# Patient Record
Sex: FEMALE | Race: OTHER | Employment: UNEMPLOYED | URBAN - METROPOLITAN AREA
[De-identification: names, ages, dates, MRNs, and addresses within clinical notes are randomized per-mention and may not be internally consistent; named-entity substitution may affect disease eponyms.]

---

## 2017-01-04 ENCOUNTER — TELEPHONE (OUTPATIENT)
Dept: OBGYN CLINIC | Facility: CLINIC | Age: 37
End: 2017-01-04

## 2017-01-04 NOTE — TELEPHONE ENCOUNTER
Pt requesting to know when her sterilization was. Informed pt that she had her sterilization on June 8, 2016. Per pt will come in today to sign the consent form for her TANIA.

## 2017-01-09 ENCOUNTER — OFFICE VISIT (OUTPATIENT)
Dept: FAMILY MEDICINE CLINIC | Facility: CLINIC | Age: 37
End: 2017-01-09

## 2017-01-09 VITALS
WEIGHT: 155 LBS | SYSTOLIC BLOOD PRESSURE: 118 MMHG | TEMPERATURE: 98 F | DIASTOLIC BLOOD PRESSURE: 72 MMHG | BODY MASS INDEX: 26 KG/M2

## 2017-01-09 DIAGNOSIS — G44.209 ACUTE NON INTRACTABLE TENSION-TYPE HEADACHE: Primary | ICD-10-CM

## 2017-01-09 DIAGNOSIS — K21.9 GASTROESOPHAGEAL REFLUX DISEASE WITHOUT ESOPHAGITIS: ICD-10-CM

## 2017-01-09 PROCEDURE — 99214 OFFICE O/P EST MOD 30 MIN: CPT | Performed by: FAMILY MEDICINE

## 2017-01-09 PROCEDURE — 96372 THER/PROPH/DIAG INJ SC/IM: CPT | Performed by: FAMILY MEDICINE

## 2017-01-09 PROCEDURE — 99212 OFFICE O/P EST SF 10 MIN: CPT | Performed by: FAMILY MEDICINE

## 2017-01-09 RX ORDER — KETOROLAC TROMETHAMINE 10 MG/1
10 TABLET, FILM COATED ORAL EVERY 12 HOURS PRN
Qty: 10 TABLET | Refills: 0 | Status: SHIPPED | OUTPATIENT
Start: 2017-01-09 | End: 2017-03-21

## 2017-01-09 RX ORDER — KETOROLAC TROMETHAMINE 30 MG/ML
30 INJECTION, SOLUTION INTRAMUSCULAR; INTRAVENOUS ONCE
Status: COMPLETED | OUTPATIENT
Start: 2017-01-09 | End: 2017-01-09

## 2017-01-09 RX ORDER — FAMOTIDINE 40 MG/1
40 TABLET, FILM COATED ORAL DAILY
Qty: 60 TABLET | Refills: 0 | Status: SHIPPED | OUTPATIENT
Start: 2017-01-09 | End: 2017-03-21

## 2017-01-09 RX ADMIN — KETOROLAC TROMETHAMINE 30 MG: 30 INJECTION, SOLUTION INTRAMUSCULAR; INTRAVENOUS at 13:51:00

## 2017-01-09 NOTE — PROGRESS NOTES
1/9/2017  1:29 PM    Lorne Mcdermott is a 31year old female. Chief complaint(s): Patient presents with:   Follow - Up  Headache: pt states headaches have gotten worse since last visit  Fatigue    HPI:     Lorne Mcdermott primary complaint is regarding h OTHER SURGICAL HISTORY  2007    Comment Cysts removed    OTHER SURGICAL HISTORY      Comment arthrocentesis of the right shoulder subacromial space    OTHER SURGICAL HISTORY  06/08/16    Comment laparoscopy, filshie clip sterilization of left fallopian Allergies:    Sulfa Antibiotics       Hives, Rash      ROS:   Review of Systems   Constitutional: Positive for fatigue. Negative for fever and chills. HENT: Negative for ear pain and sore throat. Respiratory: Negative for cough and wheezing. Result Value Ref Range   Case Report Surgical Pathology                                Case: [id number]                                  Authorizing Provider:  Eleazar Gosselin, MD    Collected:           10/28/2016                   Ordering Location Note: Due to nature of specimen, it may not survive processing.    Laura Sylvester)  Sharlene Diego M.D./[facility] REMOVED]    Interpretation Benign      EKG / Spirometry : -     Radiology: No results found. -    ASSESSMENT/PLAN:   Assessment  Acute non intracta doctor with any new symptoms or medications' side effects.       FOLLOW-UP: Schedule a follow-up visit in prn.        2. Gastroesophageal reflux disease without esophagitis       MEDICATIONS:   Signed Prescriptions Disp Refills    famotidine 40 MG Oral Tab

## 2017-01-23 ENCOUNTER — OFFICE VISIT (OUTPATIENT)
Dept: OBGYN CLINIC | Facility: CLINIC | Age: 37
End: 2017-01-23

## 2017-01-23 VITALS
TEMPERATURE: 98 F | HEART RATE: 90 BPM | BODY MASS INDEX: 26 KG/M2 | WEIGHT: 156 LBS | DIASTOLIC BLOOD PRESSURE: 80 MMHG | SYSTOLIC BLOOD PRESSURE: 127 MMHG

## 2017-01-23 DIAGNOSIS — R10.2 CHRONIC PELVIC PAIN IN FEMALE: Primary | ICD-10-CM

## 2017-01-23 DIAGNOSIS — N80.9 ENDOMETRIOSIS: ICD-10-CM

## 2017-01-23 DIAGNOSIS — G89.29 CHRONIC PELVIC PAIN IN FEMALE: Primary | ICD-10-CM

## 2017-01-23 DIAGNOSIS — D25.9 UTERINE LEIOMYOMA, UNSPECIFIED LOCATION: ICD-10-CM

## 2017-01-23 PROCEDURE — 99244 OFF/OP CNSLTJ NEW/EST MOD 40: CPT | Performed by: OBSTETRICS & GYNECOLOGY

## 2017-01-23 NOTE — PROGRESS NOTES
HPI:   Letty Sharma is a 31year old female who presents for a hx of chronic pelvic pain/endometriosis/fibroid uterus,  Pt to sign consent form for medicaid today a hysterectomy, pt counseled extesnively on options of management, including medical manag (VITAMIN B 12) 250 MCG Oral Lozenge Take 1 tablet by mouth daily. Disp: 100 lozenge Rfl: 1   acetaminophen (TYLENOL) 325 MG Oral Tab Take 325 mg by mouth every 6 (six) hours as needed for Pain.  Disp:  Rfl:    Ketorolac Tromethamine 10 MG Oral Tab Take 1 ta or ST  LUNGS: denies shortness of breath with exertion  CARDIOVASCULAR: denies chest pain on exertion  GI: denies abdominal pain,denies heartburn  : denies dysuria, vaginal discharge or itching,periods regular   MUSCULOSKELETAL: denies back pain  NEURO:

## 2017-01-25 ENCOUNTER — TELEPHONE (OUTPATIENT)
Dept: OBGYN CLINIC | Facility: CLINIC | Age: 37
End: 2017-01-25

## 2017-01-25 NOTE — TELEPHONE ENCOUNTER
Pt is requesting a dr's note stating she will be off from work due to a gyne surgical procedure, ( not sure how much time off  she needs  ) and would like to know the difference between the 2 surgeries?    also has been feeling lots  of pain and pressure on her vaginal are and back when she is sitting down

## 2017-01-28 NOTE — TELEPHONE ENCOUNTER
Called pt , left message. Pt is considering between a Total Abdominal Hysterectomy vs a Davinci Robotic Total hysterectomy.

## 2017-01-31 NOTE — TELEPHONE ENCOUNTER
Pt counseled, pt wants a Total Abdominal Hysterectomy, possible bilateral salpingoophorectomy, possible bilateral salpingectomy. Pt requesting beginning of Mar 2017.

## 2017-02-07 NOTE — TELEPHONE ENCOUNTER
Left message on home number to call office. Unable to leave message on cell number. Need to inquire if patient signed consent form for Hysterectomy due to having IPA.

## 2017-02-10 NOTE — TELEPHONE ENCOUNTER
Patient is scheduled for surgery 3/8/17 at 7:30am for TANIA poss BSO, poss bilateral salpingectomy with Dr. Magnus Jimenes, assist pending. Form faxed to surg sched and placed in book. Consent for TANIA IPA was signed 1/17/2016.

## 2017-02-10 NOTE — TELEPHONE ENCOUNTER
Patient informed consent was signed 1/7/16 will call patient with dates, per pt requesting march surgery.

## 2017-02-10 NOTE — TELEPHONE ENCOUNTER
Called patient to inform surgery date and time, patient stated she had time to think about  and does not have any at this time. Patient stated she will call to reschedule for another date possible April or until she is able to find care for her children. Surgery form faxed to surg sched dept stating cancelled per pt request today verbal over phone. Routed to Dr. Jack Barker for Camelia Kat.

## 2017-02-13 ENCOUNTER — TELEPHONE (OUTPATIENT)
Dept: OBGYN CLINIC | Facility: CLINIC | Age: 37
End: 2017-02-13

## 2017-02-13 DIAGNOSIS — G89.29 CHRONIC FEMALE PELVIC PAIN: ICD-10-CM

## 2017-02-13 DIAGNOSIS — D25.9 LEIOMYOMA OF UTERUS, UNSPECIFIED: Primary | ICD-10-CM

## 2017-02-13 DIAGNOSIS — R10.2 CHRONIC FEMALE PELVIC PAIN: ICD-10-CM

## 2017-02-13 DIAGNOSIS — N80.9 ENDOMETRIOSIS: ICD-10-CM

## 2017-02-13 NOTE — TELEPHONE ENCOUNTER
PT IS CALLING TO CONFIRM  HER APPT WAS FOR 03/08/2017 OR ARE ANY OTHER  DATES AVAILABLE ? PT IS AWARE SHE WILL BE STAYING AT [facility] 3 DAYS CAN HER 15YEAR O[address] ?

## 2017-02-13 NOTE — TELEPHONE ENCOUNTER
Patient is interested in surgery 4/05/2017 due to having enough planning with childcare. Informed patient will call tomorrow with time available. Patient agreed and verbalized understanding.

## 2017-02-15 NOTE — TELEPHONE ENCOUNTER
Dr. Patel Barcenas, patient is scheduled for Total abdominal hysterectomy, poss bilateral salpingo oophorectomy, poss bilateral salpingectomy for 4/05/2017 at 7:30am [facility], assist is pending. Form faxed to surg sched and put in surg book.      Patient informed of d

## 2017-02-16 NOTE — TELEPHONE ENCOUNTER
Yes, thank you Dr. Bisi Gomez. Placed in surg book and updated form and faxed. Routed to Dr. Rosemarie Moody for [address].

## 2017-02-21 NOTE — TELEPHONE ENCOUNTER
Patient calling to confirm date of surgery to inform her work for any paper work to be filled. Per pt will call back if note needed for work and or papers filled out. Patient agreed and verbalized understanding.

## 2017-03-02 ENCOUNTER — OFFICE VISIT (OUTPATIENT)
Dept: FAMILY MEDICINE CLINIC | Facility: CLINIC | Age: 37
End: 2017-03-02

## 2017-03-02 VITALS
TEMPERATURE: 98 F | WEIGHT: 161 LBS | SYSTOLIC BLOOD PRESSURE: 118 MMHG | HEIGHT: 65 IN | DIASTOLIC BLOOD PRESSURE: 79 MMHG | BODY MASS INDEX: 26.82 KG/M2 | HEART RATE: 65 BPM

## 2017-03-02 DIAGNOSIS — J06.9 UPPER RESPIRATORY TRACT INFECTION, UNSPECIFIED TYPE: ICD-10-CM

## 2017-03-02 DIAGNOSIS — M26.609 TMJ DYSFUNCTION: Primary | ICD-10-CM

## 2017-03-02 DIAGNOSIS — G44.209 TENSION HEADACHE: ICD-10-CM

## 2017-03-02 PROCEDURE — 99214 OFFICE O/P EST MOD 30 MIN: CPT | Performed by: FAMILY MEDICINE

## 2017-03-02 PROCEDURE — 99212 OFFICE O/P EST SF 10 MIN: CPT | Performed by: FAMILY MEDICINE

## 2017-03-02 RX ORDER — MONTELUKAST SODIUM 10 MG/1
10 TABLET ORAL DAILY
Qty: 30 TABLET | Refills: 0 | Status: SHIPPED | OUTPATIENT
Start: 2017-03-02 | End: 2017-03-29

## 2017-03-02 RX ORDER — ACETAMINOPHEN AND CODEINE PHOSPHATE 120; 12 MG/5ML; MG/5ML
SOLUTION ORAL
COMMUNITY
Start: 2017-02-06 | End: 2017-03-29

## 2017-03-02 RX ORDER — CYCLOBENZAPRINE HCL 10 MG
10 TABLET ORAL NIGHTLY
Qty: 20 TABLET | Refills: 0 | Status: SHIPPED | OUTPATIENT
Start: 2017-03-02 | End: 2017-03-22

## 2017-03-02 RX ORDER — NAPROXEN 500 MG/1
500 TABLET ORAL 2 TIMES DAILY WITH MEALS
Qty: 42 TABLET | Refills: 0 | Status: SHIPPED | OUTPATIENT
Start: 2017-03-02 | End: 2017-03-21

## 2017-03-02 NOTE — PROGRESS NOTES
Patient ID: Bri Vega is a 30year old female. HPI  Patient presents with:  Headache  Dizziness    She states this started 4 days ago with headache on both ears that goes up to the parietal region of her scalp.   She states she does work the third Disp:  Rfl:      Allergies:  Sulfa Antibiotics       Hives, Rash   PHYSICAL EXAM:   Physical Exam   Constitutional: She is oriented to person, place, and time. She appears well-developed and well-nourished. No distress. HENT:   Head: Normocephalic.    Rig NTI.        Try and reduce your stress as well.  agrees. Can put ice pack on the back of the neck which can also help. Tension headache  -     naproxen 500 MG Oral Tab; Take 1 tablet (500 mg total) by mouth 2 (two) times daily with meals.   -

## 2017-03-02 NOTE — PATIENT INSTRUCTIONS
You have bilateral TMJ dysfunction (temporomandibular joint). Do warm compresses 3 times daily for 5 minutes each time and take medicine as directed. Avoid chewing tough meats, bagels, and gum excessively.   May need to see dentist for night mouthguard if

## 2017-03-20 ENCOUNTER — TELEPHONE (OUTPATIENT)
Dept: OBGYN CLINIC | Facility: CLINIC | Age: 37
End: 2017-03-20

## 2017-03-20 NOTE — TELEPHONE ENCOUNTER
PT HAS SURGERY SCHEDULED FOR 4-5-17  NEEDS A NOTE FOR WORK STATING DATE OF SURGERY; AND HOW MANY DAYS PT WILL BE OFF WORK  PLEASE FAX

## 2017-03-21 ENCOUNTER — OFFICE VISIT (OUTPATIENT)
Dept: FAMILY MEDICINE CLINIC | Facility: CLINIC | Age: 37
End: 2017-03-21

## 2017-03-21 VITALS
TEMPERATURE: 98 F | SYSTOLIC BLOOD PRESSURE: 138 MMHG | DIASTOLIC BLOOD PRESSURE: 80 MMHG | BODY MASS INDEX: 27 KG/M2 | HEART RATE: 74 BPM | WEIGHT: 161 LBS

## 2017-03-21 DIAGNOSIS — G44.209 TENSION HEADACHE: Primary | ICD-10-CM

## 2017-03-21 PROCEDURE — 99214 OFFICE O/P EST MOD 30 MIN: CPT | Performed by: FAMILY MEDICINE

## 2017-03-21 PROCEDURE — 99212 OFFICE O/P EST SF 10 MIN: CPT | Performed by: FAMILY MEDICINE

## 2017-03-21 NOTE — PROGRESS NOTES
3/21/2017  11:15 AM    Carlee Avalos is a 30year old female. Chief complaint(s): Patient presents with: Follow - Up  Headache    HPI:     Carlee Avalos primary complaint is regarding headache.      Patient is a 30-year-old female who presents compl Types: Cigarettes    Smokeless Status: Former User                       Comment: Smokes 1-5 cigarettes daily    Alcohol Use: No                 Immunizations: There is no immunization history on file for this patient.     Medications (Active lb (73.029 kg)  LMP 03/02/2017 (Exact Date)  Breastfeeding? No   HENT:   Head: Normocephalic. Right Ear: Tympanic membrane, external ear and ear canal normal.   Left Ear: Tympanic membrane, external ear and ear canal normal.   Nose: No rhinorrhea.    Mout

## 2017-03-22 NOTE — TELEPHONE ENCOUNTER
Pt called and made aware zaria,t per Dr Triana Standard, pt would be off work for 6-8 weeks. Pt also made aware note for time off work faxed to [phone] attention to [facility]. Pt verbalizes understanding.  [phone] used to interpret for cl

## 2017-03-22 NOTE — TELEPHONE ENCOUNTER
PER PT SHE WOULD LIKE TO KNOW IF [facility] NOTE WAS FAX TO HER JOB, PER PT DR AHUJA SAID SHE WILL BE OUT OF WORK FOR 8 WEEKS

## 2017-03-27 NOTE — TELEPHONE ENCOUNTER
Per pt her  has not received the dr's note, or received it by mail, pt would like to  a copy at the [facility]

## 2017-03-28 NOTE — TELEPHONE ENCOUNTER
Pt is returning nurse call, pt would like to know if the note is ready for pick at the [facility] location ?

## 2017-03-29 ENCOUNTER — TELEPHONE (OUTPATIENT)
Dept: OBGYN CLINIC | Facility: CLINIC | Age: 37
End: 2017-03-29

## 2017-03-29 RX ORDER — FAMOTIDINE 20 MG/1
40 TABLET ORAL DAILY
COMMUNITY
End: 2017-06-02

## 2017-03-29 NOTE — TELEPHONE ENCOUNTER
Referral was authorized in [address] [place] [address] under referral tab dated 2/15/17. Called Ming and left him a VM.

## 2017-03-29 NOTE — TELEPHONE ENCOUNTER
EMILIE CALLING FROM [facility] REGARDING PRIOR AUTHORIZATION / PT SCHEDULE FOR SURGERY ON 04/05/17 / PT HAS CARMINA RIVERO / [place] / [place] ADV

## 2017-03-30 ENCOUNTER — LAB ENCOUNTER (OUTPATIENT)
Dept: LAB | Age: 37
End: 2017-03-30
Attending: OBSTETRICS & GYNECOLOGY
Payer: MEDICAID

## 2017-03-30 DIAGNOSIS — D25.9 UTERINE LEIOMYOMA, UNSPECIFIED LOCATION: ICD-10-CM

## 2017-03-30 DIAGNOSIS — R10.2 PELVIC PAIN IN FEMALE: ICD-10-CM

## 2017-03-30 DIAGNOSIS — N80.9 ENDOMETRIOSIS: ICD-10-CM

## 2017-03-30 PROCEDURE — 36415 COLL VENOUS BLD VENIPUNCTURE: CPT

## 2017-03-30 PROCEDURE — 85025 COMPLETE CBC W/AUTO DIFF WBC: CPT

## 2017-03-30 PROCEDURE — 86901 BLOOD TYPING SEROLOGIC RH(D): CPT

## 2017-03-30 PROCEDURE — 86850 RBC ANTIBODY SCREEN: CPT

## 2017-03-30 PROCEDURE — 86900 BLOOD TYPING SEROLOGIC ABO: CPT

## 2017-03-30 NOTE — TELEPHONE ENCOUNTER
Left message on Rich v/m to call office. Called [facility] patient is approved Cori Eisenberg number is #[id number] Spoke to [place] and [place].

## 2017-03-31 ENCOUNTER — TELEPHONE (OUTPATIENT)
Dept: OBGYN CLINIC | Facility: CLINIC | Age: 37
End: 2017-03-31

## 2017-04-01 NOTE — TELEPHONE ENCOUNTER
Pt will re fax the fmla forms today, pls call pt when is ready for , pt's job would like the pt to drop them off personally

## 2017-04-03 NOTE — TELEPHONE ENCOUNTER
Per pt the NICANOR at the [facility] location faxed the forms to the [facility]

## 2017-04-03 NOTE — TELEPHONE ENCOUNTER
Per pt she has been faxing to [phone] and was not able to go thought, she will be drooping them at at the [facility] location , pls note once the forms are completed to have the pt pick then up at the [facility] office she needs to turn them in in person at her job

## 2017-04-03 NOTE — TELEPHONE ENCOUNTER
Pt will be dropping papers off at the [facility] location. Pt to  forms once forms filled out per staff.

## 2017-04-05 ENCOUNTER — ANESTHESIA EVENT (OUTPATIENT)
Dept: SURGERY | Facility: HOSPITAL | Age: 37
DRG: 743 | End: 2017-04-05
Payer: MEDICAID

## 2017-04-05 ENCOUNTER — HOSPITAL ENCOUNTER (INPATIENT)
Facility: HOSPITAL | Age: 37
LOS: 2 days | Discharge: HOME | DRG: 743 | End: 2017-04-07
Attending: OBSTETRICS & GYNECOLOGY | Admitting: OBSTETRICS & GYNECOLOGY
Payer: MEDICAID

## 2017-04-05 ENCOUNTER — SURGERY (OUTPATIENT)
Age: 37
End: 2017-04-05

## 2017-04-05 ENCOUNTER — ANESTHESIA (OUTPATIENT)
Dept: SURGERY | Facility: HOSPITAL | Age: 37
DRG: 743 | End: 2017-04-05
Payer: MEDICAID

## 2017-04-05 DIAGNOSIS — N80.9 ENDOMETRIOSIS: ICD-10-CM

## 2017-04-05 DIAGNOSIS — D25.9 UTERINE LEIOMYOMA, UNSPECIFIED LOCATION: Primary | ICD-10-CM

## 2017-04-05 DIAGNOSIS — R10.2 PELVIC PAIN IN FEMALE: ICD-10-CM

## 2017-04-05 PROCEDURE — 0UT70ZZ RESECTION OF BILATERAL FALLOPIAN TUBES, OPEN APPROACH: ICD-10-PCS | Performed by: OBSTETRICS & GYNECOLOGY

## 2017-04-05 PROCEDURE — 49203 EXC ABD TUM 5 CM OR LESS: CPT | Performed by: OBSTETRICS & GYNECOLOGY

## 2017-04-05 PROCEDURE — 58150 TOTAL HYSTERECTOMY: CPT | Performed by: OBSTETRICS & GYNECOLOGY

## 2017-04-05 PROCEDURE — 0UT90ZZ RESECTION OF UTERUS, OPEN APPROACH: ICD-10-PCS | Performed by: OBSTETRICS & GYNECOLOGY

## 2017-04-05 PROCEDURE — 0UTC0ZZ RESECTION OF CERVIX, OPEN APPROACH: ICD-10-PCS | Performed by: OBSTETRICS & GYNECOLOGY

## 2017-04-05 RX ORDER — SODIUM CHLORIDE 0.9 % (FLUSH) 0.9 %
10 SYRINGE (ML) INJECTION AS NEEDED
Status: DISCONTINUED | OUTPATIENT
Start: 2017-04-05 | End: 2017-04-07

## 2017-04-05 RX ORDER — SODIUM PHOSPHATE, DIBASIC AND SODIUM PHOSPHATE, MONOBASIC 7; 19 G/133ML; G/133ML
1 ENEMA RECTAL ONCE AS NEEDED
Status: ACTIVE | OUTPATIENT
Start: 2017-04-05 | End: 2017-04-05

## 2017-04-05 RX ORDER — MORPHINE SULFATE 10 MG/ML
6 INJECTION, SOLUTION INTRAMUSCULAR; INTRAVENOUS EVERY 10 MIN PRN
Status: DISCONTINUED | OUTPATIENT
Start: 2017-04-05 | End: 2017-04-05 | Stop reason: HOSPADM

## 2017-04-05 RX ORDER — ONDANSETRON 2 MG/ML
INJECTION INTRAMUSCULAR; INTRAVENOUS AS NEEDED
Status: DISCONTINUED | OUTPATIENT
Start: 2017-04-05 | End: 2017-04-05 | Stop reason: SURG

## 2017-04-05 RX ORDER — ONDANSETRON 2 MG/ML
4 INJECTION INTRAMUSCULAR; INTRAVENOUS ONCE AS NEEDED
Status: DISCONTINUED | OUTPATIENT
Start: 2017-04-05 | End: 2017-04-05 | Stop reason: HOSPADM

## 2017-04-05 RX ORDER — ACETAMINOPHEN 325 MG/1
650 TABLET ORAL ONCE
Status: COMPLETED | OUTPATIENT
Start: 2017-04-05 | End: 2017-04-05

## 2017-04-05 RX ORDER — POLYETHYLENE GLYCOL 3350 17 G/17G
17 POWDER, FOR SOLUTION ORAL DAILY PRN
Status: DISCONTINUED | OUTPATIENT
Start: 2017-04-05 | End: 2017-04-07

## 2017-04-05 RX ORDER — SODIUM CHLORIDE, SODIUM LACTATE, POTASSIUM CHLORIDE, CALCIUM CHLORIDE 600; 310; 30; 20 MG/100ML; MG/100ML; MG/100ML; MG/100ML
INJECTION, SOLUTION INTRAVENOUS CONTINUOUS
Status: DISCONTINUED | OUTPATIENT
Start: 2017-04-05 | End: 2017-04-07

## 2017-04-05 RX ORDER — ZOLPIDEM TARTRATE 5 MG/1
5 TABLET ORAL NIGHTLY PRN
Status: DISCONTINUED | OUTPATIENT
Start: 2017-04-05 | End: 2017-04-07

## 2017-04-05 RX ORDER — METOCLOPRAMIDE 10 MG/1
10 TABLET ORAL ONCE
Status: DISCONTINUED | OUTPATIENT
Start: 2017-04-05 | End: 2017-04-05 | Stop reason: HOSPADM

## 2017-04-05 RX ORDER — NALOXONE HYDROCHLORIDE 0.4 MG/ML
80 INJECTION, SOLUTION INTRAMUSCULAR; INTRAVENOUS; SUBCUTANEOUS AS NEEDED
Status: DISCONTINUED | OUTPATIENT
Start: 2017-04-05 | End: 2017-04-05 | Stop reason: HOSPADM

## 2017-04-05 RX ORDER — DIPHENHYDRAMINE HYDROCHLORIDE 50 MG/ML
12.5 INJECTION INTRAMUSCULAR; INTRAVENOUS EVERY 4 HOURS PRN
Status: DISCONTINUED | OUTPATIENT
Start: 2017-04-05 | End: 2017-04-07

## 2017-04-05 RX ORDER — MORPHINE SULFATE 2 MG/ML
2 INJECTION, SOLUTION INTRAMUSCULAR; INTRAVENOUS EVERY 10 MIN PRN
Status: DISCONTINUED | OUTPATIENT
Start: 2017-04-05 | End: 2017-04-05 | Stop reason: HOSPADM

## 2017-04-05 RX ORDER — BISACODYL 10 MG
10 SUPPOSITORY, RECTAL RECTAL
Status: DISCONTINUED | OUTPATIENT
Start: 2017-04-05 | End: 2017-04-07

## 2017-04-05 RX ORDER — DEXTROSE, SODIUM CHLORIDE, SODIUM LACTATE, POTASSIUM CHLORIDE, AND CALCIUM CHLORIDE 5; .6; .31; .03; .02 G/100ML; G/100ML; G/100ML; G/100ML; G/100ML
INJECTION, SOLUTION INTRAVENOUS CONTINUOUS
Status: DISCONTINUED | OUTPATIENT
Start: 2017-04-05 | End: 2017-04-07

## 2017-04-05 RX ORDER — DEXAMETHASONE SODIUM PHOSPHATE 4 MG/ML
VIAL (ML) INJECTION AS NEEDED
Status: DISCONTINUED | OUTPATIENT
Start: 2017-04-05 | End: 2017-04-05 | Stop reason: SURG

## 2017-04-05 RX ORDER — SODIUM CHLORIDE, SODIUM LACTATE, POTASSIUM CHLORIDE, CALCIUM CHLORIDE 600; 310; 30; 20 MG/100ML; MG/100ML; MG/100ML; MG/100ML
INJECTION, SOLUTION INTRAVENOUS CONTINUOUS PRN
Status: DISCONTINUED | OUTPATIENT
Start: 2017-04-05 | End: 2017-04-05 | Stop reason: SURG

## 2017-04-05 RX ORDER — SODIUM CHLORIDE, SODIUM LACTATE, POTASSIUM CHLORIDE, CALCIUM CHLORIDE 600; 310; 30; 20 MG/100ML; MG/100ML; MG/100ML; MG/100ML
INJECTION, SOLUTION INTRAVENOUS CONTINUOUS
Status: DISCONTINUED | OUTPATIENT
Start: 2017-04-05 | End: 2017-04-05 | Stop reason: HOSPADM

## 2017-04-05 RX ORDER — HYDROMORPHONE HYDROCHLORIDE 1 MG/ML
0.2 INJECTION, SOLUTION INTRAMUSCULAR; INTRAVENOUS; SUBCUTANEOUS EVERY 5 MIN PRN
Status: DISCONTINUED | OUTPATIENT
Start: 2017-04-05 | End: 2017-04-05 | Stop reason: HOSPADM

## 2017-04-05 RX ORDER — DOCUSATE SODIUM 100 MG/1
100 CAPSULE, LIQUID FILLED ORAL 2 TIMES DAILY
Status: DISCONTINUED | OUTPATIENT
Start: 2017-04-05 | End: 2017-04-07

## 2017-04-05 RX ORDER — HYDROMORPHONE HYDROCHLORIDE 1 MG/ML
INJECTION, SOLUTION INTRAMUSCULAR; INTRAVENOUS; SUBCUTANEOUS AS NEEDED
Status: DISCONTINUED | OUTPATIENT
Start: 2017-04-05 | End: 2017-04-05 | Stop reason: SURG

## 2017-04-05 RX ORDER — ROCURONIUM BROMIDE 10 MG/ML
INJECTION, SOLUTION INTRAVENOUS AS NEEDED
Status: DISCONTINUED | OUTPATIENT
Start: 2017-04-05 | End: 2017-04-05 | Stop reason: SURG

## 2017-04-05 RX ORDER — GLYCOPYRROLATE 0.2 MG/ML
INJECTION INTRAMUSCULAR; INTRAVENOUS AS NEEDED
Status: DISCONTINUED | OUTPATIENT
Start: 2017-04-05 | End: 2017-04-05 | Stop reason: SURG

## 2017-04-05 RX ORDER — NEOSTIGMINE METHYLSULFATE 0.5 MG/ML
INJECTION INTRAVENOUS AS NEEDED
Status: DISCONTINUED | OUTPATIENT
Start: 2017-04-05 | End: 2017-04-05 | Stop reason: SURG

## 2017-04-05 RX ORDER — MIDAZOLAM HYDROCHLORIDE 1 MG/ML
INJECTION INTRAMUSCULAR; INTRAVENOUS AS NEEDED
Status: DISCONTINUED | OUTPATIENT
Start: 2017-04-05 | End: 2017-04-05 | Stop reason: SURG

## 2017-04-05 RX ORDER — HYDROCODONE BITARTRATE AND ACETAMINOPHEN 5; 325 MG/1; MG/1
1 TABLET ORAL AS NEEDED
Status: DISCONTINUED | OUTPATIENT
Start: 2017-04-05 | End: 2017-04-05 | Stop reason: HOSPADM

## 2017-04-05 RX ORDER — MORPHINE SULFATE 4 MG/ML
4 INJECTION, SOLUTION INTRAMUSCULAR; INTRAVENOUS EVERY 10 MIN PRN
Status: DISCONTINUED | OUTPATIENT
Start: 2017-04-05 | End: 2017-04-05 | Stop reason: HOSPADM

## 2017-04-05 RX ORDER — IBUPROFEN 600 MG/1
600 TABLET ORAL EVERY 6 HOURS
Status: DISCONTINUED | OUTPATIENT
Start: 2017-04-05 | End: 2017-04-07

## 2017-04-05 RX ORDER — HYDROCODONE BITARTRATE AND ACETAMINOPHEN 5; 325 MG/1; MG/1
2 TABLET ORAL AS NEEDED
Status: DISCONTINUED | OUTPATIENT
Start: 2017-04-05 | End: 2017-04-05 | Stop reason: HOSPADM

## 2017-04-05 RX ORDER — HALOPERIDOL 5 MG/ML
0.25 INJECTION INTRAMUSCULAR ONCE AS NEEDED
Status: DISCONTINUED | OUTPATIENT
Start: 2017-04-05 | End: 2017-04-05 | Stop reason: HOSPADM

## 2017-04-05 RX ORDER — HYDROMORPHONE HYDROCHLORIDE 1 MG/ML
0.4 INJECTION, SOLUTION INTRAMUSCULAR; INTRAVENOUS; SUBCUTANEOUS EVERY 5 MIN PRN
Status: DISCONTINUED | OUTPATIENT
Start: 2017-04-05 | End: 2017-04-05 | Stop reason: HOSPADM

## 2017-04-05 RX ORDER — LIDOCAINE HYDROCHLORIDE 10 MG/ML
INJECTION, SOLUTION EPIDURAL; INFILTRATION; INTRACAUDAL; PERINEURAL AS NEEDED
Status: DISCONTINUED | OUTPATIENT
Start: 2017-04-05 | End: 2017-04-05 | Stop reason: SURG

## 2017-04-05 RX ORDER — FAMOTIDINE 20 MG/1
20 TABLET ORAL ONCE
Status: DISCONTINUED | OUTPATIENT
Start: 2017-04-05 | End: 2017-04-05 | Stop reason: HOSPADM

## 2017-04-05 RX ORDER — HYDROMORPHONE HYDROCHLORIDE 1 MG/ML
0.6 INJECTION, SOLUTION INTRAMUSCULAR; INTRAVENOUS; SUBCUTANEOUS EVERY 5 MIN PRN
Status: DISCONTINUED | OUTPATIENT
Start: 2017-04-05 | End: 2017-04-05 | Stop reason: HOSPADM

## 2017-04-05 RX ADMIN — GLYCOPYRROLATE 0.8 MG: 0.2 INJECTION INTRAMUSCULAR; INTRAVENOUS at 09:20:00

## 2017-04-05 RX ADMIN — NEOSTIGMINE METHYLSULFATE 5 MG: 0.5 INJECTION INTRAVENOUS at 09:20:00

## 2017-04-05 RX ADMIN — HYDROMORPHONE HYDROCHLORIDE 0.3 MG: 1 INJECTION, SOLUTION INTRAMUSCULAR; INTRAVENOUS; SUBCUTANEOUS at 09:28:00

## 2017-04-05 RX ADMIN — MIDAZOLAM HYDROCHLORIDE 2 MG: 1 INJECTION INTRAMUSCULAR; INTRAVENOUS at 07:50:00

## 2017-04-05 RX ADMIN — ONDANSETRON 4 MG: 2 INJECTION INTRAMUSCULAR; INTRAVENOUS at 09:20:00

## 2017-04-05 RX ADMIN — DEXAMETHASONE SODIUM PHOSPHATE 4 MG: 4 MG/ML VIAL (ML) INJECTION at 08:06:00

## 2017-04-05 RX ADMIN — ROCURONIUM BROMIDE 50 MG: 10 INJECTION, SOLUTION INTRAVENOUS at 07:52:00

## 2017-04-05 RX ADMIN — SODIUM CHLORIDE, SODIUM LACTATE, POTASSIUM CHLORIDE, CALCIUM CHLORIDE: 600; 310; 30; 20 INJECTION, SOLUTION INTRAVENOUS at 09:39:00

## 2017-04-05 RX ADMIN — SODIUM CHLORIDE, SODIUM LACTATE, POTASSIUM CHLORIDE, CALCIUM CHLORIDE: 600; 310; 30; 20 INJECTION, SOLUTION INTRAVENOUS at 07:47:00

## 2017-04-05 RX ADMIN — ROCURONIUM BROMIDE 10 MG: 10 INJECTION, SOLUTION INTRAVENOUS at 08:49:00

## 2017-04-05 RX ADMIN — LIDOCAINE HYDROCHLORIDE 25 MG: 10 INJECTION, SOLUTION EPIDURAL; INFILTRATION; INTRACAUDAL; PERINEURAL at 07:52:00

## 2017-04-05 RX ADMIN — SODIUM CHLORIDE, SODIUM LACTATE, POTASSIUM CHLORIDE, CALCIUM CHLORIDE: 600; 310; 30; 20 INJECTION, SOLUTION INTRAVENOUS at 09:25:00

## 2017-04-05 NOTE — ANESTHESIA POSTPROCEDURE EVALUATION
Patient: Bri Vega    Procedure Summary     Date Anesthesia Start Anesthesia Stop Room / Location    04/05/17 0747  [facility] OR 05 / [facility] MAIN OR       Procedure Diagnosis Surgeon Responsible Provider    ABDOMINAL HYSTERECTOMY (N/A Abdomen) (Uterine l

## 2017-04-05 NOTE — OPERATIVE REPORT
[facility]    PATIENT'S NAME: Immanuel Velasquez   ATTENDING PHYSICIAN: Juvencio Morris MD   OPERATING PHYSICIAN: Yasir Morris MD   PATIENT ACCOUNT#:   [de-identified]    LOCATION:  [facility] [address] PACU [address] [place] [address]  MEDICAL RECORD #:   [id number] was administered, the patient was prepped and draped in the usual sterile fashion. Note that sequential compression devices were begun prior to procedure and were operative throughout the procedure and will be operative postop as well for DVT prophylaxis. field bilaterally. Progressive clamping was then performed toward the uterosacral ligaments, which were clamped and suture ligated bilaterally. The uterus and cervix were then amputated off the vaginal cuff.   The vaginal cuff was closed with 0 Vicryl sut

## 2017-04-05 NOTE — ANESTHESIA PREPROCEDURE EVALUATION
Anesthesia PreOp Note    HPI:     Bri Vega is a 30year old female who presents for preoperative consultation requested by:  Charlotte Booker MD    Date of Surgery: 4/5/2017    Procedure(s):  ABDOMINAL HYSTERECTOMY  Indication: Uterine leiomyoma, CHOLECYSTECTOMY           Prescriptions prior to admission:  famoTIDine 20 MG Oral Tab Take 40 mg by mouth daily. Disp:  Rfl:  4/4/2017 at 1800   Melatonin 1 MG Oral Cap Take 2 mg by mouth daily.  Disp:  Rfl:  Taking   alprazolam (XANAX) 0.25 MG Oral Tab Ta 6.9 03/30/2017   RBC 4.10 03/30/2017   HGB 12.5 03/30/2017   HCT 37.3 03/30/2017   MCV 91.0 03/30/2017   MCH 30.6 03/30/2017   MCHC 33.7 03/30/2017   RDW 13.5 03/30/2017    03/30/2017   MPV 9.4 03/30/2017   URINEPREG Negative 04/05/2017 AM

## 2017-04-05 NOTE — H&P
HPI:    Madan Melgar is a 31year old female who presents for a hx of chronic pelvic pain/endometriosis/fibroid uterus,  Pt to sign consent form for medicaid today a hysterectomy, pt counseled extesnively on options of management, including medical melissa Disp: 12 capsule  Rfl: 4    Cyanocobalamin (VITAMIN B 12) 250 MCG Oral Lozenge  Take 1 tablet by mouth daily.  Disp: 100 lozenge  Rfl: 1    acetaminophen (TYLENOL) 325 MG Oral Tab  Take 325 mg by mouth every 6 (six) hours as needed for Pain.  Disp:   Rfl:     REVIEW OF SYSTEMS:    GENERAL: feels well otherwise  SKIN: denies any unusual skin lesions  EYES:denies blurred vision or double vision  HEENT: denies nasal congestion, sinus pain or ST  LUNGS: denies shortness of breath with exertion  CARDIOVASCULAR

## 2017-04-05 NOTE — OPERATIVE REPORT
[facility] - [facility]    Post-Op Progress Note    Mark Mathis Patient Status:  Surgery Admit    1980 MRN [id number]   Location One [facility] [place] [facility]"" UNIT Attending Tatiana Carlson MD   Hosp Day # 0 PCP Rafael Kraft

## 2017-04-06 RX ORDER — HYDROCODONE BITARTRATE AND ACETAMINOPHEN 7.5; 325 MG/1; MG/1
1 TABLET ORAL EVERY 6 HOURS PRN
Status: DISCONTINUED | OUTPATIENT
Start: 2017-04-06 | End: 2017-04-07

## 2017-04-06 RX ORDER — HYDROCODONE BITARTRATE AND ACETAMINOPHEN 5; 325 MG/1; MG/1
1 TABLET ORAL EVERY 4 HOURS PRN
Status: DISCONTINUED | OUTPATIENT
Start: 2017-04-06 | End: 2017-04-07

## 2017-04-06 NOTE — PLAN OF CARE
Altered Communication/Language Barrier    • Patient/Family is able to understand and participate in their care Progressing        DISCHARGE PLANNING    • Discharge to home or other facility with appropriate resources Progressing        PAIN - ADULT    • Ve

## 2017-04-06 NOTE — PAYOR COMM NOTE
Review Type: ADMISSION   Reviewer: Dylan Carty       Date: April 6, 2017 - 3:53 PM  Payor: Josef Wyman Number: [id number]  Admit date: 4/5/2017  6:14 AM   Admitted from Emergency Dept.:       HPI:    Kimberly Arauz is a 31 year 37.5-325 MG Oral Tab  Take 1 tablet by mouth every 6 (six) hours as needed for Pain.  Take it with food.  Disp: 60 tablet  Rfl: 1    Cholecalciferol (VITAMIN D3) 47104 UNITS Oral Cap  Take 1 tablet by mouth once a week.  Disp: 12 capsule  Rfl: 4    Cyanocob Status: Former Smoker                   Packs/Day: 0.00  Years:            Types: Cigarettes    Smokeless Status: Former User                        Comment: Smokes 1-5 cigarettes daily    Alcohol Use: ZAK                         REVIEW OF SYSTEMS:    GENER patient indicates understanding of these issues and agrees to the plan.    Pt counseled again on liberty/poss bso. All questions answered.              ADMISSION DATE:       04/05/2017      OPERATION DATE:  04/05/2017    OPERATIVE REPORT      PREOPERATIVE DIAG

## 2017-04-06 NOTE — PROGRESS NOTES
POD 1  Pt doing well. Pt doing well. No c/o.     abd soft, nt., incision c/d/i    Ext nt    A/p POD 1  Pt doing well. No c/o. Ambulate.

## 2017-04-07 VITALS
TEMPERATURE: 98 F | HEIGHT: 64 IN | HEART RATE: 85 BPM | WEIGHT: 162 LBS | SYSTOLIC BLOOD PRESSURE: 118 MMHG | DIASTOLIC BLOOD PRESSURE: 57 MMHG | BODY MASS INDEX: 27.66 KG/M2 | RESPIRATION RATE: 20 BRPM | OXYGEN SATURATION: 100 %

## 2017-04-07 RX ORDER — HYDROCODONE BITARTRATE AND ACETAMINOPHEN 7.5; 325 MG/1; MG/1
1 TABLET ORAL EVERY 6 HOURS PRN
Qty: 30 TABLET | Refills: 0 | Status: SHIPPED | OUTPATIENT
Start: 2017-04-07 | End: 2017-06-02

## 2017-04-07 RX ORDER — ALPRAZOLAM 0.25 MG/1
0.25 TABLET ORAL DAILY PRN
Status: DISCONTINUED | OUTPATIENT
Start: 2017-04-07 | End: 2017-04-07

## 2017-04-07 NOTE — PROGRESS NOTES
POD 2  Pt doing well, no c/o. Passing gas, s/p bm    Alert and oriented, nad  abd soft, nt, incision c/d/i  Ext nt    A/p pod 2  Pt doing well. Eating well. Ambulating well. D/c instructions given/  F/u 2 weeks.

## 2017-04-07 NOTE — DISCHARGE SUMMARY
[facility] [place] [facility]    Discharge Summary    Obi Estrada Patient Status:  Inpatient    1980 MRN [id number]   Location [facility] 4W/SW/SE Attending Gianfranco Waters MD   Hosp Day # 2 PCP Alisa Washington MD     Admit Date:

## 2017-04-10 ENCOUNTER — TELEPHONE (OUTPATIENT)
Dept: OBGYN CLINIC | Facility: CLINIC | Age: 37
End: 2017-04-10

## 2017-04-10 NOTE — TELEPHONE ENCOUNTER
Used , Ashley, [id number]. Informed pt using the  that Dr. Fahad Kim wanted pt to be informed that pathology report was benign, fibroid uterus noted. Pt stated understanding.

## 2017-04-10 NOTE — TELEPHONE ENCOUNTER
[address] MD Robina  P [facility] Clinical Staff                   Please inform, pathology report was benign, fibroid uterus noted.

## 2017-04-10 NOTE — TELEPHONE ENCOUNTER
Has a rash in her back, very very itchy, taking narco for pain, had surgery on 04/05/2017. Bertram Abbott

## 2017-04-10 NOTE — TELEPHONE ENCOUNTER
PT IS CALLING TO CHECK THE STATUS ON HER FMLA FORMS, DID TRIED TO SPEAK TO ADRIANA BUT SHE DID NOT ANSWER

## 2017-04-10 NOTE — TELEPHONE ENCOUNTER
Patient developed a rash on her back 3days very itchy, per ASJ patient to try take benadryl and use hydrocortizone cream, if rash wont improve needs to see her PC, no further questions at this time,  used to communicate

## 2017-04-20 ENCOUNTER — OFFICE VISIT (OUTPATIENT)
Dept: OBGYN CLINIC | Facility: CLINIC | Age: 37
End: 2017-04-20

## 2017-04-20 VITALS
WEIGHT: 166 LBS | BODY MASS INDEX: 28 KG/M2 | DIASTOLIC BLOOD PRESSURE: 69 MMHG | HEART RATE: 76 BPM | SYSTOLIC BLOOD PRESSURE: 117 MMHG

## 2017-04-20 DIAGNOSIS — R10.2 PELVIC PAIN: ICD-10-CM

## 2017-04-20 DIAGNOSIS — R30.0 DYSURIA: ICD-10-CM

## 2017-04-20 DIAGNOSIS — N30.90 CYSTITIS: Primary | ICD-10-CM

## 2017-04-20 PROCEDURE — 99214 OFFICE O/P EST MOD 30 MIN: CPT | Performed by: OBSTETRICS & GYNECOLOGY

## 2017-04-20 RX ORDER — AMOXICILLIN 500 MG/1
500 CAPSULE ORAL 3 TIMES DAILY
Qty: 21 CAPSULE | Refills: 0 | Status: SHIPPED | OUTPATIENT
Start: 2017-04-20 | End: 2017-04-21

## 2017-04-20 RX ORDER — PHENAZOPYRIDINE HYDROCHLORIDE 200 MG/1
200 TABLET, FILM COATED ORAL 3 TIMES DAILY PRN
Qty: 9 TABLET | Refills: 0 | Status: SHIPPED | OUTPATIENT
Start: 2017-04-20 | End: 2017-04-23

## 2017-04-20 NOTE — PROGRESS NOTES
HPI:   Merlinda Compton is a 30year old female who presents for a f/u/postop/pt c/o suprapubic pain and dysuria for several days.        Wt Readings from Last 6 Encounters:  04/20/17 : 166 lb (75.297 kg)  04/05/17 : 162 lb (73.483 kg)  03/21/17 : 161 lb (73 subacromial space    OTHER SURGICAL HISTORY  06/08/16    Comment laparoscopy, filshie clip sterilization of left fallopian tube, ablation of endometriosis, retrieval and removal of displaced filshie clip    CHOLECYSTECTOMY      TOTAL ABDOMINAL HYSTERECTOMY tv palpation.    MUSCULOSKELETAL: back is not tender,FROM of the back  EXTREMITIES: no cyanosis, clubbing or edema  NEURO: Oriented times three,    ASSESSMENT AND PLAN:   Ramonita Hernandez is a 30year old female who presents for a f/u postop/suprapubic pelvi

## 2017-04-21 ENCOUNTER — TELEPHONE (OUTPATIENT)
Dept: OBGYN CLINIC | Facility: CLINIC | Age: 37
End: 2017-04-21

## 2017-04-21 RX ORDER — AMOXICILLIN 500 MG/1
500 CAPSULE ORAL 3 TIMES DAILY
Qty: 21 CAPSULE | Refills: 0 | Status: SHIPPED | OUTPATIENT
Start: 2017-04-21 | End: 2017-04-28

## 2017-04-21 NOTE — TELEPHONE ENCOUNTER
Rx sent to [facility] pharmacy, pt had hysterectomy on 4/5/17, when can pt resume driving?

## 2017-04-25 ENCOUNTER — TELEPHONE (OUTPATIENT)
Dept: OBGYN CLINIC | Facility: CLINIC | Age: 37
End: 2017-04-25

## 2017-04-25 NOTE — TELEPHONE ENCOUNTER
Per pt she had a bad cramp on her R side that goes down to leg, had surgery on 04/05/2017, pt would like to know what to take ?

## 2017-04-25 NOTE — TELEPHONE ENCOUNTER
Patient had surgery on 4/5 and started having pain 1 week ago on right side of abdomen and radiates down to the leg, rates pain 9 out of 10- comes and goes.   feel like leg becomes \"loose\" (no strength on whole right leg when she feels the pain) and burni

## 2017-04-26 ENCOUNTER — TELEPHONE (OUTPATIENT)
Dept: OBGYN CLINIC | Facility: CLINIC | Age: 37
End: 2017-04-26

## 2017-04-26 NOTE — TELEPHONE ENCOUNTER
TANIA done 4/5/17 by lobito, pt states she has had vaginal bleeding bright red color since today 12 noon, pain is 6/10, pt is taking norco every 8-10hrs PRN, amoxicillin every 6hrs, lower pelvic pressure when walking only 8-9/10 pain.  Also pt states that this w

## 2017-04-27 ENCOUNTER — HOSPITAL ENCOUNTER (EMERGENCY)
Facility: HOSPITAL | Age: 37
Discharge: HOME | End: 2017-04-27
Payer: MEDICAID

## 2017-04-27 ENCOUNTER — APPOINTMENT (OUTPATIENT)
Dept: CT IMAGING | Facility: HOSPITAL | Age: 37
End: 2017-04-27
Attending: NURSE PRACTITIONER
Payer: MEDICAID

## 2017-04-27 VITALS
WEIGHT: 165 LBS | TEMPERATURE: 98 F | HEART RATE: 73 BPM | BODY MASS INDEX: 28 KG/M2 | DIASTOLIC BLOOD PRESSURE: 59 MMHG | SYSTOLIC BLOOD PRESSURE: 108 MMHG | RESPIRATION RATE: 18 BRPM | OXYGEN SATURATION: 99 %

## 2017-04-27 DIAGNOSIS — Z87.42 H/O VAGINAL BLEEDING: Primary | ICD-10-CM

## 2017-04-27 DIAGNOSIS — Z90.710 S/P HYSTERECTOMY: ICD-10-CM

## 2017-04-27 PROCEDURE — 85025 COMPLETE CBC W/AUTO DIFF WBC: CPT | Performed by: NURSE PRACTITIONER

## 2017-04-27 PROCEDURE — 74177 CT ABD & PELVIS W/CONTRAST: CPT

## 2017-04-27 PROCEDURE — 80048 BASIC METABOLIC PNL TOTAL CA: CPT | Performed by: NURSE PRACTITIONER

## 2017-04-27 PROCEDURE — 81003 URINALYSIS AUTO W/O SCOPE: CPT

## 2017-04-27 PROCEDURE — 99285 EMERGENCY DEPT VISIT HI MDM: CPT

## 2017-04-27 PROCEDURE — 36415 COLL VENOUS BLD VENIPUNCTURE: CPT

## 2017-04-27 NOTE — TELEPHONE ENCOUNTER
04/27 pt did not go to the ER, but she was advise to go to the ER  Due to her symptoms for the past two days and how important is for her to be seen at the ER. Pt said that she will try to go today.

## 2017-04-27 NOTE — ED INITIAL ASSESSMENT (HPI)
Per sister, pt with vag bleeding when wiping after urination. Sister states pt had hysterectomy 4/5/17. C/o lower abd pain, slight lower back pain.  Per sister, pt on amoxicillin for UTI, started saturday

## 2017-04-27 NOTE — TELEPHONE ENCOUNTER
Per Dr. Aodnay Hu, patient to wait in ER to have complete testing done with ultrasound. Per pt is with sister and will have her get her food because she is hungry. Per patient will wait.

## 2017-04-27 NOTE — ED PROVIDER NOTES
Patient Seen in: [facility] AND [facility] Emergency Department    History   Patient presents with:  Vaginal Bleeding    Stated Complaint: Vaginal bleeding     HPI    Patient presents into the emergency room for evaluation of vaginal bleeding.   Patient is primar displaced filshie clip    CHOLECYSTECTOMY      TOTAL ABDOMINAL HYSTERECTOMY N/A 4/5/2017    Comment Procedure: ABDOMINAL HYSTERECTOMY;  Surgeon: Bridgette Pierre MD;  Location: [facility] MAIN OR       Medications :   amoxicillin 500 MG Oral Cap,  Take 1 caps well-developed and well-nourished. No distress. HENT:   Head: Normocephalic and atraumatic. Neck: Normal range of motion. Neck supple. Cardiovascular: Normal rate, regular rhythm, normal heart sounds and intact distal pulses.   Exam reveals no gallop DIFFERENTIAL[[id number]]                              Final result                 Please view results for these tests on the individual orders.    CBC W/ DIFFERENTIAL       MDM   During the course of stay in the emergency room: Patient was notified based on  140-400 K/UL   MPV 8.3 7.4-10.3 fL   Neutrophil % 73 %   Lymphocyte % 21 %   Monocyte % 4 %   Eosinophil % 1 %   Basophil % 1 %   Neutrophil Absolute 4.0 1.8-7.7 K/UL   Lymphocyte Absolute 1.1 1.0-4.0 K/UL   Monocyte Absolute 0.2 0.0-1.0 K/UL   E

## 2017-04-27 NOTE — TELEPHONE ENCOUNTER
Per pt she is at the er now, has been waiting for the past 2.30. Hrs, per pt this is way too long, the wait time is longer.  Pt would like to know if she can be seen at the clinic be

## 2017-04-27 NOTE — TELEPHONE ENCOUNTER
PT IS RETURNING NURSE CALL, STILL HAVING LITTLE BIT ON DISCHARGE, AND MILD PELVIC PAIN WHEN SHE WALKS

## 2017-04-28 NOTE — TELEPHONE ENCOUNTER
PER PT SHE WAS SEEN AT THE ER YESTERDAY. RETURNING NURSE CALL. ALSO DOES SHE NEEDS TO SCHEDULED A ER F/U APPT ?

## 2017-04-28 NOTE — TELEPHONE ENCOUNTER
Pt called and appointment with Dr. Trevon Negron made for 05/01/17 for 11:40 pm. Pt seen yesterday in the ER for vaginal bleeding and abdominal pain. Pt voices still has abdominal pain today (rating 1-2 on 0-10 scale), but no vaginal bleeding.

## 2017-05-03 ENCOUNTER — APPOINTMENT (OUTPATIENT)
Dept: LAB | Facility: HOSPITAL | Age: 37
End: 2017-05-03
Attending: OBSTETRICS & GYNECOLOGY
Payer: MEDICAID

## 2017-05-03 ENCOUNTER — OFFICE VISIT (OUTPATIENT)
Dept: OBGYN CLINIC | Facility: CLINIC | Age: 37
End: 2017-05-03

## 2017-05-03 VITALS
SYSTOLIC BLOOD PRESSURE: 126 MMHG | WEIGHT: 170 LBS | HEART RATE: 84 BPM | BODY MASS INDEX: 29 KG/M2 | DIASTOLIC BLOOD PRESSURE: 72 MMHG

## 2017-05-03 DIAGNOSIS — R35.0 URINARY FREQUENCY: ICD-10-CM

## 2017-05-03 DIAGNOSIS — N30.90 CYSTITIS: ICD-10-CM

## 2017-05-03 DIAGNOSIS — R35.0 URINARY FREQUENCY: Primary | ICD-10-CM

## 2017-05-03 DIAGNOSIS — R10.2 PELVIC PAIN: ICD-10-CM

## 2017-05-03 DIAGNOSIS — R30.0 DYSURIA: ICD-10-CM

## 2017-05-03 PROCEDURE — 87086 URINE CULTURE/COLONY COUNT: CPT

## 2017-05-03 PROCEDURE — 99214 OFFICE O/P EST MOD 30 MIN: CPT | Performed by: OBSTETRICS & GYNECOLOGY

## 2017-05-03 PROCEDURE — 81003 URINALYSIS AUTO W/O SCOPE: CPT

## 2017-05-03 RX ORDER — PHENAZOPYRIDINE HYDROCHLORIDE 200 MG/1
200 TABLET, FILM COATED ORAL 3 TIMES DAILY PRN
Qty: 9 TABLET | Refills: 0 | Status: SHIPPED | OUTPATIENT
Start: 2017-05-03 | End: 2017-05-12

## 2017-05-03 RX ORDER — NITROFURANTOIN 25; 75 MG/1; MG/1
100 CAPSULE ORAL 2 TIMES DAILY
Qty: 14 CAPSULE | Refills: 0 | Status: SHIPPED | OUTPATIENT
Start: 2017-05-03 | End: 2017-05-10

## 2017-05-04 NOTE — PROGRESS NOTES
HPI:   Edilberto Elias is a 30year old female who presents for a hx of discomfort with urinating/dysuria/urinary frequency for several days. Stated has lower pelvic discomfort on/off,  Sometimes with walking. Feels fine when resting/lying down always. Diagnosis Date   • Hyperlipidemia    • Migraines      Per [facility] - Migraine headaches   • History of Papanicolaou smear of cervix 2015   • Ovarian cyst    • Postpartum depression    • Pregnancy , ,         • Vitamin D deficiency itching,periods regular   MUSCULOSKELETAL: denies back pain  NEURO: denies headaches  PSYCHE: denies depression or anxiety  HEMATOLOGIC: denies hx of anemia  ENDOCRINE: denies thyroid history  ALL/ASTHMA: denies hx of allergy or asthma    EXAM:   /72

## 2017-05-12 ENCOUNTER — OFFICE VISIT (OUTPATIENT)
Dept: OBGYN CLINIC | Facility: CLINIC | Age: 37
End: 2017-05-12

## 2017-05-12 VITALS
HEART RATE: 72 BPM | SYSTOLIC BLOOD PRESSURE: 106 MMHG | DIASTOLIC BLOOD PRESSURE: 73 MMHG | WEIGHT: 171.19 LBS | BODY MASS INDEX: 29 KG/M2

## 2017-05-12 DIAGNOSIS — L76.82 INCISIONAL PAIN: ICD-10-CM

## 2017-05-12 DIAGNOSIS — R30.0 DYSURIA: Primary | ICD-10-CM

## 2017-05-12 PROCEDURE — 99213 OFFICE O/P EST LOW 20 MIN: CPT | Performed by: OBSTETRICS & GYNECOLOGY

## 2017-05-12 NOTE — PROGRESS NOTES
HPI:   Asmita Abreu is a 30year old female who presents for a f/u s/p liberty. Pt had abd discomfort before,  On /off ,not persistent. Pt feels mild discomfort with urinating, had amox and macrobid,  Urine cx negative.   rec pt make appt with urology,  Pt 2012    COLONOSCOPY  2004    OTHER SURGICAL HISTORY  4/2014    Comment Per [facility] - Laparoscopic ovarian cyst    OTHER SURGICAL HISTORY  2007    Comment Cysts removed    OTHER SURGICAL HISTORY      Comment arthrocentesis of the right shoulder subacromial spa normocephalic  NECK: supple,no adenopathy  CHEST: no chest tenderness  LUNGS: clear to auscultation  CARDIO: RRR without murmur  GI: good BS's,no masses, HSM or tenderness, incision healed, very mild tenderness on moving incision, no s/sx infection.     :

## 2017-05-17 ENCOUNTER — TELEPHONE (OUTPATIENT)
Dept: OBGYN CLINIC | Facility: CLINIC | Age: 37
End: 2017-05-17

## 2017-05-17 NOTE — TELEPHONE ENCOUNTER
pt. wants to know if she can start taking Phentermine medication or any other medication to loose weight?

## 2017-05-17 NOTE — TELEPHONE ENCOUNTER
Please ask pt to call the doctor who prescribed the phentermine if that doctor wants her to take the phentermine.

## 2017-05-18 ENCOUNTER — OFFICE VISIT (OUTPATIENT)
Dept: SURGERY | Facility: CLINIC | Age: 37
End: 2017-05-18

## 2017-05-18 VITALS
BODY MASS INDEX: 27.49 KG/M2 | TEMPERATURE: 98 F | DIASTOLIC BLOOD PRESSURE: 70 MMHG | RESPIRATION RATE: 16 BRPM | SYSTOLIC BLOOD PRESSURE: 115 MMHG | HEART RATE: 73 BPM | WEIGHT: 165 LBS | HEIGHT: 65 IN

## 2017-05-18 DIAGNOSIS — R82.90 URINE FINDING: Primary | ICD-10-CM

## 2017-05-18 PROCEDURE — 99244 OFF/OP CNSLTJ NEW/EST MOD 40: CPT | Performed by: UROLOGY

## 2017-05-18 PROCEDURE — 99212 OFFICE O/P EST SF 10 MIN: CPT | Performed by: UROLOGY

## 2017-05-18 PROCEDURE — 81002 URINALYSIS NONAUTO W/O SCOPE: CPT | Performed by: UROLOGY

## 2017-05-18 RX ORDER — FAMOTIDINE 40 MG/1
40 TABLET, FILM COATED ORAL DAILY
COMMUNITY
End: 2017-06-02

## 2017-05-18 RX ORDER — OXYBUTYNIN CHLORIDE 10 MG/1
10 TABLET, EXTENDED RELEASE ORAL DAILY
Qty: 30 TABLET | Refills: 11 | Status: SHIPPED | OUTPATIENT
Start: 2017-05-18 | End: 2017-08-07

## 2017-05-18 NOTE — PROGRESS NOTES
SUBJECTIVE:  Ellis Anderson is a 30year old female who presents for a consultation at the request of, and a copy of this note will be sent to, Dr. Brad Ruiz, for evaluation of  Bladder pain, nocturia x 2-3, daytime frequency, pain exacerbated by holding h laparoscopy, filshie clip sterilization of left fallopian tube, ablation of endometriosis, retrieval and removal of displaced filshie clip    CHOLECYSTECTOMY      TOTAL ABDOMINAL HYSTERECTOMY N/A 4/5/2017    Comment Procedure: ABDOMINAL HYSTERECTOMY;  Surg ER 10 mg PO QD. Side effects reviewed. Asked that she increase her fiber intake.  -Believe pain likely post operative. Given all negative urine testing and CT scan, no evidence of  path. -Followup 4 weeks to assess response to medication.     Orders P

## 2017-06-02 ENCOUNTER — OFFICE VISIT (OUTPATIENT)
Dept: OBGYN CLINIC | Facility: CLINIC | Age: 37
End: 2017-06-02

## 2017-06-02 VITALS
BODY MASS INDEX: 28 KG/M2 | DIASTOLIC BLOOD PRESSURE: 72 MMHG | WEIGHT: 171 LBS | SYSTOLIC BLOOD PRESSURE: 111 MMHG | HEART RATE: 69 BPM

## 2017-06-02 DIAGNOSIS — M54.30 SCIATIC LEG PAIN: Primary | ICD-10-CM

## 2017-06-02 DIAGNOSIS — G89.29 CHRONIC LOW BACK PAIN WITH SCIATICA, SCIATICA LATERALITY UNSPECIFIED, UNSPECIFIED BACK PAIN LATERALITY: ICD-10-CM

## 2017-06-02 DIAGNOSIS — M54.40 CHRONIC LOW BACK PAIN WITH SCIATICA, SCIATICA LATERALITY UNSPECIFIED, UNSPECIFIED BACK PAIN LATERALITY: ICD-10-CM

## 2017-06-02 PROCEDURE — 99214 OFFICE O/P EST MOD 30 MIN: CPT | Performed by: OBSTETRICS & GYNECOLOGY

## 2017-06-02 RX ORDER — ACETAMINOPHEN 500 MG
500 TABLET ORAL EVERY 6 HOURS PRN
COMMUNITY
End: 2017-06-05

## 2017-06-02 NOTE — PROGRESS NOTES
HPI:   Ella Lesch is a 30year old female who presents for a f/u s/p liberty. Pt noted leg pains, no abd or pelvic pain. Pt stated she has a hx of sciatic leg pain, last evaluated 1 year ago per pt.   She noted left and right sciatica pain, as well as SI ,         • Vitamin D deficiency    • Vitamin B12 deficiency    • Esophageal reflux    • Endometriosis    • IBS (irritable bowel syndrome)    • Depression    • PONV (postoperative nausea and vomiting)           Past Surgical History    [facility] denies hx of allergy or asthma    EXAM:   /72 mmHg  Pulse 69  Wt 171 lb (77.565 kg)  LMP 03/02/2017 (Exact Date)  Body mass index is 28.46 kg/(m^2).    GENERAL: well developed, well nourished,in no apparent distress  SKIN: no rashes,no suspicious lesi

## 2017-06-05 ENCOUNTER — OFFICE VISIT (OUTPATIENT)
Dept: FAMILY MEDICINE CLINIC | Facility: CLINIC | Age: 37
End: 2017-06-05

## 2017-06-05 VITALS
HEART RATE: 66 BPM | BODY MASS INDEX: 28 KG/M2 | WEIGHT: 170 LBS | TEMPERATURE: 99 F | DIASTOLIC BLOOD PRESSURE: 76 MMHG | SYSTOLIC BLOOD PRESSURE: 111 MMHG

## 2017-06-05 DIAGNOSIS — M54.41 ACUTE MIDLINE LOW BACK PAIN WITH RIGHT-SIDED SCIATICA: Primary | ICD-10-CM

## 2017-06-05 PROCEDURE — 99214 OFFICE O/P EST MOD 30 MIN: CPT | Performed by: FAMILY MEDICINE

## 2017-06-05 PROCEDURE — 99212 OFFICE O/P EST SF 10 MIN: CPT | Performed by: FAMILY MEDICINE

## 2017-06-05 RX ORDER — IBUPROFEN 600 MG/1
600 TABLET ORAL EVERY 6 HOURS PRN
Qty: 60 TABLET | Refills: 0 | Status: SHIPPED | OUTPATIENT
Start: 2017-06-05 | End: 2017-06-19

## 2017-06-05 RX ORDER — METAXALONE 800 MG/1
800 TABLET ORAL 3 TIMES DAILY
Qty: 30 TABLET | Refills: 1 | Status: SHIPPED | OUTPATIENT
Start: 2017-06-05 | End: 2017-06-19

## 2017-06-05 NOTE — PROGRESS NOTES
6/5/2017  4:45 PM    Arpita Gutierrez is a 30year old female. Chief complaint(s): Patient presents with: Follow - Up  Sciatica    HPI:     Arpita Gutierrez primary complaint is regarding back pains.      Arpita Gutierrez is a 30year old female present co Meliton Green MD;  Location: [facility] MAIN OR    HYSTERECTOMY        Family History   Problem Relation Age of Onset   • Diabetes Father      Type 2   • Hypertension Mother    • Lipids Mother      Hyperlipidemia   • Diabetes Mother      Type 2   • Diabetes Sister /76 mmHg  Pulse 66  Temp(Src) 98.5 °F (36.9 °C) (Oral)  Wt 170 lb (77.111 kg)  LMP 03/02/2017 (Exact Date)  Breastfeeding? No   HENT:   Head: Normocephalic. Right Ear: External ear normal.   Left Ear: External ear normal.   Nose: No rhinorrhea. tablet (800 mg total) by mouth 3 (three) times daily. REFERRALS: 1PHYSICAL THERAPY - INTERNAL,   PHYSICAL THERAPY - at [facility].    RECOMMENDATIONS given include: Please, call our office with any questions or concerns.  Notify Dr Bonifacio Matute or

## 2017-06-19 ENCOUNTER — APPOINTMENT (OUTPATIENT)
Dept: LAB | Age: 37
End: 2017-06-19
Attending: FAMILY MEDICINE
Payer: MEDICAID

## 2017-06-19 ENCOUNTER — LAB ENCOUNTER (OUTPATIENT)
Dept: LAB | Age: 37
End: 2017-06-19
Attending: FAMILY MEDICINE
Payer: MEDICAID

## 2017-06-19 ENCOUNTER — OFFICE VISIT (OUTPATIENT)
Dept: FAMILY MEDICINE CLINIC | Facility: CLINIC | Age: 37
End: 2017-06-19

## 2017-06-19 VITALS
HEIGHT: 62.5 IN | HEART RATE: 88 BPM | SYSTOLIC BLOOD PRESSURE: 117 MMHG | TEMPERATURE: 99 F | BODY MASS INDEX: 29.07 KG/M2 | DIASTOLIC BLOOD PRESSURE: 83 MMHG | WEIGHT: 162 LBS

## 2017-06-19 DIAGNOSIS — Z00.00 PHYSICAL EXAM: ICD-10-CM

## 2017-06-19 DIAGNOSIS — Z00.00 PHYSICAL EXAM: Primary | ICD-10-CM

## 2017-06-19 PROCEDURE — 85025 COMPLETE CBC W/AUTO DIFF WBC: CPT

## 2017-06-19 PROCEDURE — 93010 ELECTROCARDIOGRAM REPORT: CPT | Performed by: FAMILY MEDICINE

## 2017-06-19 PROCEDURE — 99395 PREV VISIT EST AGE 18-39: CPT | Performed by: FAMILY MEDICINE

## 2017-06-19 PROCEDURE — 93005 ELECTROCARDIOGRAM TRACING: CPT

## 2017-06-19 PROCEDURE — 80061 LIPID PANEL: CPT

## 2017-06-19 PROCEDURE — 84443 ASSAY THYROID STIM HORMONE: CPT

## 2017-06-19 PROCEDURE — 81001 URINALYSIS AUTO W/SCOPE: CPT

## 2017-06-19 PROCEDURE — 83036 HEMOGLOBIN GLYCOSYLATED A1C: CPT

## 2017-06-19 PROCEDURE — 80053 COMPREHEN METABOLIC PANEL: CPT

## 2017-06-19 PROCEDURE — 36415 COLL VENOUS BLD VENIPUNCTURE: CPT

## 2017-06-19 RX ORDER — ACETAMINOPHEN 160 MG
2000 TABLET,DISINTEGRATING ORAL DAILY
COMMUNITY
End: 2017-08-24

## 2017-06-19 NOTE — PROGRESS NOTES
6/19/2017  10:21 AM    Alessio Sanders is a 30year old female. Chief complaint(s): Patient presents with:  Routine Physical    HPI:     Alessio Sanders primary complaint is regarding CPE.      Isamar Bar is a 30year old female present today fo 4/5/2017    Comment Procedure: ABDOMINAL HYSTERECTOMY;  Surgeon: Daryle Spar, MD;  Location: [facility] MAIN OR    HYSTERECTOMY        Family History   Problem Relation Age of Onset   • Diabetes Father      Type 2   • Hypertension Mother    • Lipids Veroniquee appears well-developed and well-nourished. /83 mmHg  Pulse 88  Temp(Src) 98.7 °F (37.1 °C) (Oral)  Ht 5' 2.5\" (1.588 m)  Wt 162 lb (73.483 kg)  BMI 29.14 kg/m2  LMP 03/02/2017 (Exact Date)  Breastfeeding?  No   HENT:   Normocephalic, Normal red lig Negative mg/dL   SPECIFIC GRAVITY 1.010 1.005 - 1.030   OCCULT BLOOD neg Negative   PH, URINE 6.0 4.5 - 8.0   PROTEIN (URINE DIPSTICK) neg Negative/Trace mg/dL   UROBILINOGEN,SEMI-QN 0.2 0.0 - 1.9 mg/dL   NITRITE, URINE neg Negative   LEUKOCYTES neg Negati chosen by patient was s/p hysterectomy. REFUSALS:  Although recommended, the patient refuses the following: none . FOLLOW-UP: Schedule a follow-up visit in 12 months.              Orders This Visit:    Orders Placed This Encounter  CBC W Differential

## 2017-06-21 ENCOUNTER — TELEPHONE (OUTPATIENT)
Dept: INTERNAL MEDICINE CLINIC | Facility: CLINIC | Age: 37
End: 2017-06-21

## 2017-06-21 NOTE — TELEPHONE ENCOUNTER
----- Message from Raj Lei MD sent at 6/19/2017  7:37 PM CDT -----  Please notify patient that his/her cholesterol levels were slight elevated.  However, it only requires to follow a low cholesterol / low fat diet, exercise and recheck in 12 month

## 2017-06-21 NOTE — TELEPHONE ENCOUNTER
Via  line [phone] Informed pt test results and Dr. Simpson Held message to follow a low cholesterol/low fat diet and exercise, recheck in 12 wks. Pt verbalized understanding  UA was abn.  Pt c/o urinary frequency, urgency, abdominal pain during urin

## 2017-07-06 ENCOUNTER — OFFICE VISIT (OUTPATIENT)
Dept: SURGERY | Facility: CLINIC | Age: 37
End: 2017-07-06

## 2017-07-06 VITALS
HEIGHT: 62 IN | SYSTOLIC BLOOD PRESSURE: 104 MMHG | BODY MASS INDEX: 29.81 KG/M2 | DIASTOLIC BLOOD PRESSURE: 70 MMHG | HEART RATE: 77 BPM | WEIGHT: 162 LBS | TEMPERATURE: 98 F

## 2017-07-06 DIAGNOSIS — R39.89 BLADDER PAIN: Primary | ICD-10-CM

## 2017-07-06 PROCEDURE — 99213 OFFICE O/P EST LOW 20 MIN: CPT | Performed by: UROLOGY

## 2017-07-06 PROCEDURE — 99212 OFFICE O/P EST SF 10 MIN: CPT | Performed by: UROLOGY

## 2017-07-06 RX ORDER — AMITRIPTYLINE HYDROCHLORIDE 25 MG/1
25 TABLET, FILM COATED ORAL NIGHTLY
Qty: 30 TABLET | Refills: 11 | Status: SHIPPED | OUTPATIENT
Start: 2017-07-06 | End: 2017-09-07

## 2017-07-06 NOTE — PROGRESS NOTES
Gregory Ball is a 30year old female. HPI:   Patient presents with:  Urinary Symptoms (urologic): Patient stilll c/o bladder pain. Pain starts in lumbar and travels to bladder area. Pain level last night at work was a 10/10.  Pain worse with walking SURGICAL HISTORY      Comment: laparoscopy, filshie clip sterilization of                left fallopian tube, ablation of endometriosis,               retrieval and removal of displaced filshie clip  4/5/2017: TOTAL ABDOMINAL HYSTERECTOMY N/A      Comment: Orders This Visit:  No orders of the defined types were placed in this encounter. Meds This Visit:    Signed Prescriptions Disp Refills    Amitriptyline HCl 25 MG Oral Tab 30 tablet 11      Sig: Take 1 tablet (25 mg total) by mouth nightly.

## 2017-07-17 ENCOUNTER — OFFICE VISIT (OUTPATIENT)
Dept: FAMILY MEDICINE CLINIC | Facility: CLINIC | Age: 37
End: 2017-07-17

## 2017-07-17 ENCOUNTER — HOSPITAL ENCOUNTER (OUTPATIENT)
Dept: GENERAL RADIOLOGY | Age: 37
Discharge: HOME | End: 2017-07-17
Attending: FAMILY MEDICINE
Payer: COMMERCIAL

## 2017-07-17 VITALS
DIASTOLIC BLOOD PRESSURE: 61 MMHG | WEIGHT: 162 LBS | HEART RATE: 78 BPM | BODY MASS INDEX: 29.81 KG/M2 | SYSTOLIC BLOOD PRESSURE: 115 MMHG | TEMPERATURE: 99 F | HEIGHT: 62 IN

## 2017-07-17 DIAGNOSIS — M43.17 SPONDYLOLISTHESIS OF LUMBOSACRAL REGION: ICD-10-CM

## 2017-07-17 DIAGNOSIS — G89.29 CHRONIC RIGHT-SIDED LOW BACK PAIN WITH RIGHT-SIDED SCIATICA: ICD-10-CM

## 2017-07-17 DIAGNOSIS — M43.07 SPONDYLOLYSIS, LUMBOSACRAL: Primary | ICD-10-CM

## 2017-07-17 DIAGNOSIS — M43.07 SPONDYLOLYSIS, LUMBOSACRAL: ICD-10-CM

## 2017-07-17 DIAGNOSIS — M54.41 CHRONIC RIGHT-SIDED LOW BACK PAIN WITH RIGHT-SIDED SCIATICA: ICD-10-CM

## 2017-07-17 PROCEDURE — 72110 X-RAY EXAM L-2 SPINE 4/>VWS: CPT | Performed by: FAMILY MEDICINE

## 2017-07-17 PROCEDURE — 99212 OFFICE O/P EST SF 10 MIN: CPT | Performed by: FAMILY MEDICINE

## 2017-07-17 PROCEDURE — 99214 OFFICE O/P EST MOD 30 MIN: CPT | Performed by: FAMILY MEDICINE

## 2017-07-17 RX ORDER — NABUMETONE 750 MG/1
750 TABLET, FILM COATED ORAL 2 TIMES DAILY
Qty: 60 TABLET | Refills: 0 | Status: SHIPPED | OUTPATIENT
Start: 2017-07-17 | End: 2017-08-07

## 2017-07-21 ENCOUNTER — TELEPHONE (OUTPATIENT)
Dept: FAMILY MEDICINE CLINIC | Facility: CLINIC | Age: 37
End: 2017-07-21

## 2017-07-21 NOTE — TELEPHONE ENCOUNTER
Pt would like a call back with her x-ray results. Per pt it was done this week. Pt is requesting return call in [address] [facility]

## 2017-07-31 ENCOUNTER — TELEPHONE (OUTPATIENT)
Dept: FAMILY MEDICINE CLINIC | Facility: CLINIC | Age: 37
End: 2017-07-31

## 2017-07-31 DIAGNOSIS — M43.07 SPONDYLOLYSIS, LUMBOSACRAL: Primary | ICD-10-CM

## 2017-07-31 DIAGNOSIS — M54.41 CHRONIC RIGHT-SIDED LOW BACK PAIN WITH RIGHT-SIDED SCIATICA: ICD-10-CM

## 2017-07-31 DIAGNOSIS — G89.29 CHRONIC RIGHT-SIDED LOW BACK PAIN WITH RIGHT-SIDED SCIATICA: ICD-10-CM

## 2017-07-31 DIAGNOSIS — M43.17 SPONDYLOLISTHESIS OF LUMBOSACRAL REGION: ICD-10-CM

## 2017-07-31 NOTE — TELEPHONE ENCOUNTER
With  # [id number]    Notes Recorded by Ang Beckham DO on 7/17/2017 at 11:10 PM CDT  X-ray of your back shows that your L5 vertebrae has slipped on top of the S1 vertebrae.  You do have disc space narrowing also between L5 and S1.  I suspect may

## 2017-07-31 NOTE — TELEPHONE ENCOUNTER
Samoan SPEAKER -  Pt calling requesting a change in orders for MRI to an open MRI. Pt states she can not do a closed one. Pt has appt on 08/12, please call when approved.

## 2017-08-07 ENCOUNTER — HOSPITAL ENCOUNTER (OUTPATIENT)
Dept: GENERAL RADIOLOGY | Age: 37
Discharge: HOME | End: 2017-08-07
Attending: FAMILY MEDICINE
Payer: COMMERCIAL

## 2017-08-07 ENCOUNTER — OFFICE VISIT (OUTPATIENT)
Dept: FAMILY MEDICINE CLINIC | Facility: CLINIC | Age: 37
End: 2017-08-07

## 2017-08-07 VITALS
WEIGHT: 159.19 LBS | BODY MASS INDEX: 29.3 KG/M2 | RESPIRATION RATE: 18 BRPM | TEMPERATURE: 99 F | HEART RATE: 105 BPM | HEIGHT: 62 IN | SYSTOLIC BLOOD PRESSURE: 108 MMHG | DIASTOLIC BLOOD PRESSURE: 67 MMHG

## 2017-08-07 DIAGNOSIS — M54.41 CHRONIC RIGHT-SIDED LOW BACK PAIN WITH RIGHT-SIDED SCIATICA: Primary | ICD-10-CM

## 2017-08-07 DIAGNOSIS — M25.572 ACUTE LEFT ANKLE PAIN: ICD-10-CM

## 2017-08-07 DIAGNOSIS — M43.16 SPONDYLOLISTHESIS OF LUMBAR REGION: ICD-10-CM

## 2017-08-07 DIAGNOSIS — S32.000G CLOSED WEDGE FRACTURE OF LUMBAR VERTEBRA WITH DELAYED HEALING, UNSPECIFIED LUMBAR VERTEBRAL LEVEL, SUBSEQUENT ENCOUNTER: ICD-10-CM

## 2017-08-07 DIAGNOSIS — G89.29 CHRONIC RIGHT-SIDED LOW BACK PAIN WITH RIGHT-SIDED SCIATICA: Primary | ICD-10-CM

## 2017-08-07 PROCEDURE — 99214 OFFICE O/P EST MOD 30 MIN: CPT | Performed by: FAMILY MEDICINE

## 2017-08-07 PROCEDURE — 99212 OFFICE O/P EST SF 10 MIN: CPT | Performed by: FAMILY MEDICINE

## 2017-08-07 PROCEDURE — 73610 X-RAY EXAM OF ANKLE: CPT | Performed by: FAMILY MEDICINE

## 2017-08-07 RX ORDER — DICLOFENAC SODIUM 75 MG/1
75 TABLET, DELAYED RELEASE ORAL 2 TIMES DAILY
Qty: 42 TABLET | Refills: 1 | Status: SHIPPED | OUTPATIENT
Start: 2017-08-07 | End: 2017-08-24 | Stop reason: ALTCHOICE

## 2017-08-07 NOTE — PROGRESS NOTES
Patient ID: Alessio Sanders is a 30year old female.     HPI  Patient presents with:  Pain: left hip   Back Pain: lower back     Xr Lumbar Spine (min 4 Views) (cpt=72110)    Result Date: 7/17/2017  CONCLUSION:  1. Grade II spondylolisthesis of L5 with re ovarian cyst  2007: OTHER SURGICAL HISTORY      Comment: Cysts removed  No date: OTHER SURGICAL HISTORY      Comment: arthrocentesis of the right shoulder                subacromial space  06/08/16: OTHER SURGICAL HISTORY      Comment: patty chavez intact, good strength with plantar flexion and dorsiflexion, gait was antalgic. Able to flex forward at the waist and touch the ankles. No bony tenderness. Straight leg raise is negative bilaterally.      Neurological: Patient is alert and oriented to

## 2017-08-08 ENCOUNTER — TELEPHONE (OUTPATIENT)
Dept: INTERNAL MEDICINE CLINIC | Facility: CLINIC | Age: 37
End: 2017-08-08

## 2017-08-08 NOTE — TELEPHONE ENCOUNTER
Pt called, message per Dr given. Verbalized understanding and compliance   pt is already aware and has MRI scheduled 8/12/17    Notes Recorded by Joseph Trivedi DO on 8/7/2017 at 8:00 PM CDT  X-ray of the ankle shows no fracture. Pt notified.   All que

## 2017-08-08 NOTE — TELEPHONE ENCOUNTER
Notes Recorded by Ritu Silva DO on 7/17/2017 at 11:10 PM CDT  X-ray of your back shows that your L5 vertebrae has slipped on top of the S1 vertebrae.  You do have disc space narrowing also between L5 and S1.  I suspect may pinching a nerve and that is

## 2017-08-12 ENCOUNTER — HOSPITAL ENCOUNTER (OUTPATIENT)
Dept: MRI IMAGING | Age: 37
Discharge: HOME | End: 2017-08-12
Attending: FAMILY MEDICINE
Payer: COMMERCIAL

## 2017-08-12 DIAGNOSIS — M43.17 SPONDYLOLISTHESIS OF LUMBOSACRAL REGION: ICD-10-CM

## 2017-08-12 DIAGNOSIS — M54.41 CHRONIC RIGHT-SIDED LOW BACK PAIN WITH RIGHT-SIDED SCIATICA: ICD-10-CM

## 2017-08-12 DIAGNOSIS — G89.29 CHRONIC RIGHT-SIDED LOW BACK PAIN WITH RIGHT-SIDED SCIATICA: ICD-10-CM

## 2017-08-12 DIAGNOSIS — M43.07 SPONDYLOLYSIS, LUMBOSACRAL: ICD-10-CM

## 2017-08-12 PROCEDURE — 72148 MRI LUMBAR SPINE W/O DYE: CPT | Performed by: FAMILY MEDICINE

## 2017-08-16 ENCOUNTER — TELEPHONE (OUTPATIENT)
Dept: FAMILY MEDICINE CLINIC | Facility: CLINIC | Age: 37
End: 2017-08-16

## 2017-08-16 NOTE — TELEPHONE ENCOUNTER
#[id number]    Pt advised of results, verbalized understanding and agrees to plan. Pt made appt to review results on Monday.

## 2017-08-16 NOTE — TELEPHONE ENCOUNTER
Patient is calling regarding results. Please advise.      Notes Recorded by Alesha Sutton DO on 8/13/2017 at 1:46 PM CDT  At L5-S1 you have slippage of the vertebrae.  This is causing a disc bulge.  You also have tightening of the holes with the nerves co

## 2017-08-21 ENCOUNTER — OFFICE VISIT (OUTPATIENT)
Dept: FAMILY MEDICINE CLINIC | Facility: CLINIC | Age: 37
End: 2017-08-21

## 2017-08-21 VITALS
DIASTOLIC BLOOD PRESSURE: 62 MMHG | SYSTOLIC BLOOD PRESSURE: 117 MMHG | TEMPERATURE: 99 F | WEIGHT: 158.63 LBS | RESPIRATION RATE: 12 BRPM | HEIGHT: 62 IN | HEART RATE: 67 BPM | BODY MASS INDEX: 29.19 KG/M2

## 2017-08-21 DIAGNOSIS — M43.16 SPONDYLOLISTHESIS OF LUMBAR REGION: ICD-10-CM

## 2017-08-21 DIAGNOSIS — T67.1XXA HEAT SYNCOPE, INITIAL ENCOUNTER: ICD-10-CM

## 2017-08-21 DIAGNOSIS — M54.41 CHRONIC RIGHT-SIDED LOW BACK PAIN WITH RIGHT-SIDED SCIATICA: Primary | ICD-10-CM

## 2017-08-21 DIAGNOSIS — G89.29 CHRONIC RIGHT-SIDED LOW BACK PAIN WITH RIGHT-SIDED SCIATICA: Primary | ICD-10-CM

## 2017-08-21 DIAGNOSIS — M51.26 HERNIATION OF NUCLEUS PULPOSUS, LUMBAR: ICD-10-CM

## 2017-08-21 PROCEDURE — 99212 OFFICE O/P EST SF 10 MIN: CPT | Performed by: FAMILY MEDICINE

## 2017-08-21 PROCEDURE — 99215 OFFICE O/P EST HI 40 MIN: CPT | Performed by: FAMILY MEDICINE
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
## 2017-08-22 ENCOUNTER — LAB ENCOUNTER (OUTPATIENT)
Dept: LAB | Age: 37
End: 2017-08-22
Attending: FAMILY MEDICINE
Payer: COMMERCIAL

## 2017-08-22 DIAGNOSIS — Z00.00 ROUTINE GENERAL MEDICAL EXAMINATION AT A HEALTH CARE FACILITY: Primary | ICD-10-CM

## 2017-08-22 DIAGNOSIS — T67.1XXA HEAT SYNCOPE, INITIAL ENCOUNTER: ICD-10-CM

## 2017-08-22 LAB
ANION GAP SERPL CALC-SCNC: 4 MMOL/L (ref 0–18)
BASOPHILS # BLD: 0 K/UL (ref 0–0.2)
BASOPHILS NFR BLD: 1 %
BUN SERPL-MCNC: 6 MG/DL (ref 8–20)
BUN/CREAT SERPL: 6.9 (ref 10–20)
CALCIUM SERPL-MCNC: 8.6 MG/DL (ref 8.5–10.5)
CHLORIDE SERPL-SCNC: 108 MMOL/L (ref 95–110)
CO2 SERPL-SCNC: 27 MMOL/L (ref 22–32)
CREAT SERPL-MCNC: 0.87 MG/DL (ref 0.5–1.5)
EOSINOPHIL # BLD: 0.1 K/UL (ref 0–0.7)
EOSINOPHIL NFR BLD: 1 %
ERYTHROCYTE [DISTWIDTH] IN BLOOD BY AUTOMATED COUNT: 14 % (ref 11–15)
GLUCOSE SERPL-MCNC: 70 MG/DL (ref 70–99)
HCT VFR BLD AUTO: 37.8 % (ref 35–48)
HGB BLD-MCNC: 12.9 G/DL (ref 12–16)
LYMPHOCYTES # BLD: 1.5 K/UL (ref 1–4)
LYMPHOCYTES NFR BLD: 25 %
MCH RBC QN AUTO: 30.1 PG (ref 27–32)
MCHC RBC AUTO-ENTMCNC: 34.1 G/DL (ref 32–37)
MCV RBC AUTO: 88.4 FL (ref 80–100)
MONOCYTES # BLD: 0.5 K/UL (ref 0–1)
MONOCYTES NFR BLD: 9 %
NEUTROPHILS # BLD AUTO: 4.1 K/UL (ref 1.8–7.7)
NEUTROPHILS NFR BLD: 65 %
OSMOLALITY UR CALC.SUM OF ELEC: 284 MOSM/KG (ref 275–295)
PLATELET # BLD AUTO: 231 K/UL (ref 140–400)
PMV BLD AUTO: 8.9 FL (ref 7.4–10.3)
POTASSIUM SERPL-SCNC: 3.5 MMOL/L (ref 3.3–5.1)
RBC # BLD AUTO: 4.28 M/UL (ref 3.7–5.4)
SODIUM SERPL-SCNC: 139 MMOL/L (ref 136–144)
WBC # BLD AUTO: 6.2 K/UL (ref 4–11)

## 2017-08-22 PROCEDURE — 85025 COMPLETE CBC W/AUTO DIFF WBC: CPT

## 2017-08-22 PROCEDURE — 36415 COLL VENOUS BLD VENIPUNCTURE: CPT

## 2017-08-22 PROCEDURE — 93005 ELECTROCARDIOGRAM TRACING: CPT

## 2017-08-22 PROCEDURE — 80048 BASIC METABOLIC PNL TOTAL CA: CPT

## 2017-08-22 PROCEDURE — 93010 ELECTROCARDIOGRAM REPORT: CPT | Performed by: FAMILY MEDICINE

## 2017-08-23 ENCOUNTER — TELEPHONE (OUTPATIENT)
Dept: NEUROLOGY | Facility: CLINIC | Age: 37
End: 2017-08-23

## 2017-08-23 ENCOUNTER — TELEPHONE (OUTPATIENT)
Dept: FAMILY MEDICINE CLINIC | Facility: CLINIC | Age: 37
End: 2017-08-23

## 2017-08-23 NOTE — TELEPHONE ENCOUNTER
----- Message from Car Baron DO sent at 8/7/2017  8:00 PM CDT -----  X-ray of the ankle shows no fracture.

## 2017-08-23 NOTE — TELEPHONE ENCOUNTER
----- Message from Dwayne Dupree DO sent at 8/22/2017 11:48 PM CDT -----  CBC shows no anemia, the kidney function is normal, and the sugar is normal.

## 2017-08-24 ENCOUNTER — HOSPITAL ENCOUNTER (OUTPATIENT)
Dept: GENERAL RADIOLOGY | Facility: HOSPITAL | Age: 37
Discharge: HOME | End: 2017-08-24
Attending: PHYSICAL MEDICINE & REHABILITATION
Payer: COMMERCIAL

## 2017-08-24 ENCOUNTER — OFFICE VISIT (OUTPATIENT)
Dept: NEUROLOGY | Facility: CLINIC | Age: 37
End: 2017-08-24

## 2017-08-24 ENCOUNTER — TELEPHONE (OUTPATIENT)
Dept: NEUROLOGY | Facility: CLINIC | Age: 37
End: 2017-08-24

## 2017-08-24 VITALS
DIASTOLIC BLOOD PRESSURE: 76 MMHG | RESPIRATION RATE: 16 BRPM | BODY MASS INDEX: 29.08 KG/M2 | HEIGHT: 62 IN | SYSTOLIC BLOOD PRESSURE: 120 MMHG | WEIGHT: 158 LBS | HEART RATE: 60 BPM

## 2017-08-24 DIAGNOSIS — M43.17 SPONDYLOLISTHESIS AT L5-S1 LEVEL: ICD-10-CM

## 2017-08-24 DIAGNOSIS — M43.07 SPONDYLOLYSIS OF LUMBOSACRAL REGION: ICD-10-CM

## 2017-08-24 DIAGNOSIS — G89.29 CHRONIC BILATERAL LOW BACK PAIN WITH BILATERAL SCIATICA: ICD-10-CM

## 2017-08-24 DIAGNOSIS — M54.42 CHRONIC BILATERAL LOW BACK PAIN WITH BILATERAL SCIATICA: ICD-10-CM

## 2017-08-24 DIAGNOSIS — M54.41 CHRONIC BILATERAL LOW BACK PAIN WITH BILATERAL SCIATICA: ICD-10-CM

## 2017-08-24 DIAGNOSIS — M43.07 SPONDYLOLYSIS OF LUMBOSACRAL REGION: Primary | ICD-10-CM

## 2017-08-24 PROCEDURE — 72120 X-RAY BEND ONLY L-S SPINE: CPT | Performed by: PHYSICAL MEDICINE & REHABILITATION

## 2017-08-24 PROCEDURE — 99214 OFFICE O/P EST MOD 30 MIN: CPT | Performed by: PHYSICAL MEDICINE & REHABILITATION

## 2017-08-24 NOTE — PATIENT INSTRUCTIONS
As of October 6th 2014, the Drug Enforcement Agency [facility]) is reclassifying all hydrocodone combination medications from Schedule III to Schedule II. This includes medications such as Norco, Vicodin, Lortab, Zohydro, and Vicoprofen.     What this means for y

## 2017-08-24 NOTE — H&P
No referring provider defined for this encounter.   Rhoda Hernandez MD    PHYSICAL MEDICINE and REHABILITATION NEW PATIENT    Chief Complaint: low back pain     HPI: Gregory Ball is a 30year old female who presents with a chief complaint of Low Back Shortness of breath: no  Chest pain:  no  Stomach ulcer:  no  Arthritis:  no  Depression:  yes  Diabetes Mellitus:  no  Cancer: no  Bowel/Bladder incontinence: no  Numbness/Tingling: yes as above  Seizure: no    Past Medical History:   Diagnosis Date   • D Comment: OCCASIONAL ALCOHOL    Drug use: No    Sexual activity: Yes    Birth control/ protection: Hysterectomy     Other Topics Concern    Caffeine Concern Yes    Comment: 1 cup coffee daily     Social History Narrative   None on file     Past Surgical Hi Babinski sign: negative b/l    Manual muscle testing:                                         Muscle Stretch Reflex:   Right Left                     Right Left   Hip Flexor * 5  Knee 2 2   Hip Adductor 5 5  Hamstring Not elicited Not elicited   Hip Abduct

## 2017-08-24 NOTE — TELEPHONE ENCOUNTER
Faxed clinical notes to [address] [place] [address] for authorization of L5-S1 ILESI cpt code 56895 pending approval.

## 2017-08-28 NOTE — TELEPHONE ENCOUNTER
Received fax from [address] [place] [address]  advising of approval for L5-S1 ILESI. Authorization # [id number] for 2 units/DOS effective 08/25/17 to 10/01/17 . Will inform Nursing.

## 2017-08-29 NOTE — TELEPHONE ENCOUNTER
Patient has been scheduled for a Lumbar interlaminar epidural steroid injection L5-S1 towards the right on 9/8/17 at the [address] [place] [address]. Medications and allergies reviewed.  Patient informed to hold aspirins, nsaids, blood thinners, vitamins and fish oils 7 days prior

## 2017-08-30 NOTE — TELEPHONE ENCOUNTER
Initially scheduled injection for 9/8/17  5 pm start time. [facility] scheduling called and time changed to 9/8/17 3 pm start time. Surgery scheduling request form faxed to [phone] with corrected time.

## 2017-08-30 NOTE — TELEPHONE ENCOUNTER
Faxed request form w/ corrected time to [phone].  Left detailed message to notify pt her injection start time was changed to 3 pm.

## 2017-09-08 ENCOUNTER — SURGERY (OUTPATIENT)
Age: 37
End: 2017-09-08

## 2017-09-08 ENCOUNTER — HOSPITAL ENCOUNTER (OUTPATIENT)
Facility: HOSPITAL | Age: 37
Setting detail: HOSPITAL OUTPATIENT SURGERY
Discharge: HOME | End: 2017-09-08
Attending: PHYSICAL MEDICINE & REHABILITATION | Admitting: PHYSICAL MEDICINE & REHABILITATION
Payer: COMMERCIAL

## 2017-09-08 ENCOUNTER — APPOINTMENT (OUTPATIENT)
Dept: GENERAL RADIOLOGY | Facility: HOSPITAL | Age: 37
End: 2017-09-08
Attending: PHYSICAL MEDICINE & REHABILITATION
Payer: COMMERCIAL

## 2017-09-08 VITALS
WEIGHT: 158 LBS | DIASTOLIC BLOOD PRESSURE: 73 MMHG | RESPIRATION RATE: 16 BRPM | SYSTOLIC BLOOD PRESSURE: 114 MMHG | HEART RATE: 86 BPM | BODY MASS INDEX: 29.08 KG/M2 | HEIGHT: 62 IN | OXYGEN SATURATION: 100 % | TEMPERATURE: 98 F

## 2017-09-08 PROBLEM — M43.17 SPONDYLOLISTHESIS OF LUMBOSACRAL REGION: Status: ACTIVE | Noted: 2017-09-08

## 2017-09-08 PROCEDURE — 3E0R3CZ INTRODUCTION OF REGIONAL ANESTHETIC INTO SPINAL CANAL, PERCUTANEOUS APPROACH: ICD-10-PCS | Performed by: PHYSICAL MEDICINE & REHABILITATION

## 2017-09-08 PROCEDURE — 62323 NJX INTERLAMINAR LMBR/SAC: CPT | Performed by: PHYSICAL MEDICINE & REHABILITATION

## 2017-09-08 PROCEDURE — 72100 X-RAY EXAM L-S SPINE 2/3 VWS: CPT | Performed by: PHYSICAL MEDICINE & REHABILITATION

## 2017-09-08 PROCEDURE — 3E0R3KZ INTRODUCTION OF OTHER DIAGNOSTIC SUBSTANCE INTO SPINAL CANAL, PERCUTANEOUS APPROACH: ICD-10-PCS | Performed by: PHYSICAL MEDICINE & REHABILITATION

## 2017-09-08 PROCEDURE — 3E0R33Z INTRODUCTION OF ANTI-INFLAMMATORY INTO SPINAL CANAL, PERCUTANEOUS APPROACH: ICD-10-PCS | Performed by: PHYSICAL MEDICINE & REHABILITATION

## 2017-09-08 PROCEDURE — 76001 XR C-ARM FLUORO >1 HOUR  (CPT=76001): CPT | Performed by: PHYSICAL MEDICINE & REHABILITATION

## 2017-09-08 RX ORDER — LIDOCAINE HYDROCHLORIDE 10 MG/ML
INJECTION, SOLUTION EPIDURAL; INFILTRATION; INTRACAUDAL; PERINEURAL AS NEEDED
Status: DISCONTINUED | OUTPATIENT
Start: 2017-09-08 | End: 2017-09-08 | Stop reason: HOSPADM

## 2017-09-08 RX ORDER — TRIAMCINOLONE ACETONIDE 40 MG/ML
INJECTION, SUSPENSION INTRA-ARTICULAR; INTRAMUSCULAR AS NEEDED
Status: DISCONTINUED | OUTPATIENT
Start: 2017-09-08 | End: 2017-09-08 | Stop reason: HOSPADM

## 2017-09-08 NOTE — DISCHARGE SUMMARY
[facility]  Discharge Summary    Joe Garcia Patient Status:  Hospital Outpatient Surgery    1980 MRN [id number]   Location [facility] PRE OP RECOVERY Attending Parker Chester DO   Hosp Day # 0 PCP Diya Spivey MD     Date of mg total) by mouth nightly as needed for Sleep. Qty: 30 tablet Refills: 1      Follow up Visits: Follow-up with Dr. Darcy Santos for post-procedure check in 2-3 weeks.     Follow up Labs: None    Other Discharge Instructions: Given a signed letter to be e

## 2017-09-08 NOTE — H&P
[address] Patient Status:  Hospital Outpatient Surgery    1980 MRN [id number]   Location [facility] PRE OP RECOVERY Attending May Sutton, [address] Day # 0 PCP Roxie Mcdonald • Hypertension Mother    • Lipids Mother      Hyperlipidemia   • Diabetes Mother      Type 2   • Diabetes Sister    • Hypertension Sister    • Lipids Brother       reports that she has never smoked.  She has quit using smokeless tobacco. She reports that right.    Sonido Duong  9/8/2017  2:58 PM

## 2017-09-08 NOTE — PROCEDURES
Procedure Note                   Pre and Post-operative Vital Signs:    09/08/17  1419 09/08/17  1500 09/08/17  1533 09/08/17  1535   BP: 122/50 (!) 89/70 126/67 114/73   BP Location: Right arm      Pulse: 86 86 92 86   Resp: 16      Temp: 98.2 °F (36.8 °C extremities and able to ambulate, and patient was given discharge instructions and discharged in care of family/friend in stable condition    Discharge Instructions:      1) Patient was given post-procedure discharge instructions.   2) Patient was ambulatin

## 2017-09-12 ENCOUNTER — OFFICE VISIT (OUTPATIENT)
Dept: FAMILY MEDICINE CLINIC | Facility: CLINIC | Age: 37
End: 2017-09-12

## 2017-09-12 VITALS
DIASTOLIC BLOOD PRESSURE: 68 MMHG | SYSTOLIC BLOOD PRESSURE: 119 MMHG | HEART RATE: 68 BPM | BODY MASS INDEX: 29.26 KG/M2 | HEIGHT: 62 IN | TEMPERATURE: 99 F | WEIGHT: 159 LBS

## 2017-09-12 DIAGNOSIS — G43.009 MIGRAINE WITHOUT AURA AND WITHOUT STATUS MIGRAINOSUS, NOT INTRACTABLE: Primary | ICD-10-CM

## 2017-09-12 DIAGNOSIS — M43.17 SPONDYLOLISTHESIS OF LUMBOSACRAL REGION: ICD-10-CM

## 2017-09-12 PROCEDURE — 99214 OFFICE O/P EST MOD 30 MIN: CPT | Performed by: FAMILY MEDICINE

## 2017-09-12 PROCEDURE — 99212 OFFICE O/P EST SF 10 MIN: CPT | Performed by: FAMILY MEDICINE

## 2017-09-12 RX ORDER — PROPRANOLOL HYDROCHLORIDE 10 MG/1
10 TABLET ORAL 2 TIMES DAILY
Qty: 60 TABLET | Refills: 1 | Status: SHIPPED | OUTPATIENT
Start: 2017-09-12 | End: 2018-01-22

## 2017-09-12 NOTE — PROGRESS NOTES
Patient ID: Eryn Esparza is a 30year old female. HPI  Patient presents with:  Forms Completion  Leg Pain  Back Pain  Migraine  I saw on 8/21/2017.   On September 8, 2017 she did get a epidural steroid injection into her back but states it did not JOINT UNLISTED  No date: CHOLECYSTECTOMY  2004: COLONOSCOPY  No date: HYSTERECTOMY  2012: LAPAROSCOPIC CHOLECYSTECTOMY  4/2014: OTHER SURGICAL HISTORY      Comment: Per [facility] - Laparoscopic ovarian cyst  2007: OTHER SURGICAL HISTORY      Comment: Cysts yoel normal. No respiratory distress. Abdominal: Normal appearance and bowel sounds are normal. There is    no tenderness. There is no rigidity, no rebound, no guarding   Neurological: Patient is alert and oriented to person, place, and time.    Skin: Skin is

## 2017-09-13 ENCOUNTER — TELEPHONE (OUTPATIENT)
Dept: FAMILY MEDICINE CLINIC | Facility: CLINIC | Age: 37
End: 2017-09-13

## 2017-09-13 NOTE — TELEPHONE ENCOUNTER
Patient dropped off FMLA forms to be completed at [facility]. Faxed to Medical Records at [phone]. Also forwarded originals via inner office.

## 2017-09-14 ENCOUNTER — TELEPHONE (OUTPATIENT)
Dept: ADMINISTRATIVE | Age: 37
End: 2017-09-14

## 2017-09-14 NOTE — TELEPHONE ENCOUNTER
Good afternoon Dr. Kurt Cuello pending in DONTA. Pt is requesting intermittent time off of 1-4 days per month for 12 months due to back pain and migraines starting from 9/8/17. Do you approve?     Thank you,  Gerhardt Challenger

## 2017-09-22 ENCOUNTER — TELEPHONE (OUTPATIENT)
Dept: NEUROLOGY | Facility: CLINIC | Age: 37
End: 2017-09-22

## 2017-09-22 ENCOUNTER — OFFICE VISIT (OUTPATIENT)
Dept: NEUROLOGY | Facility: CLINIC | Age: 37
End: 2017-09-22

## 2017-09-22 VITALS
DIASTOLIC BLOOD PRESSURE: 60 MMHG | HEIGHT: 62 IN | SYSTOLIC BLOOD PRESSURE: 120 MMHG | HEART RATE: 68 BPM | WEIGHT: 159 LBS | RESPIRATION RATE: 16 BRPM | BODY MASS INDEX: 29.26 KG/M2

## 2017-09-22 DIAGNOSIS — M43.17 SPONDYLOLISTHESIS OF LUMBOSACRAL REGION: Primary | ICD-10-CM

## 2017-09-22 PROCEDURE — 99214 OFFICE O/P EST MOD 30 MIN: CPT | Performed by: PHYSICAL MEDICINE & REHABILITATION

## 2017-09-22 RX ORDER — TRAMADOL HYDROCHLORIDE 50 MG/1
50 TABLET ORAL EVERY 8 HOURS PRN
Qty: 15 TABLET | Refills: 0 | Status: SHIPPED | OUTPATIENT
Start: 2017-09-22 | End: 2018-01-22

## 2017-09-22 NOTE — TELEPHONE ENCOUNTER
Called [facility] [phone] for Authorization of Approval for Bilateral L5 transforaminal epidural steroid injections with CPT code 90532-40 / 64927, t/t Bandar Riojas, stating the Approved with Authorization code Authorization # [id number] for 2 units/DOS e

## 2017-09-22 NOTE — PROGRESS NOTES
No referring provider defined for this encounter. Nataliia Frausto MD    Chief Complaint: Low back pain    HPI:  Maritza Grimes is a 30year old female who presents with complaints of Low Back Pain (9/8/17 L5-S1 ILESI with no relief offered.  Pain is 7 Musculoskeletal:        Back:        Neurologic Examination:  Manual muscle testing:   Gives way with right HF due to pain. 5/5 b/l ankle dorsiflexion and plantarflexion.     Provocative tests:   Right  Left   Facet Load (ext/SB/Rot) Positive Negative   Str

## 2017-09-22 NOTE — TELEPHONE ENCOUNTER
Patient has been scheduled for a Bilateral L5 transforaminal epidural steroid injections on 9/29/17 at the [address] [place] [address]. Medications and allergies reviewed. Patient informed to hold aspirins, nsaids, blood thinners, vitamins and fish oils 7 days prior to procedure.

## 2017-09-26 ENCOUNTER — OFFICE VISIT (OUTPATIENT)
Dept: FAMILY MEDICINE CLINIC | Facility: CLINIC | Age: 37
End: 2017-09-26

## 2017-09-26 VITALS
TEMPERATURE: 99 F | RESPIRATION RATE: 12 BRPM | HEIGHT: 62 IN | BODY MASS INDEX: 29.66 KG/M2 | SYSTOLIC BLOOD PRESSURE: 140 MMHG | WEIGHT: 161.19 LBS | DIASTOLIC BLOOD PRESSURE: 59 MMHG | HEART RATE: 77 BPM

## 2017-09-26 DIAGNOSIS — M43.17 SPONDYLOLISTHESIS OF LUMBOSACRAL REGION: Primary | ICD-10-CM

## 2017-09-26 DIAGNOSIS — G44.209 TENSION HEADACHE: ICD-10-CM

## 2017-09-26 DIAGNOSIS — M54.16 LUMBAR RADICULITIS: ICD-10-CM

## 2017-09-26 PROCEDURE — 99214 OFFICE O/P EST MOD 30 MIN: CPT | Performed by: FAMILY MEDICINE

## 2017-09-26 PROCEDURE — 99212 OFFICE O/P EST SF 10 MIN: CPT | Performed by: FAMILY MEDICINE

## 2017-09-26 RX ORDER — ORPHENADRINE CITRATE 100 MG/1
100 TABLET, EXTENDED RELEASE ORAL 2 TIMES DAILY PRN
Qty: 60 TABLET | Refills: 1 | Status: SHIPPED | OUTPATIENT
Start: 2017-09-26 | End: 2018-01-22

## 2017-09-26 NOTE — PROGRESS NOTES
Patient ID: Kristen Shore is a 30year old female. HPI  Patient presents with:  Back Pain: follow up     She saw Dr. Raj Cueto for her pain in the back. Pain is not really changed. She will receive other shots another set of shots in 3 days.   Pt re ovarian cyst  2007: OTHER SURGICAL HISTORY      Comment: Cysts removed  No date: OTHER SURGICAL HISTORY      Comment: arthrocentesis of the right shoulder                subacromial space  06/08/16: OTHER SURGICAL HISTORY      Comment: patty chavez bilateral lower extremity, sensory exam was intact, good strength with plantar flexion and dorsiflexion, gait was normal.  Able to flex forward at the waist and touch the upper legs. No bony tenderness.     Neck: Tender with increased tone of the trapezius

## 2017-09-29 ENCOUNTER — APPOINTMENT (OUTPATIENT)
Dept: GENERAL RADIOLOGY | Facility: HOSPITAL | Age: 37
End: 2017-09-29
Attending: PHYSICAL MEDICINE & REHABILITATION
Payer: COMMERCIAL

## 2017-09-29 ENCOUNTER — HOSPITAL ENCOUNTER (OUTPATIENT)
Facility: HOSPITAL | Age: 37
Setting detail: HOSPITAL OUTPATIENT SURGERY
Discharge: HOME | End: 2017-09-29
Attending: PHYSICAL MEDICINE & REHABILITATION | Admitting: PHYSICAL MEDICINE & REHABILITATION
Payer: COMMERCIAL

## 2017-09-29 ENCOUNTER — ANESTHESIA EVENT (OUTPATIENT)
Dept: SURGERY | Facility: HOSPITAL | Age: 37
End: 2017-09-29
Payer: COMMERCIAL

## 2017-09-29 ENCOUNTER — SURGERY (OUTPATIENT)
Age: 37
End: 2017-09-29

## 2017-09-29 ENCOUNTER — ANESTHESIA (OUTPATIENT)
Dept: SURGERY | Facility: HOSPITAL | Age: 37
End: 2017-09-29
Payer: COMMERCIAL

## 2017-09-29 VITALS
RESPIRATION RATE: 14 BRPM | DIASTOLIC BLOOD PRESSURE: 63 MMHG | BODY MASS INDEX: 28.89 KG/M2 | TEMPERATURE: 98 F | HEART RATE: 60 BPM | SYSTOLIC BLOOD PRESSURE: 132 MMHG | WEIGHT: 157 LBS | OXYGEN SATURATION: 99 % | HEIGHT: 62 IN

## 2017-09-29 PROCEDURE — 76001 XR C-ARM FLUORO >1 HOUR  (CPT=76001): CPT | Performed by: PHYSICAL MEDICINE & REHABILITATION

## 2017-09-29 PROCEDURE — 72100 X-RAY EXAM L-S SPINE 2/3 VWS: CPT | Performed by: PHYSICAL MEDICINE & REHABILITATION

## 2017-09-29 PROCEDURE — 3E0R33Z INTRODUCTION OF ANTI-INFLAMMATORY INTO SPINAL CANAL, PERCUTANEOUS APPROACH: ICD-10-PCS | Performed by: PHYSICAL MEDICINE & REHABILITATION

## 2017-09-29 PROCEDURE — 3E0R3BZ INTRODUCTION OF ANESTHETIC AGENT INTO SPINAL CANAL, PERCUTANEOUS APPROACH: ICD-10-PCS | Performed by: PHYSICAL MEDICINE & REHABILITATION

## 2017-09-29 RX ORDER — ACETAMINOPHEN 325 MG/1
650 TABLET ORAL ONCE
Status: COMPLETED | OUTPATIENT
Start: 2017-09-29 | End: 2017-09-29

## 2017-09-29 RX ORDER — MIDAZOLAM HYDROCHLORIDE 1 MG/ML
INJECTION INTRAMUSCULAR; INTRAVENOUS AS NEEDED
Status: DISCONTINUED | OUTPATIENT
Start: 2017-09-29 | End: 2017-09-29 | Stop reason: SURG

## 2017-09-29 RX ORDER — HYDROMORPHONE HYDROCHLORIDE 1 MG/ML
0.4 INJECTION, SOLUTION INTRAMUSCULAR; INTRAVENOUS; SUBCUTANEOUS EVERY 5 MIN PRN
Status: DISCONTINUED | OUTPATIENT
Start: 2017-09-29 | End: 2017-09-29

## 2017-09-29 RX ORDER — MORPHINE SULFATE 2 MG/ML
2 INJECTION, SOLUTION INTRAMUSCULAR; INTRAVENOUS EVERY 10 MIN PRN
Status: DISCONTINUED | OUTPATIENT
Start: 2017-09-29 | End: 2017-09-29

## 2017-09-29 RX ORDER — HYDROCODONE BITARTRATE AND ACETAMINOPHEN 5; 325 MG/1; MG/1
1 TABLET ORAL AS NEEDED
Status: DISCONTINUED | OUTPATIENT
Start: 2017-09-29 | End: 2017-09-29

## 2017-09-29 RX ORDER — FAMOTIDINE 20 MG/1
20 TABLET ORAL ONCE
Status: DISCONTINUED | OUTPATIENT
Start: 2017-09-29 | End: 2017-09-29 | Stop reason: HOSPADM

## 2017-09-29 RX ORDER — HALOPERIDOL 5 MG/ML
0.25 INJECTION INTRAMUSCULAR ONCE AS NEEDED
Status: DISCONTINUED | OUTPATIENT
Start: 2017-09-29 | End: 2017-09-29

## 2017-09-29 RX ORDER — NALOXONE HYDROCHLORIDE 0.4 MG/ML
80 INJECTION, SOLUTION INTRAMUSCULAR; INTRAVENOUS; SUBCUTANEOUS AS NEEDED
Status: DISCONTINUED | OUTPATIENT
Start: 2017-09-29 | End: 2017-09-29

## 2017-09-29 RX ORDER — SODIUM CHLORIDE, SODIUM LACTATE, POTASSIUM CHLORIDE, CALCIUM CHLORIDE 600; 310; 30; 20 MG/100ML; MG/100ML; MG/100ML; MG/100ML
INJECTION, SOLUTION INTRAVENOUS CONTINUOUS
Status: DISCONTINUED | OUTPATIENT
Start: 2017-09-29 | End: 2017-09-29

## 2017-09-29 RX ORDER — MORPHINE SULFATE 4 MG/ML
4 INJECTION, SOLUTION INTRAMUSCULAR; INTRAVENOUS EVERY 10 MIN PRN
Status: DISCONTINUED | OUTPATIENT
Start: 2017-09-29 | End: 2017-09-29

## 2017-09-29 RX ORDER — MORPHINE SULFATE 10 MG/ML
6 INJECTION, SOLUTION INTRAMUSCULAR; INTRAVENOUS EVERY 10 MIN PRN
Status: DISCONTINUED | OUTPATIENT
Start: 2017-09-29 | End: 2017-09-29

## 2017-09-29 RX ORDER — HYDROMORPHONE HYDROCHLORIDE 1 MG/ML
0.2 INJECTION, SOLUTION INTRAMUSCULAR; INTRAVENOUS; SUBCUTANEOUS EVERY 5 MIN PRN
Status: DISCONTINUED | OUTPATIENT
Start: 2017-09-29 | End: 2017-09-29

## 2017-09-29 RX ORDER — LIDOCAINE HYDROCHLORIDE 10 MG/ML
INJECTION, SOLUTION EPIDURAL; INFILTRATION; INTRACAUDAL; PERINEURAL AS NEEDED
Status: DISCONTINUED | OUTPATIENT
Start: 2017-09-29 | End: 2017-09-29 | Stop reason: HOSPADM

## 2017-09-29 RX ORDER — METOCLOPRAMIDE 10 MG/1
10 TABLET ORAL ONCE
Status: DISCONTINUED | OUTPATIENT
Start: 2017-09-29 | End: 2017-09-29 | Stop reason: HOSPADM

## 2017-09-29 RX ORDER — METOPROLOL TARTRATE 5 MG/5ML
2.5 INJECTION INTRAVENOUS ONCE
Status: DISCONTINUED | OUTPATIENT
Start: 2017-09-29 | End: 2017-09-29

## 2017-09-29 RX ORDER — DEXAMETHASONE SODIUM PHOSPHATE 10 MG/ML
INJECTION, SOLUTION INTRAMUSCULAR; INTRAVENOUS AS NEEDED
Status: DISCONTINUED | OUTPATIENT
Start: 2017-09-29 | End: 2017-09-29 | Stop reason: HOSPADM

## 2017-09-29 RX ORDER — HYDROMORPHONE HYDROCHLORIDE 1 MG/ML
0.6 INJECTION, SOLUTION INTRAMUSCULAR; INTRAVENOUS; SUBCUTANEOUS EVERY 5 MIN PRN
Status: DISCONTINUED | OUTPATIENT
Start: 2017-09-29 | End: 2017-09-29

## 2017-09-29 RX ORDER — ONDANSETRON 2 MG/ML
4 INJECTION INTRAMUSCULAR; INTRAVENOUS ONCE AS NEEDED
Status: DISCONTINUED | OUTPATIENT
Start: 2017-09-29 | End: 2017-09-29

## 2017-09-29 RX ORDER — HYDROCODONE BITARTRATE AND ACETAMINOPHEN 5; 325 MG/1; MG/1
2 TABLET ORAL AS NEEDED
Status: DISCONTINUED | OUTPATIENT
Start: 2017-09-29 | End: 2017-09-29

## 2017-09-29 RX ADMIN — SODIUM CHLORIDE, SODIUM LACTATE, POTASSIUM CHLORIDE, CALCIUM CHLORIDE: 600; 310; 30; 20 INJECTION, SOLUTION INTRAVENOUS at 16:45:00

## 2017-09-29 RX ADMIN — MIDAZOLAM HYDROCHLORIDE 2 MG: 1 INJECTION INTRAMUSCULAR; INTRAVENOUS at 16:15:00

## 2017-09-29 RX ADMIN — SODIUM CHLORIDE, SODIUM LACTATE, POTASSIUM CHLORIDE, CALCIUM CHLORIDE: 600; 310; 30; 20 INJECTION, SOLUTION INTRAVENOUS at 16:14:00

## 2017-09-29 RX ADMIN — SODIUM CHLORIDE, SODIUM LACTATE, POTASSIUM CHLORIDE, CALCIUM CHLORIDE: 600; 310; 30; 20 INJECTION, SOLUTION INTRAVENOUS at 16:25:00

## 2017-09-29 NOTE — DISCHARGE SUMMARY
Outpatient Surgery Brief Discharge Summary         Patient ID:  Mickey Coulter  [id number]  30year old  2/9/1980    Discharge Diagnoses: Spondylolisthesis of lumbosacral region     Procedures: bilateral L5-S1 transforaminal epidural steroid injections

## 2017-09-29 NOTE — OPERATIVE REPORT
Procedure note: Transforaminal Epidural Steroid Injection, bilateral L5-S1     Pre-operative vital signs:    09/29/17  1405   BP: 135/58   Pulse: 80   Resp: 18   Temp: 98.6 °F (37 °C)     Informed Consent Obtained by: Wilfredo Nolasco    Procedure: Tameka Rangel above. The needle was removed and the patient tolerated the procedure well. Anesthesia: MAC sedation  IVF: NS  Addendum: The patient was noted to have volitional movement in the bilateral ankles on command.     Post-operative Vital Signs: BP:  HR:  RR:

## 2017-09-29 NOTE — ANESTHESIA PREPROCEDURE EVALUATION
Anesthesia PreOp Note    HPI:     Rohan Norris is a 30year old female who presents for preoperative consultation requested by: Troy Rosario DO    Date of Surgery: 9/29/2017    Procedure(s):  LUMBAR INTERLAMINAR EPIDURAL INJECTION  Indication: Spon HISTORY      Comment: laparoscopy, filshie clip sterilization of                left fallopian tube, ablation of endometriosis,               retrieval and removal of displaced filshie clip  4/5/2017: TOTAL ABDOMINAL HYSTERECTOMY N/A      Comment: Procedur HYDROcodone-acetaminophen (NORCO) 5-325 MG per tab 1 tablet 1 tablet Oral PRN Jim Rodriguez MD    Or        HYDROcodone-acetaminophen [facility]) 5-325 MG per tab 2 tablet 2 tablet Oral PRN Jim Rodriguez MD    fentaNYL citrate (SUBLIMAZE) 0.05 MG/ML Father      Type 2   • Hypertension Mother    • Lipids Mother      Hyperlipidemia   • Diabetes Mother      Type 2   • Diabetes Sister    • Hypertension Sister    • Lipids Brother        Social History  Social History   Marital status:   Spouse name: I  TM distance: >3 FB  Neck ROM: full  Dental - normal exam     Pulmonary - normal exam   Cardiovascular - normal exam    Neuro/Psych      GI/Hepatic/Renal    (+) GERD well controlled,     Endo/Other    Abdominal  - normal exam             Anesthesia Plan:

## 2017-09-29 NOTE — ANESTHESIA POSTPROCEDURE EVALUATION
Patient: Ismael Hernández    Procedure Summary     Date:  09/29/17 Room / Location:  [facility] OR  / [facility] OR    Anesthesia Start:  1605 Anesthesia Stop:  5737    Procedure:  LUMBAR INTERLAMINAR EPIDURAL INJECTION (Bilateral ) Diagnosis:  (Spondylolis

## 2017-09-29 NOTE — H&P
[address] [facility] Patient Status:  Hospital Outpatient Surgery    1980 MRN [id number]   Location [facility] PRE OP RECOVERY Attending Arden Mortensen, [address] [facility] Day # 0 MAMI Barry • Hypertension Sister    • Lipids Brother       reports that she has never smoked. She has quit using smokeless tobacco. She reports that she drinks alcohol. She reports that she does not use drugs.     Allergies:    Sulfa Antibiotics       Hives, Rash pain     Right elbow pain     Cervical radiculitis     Vitamin D deficiency     Vitamin B12 deficiency     Postcoital bleeding     Pelvic pain in female     Uterine leiomyoma     Endometriosis     Spondylolisthesis of lumbosacral region    1) spondylosthes

## 2017-10-06 ENCOUNTER — TELEPHONE (OUTPATIENT)
Dept: FAMILY MEDICINE CLINIC | Facility: CLINIC | Age: 37
End: 2017-10-06

## 2017-10-09 NOTE — TELEPHONE ENCOUNTER
PA for Orphenadrine Citrate  mg tab completed with EPS via CMM response time 24-72 hours KEY [id number].

## 2017-10-12 NOTE — TELEPHONE ENCOUNTER
Fax received from [employer] stating orphenadrine tab 100mg er is denied. Pt must try and fail 2 PDL agents. Pt has tried and failed cyclobenzaprine so needs to try 1 more of the following: Parafon Forte, Baclofen, Robaxin, Zanaflex tabs only, generics when available.

## 2017-10-23 ENCOUNTER — TELEPHONE (OUTPATIENT)
Dept: NEUROLOGY | Facility: CLINIC | Age: 37
End: 2017-10-23

## 2018-01-22 ENCOUNTER — OFFICE VISIT (OUTPATIENT)
Dept: FAMILY MEDICINE CLINIC | Facility: CLINIC | Age: 38
End: 2018-01-22

## 2018-01-22 VITALS
DIASTOLIC BLOOD PRESSURE: 81 MMHG | HEART RATE: 90 BPM | BODY MASS INDEX: 30 KG/M2 | TEMPERATURE: 98 F | SYSTOLIC BLOOD PRESSURE: 134 MMHG | WEIGHT: 165 LBS

## 2018-01-22 DIAGNOSIS — M54.6 ACUTE RIGHT-SIDED THORACIC BACK PAIN: Primary | ICD-10-CM

## 2018-01-22 DIAGNOSIS — R51.9 ACUTE NONINTRACTABLE HEADACHE, UNSPECIFIED HEADACHE TYPE: ICD-10-CM

## 2018-01-22 LAB
APPEARANCE: CLEAR
BILIRUBIN: NEGATIVE
GLUCOSE (URINE DIPSTICK): NEGATIVE MG/DL
KETONES (URINE DIPSTICK): NEGATIVE MG/DL
LEUKOCYTES: NEGATIVE
MULTISTIX LOT#: NORMAL NUMERIC
NITRITE, URINE: NEGATIVE
OCCULT BLOOD: NEGATIVE
PH, URINE: 5 (ref 4.5–8)
PROTEIN (URINE DIPSTICK): NEGATIVE MG/DL
SPECIFIC GRAVITY: 1.01 (ref 1–1.03)
URINE-COLOR: YELLOW
UROBILINOGEN,SEMI-QN: 0.2 MG/DL (ref 0–1.9)

## 2018-01-22 PROCEDURE — 81002 URINALYSIS NONAUTO W/O SCOPE: CPT | Performed by: FAMILY MEDICINE

## 2018-01-22 PROCEDURE — 99214 OFFICE O/P EST MOD 30 MIN: CPT | Performed by: FAMILY MEDICINE

## 2018-01-22 PROCEDURE — 99212 OFFICE O/P EST SF 10 MIN: CPT | Performed by: FAMILY MEDICINE

## 2018-01-22 NOTE — PROGRESS NOTES
1/22/2018  9:29 AM    Aravind Churchill is a 30year old female. Chief complaint(s): Patient presents with:  Headache  Low Back Pain: X 2 months off an on   Urinary Frequency    HPI:     Aravind Churchill primary complaint is regarding as above.      Chani Gonzalez of lumbosacral region 9/8/2017   • Vitamin B12 deficiency    • Vitamin D deficiency       Past Surgical History:  No date: CHOLECYSTECTOMY  2004: COLONOSCOPY  No date: HYSTERECTOMY  2012: LAPAROSCOPIC CHOLECYSTECTOMY  4/2014: OTHER SURGICAL HISTORY      Co AN             I.V.      ROS:   Review of Systems   Constitutional: Negative for chills, fatigue and fever. HENT: Negative for ear pain and sore throat. Respiratory: Negative for cough and wheezing.     Cardiovascular: Negative for chest pain and palpi Negative   PH, URINE 5.0 4.5 - 8.0   PROTEIN (URINE DIPSTICK) NEGATIVE Negative/Trace mg/dL   UROBILINOGEN,SEMI-QN 0.2 0.0 - 1.9 mg/dL   NITRITE, URINE NEGATIVE Negative   LEUKOCYTES NEGATIVE Negative   APPEARANCE CLEAR Clear   URINE-COLOR YELLOW Yellow

## 2018-01-26 ENCOUNTER — OFFICE VISIT (OUTPATIENT)
Dept: OBGYN CLINIC | Facility: CLINIC | Age: 38
End: 2018-01-26

## 2018-01-26 ENCOUNTER — APPOINTMENT (OUTPATIENT)
Dept: LAB | Facility: HOSPITAL | Age: 38
End: 2018-01-26
Attending: OBSTETRICS & GYNECOLOGY
Payer: COMMERCIAL

## 2018-01-26 VITALS — DIASTOLIC BLOOD PRESSURE: 70 MMHG | WEIGHT: 163 LBS | SYSTOLIC BLOOD PRESSURE: 114 MMHG | BODY MASS INDEX: 30 KG/M2

## 2018-01-26 DIAGNOSIS — N83.201 RIGHT OVARIAN CYST: ICD-10-CM

## 2018-01-26 DIAGNOSIS — R10.2 PELVIC PAIN: Primary | ICD-10-CM

## 2018-01-26 DIAGNOSIS — R10.2 PELVIC PAIN: ICD-10-CM

## 2018-01-26 DIAGNOSIS — Z87.42 HISTORY OF ENDOMETRIOSIS: ICD-10-CM

## 2018-01-26 PROCEDURE — 87086 URINE CULTURE/COLONY COUNT: CPT

## 2018-01-26 PROCEDURE — 99214 OFFICE O/P EST MOD 30 MIN: CPT | Performed by: OBSTETRICS & GYNECOLOGY

## 2018-01-26 NOTE — PROGRESS NOTES
fHPI:   Joe Garcia is a 30year old female who presents for a hx of pelvic pain, was in [facility] and u/s showed right ovarian cyst 1.9 cm possible endometrioma vs hemorrhagic corpus luteum. Now here for a f/u consult from the er .   Pt stated 2005, 2008: Pregnancy      Comment:   2017: Spondylolisthesis of lumbosacral region  No date: Vitamin B12 deficiency  No date: Vitamin D deficiency   Past Surgical History:  No date: CHOLECYSTECTOMY  2004: COLONOSCOPY  No date: HYSTERECTOMY  2012: of anemia  ENDOCRINE: denies thyroid history  ALL/ASTHMA: denies hx of allergy or asthma    EXAM:   /70   Wt 163 lb (73.9 kg)   LMP 03/22/2017   BMI 29.81 kg/m²   Body mass index is 29.81 kg/m².    GENERAL: well developed, well nourished,in no apparen

## 2018-02-05 ENCOUNTER — HOSPITAL ENCOUNTER (OUTPATIENT)
Dept: ULTRASOUND IMAGING | Age: 38
Discharge: HOME | End: 2018-02-05
Attending: OBSTETRICS & GYNECOLOGY
Payer: COMMERCIAL

## 2018-02-05 DIAGNOSIS — R10.2 PELVIC PAIN: ICD-10-CM

## 2018-02-05 DIAGNOSIS — N83.201 RIGHT OVARIAN CYST: ICD-10-CM

## 2018-02-05 PROCEDURE — 76830 TRANSVAGINAL US NON-OB: CPT | Performed by: OBSTETRICS & GYNECOLOGY

## 2018-02-05 PROCEDURE — 76856 US EXAM PELVIC COMPLETE: CPT | Performed by: OBSTETRICS & GYNECOLOGY

## 2018-02-08 ENCOUNTER — TELEPHONE (OUTPATIENT)
Dept: OBGYN CLINIC | Facility: CLINIC | Age: 38
End: 2018-02-08

## 2018-02-09 NOTE — TELEPHONE ENCOUNTER
Lebanese phone line  #[phone] used to translate phone call. Informed pt urine culture from 01/26/18 was negative. Pt verbalizes understanding.

## 2018-02-12 ENCOUNTER — OFFICE VISIT (OUTPATIENT)
Dept: FAMILY MEDICINE CLINIC | Facility: CLINIC | Age: 38
End: 2018-02-12

## 2018-02-12 ENCOUNTER — LAB ENCOUNTER (OUTPATIENT)
Dept: LAB | Age: 38
End: 2018-02-12
Attending: FAMILY MEDICINE
Payer: COMMERCIAL

## 2018-02-12 VITALS
TEMPERATURE: 99 F | WEIGHT: 160 LBS | DIASTOLIC BLOOD PRESSURE: 63 MMHG | RESPIRATION RATE: 12 BRPM | BODY MASS INDEX: 29.44 KG/M2 | HEART RATE: 81 BPM | SYSTOLIC BLOOD PRESSURE: 102 MMHG | HEIGHT: 62 IN

## 2018-02-12 DIAGNOSIS — M79.604 PAIN IN RIGHT LEG: ICD-10-CM

## 2018-02-12 DIAGNOSIS — R53.83 LETHARGY: ICD-10-CM

## 2018-02-12 DIAGNOSIS — R10.11 RUQ ABDOMINAL PAIN: ICD-10-CM

## 2018-02-12 DIAGNOSIS — M54.6 CHRONIC BILATERAL THORACIC BACK PAIN: ICD-10-CM

## 2018-02-12 DIAGNOSIS — F41.9 ANXIETY: ICD-10-CM

## 2018-02-12 DIAGNOSIS — G89.29 CHRONIC BILATERAL THORACIC BACK PAIN: ICD-10-CM

## 2018-02-12 DIAGNOSIS — F51.04 PSYCHOPHYSIOLOGICAL INSOMNIA: ICD-10-CM

## 2018-02-12 DIAGNOSIS — R10.31 RIGHT LOWER QUADRANT ABDOMINAL PAIN: Primary | ICD-10-CM

## 2018-02-12 DIAGNOSIS — F32.A DEPRESSIVE DISORDER: ICD-10-CM

## 2018-02-12 DIAGNOSIS — R14.0 POSTPRANDIAL ABDOMINAL BLOATING: ICD-10-CM

## 2018-02-12 LAB
BASOPHILS # BLD: 0 K/UL (ref 0–0.2)
BASOPHILS NFR BLD: 0 %
CRP SERPL-MCNC: <0.5 MG/DL (ref 0–0.9)
EOSINOPHIL # BLD: 0.1 K/UL (ref 0–0.7)
EOSINOPHIL NFR BLD: 1 %
ERYTHROCYTE [DISTWIDTH] IN BLOOD BY AUTOMATED COUNT: 13 % (ref 11–15)
ERYTHROCYTE [SEDIMENTATION RATE] IN BLOOD: 7 MM/HR (ref 0–20)
HCT VFR BLD AUTO: 41.5 % (ref 35–48)
HGB BLD-MCNC: 13.8 G/DL (ref 12–16)
LYMPHOCYTES # BLD: 1.9 K/UL (ref 1–4)
LYMPHOCYTES NFR BLD: 24 %
MCH RBC QN AUTO: 30.4 PG (ref 27–32)
MCHC RBC AUTO-ENTMCNC: 33.3 G/DL (ref 32–37)
MCV RBC AUTO: 91.3 FL (ref 80–100)
MONOCYTES # BLD: 0.7 K/UL (ref 0–1)
MONOCYTES NFR BLD: 9 %
NEUTROPHILS # BLD AUTO: 5.1 K/UL (ref 1.8–7.7)
NEUTROPHILS NFR BLD: 65 %
PLATELET # BLD AUTO: 261 K/UL (ref 140–400)
PMV BLD AUTO: 9.3 FL (ref 7.4–10.3)
RBC # BLD AUTO: 4.54 M/UL (ref 3.7–5.4)
RHEUMATOID FACT SER QL: <5 IU/ML
TSH SERPL-ACNC: 1.18 UIU/ML (ref 0.45–5.33)
VIT B12 SERPL-MCNC: 596 PG/ML (ref 181–914)
WBC # BLD AUTO: 7.8 K/UL (ref 4–11)

## 2018-02-12 PROCEDURE — 86039 ANTINUCLEAR ANTIBODIES (ANA): CPT

## 2018-02-12 PROCEDURE — 82607 VITAMIN B-12: CPT

## 2018-02-12 PROCEDURE — 85025 COMPLETE CBC W/AUTO DIFF WBC: CPT

## 2018-02-12 PROCEDURE — 86812 HLA TYPING A B OR C: CPT

## 2018-02-12 PROCEDURE — 86038 ANTINUCLEAR ANTIBODIES: CPT

## 2018-02-12 PROCEDURE — 85652 RBC SED RATE AUTOMATED: CPT

## 2018-02-12 PROCEDURE — 86140 C-REACTIVE PROTEIN: CPT

## 2018-02-12 PROCEDURE — 84443 ASSAY THYROID STIM HORMONE: CPT

## 2018-02-12 PROCEDURE — 99215 OFFICE O/P EST HI 40 MIN: CPT | Performed by: FAMILY MEDICINE

## 2018-02-12 PROCEDURE — 36415 COLL VENOUS BLD VENIPUNCTURE: CPT

## 2018-02-12 PROCEDURE — 86431 RHEUMATOID FACTOR QUANT: CPT

## 2018-02-12 PROCEDURE — 99212 OFFICE O/P EST SF 10 MIN: CPT | Performed by: FAMILY MEDICINE

## 2018-02-12 RX ORDER — ALPRAZOLAM 0.25 MG/1
0.25 TABLET ORAL NIGHTLY PRN
Qty: 30 TABLET | Refills: 1 | Status: SHIPPED | OUTPATIENT
Start: 2018-02-12 | End: 2018-05-29

## 2018-02-12 NOTE — PROGRESS NOTES
Patient ID: Kristen Shore is a 41year old female. HPI  Patient presents with:  Abdominal Pain: Patient  states here for abd and back pain x's 2 months. She has numerous complaints.   She states about 2 months ago she has been starting to have s pain in the right upper quadrant is worse when she takes a deep breath or after she eats. She denies any acid taste in her throat. I asked if she is stressed and she states she is very stressed and sometimes a stress even causes nausea.   She has a poor femoroacetabular impingement. 8. Lesser incidental findings as above.             Wt Readings from Last 6 Encounters:  02/12/18 : 160 lb (72.6 kg)  01/26/18 : 163 lb (73.9 kg)  01/22/18 : 165 lb (74.8 kg)  09/29/17 : 157 lb (71.2 kg)  09/26/17 : 161 lb Hyun Evans MD;  Location: [address] [place] [address] MAIN OR  2012: TUBAL LIGATION       Current Outpatient Prescriptions:  Acetaminophen (TYLENOL) 325 MG Oral Cap Take 500 mg by mouth every 6 (six) hours as needed.  Disp:  Rfl:    alprazolam (XANAX) 0.25 MG Oral Tab Neurological: Patient is alert and oriented to person, place, and time   Patient has normal strength and normal reflexes. Patient displays no tremor. No cranial nerve deficit. Coordination and gait normal   Skin: Skin is warm.    Psychiatric: Patient ha INTERNAL    Depressive disorder  -     Sertraline HCl 50 MG Oral Tab; Take 1/2 tablet po daily for 1 week and then go to 1 full tablet each day from then on.  (for mood)    Anxiety  -     ALPRAZolam (XANAX) 0.25 MG Oral Tab;  Take 1 tablet (0.25 mg total) b

## 2018-02-13 LAB — HLA-B27: NEGATIVE

## 2018-02-14 ENCOUNTER — TELEPHONE (OUTPATIENT)
Dept: FAMILY MEDICINE CLINIC | Facility: CLINIC | Age: 38
End: 2018-02-14

## 2018-02-15 LAB — NUCLEAR IGG TITR SER IF: POSITIVE {TITER}

## 2018-02-16 ENCOUNTER — TELEPHONE (OUTPATIENT)
Dept: SURGERY | Facility: CLINIC | Age: 38
End: 2018-02-16

## 2018-02-16 NOTE — TELEPHONE ENCOUNTER
Patient calling - verified patient's name and  - informed patient of doctor's note with , patient verbalized understanding.  Pt states she tried to  the zoloft but did not have her correct insurance information with her and was not able

## 2018-02-17 ENCOUNTER — TELEPHONE (OUTPATIENT)
Dept: OTHER | Age: 38
End: 2018-02-17

## 2018-02-17 LAB — ANA NUCLEOLAR TITR SER IF: 160 {TITER}

## 2018-02-17 NOTE — TELEPHONE ENCOUNTER
Routed to [facility] to adjusts referrals in system for this month. Notes Recorded by Carmen Vasquez RN on 2/17/2018 at 4:29 PM CST   obtained ID #[id number] pt notified of results and verbalizes understanding with .  Phone number

## 2018-03-15 ENCOUNTER — OFFICE VISIT (OUTPATIENT)
Dept: RHEUMATOLOGY | Facility: CLINIC | Age: 38
End: 2018-03-15

## 2018-03-15 ENCOUNTER — APPOINTMENT (OUTPATIENT)
Dept: LAB | Age: 38
End: 2018-03-15
Attending: INTERNAL MEDICINE
Payer: COMMERCIAL

## 2018-03-15 VITALS
SYSTOLIC BLOOD PRESSURE: 122 MMHG | BODY MASS INDEX: 29.63 KG/M2 | HEIGHT: 62 IN | HEART RATE: 65 BPM | WEIGHT: 161 LBS | DIASTOLIC BLOOD PRESSURE: 73 MMHG

## 2018-03-15 DIAGNOSIS — M25.50 POLYARTHRALGIA: ICD-10-CM

## 2018-03-15 DIAGNOSIS — M79.10 MYALGIA: ICD-10-CM

## 2018-03-15 DIAGNOSIS — R76.8 POSITIVE ANA (ANTINUCLEAR ANTIBODY): ICD-10-CM

## 2018-03-15 DIAGNOSIS — R76.8 POSITIVE ANA (ANTINUCLEAR ANTIBODY): Primary | ICD-10-CM

## 2018-03-15 LAB — CK SERPL-CCNC: 94 U/L (ref 38–234)

## 2018-03-15 PROCEDURE — 99212 OFFICE O/P EST SF 10 MIN: CPT | Performed by: INTERNAL MEDICINE

## 2018-03-15 PROCEDURE — 82550 ASSAY OF CK (CPK): CPT

## 2018-03-15 PROCEDURE — 86235 NUCLEAR ANTIGEN ANTIBODY: CPT

## 2018-03-15 PROCEDURE — 86800 THYROGLOBULIN ANTIBODY: CPT

## 2018-03-15 PROCEDURE — 82085 ASSAY OF ALDOLASE: CPT

## 2018-03-15 PROCEDURE — 86376 MICROSOMAL ANTIBODY EACH: CPT

## 2018-03-15 PROCEDURE — 86200 CCP ANTIBODY: CPT

## 2018-03-15 PROCEDURE — 86225 DNA ANTIBODY NATIVE: CPT

## 2018-03-15 PROCEDURE — 36415 COLL VENOUS BLD VENIPUNCTURE: CPT

## 2018-03-15 PROCEDURE — 99244 OFF/OP CNSLTJ NEW/EST MOD 40: CPT | Performed by: INTERNAL MEDICINE

## 2018-03-15 RX ORDER — CYCLOBENZAPRINE HCL 5 MG
5 TABLET ORAL NIGHTLY
Qty: 30 TABLET | Refills: 0 | Status: SHIPPED | OUTPATIENT
Start: 2018-03-15 | End: 2018-05-29

## 2018-03-16 LAB
ALDOLASE, SERUM: 4.1 U/L
CCP IGG SERPL-ACNC: 0.5 U/ML (ref 0–6.9)
DSDNA AB TITR SER: <10 {TITER}
THYROGLOBULIN AB: 1.5 IU/ML
THYROPEROXIDASE AB SERPL-ACNC: 42.3 IU/ML (ref 0–9)

## 2018-03-17 LAB — SCLERODERMA (SCL-70) (ENA) AB, IGG: 0 AU/ML

## 2018-03-20 LAB
ENA SM IGG SER QL: NEGATIVE
ENA SM+RNP AB SER QL: NEGATIVE
ENA SS-A AB SER QL IA: NEGATIVE
ENA SS-B AB SER QL IA: NEGATIVE

## 2018-03-26 ENCOUNTER — OFFICE VISIT (OUTPATIENT)
Dept: FAMILY MEDICINE CLINIC | Facility: CLINIC | Age: 38
End: 2018-03-26

## 2018-03-26 VITALS — TEMPERATURE: 98 F | WEIGHT: 158 LBS | HEIGHT: 62 IN | BODY MASS INDEX: 29.08 KG/M2

## 2018-03-26 DIAGNOSIS — M25.50 POLYARTHRALGIA: Primary | ICD-10-CM

## 2018-03-26 DIAGNOSIS — R53.83 LETHARGY: ICD-10-CM

## 2018-03-26 DIAGNOSIS — R76.8 POSITIVE ANA (ANTINUCLEAR ANTIBODY): ICD-10-CM

## 2018-03-26 DIAGNOSIS — R30.0 DYSURIA: ICD-10-CM

## 2018-03-26 DIAGNOSIS — F32.A DEPRESSIVE DISORDER: ICD-10-CM

## 2018-03-26 DIAGNOSIS — F41.9 ANXIETY: ICD-10-CM

## 2018-03-26 LAB
APPEARANCE: CLEAR
MULTISTIX LOT#: NORMAL NUMERIC
PH, URINE: 6 (ref 4.5–8)
SPECIFIC GRAVITY: 1.01 (ref 1–1.03)
URINE-COLOR: YELLOW
UROBILINOGEN,SEMI-QN: 0.2 MG/DL (ref 0–1.9)

## 2018-03-26 PROCEDURE — 99214 OFFICE O/P EST MOD 30 MIN: CPT | Performed by: FAMILY MEDICINE

## 2018-03-26 PROCEDURE — 81002 URINALYSIS NONAUTO W/O SCOPE: CPT | Performed by: FAMILY MEDICINE

## 2018-03-26 PROCEDURE — 99212 OFFICE O/P EST SF 10 MIN: CPT | Performed by: FAMILY MEDICINE

## 2018-03-26 RX ORDER — DULOXETIN HYDROCHLORIDE 30 MG/1
CAPSULE, DELAYED RELEASE ORAL
Qty: 60 CAPSULE | Refills: 1 | Status: SHIPPED | OUTPATIENT
Start: 2018-03-26 | End: 2018-04-26

## 2018-03-26 NOTE — PROGRESS NOTES
Patient ID: Gualberto Mclaughlin is a 41year old female. HPI  Patient presents with:  Low Back Pain  Hair/Scalp Problem    She states today in the morning she started having some pressure when she urinated. Some mild pain. Is no blood in the urine.   No COLONOSCOPY  No date: HYSTERECTOMY  2012: LAPAROSCOPIC CHOLECYSTECTOMY  4/2014: OTHER SURGICAL HISTORY      Comment: Per [facility] - Laparoscopic ovarian cyst  2007: OTHER SURGICAL HISTORY      Comment: Cysts removed  No date: OTHER SURGICAL HISTORY      Commchirag bilateral trapezius muscle up into the cervical paraspinal muscles. Lymphadenopathy:     Has  no cervical adenopathy. Cardiovascular: Normal rate, regular rhythm and no murmur heard.   Pulmonary/Chest: Effort normal and breath sounds normal. No respirato

## 2018-04-04 ENCOUNTER — HOSPITAL ENCOUNTER (OUTPATIENT)
Age: 38
Discharge: HOME | End: 2018-04-04
Attending: FAMILY MEDICINE
Payer: COMMERCIAL

## 2018-04-04 VITALS
HEART RATE: 80 BPM | RESPIRATION RATE: 18 BRPM | TEMPERATURE: 98 F | DIASTOLIC BLOOD PRESSURE: 52 MMHG | SYSTOLIC BLOOD PRESSURE: 125 MMHG | OXYGEN SATURATION: 100 % | BODY MASS INDEX: 29 KG/M2 | WEIGHT: 158 LBS

## 2018-04-04 DIAGNOSIS — R21 RASH: Primary | ICD-10-CM

## 2018-04-04 PROCEDURE — 99213 OFFICE O/P EST LOW 20 MIN: CPT

## 2018-04-04 PROCEDURE — 99214 OFFICE O/P EST MOD 30 MIN: CPT

## 2018-04-04 RX ORDER — PERMETHRIN 50 MG/G
CREAM TOPICAL
Qty: 60 G | Refills: 0 | Status: SHIPPED | OUTPATIENT
Start: 2018-04-04 | End: 2018-04-26

## 2018-04-04 NOTE — ED INITIAL ASSESSMENT (HPI)
Broke out in [facility] 2 weeks ago no new products has used nothing for itching denies new products soaps creams lotion  Etc,easy non labored resp  States rash also on her legs and back no visible rash noted. Used new green tea extract.  Had extens

## 2018-04-04 NOTE — ED PROVIDER NOTES
Patient Seen in: [facility] Immediate Care In [address] [place] [address]    History   Patient presents with:  Rash Skin Problem (integumentary)    Stated Complaint: hives    HPI    Rash over the waist.    X 2 weeks  Now seems to be spreading of the back. Itchy. 0.0 oz/week     Comment: OCCASIONAL ALCOHOL      Review of Systems    Positive for stated complaint: hives  Other systems are as noted in HPI. Constitutional and vital signs reviewed.       All other systems reviewed and negative except as noted ab MD  [address] [id number]  [phone]    Schedule an appointment as soon as possible for a visit           Medications Prescribed:  Current Discharge Medication List    START taking these medications    triamcinolone acetonide 0.1 %

## 2018-04-12 ENCOUNTER — OFFICE VISIT (OUTPATIENT)
Dept: RHEUMATOLOGY | Facility: CLINIC | Age: 38
End: 2018-04-12

## 2018-04-12 VITALS
HEIGHT: 62 IN | TEMPERATURE: 98 F | WEIGHT: 161 LBS | BODY MASS INDEX: 29.63 KG/M2 | HEART RATE: 64 BPM | SYSTOLIC BLOOD PRESSURE: 112 MMHG | DIASTOLIC BLOOD PRESSURE: 69 MMHG

## 2018-04-12 DIAGNOSIS — R76.8 POSITIVE ANA (ANTINUCLEAR ANTIBODY): Primary | ICD-10-CM

## 2018-04-12 DIAGNOSIS — M79.10 MYALGIA: ICD-10-CM

## 2018-04-12 PROCEDURE — 99212 OFFICE O/P EST SF 10 MIN: CPT | Performed by: INTERNAL MEDICINE

## 2018-04-12 PROCEDURE — 99214 OFFICE O/P EST MOD 30 MIN: CPT | Performed by: INTERNAL MEDICINE

## 2018-04-12 RX ORDER — MELOXICAM 7.5 MG/1
7.5 TABLET ORAL DAILY
Qty: 30 TABLET | Refills: 1 | Status: SHIPPED | OUTPATIENT
Start: 2018-04-12 | End: 2018-05-29

## 2018-04-12 RX ORDER — TRAMADOL HYDROCHLORIDE 50 MG/1
50 TABLET ORAL DAILY
Qty: 30 TABLET | Refills: 3 | Status: SHIPPED | OUTPATIENT
Start: 2018-04-12 | End: 2018-05-29

## 2018-04-12 NOTE — PATIENT INSTRUCTIONS
1. Try meloxicam 7.5mg a day   2. Try tramadol 50mg a day #30   3. Stop flexeril   4. Cont. Duloxetine 30mg twice ad ay   5. Return to [facility] in 3-4 months.

## 2018-04-12 NOTE — PROGRESS NOTES
Patsy Nuñez is a 41year old female who presents for Patient presents with:  Results: lab results, also c/o lesions in mouth  Follow - Up: was seen by another physician for rash  .    HPI:     I had the pleasure of seeing Patsy Nuñez on 3/15/201 (CYMBALTA) 30 MG Oral Cap DR Particles 1 p.o. daily for 1 week and then start taking 2 p.o. daily. Disp: 60 capsule Rfl: 1   Cyclobenzaprine HCl 5 MG Oral Tab Take 1 tablet (5 mg total) by mouth nightly.  Disp: 30 tablet Rfl: 0   ALPRAZolam (XANAX) 0.25 MG Sallie Sol MD;  Location: [facility] OR  2012: TUBAL LIGATION   Family History   Problem Relation Age of Onset   • Diabetes Father      Type 2   • Hypertension Mother    • Lipids Mother      Hyperlipidemia   • Diabetes Mother      Type 2   • Diabetes S 112/69 (BP Location: Right arm, Patient Position: Sitting, Cuff Size: adult)   Pulse 64   Temp 98.1 °F (36.7 °C) (Oral)   Ht 5' 2\" (1.575 m)   Wt 161 lb (73 kg)   LMP 03/22/2017   BMI 29.45 kg/m²   HEENT: Clear oropharynx, no oral ulcers, EOM intact, pipe GLUCOSE (URINE DIPSTICK)      mg/dL neg    BILIRUBIN      Negative neg    KETONES (URINE DIPSTICK)      Negative mg/dL neg    SPECIFIC GRAVITY      1.005 - 1.030 1.010    OCCULT BLOOD      Negative neg    PH, URINE      4.5 - 8.0 6.0    PROTEIN (URINE DI presents with:  Results: lab results, also c/o lesions in mouth  Follow - Up: was seen by another physician for rash      1.  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

## 2018-04-26 ENCOUNTER — TELEPHONE (OUTPATIENT)
Dept: NEUROLOGY | Facility: CLINIC | Age: 38
End: 2018-04-26

## 2018-04-26 ENCOUNTER — OFFICE VISIT (OUTPATIENT)
Dept: NEUROLOGY | Facility: CLINIC | Age: 38
End: 2018-04-26

## 2018-04-26 VITALS
HEIGHT: 62 IN | WEIGHT: 158 LBS | BODY MASS INDEX: 29.08 KG/M2 | RESPIRATION RATE: 14 BRPM | HEART RATE: 62 BPM | SYSTOLIC BLOOD PRESSURE: 110 MMHG | DIASTOLIC BLOOD PRESSURE: 70 MMHG

## 2018-04-26 DIAGNOSIS — M43.17 SPONDYLOLISTHESIS AT L5-S1 LEVEL: Primary | ICD-10-CM

## 2018-04-26 PROCEDURE — 99213 OFFICE O/P EST LOW 20 MIN: CPT | Performed by: PHYSICAL MEDICINE & REHABILITATION

## 2018-04-26 RX ORDER — GABAPENTIN 300 MG/1
300 CAPSULE ORAL 3 TIMES DAILY
Qty: 60 CAPSULE | Refills: 0 | Status: SHIPPED | OUTPATIENT
Start: 2018-04-26 | End: 2018-06-14

## 2018-04-26 NOTE — PROGRESS NOTES
HPI:    Patient ID: Gregory Ball is a 41year old female. This 53-year-old female was last seen by me in September. She continues to experience chronic bilateral low back pain radiating down both legs, right worse than left.   The pain radiates dis Gregory Nino) 0.25 MG Oral Tab Take 1 tablet (0.25 mg total) by mouth nightly as needed for Sleep. Disp: 30 tablet Rfl: 1   Acetaminophen (TYLENOL) 325 MG Oral Cap Take 500 mg by mouth every 6 (six) hours as needed.  Disp:  Rfl:    TraMADol HCl 50 MG Oral Tab [facility] Due to the spondylolisthesis with bilateral L5 encroachment recommend bilateral S1 transforaminal epidural steroid injections under MAC sedation for situational anxiety.   Instructed her that she should try to do core exercises and walk to maintain her nonp

## 2018-04-26 NOTE — TELEPHONE ENCOUNTER
[employer] for authorization of approval for bilateral S1 TFESIs cpt codes 53411-21,16651. Approval was given with  Authorization # [id number] for one unit/DOS effective 04/26/18 07/25/18. Will inform Nursing.

## 2018-04-26 NOTE — PATIENT INSTRUCTIONS
1) Take 1 capsule in the AM after work for 7 days. If you are not too sedated when you wake up you can increase this to twice a day. 2) Stop the meloxicam 7 day before the procedure.      As of October 6th 2014, the Drug Enforcement Agency [facility]) is recl prescription, office must be given name of individual in advance and they must present an ID as well. · The name of the person picking up your prescription must be documented in your chart.

## 2018-05-10 ENCOUNTER — TELEPHONE (OUTPATIENT)
Dept: NEUROLOGY | Facility: CLINIC | Age: 38
End: 2018-05-10

## 2018-05-10 NOTE — TELEPHONE ENCOUNTER
Pt. is asking when is she going to be scheduled for procedure? See authorization. Authorization # [id number] for one unit/DOS effective 04/26/18 07/25/18.  Will inform Nursing

## 2018-05-10 NOTE — TELEPHONE ENCOUNTER
Patient has been scheduled for a Bilateral S1 TFESI under fluoroscopy, MAC sedation for situational anxiety On 5/18/18  at the [facility]. Medications and allergies reviewed.  Patient informed to hold aspirins, nsaids, blood thinners, vitamins and fish oils 3-7 da

## 2018-05-14 NOTE — TELEPHONE ENCOUNTER
Patient called and stated she would like to cancel her injection at [facility] on 05/18/18 w/ Dr. Braulio Barrett. Patient stated she can't make the appointment due to not having a  for her appointment and states she would like to reschedule.  Patient is aware she w

## 2018-05-15 NOTE — TELEPHONE ENCOUNTER
Received cancellation form from [address] [place] [address] . Patient cancelled and will call back when she is able to find a  since procedure is under MAC sedation.

## 2018-06-01 ENCOUNTER — HOSPITAL ENCOUNTER (OUTPATIENT)
Age: 38
Discharge: HOME | End: 2018-06-01
Attending: FAMILY MEDICINE
Payer: COMMERCIAL

## 2018-06-01 VITALS
DIASTOLIC BLOOD PRESSURE: 63 MMHG | SYSTOLIC BLOOD PRESSURE: 128 MMHG | HEART RATE: 126 BPM | OXYGEN SATURATION: 99 % | RESPIRATION RATE: 18 BRPM | WEIGHT: 158 LBS | BODY MASS INDEX: 29 KG/M2 | TEMPERATURE: 98 F

## 2018-06-01 DIAGNOSIS — G43.009 MIGRAINE WITHOUT AURA AND WITHOUT STATUS MIGRAINOSUS, NOT INTRACTABLE: Primary | ICD-10-CM

## 2018-06-01 PROCEDURE — 96372 THER/PROPH/DIAG INJ SC/IM: CPT

## 2018-06-01 PROCEDURE — 99214 OFFICE O/P EST MOD 30 MIN: CPT

## 2018-06-01 RX ORDER — ONDANSETRON 4 MG/1
4 TABLET, ORALLY DISINTEGRATING ORAL EVERY 8 HOURS PRN
Qty: 6 TABLET | Refills: 0 | Status: SHIPPED | OUTPATIENT
Start: 2018-06-01 | End: 2018-06-08

## 2018-06-01 RX ORDER — KETOROLAC TROMETHAMINE 10 MG/1
10 TABLET, FILM COATED ORAL EVERY 6 HOURS PRN
Qty: 25 TABLET | Refills: 0 | Status: SHIPPED | OUTPATIENT
Start: 2018-06-01 | End: 2018-06-08

## 2018-06-01 RX ORDER — HYDROCODONE BITARTRATE AND ACETAMINOPHEN 5; 325 MG/1; MG/1
1-2 TABLET ORAL EVERY 4 HOURS PRN
Qty: 15 TABLET | Refills: 0 | Status: SHIPPED | OUTPATIENT
Start: 2018-06-01 | End: 2018-06-08

## 2018-06-01 RX ORDER — KETOROLAC TROMETHAMINE 30 MG/ML
60 INJECTION, SOLUTION INTRAMUSCULAR; INTRAVENOUS ONCE
Status: COMPLETED | OUTPATIENT
Start: 2018-06-01 | End: 2018-06-01

## 2018-06-01 RX ORDER — ONDANSETRON 4 MG/1
4 TABLET, ORALLY DISINTEGRATING ORAL ONCE
Status: COMPLETED | OUTPATIENT
Start: 2018-06-01 | End: 2018-06-01

## 2018-06-01 NOTE — ED INITIAL ASSESSMENT (HPI)
Headache started Tuesday  +nausea  +blurry vision  +photosensitivity  +nasal congestion    HX: migraines

## 2018-06-01 NOTE — ED PROVIDER NOTES
Patient Seen in: [facility] Immediate Care In [place]    History   CC:  Patient presents with:  Headache (neurologic)    Stated Complaint: headache    ------------------------------  Per Rn: (paraphrase)    Ha x 3d, blurred vision, nausea  ------ Jihan Jimenez MD;  Location: [address] [place] [address] MAIN OR  2012: TUBAL LIGATION    Fam Hx: reviewed, is not pertinent    Smoking status: Never Smoker                                                              Smokeless tobacco: Former User                     Alcohol use:  Katie Alcala Differential diagnosis(es) d/w: migraine, tension ha  rx toradol, zofran provided  Therapeutic plan as noted  All questions answered, pt verbalizes understanding  F/u w/PCP advised            Disposition and Plan     Clinical Impression:  Migraine without

## 2018-06-05 ENCOUNTER — OFFICE VISIT (OUTPATIENT)
Dept: FAMILY MEDICINE CLINIC | Facility: CLINIC | Age: 38
End: 2018-06-05

## 2018-06-05 VITALS
TEMPERATURE: 97 F | HEIGHT: 62 IN | DIASTOLIC BLOOD PRESSURE: 75 MMHG | WEIGHT: 166 LBS | HEART RATE: 59 BPM | SYSTOLIC BLOOD PRESSURE: 114 MMHG | BODY MASS INDEX: 30.55 KG/M2

## 2018-06-05 DIAGNOSIS — G44.209 TENSION HEADACHE: Primary | ICD-10-CM

## 2018-06-05 DIAGNOSIS — F41.9 ANXIETY AND DEPRESSION: ICD-10-CM

## 2018-06-05 DIAGNOSIS — M26.609 TMJ DYSFUNCTION: ICD-10-CM

## 2018-06-05 DIAGNOSIS — F32.A ANXIETY AND DEPRESSION: ICD-10-CM

## 2018-06-05 DIAGNOSIS — S46.819D STRAIN OF TRAPEZIUS MUSCLE, UNSPECIFIED LATERALITY, SUBSEQUENT ENCOUNTER: ICD-10-CM

## 2018-06-05 DIAGNOSIS — G47.00 INSOMNIA, UNSPECIFIED TYPE: ICD-10-CM

## 2018-06-05 DIAGNOSIS — K59.00 CONSTIPATION, UNSPECIFIED CONSTIPATION TYPE: ICD-10-CM

## 2018-06-05 PROCEDURE — 99215 OFFICE O/P EST HI 40 MIN: CPT | Performed by: FAMILY MEDICINE

## 2018-06-05 PROCEDURE — 99212 OFFICE O/P EST SF 10 MIN: CPT | Performed by: FAMILY MEDICINE

## 2018-06-05 RX ORDER — VENLAFAXINE HYDROCHLORIDE 75 MG/1
75 CAPSULE, EXTENDED RELEASE ORAL DAILY
Qty: 30 CAPSULE | Refills: 2 | Status: SHIPPED | OUTPATIENT
Start: 2018-06-05 | End: 2018-12-17

## 2018-06-05 RX ORDER — NORTRIPTYLINE HYDROCHLORIDE 10 MG/1
10 CAPSULE ORAL NIGHTLY
Qty: 60 CAPSULE | Refills: 2 | Status: SHIPPED | OUTPATIENT
Start: 2018-06-05 | End: 2018-11-21

## 2018-06-05 RX ORDER — DOCUSATE SODIUM 100 MG/1
100 CAPSULE, LIQUID FILLED ORAL 2 TIMES DAILY PRN
Qty: 40 CAPSULE | Refills: 0 | Status: SHIPPED | OUTPATIENT
Start: 2018-06-05 | End: 2018-11-21

## 2018-06-05 NOTE — PROGRESS NOTES
Patient ID: Maritza Grimes is a 41year old female.     HPI  Patient presents with:  Urgent Care F/u: headaches and nausea still having headaches and blurry vision  Constipation: X 3 weeks    She did see Dr. Breana Kamara on 4/12/2018, her rheumatologist. of breath. Cardiovascular: Negative for chest pain. Gastrointestinal: Negative for abdominal pain and vomiting. Musculoskeletal: Positive for myalgias and neck pain. Skin: Negative for rash. Neurological: Positive for headaches.  Negative for diz Disp: 25 tablet Rfl: 0   ondansetron 4 MG Oral Tablet Dispersible Take 1 tablet (4 mg total) by mouth every 8 (eight) hours as needed for Nausea.  Disp: 6 tablet Rfl: 0   HYDROcodone-acetaminophen 5-325 MG Oral Tab Take 1-2 tablets by mouth every 4 (four) h rate, regular rhythm and  normal heart sounds. Pulmonary/Chest: Effort normal and breath sounds normal. No respiratory distress. Lymphadenopathy:     Patient has no cervical adenopathy.    Neurological: Patient is alert and oriented to person, place, a if it does not improve especially if you grind your teeth. The brand that I wear is NTI. Constipation, unspecified constipation type  -     docusate sodium (COLACE) 100 MG Oral Cap;  Take 1 capsule (100 mg total) by mouth 2 (two) times daily as ne

## 2018-06-05 NOTE — PATIENT INSTRUCTIONS
TEMPOROMANDIBULAR JOINT PAIN PLAN    You have bilateral TMJ dysfunction (temporomandibular joint). Do warm compresses 3 times daily for 5 minutes each time and take medicine as directed.   Avoid chewing tough me

## 2018-06-08 ENCOUNTER — HOSPITAL ENCOUNTER (OUTPATIENT)
Facility: HOSPITAL | Age: 38
Setting detail: HOSPITAL OUTPATIENT SURGERY
Discharge: HOME | End: 2018-06-08
Attending: PHYSICAL MEDICINE & REHABILITATION | Admitting: PHYSICAL MEDICINE & REHABILITATION
Payer: COMMERCIAL

## 2018-06-08 ENCOUNTER — OFFICE VISIT (OUTPATIENT)
Dept: PEDIATRICS CLINIC | Facility: CLINIC | Age: 38
End: 2018-06-08

## 2018-06-08 ENCOUNTER — SURGERY (OUTPATIENT)
Age: 38
End: 2018-06-08

## 2018-06-08 ENCOUNTER — ANESTHESIA (OUTPATIENT)
Dept: SURGERY | Facility: HOSPITAL | Age: 38
End: 2018-06-08
Payer: COMMERCIAL

## 2018-06-08 ENCOUNTER — ANESTHESIA EVENT (OUTPATIENT)
Dept: SURGERY | Facility: HOSPITAL | Age: 38
End: 2018-06-08
Payer: COMMERCIAL

## 2018-06-08 ENCOUNTER — APPOINTMENT (OUTPATIENT)
Dept: GENERAL RADIOLOGY | Facility: HOSPITAL | Age: 38
End: 2018-06-08
Attending: PHYSICAL MEDICINE & REHABILITATION
Payer: COMMERCIAL

## 2018-06-08 VITALS
DIASTOLIC BLOOD PRESSURE: 53 MMHG | HEART RATE: 67 BPM | TEMPERATURE: 97 F | OXYGEN SATURATION: 97 % | BODY MASS INDEX: 30.36 KG/M2 | RESPIRATION RATE: 14 BRPM | WEIGHT: 165 LBS | SYSTOLIC BLOOD PRESSURE: 108 MMHG | HEIGHT: 62 IN

## 2018-06-08 DIAGNOSIS — M43.17 SPONDYLOLISTHESIS OF LUMBOSACRAL REGION: Primary | ICD-10-CM

## 2018-06-08 PROCEDURE — 3E0R3BZ INTRODUCTION OF ANESTHETIC AGENT INTO SPINAL CANAL, PERCUTANEOUS APPROACH: ICD-10-PCS | Performed by: PHYSICAL MEDICINE & REHABILITATION

## 2018-06-08 PROCEDURE — 99998 NO SHOW: CPT | Performed by: PHYSICAL MEDICINE & REHABILITATION

## 2018-06-08 PROCEDURE — 77003 FLUOROGUIDE FOR SPINE INJECT: CPT | Performed by: PHYSICAL MEDICINE & REHABILITATION

## 2018-06-08 PROCEDURE — 64483 NJX AA&/STRD TFRM EPI L/S 1: CPT | Performed by: PHYSICAL MEDICINE & REHABILITATION

## 2018-06-08 PROCEDURE — 3E0R33Z INTRODUCTION OF ANTI-INFLAMMATORY INTO SPINAL CANAL, PERCUTANEOUS APPROACH: ICD-10-PCS | Performed by: PHYSICAL MEDICINE & REHABILITATION

## 2018-06-08 RX ORDER — NALOXONE HYDROCHLORIDE 0.4 MG/ML
80 INJECTION, SOLUTION INTRAMUSCULAR; INTRAVENOUS; SUBCUTANEOUS AS NEEDED
Status: DISCONTINUED | OUTPATIENT
Start: 2018-06-08 | End: 2018-06-08

## 2018-06-08 RX ORDER — SODIUM CHLORIDE, SODIUM LACTATE, POTASSIUM CHLORIDE, CALCIUM CHLORIDE 600; 310; 30; 20 MG/100ML; MG/100ML; MG/100ML; MG/100ML
INJECTION, SOLUTION INTRAVENOUS CONTINUOUS
Status: DISCONTINUED | OUTPATIENT
Start: 2018-06-08 | End: 2018-06-08

## 2018-06-08 RX ORDER — HYDROMORPHONE HYDROCHLORIDE 1 MG/ML
0.2 INJECTION, SOLUTION INTRAMUSCULAR; INTRAVENOUS; SUBCUTANEOUS EVERY 5 MIN PRN
Status: DISCONTINUED | OUTPATIENT
Start: 2018-06-08 | End: 2018-06-08

## 2018-06-08 RX ORDER — ONDANSETRON 2 MG/ML
4 INJECTION INTRAMUSCULAR; INTRAVENOUS ONCE AS NEEDED
Status: COMPLETED | OUTPATIENT
Start: 2018-06-08 | End: 2018-06-08

## 2018-06-08 RX ORDER — FAMOTIDINE 20 MG/1
20 TABLET ORAL ONCE
Status: COMPLETED | OUTPATIENT
Start: 2018-06-08 | End: 2018-06-08

## 2018-06-08 RX ORDER — LIDOCAINE HYDROCHLORIDE 10 MG/ML
INJECTION, SOLUTION EPIDURAL; INFILTRATION; INTRACAUDAL; PERINEURAL AS NEEDED
Status: DISCONTINUED | OUTPATIENT
Start: 2018-06-08 | End: 2018-06-08 | Stop reason: SURG

## 2018-06-08 RX ORDER — METOCLOPRAMIDE 10 MG/1
10 TABLET ORAL ONCE
Status: DISCONTINUED | OUTPATIENT
Start: 2018-06-08 | End: 2018-06-08 | Stop reason: HOSPADM

## 2018-06-08 RX ORDER — MIDAZOLAM HYDROCHLORIDE 1 MG/ML
INJECTION INTRAMUSCULAR; INTRAVENOUS AS NEEDED
Status: DISCONTINUED | OUTPATIENT
Start: 2018-06-08 | End: 2018-06-08 | Stop reason: SURG

## 2018-06-08 RX ORDER — LIDOCAINE HYDROCHLORIDE 10 MG/ML
INJECTION, SOLUTION EPIDURAL; INFILTRATION; INTRACAUDAL; PERINEURAL AS NEEDED
Status: DISCONTINUED | OUTPATIENT
Start: 2018-06-08 | End: 2018-06-08 | Stop reason: HOSPADM

## 2018-06-08 RX ORDER — HYDROCODONE BITARTRATE AND ACETAMINOPHEN 5; 325 MG/1; MG/1
2 TABLET ORAL AS NEEDED
Status: DISCONTINUED | OUTPATIENT
Start: 2018-06-08 | End: 2018-06-08

## 2018-06-08 RX ORDER — HYDROCODONE BITARTRATE AND ACETAMINOPHEN 5; 325 MG/1; MG/1
1 TABLET ORAL AS NEEDED
Status: DISCONTINUED | OUTPATIENT
Start: 2018-06-08 | End: 2018-06-08

## 2018-06-08 RX ORDER — HYDROMORPHONE HYDROCHLORIDE 1 MG/ML
0.4 INJECTION, SOLUTION INTRAMUSCULAR; INTRAVENOUS; SUBCUTANEOUS EVERY 5 MIN PRN
Status: DISCONTINUED | OUTPATIENT
Start: 2018-06-08 | End: 2018-06-08

## 2018-06-08 RX ORDER — ACETAMINOPHEN 500 MG
1000 TABLET ORAL ONCE
Status: COMPLETED | OUTPATIENT
Start: 2018-06-08 | End: 2018-06-08

## 2018-06-08 RX ORDER — HYDROMORPHONE HYDROCHLORIDE 1 MG/ML
0.6 INJECTION, SOLUTION INTRAMUSCULAR; INTRAVENOUS; SUBCUTANEOUS EVERY 5 MIN PRN
Status: DISCONTINUED | OUTPATIENT
Start: 2018-06-08 | End: 2018-06-08

## 2018-06-08 RX ORDER — HALOPERIDOL 5 MG/ML
0.25 INJECTION INTRAMUSCULAR ONCE AS NEEDED
Status: DISCONTINUED | OUTPATIENT
Start: 2018-06-08 | End: 2018-06-08

## 2018-06-08 RX ORDER — DEXAMETHASONE SODIUM PHOSPHATE 10 MG/ML
INJECTION, SOLUTION INTRAMUSCULAR; INTRAVENOUS AS NEEDED
Status: DISCONTINUED | OUTPATIENT
Start: 2018-06-08 | End: 2018-06-08 | Stop reason: HOSPADM

## 2018-06-08 RX ADMIN — MIDAZOLAM HYDROCHLORIDE 2 MG: 1 INJECTION INTRAMUSCULAR; INTRAVENOUS at 13:03:00

## 2018-06-08 RX ADMIN — SODIUM CHLORIDE, SODIUM LACTATE, POTASSIUM CHLORIDE, CALCIUM CHLORIDE: 600; 310; 30; 20 INJECTION, SOLUTION INTRAVENOUS at 13:23:00

## 2018-06-08 RX ADMIN — LIDOCAINE HYDROCHLORIDE 20 MG: 10 INJECTION, SOLUTION EPIDURAL; INFILTRATION; INTRACAUDAL; PERINEURAL at 13:07:00

## 2018-06-08 RX ADMIN — SODIUM CHLORIDE, SODIUM LACTATE, POTASSIUM CHLORIDE, CALCIUM CHLORIDE: 600; 310; 30; 20 INJECTION, SOLUTION INTRAVENOUS at 13:03:00

## 2018-06-08 NOTE — OPERATIVE REPORT
Bilateral S1 transforaminal epidural steroid injection  Dx: L5-S1 spondylolisthesis, bilateral lower radicular leg pain. Description:  Informed consent was signed. The patient was positioned prone.  The lumbosacral region was prepped and draped in the us

## 2018-06-08 NOTE — DISCHARGE SUMMARY
Outpatient Surgery Brief Discharge Summary         Patient ID:  Robbin Hein  [id number]  41year old  2/9/1980    Discharge Diagnoses: Spondylolisthesis at L5-S1 level    Procedures: Bilateral S1 transforaminal epidural steroid injection under fluoro

## 2018-06-08 NOTE — H&P
[facility] Patient Status:  Hospital Outpatient Surgery    1980 MRN [id number]   Location [facility] PRE OP RECOVERY Attending Candelario Persaud, [address] [facility] Day # 0 PCP Jessica Armendariz Mother      Hyperlipidemia   • Diabetes Mother      Type 2   • Diabetes Sister    • Hypertension Sister    • Lipids Brother       reports that she has never smoked. She has quit using smokeless tobacco. She reports that she drinks alcohol.  She reports that 64, temperature 98.4 °F (36.9 °C), temperature source Oral, resp. rate 15, height 62\", weight 165 lb, last menstrual period 03/22/2017, SpO2 98 %, not currently breastfeeding. HEENT: Exam is unremarkable. Pupils are equal and round.     Lungs: no labored

## 2018-06-08 NOTE — ANESTHESIA PREPROCEDURE EVALUATION
Anesthesia PreOp Note    HPI:     Autumn Syed is a 41year old female who presents for preoperative consultation requested by: Yanick Marvin DO    Date of Surgery: 6/8/2018    Procedure(s):  TRANSFORAMINAL LUMBAR EPIDURAL STEROID INJECTION SINGLE L    • Spondylolisthesis of lumbosacral region 2017   • Visual impairment     WEARS GLASSES   • Vitamin B12 deficiency    • Vitamin D deficiency        Past Surgical History:  2004: COLONOSCOPY  No date: HYSTERECTOMY  2012: [facility] 6/2/2018   ondansetron 4 MG Oral Tablet Dispersible Take 1 tablet (4 mg total) by mouth every 8 (eight) hours as needed for Nausea.  Disp: 6 tablet Rfl: 0 6/3/2018   HYDROcodone-acetaminophen 5-325 MG Oral Tab Take 1-2 tablets by mouth every 4 (four) hours oxygen saturation is 98%.     05/29/18  1630 06/08/18  1128   Pulse:  64   Resp:  15   Temp:  98.4 °F (36.9 °C)   TempSrc:  Oral   SpO2:  98%   Weight: 71.7 kg (158 lb) 74.8 kg (165 lb)   Height: 1.575 m (5' 2\") 1.575 m (5' 2\")        Anesthesia ROS/Med H

## 2018-06-08 NOTE — ANESTHESIA POSTPROCEDURE EVALUATION
Patient: Eryn Esparza    Procedure Summary     Date:  06/08/18 Room / Location:  [address] [place] [address] / [address] [place] [address] MAIN OR    Anesthesia Start:  5121 Anesthesia Stop:  8386    Procedure:  TRANSFORAMINAL LUMBAR EPIDURAL STEROID INJECTION SINGLE LEVEL (Bilateral ) D

## 2018-06-13 ENCOUNTER — NURSE TRIAGE (OUTPATIENT)
Dept: OTHER | Age: 38
End: 2018-06-13

## 2018-06-13 ENCOUNTER — HOSPITAL ENCOUNTER (OUTPATIENT)
Age: 38
Discharge: ANOTHER HOSPITAL | End: 2018-06-13
Attending: EMERGENCY MEDICINE
Payer: COMMERCIAL

## 2018-06-13 VITALS
HEART RATE: 86 BPM | WEIGHT: 165 LBS | DIASTOLIC BLOOD PRESSURE: 43 MMHG | SYSTOLIC BLOOD PRESSURE: 124 MMHG | TEMPERATURE: 98 F | RESPIRATION RATE: 20 BRPM | OXYGEN SATURATION: 97 % | BODY MASS INDEX: 30 KG/M2

## 2018-06-13 DIAGNOSIS — R51.9 HEADACHE IN BACK OF HEAD: Primary | ICD-10-CM

## 2018-06-13 PROCEDURE — 99212 OFFICE O/P EST SF 10 MIN: CPT

## 2018-06-13 PROCEDURE — 99213 OFFICE O/P EST LOW 20 MIN: CPT

## 2018-06-13 NOTE — TELEPHONE ENCOUNTER
Action Requested: Summary for Provider     []  Critical Lab, Recommendations Needed  [] Need Additional Advice  [x]   FYI    []   Need Orders  [] Need Medications Sent to Pharmacy  []  Other     SUMMARY: patient has been having neck pain on the right side nothing alleviates complaint (patient took ketorolac 10mg, but only made her nauseated and sick to her stomach.   has been taking norco 5/325mg)         Reason for Disposition  • SEVERE pain (e.g., excruciating, unable to do any normal activities)    Protoc

## 2018-06-13 NOTE — ED PROVIDER NOTES
Patient Seen in: [facility] Immediate Care In [address] [place] [address]    History   Patient presents with:  Headache (neurologic)  Neck Pain (musculoskeletal, neurologic)    Stated Complaint: neck pain/headache     HPI    Patient presents to the immediate care ce of displaced filshie clip  4/5/2017: TOTAL ABDOMINAL HYSTERECTOMY N/A      Comment: Procedure: ABDOMINAL HYSTERECTOMY;  Surgeon:                Chacorta Burns MD;  Location: [address] [place] [address] OR  2012: TUBAL LIGATION    Family history reviewed and is not pert 1524  ------------------------------------------------------------      MDM       Patient will need to go to the emergency department for further imaging.         Disposition and Plan     Clinical Impression:  Headache in back of head  (primary encounter di

## 2018-06-13 NOTE — ED INITIAL ASSESSMENT (HPI)
Has frequent headaches and is being treated for migrains. Was seen on 6/8 for headache in this IC that resolved and hs now returned with neck pain. Her present MD told her her symptoms are a result of stress.

## 2018-06-14 ENCOUNTER — HOSPITAL ENCOUNTER (OUTPATIENT)
Dept: GENERAL RADIOLOGY | Age: 38
Discharge: HOME | End: 2018-06-14
Attending: FAMILY MEDICINE
Payer: COMMERCIAL

## 2018-06-14 ENCOUNTER — OFFICE VISIT (OUTPATIENT)
Dept: FAMILY MEDICINE CLINIC | Facility: CLINIC | Age: 38
End: 2018-06-14

## 2018-06-14 VITALS
BODY MASS INDEX: 30 KG/M2 | TEMPERATURE: 99 F | HEIGHT: 62 IN | WEIGHT: 163 LBS | HEART RATE: 78 BPM | SYSTOLIC BLOOD PRESSURE: 107 MMHG | DIASTOLIC BLOOD PRESSURE: 66 MMHG

## 2018-06-14 DIAGNOSIS — F41.9 ANXIETY: ICD-10-CM

## 2018-06-14 DIAGNOSIS — M54.2 NECK PAIN ON RIGHT SIDE: ICD-10-CM

## 2018-06-14 DIAGNOSIS — M54.2 POSTERIOR NECK PAIN: Primary | ICD-10-CM

## 2018-06-14 DIAGNOSIS — M43.17 SPONDYLOLISTHESIS AT L5-S1 LEVEL: ICD-10-CM

## 2018-06-14 DIAGNOSIS — M54.2 POSTERIOR NECK PAIN: ICD-10-CM

## 2018-06-14 DIAGNOSIS — G44.209 TENSION HEADACHE: ICD-10-CM

## 2018-06-14 DIAGNOSIS — S46.811D STRAIN OF RIGHT TRAPEZIUS MUSCLE, SUBSEQUENT ENCOUNTER: ICD-10-CM

## 2018-06-14 DIAGNOSIS — F32.A DEPRESSIVE DISORDER: ICD-10-CM

## 2018-06-14 PROCEDURE — 72050 X-RAY EXAM NECK SPINE 4/5VWS: CPT | Performed by: FAMILY MEDICINE

## 2018-06-14 PROCEDURE — 99212 OFFICE O/P EST SF 10 MIN: CPT | Performed by: FAMILY MEDICINE

## 2018-06-14 PROCEDURE — 99215 OFFICE O/P EST HI 40 MIN: CPT | Performed by: FAMILY MEDICINE

## 2018-06-14 RX ORDER — GABAPENTIN 300 MG/1
300 CAPSULE ORAL 2 TIMES DAILY
Qty: 60 CAPSULE | Refills: 1 | Status: SHIPPED | OUTPATIENT
Start: 2018-06-14 | End: 2018-11-21

## 2018-06-14 NOTE — PROGRESS NOTES
Patient ID: Judy Mantilla is a 41year old female. HPI  Patient presents with:  Urgent Care F/u: ADO  ER F/U  Note For Work    She has seen Dr. Ivonne Lal on 6/8/2018. She had bilateral S1 transforaminal epidural steroid injection under fluoroscopy. having pain in the right posterior neck. It goes to her right occiput and then the right temple and frontal area. She continues to have pain. There is no trauma. The pain was so bad that she vomited on June 12, 2018 twice.   She denies having numbness o SURGICAL HISTORY      Comment: arthrocentesis of the right shoulder                subacromial space  06/08/16: OTHER SURGICAL HISTORY      Comment: laparoscopy, filshie clip sterilization of                left fallopian tube, ablation of endometriosis, deviation present. No thyromegaly present. Has tenderness right  trapezius up into cervical perispinal muscles and into scalp and does  have increased tone.    Good range of motion of the neck although she has some mild pain when she turns to the left in CERVICAL SPINE (4VIEWS) (CPT=72050); Future  -     PHYSIATRY - INTERNAL  -     PHYSICAL THERAPY - INTERNAL  -     PHYSICAL THERAPY EXTERNAL    Tension headache  -     XR CERVICAL SPINE (4VIEWS) (CPT=72050);  Future  -     PHYSIATRY - INTERNAL  -     PHYSICA week........ Vaishnavi Mason Duration: 4-6 weeks                                          Can take to various Physical Therapy locations as is APPROVED by your insurance.               Dheeraj Magallanes FAX # : [phone] for therapist to send me notes          Referral Priority: Zbigniew Brower

## 2018-06-21 ENCOUNTER — TELEPHONE (OUTPATIENT)
Dept: OTHER | Age: 38
End: 2018-06-21

## 2018-06-21 NOTE — TELEPHONE ENCOUNTER
Spoke with patient (identified name and ), results reviewed and agrees with plan.     Perfecto Pleitez #[id number]      Notes recorded by Kylie Schwartz on 2018 at 11:22 AM CDT  Lab letter mailed to patient  ------    Notes recorded by [facility]

## 2018-07-21 ENCOUNTER — NURSE TRIAGE (OUTPATIENT)
Dept: OTHER | Age: 38
End: 2018-07-21

## 2018-07-21 NOTE — TELEPHONE ENCOUNTER
Action Requested: Summary for Provider     []  Critical Lab, Recommendations Needed  [] Need Additional Advice  []   FYI    []   Need Orders  [] Need Medications Sent to Pharmacy  [x]  Other      SUMMARY:Pt sent to the IC in [place], no OV appts available

## 2018-08-03 ENCOUNTER — OFFICE VISIT (OUTPATIENT)
Dept: NEUROLOGY | Facility: CLINIC | Age: 38
End: 2018-08-03
Payer: COMMERCIAL

## 2018-08-03 VITALS
DIASTOLIC BLOOD PRESSURE: 54 MMHG | RESPIRATION RATE: 16 BRPM | HEART RATE: 88 BPM | SYSTOLIC BLOOD PRESSURE: 100 MMHG | BODY MASS INDEX: 29.78 KG/M2 | HEIGHT: 62 IN | WEIGHT: 161.81 LBS

## 2018-08-03 DIAGNOSIS — G44.209 TENSION HEADACHE: Primary | ICD-10-CM

## 2018-08-03 DIAGNOSIS — M54.2 TRIGGER POINT OF NECK: ICD-10-CM

## 2018-08-03 DIAGNOSIS — M43.17 SPONDYLOLISTHESIS OF LUMBOSACRAL REGION: ICD-10-CM

## 2018-08-03 DIAGNOSIS — M54.81 OCCIPITAL NEURALGIA OF RIGHT SIDE: ICD-10-CM

## 2018-08-03 PROCEDURE — 99213 OFFICE O/P EST LOW 20 MIN: CPT | Performed by: PHYSICAL MEDICINE & REHABILITATION

## 2018-08-03 RX ORDER — CYCLOBENZAPRINE HCL 10 MG
10 TABLET ORAL 3 TIMES DAILY PRN
Qty: 60 TABLET | Refills: 0 | Status: SHIPPED | OUTPATIENT
Start: 2018-08-03 | End: 2019-02-21

## 2018-08-03 RX ORDER — IBUPROFEN 600 MG/1
600 TABLET ORAL 2 TIMES DAILY PRN
Qty: 60 TABLET | Refills: 0 | Status: SHIPPED | OUTPATIENT
Start: 2018-08-03 | End: 2018-11-21

## 2018-08-03 NOTE — PROGRESS NOTES
HPI:    Patient ID: Robbin Hein is a 41year old female. She is previously seen me for chronic bilateral lower back pain radiating down the posterior legs, right worse than left.   She has undergone L5-S1 interlaminar epidural steroid injection w Take 500 mg by mouth every 6 (six) hours as needed. Disp:  Rfl:    gabapentin 300 MG Oral Cap Take 1 capsule (300 mg total) by mouth 2 (two) times daily.  Disp: 60 capsule Rfl: 1   Venlafaxine HCl ER 75 MG Oral Capsule SR 24 Hr Take 1 capsule (75 mg total) point of neck     Recommend right upper trapezius and cervical spinal trigger point injections along with right occipital nerve block, but the patient defers.   Prescribed Flexeril 10 mg twice daily as needed for muscle tightness/headache and ibuprofen 600

## 2018-08-03 NOTE — PATIENT INSTRUCTIONS
As of October 6th 2014, the Drug Enforcement Agency [facility]) is reclassifying all hydrocodone combination medications from Schedule III to Schedule II. This includes medications such as Norco, Vicodin, Lortab, Zohydro, and Vicoprofen.     What this means for y

## 2018-08-15 ENCOUNTER — TELEPHONE (OUTPATIENT)
Dept: NEUROLOGY | Facility: CLINIC | Age: 38
End: 2018-08-15

## 2018-08-15 NOTE — TELEPHONE ENCOUNTER
L/m advising Dr. Mccurdy Petrey give her a note for 3 days to recover but would not recommend more. . Await return call.

## 2018-08-15 NOTE — TELEPHONE ENCOUNTER
Pt. Called with update. States she is only taking Cyclobenzaprine 10 mg daily because med is too sedating. Also taking Tylenol ES up to 4 daily because she cannot take Ibuprofen because she has GI upset/GERD? She has had 50% improvement of headaches.  Usama

## 2018-08-28 ENCOUNTER — TELEPHONE (OUTPATIENT)
Dept: FAMILY MEDICINE CLINIC | Facility: CLINIC | Age: 38
End: 2018-08-28

## 2018-08-28 NOTE — TELEPHONE ENCOUNTER
Pt states she needs Dr to fill out FMLA forms. She is asking if Dr needs her to drop off forms or if she also needs to come in for a OV? If she needs an OV-ok to book in a SDS slot in the afternoon?      Per pt she needs these filled out ASAP but can on

## 2018-08-31 ENCOUNTER — OFFICE VISIT (OUTPATIENT)
Dept: FAMILY MEDICINE CLINIC | Facility: CLINIC | Age: 38
End: 2018-08-31
Payer: COMMERCIAL

## 2018-08-31 ENCOUNTER — TELEPHONE (OUTPATIENT)
Dept: FAMILY MEDICINE CLINIC | Facility: CLINIC | Age: 38
End: 2018-08-31

## 2018-08-31 ENCOUNTER — APPOINTMENT (OUTPATIENT)
Dept: LAB | Age: 38
End: 2018-08-31
Attending: FAMILY MEDICINE
Payer: COMMERCIAL

## 2018-08-31 VITALS
TEMPERATURE: 98 F | DIASTOLIC BLOOD PRESSURE: 74 MMHG | WEIGHT: 164.81 LBS | SYSTOLIC BLOOD PRESSURE: 114 MMHG | HEART RATE: 73 BPM | BODY MASS INDEX: 30 KG/M2

## 2018-08-31 DIAGNOSIS — H02.889 MEIBOMIAN GLAND DISEASE, UNSPECIFIED LATERALITY: ICD-10-CM

## 2018-08-31 DIAGNOSIS — G44.209 TENSION HEADACHE: Primary | ICD-10-CM

## 2018-08-31 DIAGNOSIS — M43.17 SPONDYLOLISTHESIS OF LUMBOSACRAL REGION: ICD-10-CM

## 2018-08-31 DIAGNOSIS — R31.9 HEMATURIA, UNSPECIFIED TYPE: ICD-10-CM

## 2018-08-31 LAB
BILIRUB UR QL: NEGATIVE
CLARITY UR: CLEAR
COLOR UR: YELLOW
GLUCOSE UR-MCNC: NEGATIVE MG/DL
HGB UR QL STRIP.AUTO: NEGATIVE
KETONES UR-MCNC: NEGATIVE MG/DL
LEUKOCYTE ESTERASE UR QL STRIP.AUTO: NEGATIVE
NITRITE UR QL STRIP.AUTO: NEGATIVE
PH UR: 6 [PH] (ref 5–8)
PROT UR-MCNC: NEGATIVE MG/DL
SP GR UR STRIP: 1.01 (ref 1–1.03)
UROBILINOGEN UR STRIP-ACNC: <2
VIT C UR-MCNC: NEGATIVE MG/DL

## 2018-08-31 PROCEDURE — 99212 OFFICE O/P EST SF 10 MIN: CPT | Performed by: FAMILY MEDICINE

## 2018-08-31 PROCEDURE — 81003 URINALYSIS AUTO W/O SCOPE: CPT

## 2018-08-31 PROCEDURE — 99215 OFFICE O/P EST HI 40 MIN: CPT | Performed by: FAMILY MEDICINE

## 2018-08-31 RX ORDER — METHOCARBAMOL 500 MG/1
500 TABLET, FILM COATED ORAL 3 TIMES DAILY
Qty: 90 TABLET | Refills: 1 | Status: SHIPPED | OUTPATIENT
Start: 2018-08-31 | End: 2018-09-10

## 2018-08-31 NOTE — TELEPHONE ENCOUNTER
Pt dropped off FMLA recert after ov today at [facility]. Signed HIPAA, pd fee. Logged in DONTA for processing.

## 2018-08-31 NOTE — PROGRESS NOTES
Patient ID: Janis Mackay is a 41year old female. HPI  Patient presents with:  Migraine: fup- Has FMLA forms to be completed. Back Pain: Spondylolisthesis of lumbosacral region fup     Pt states she needs Dr to fill out FMLA forms.  She is asking There is no vomiting or nausea. No fevers. She states she feels bumps on her eyelids both upper and lower. She states that the left lower and the right upper. She has no injury. She does feel she has some allergies of the eyes as well.   She can see Psychiatric/Behavioral: Positive for sleep disturbance. The patient is nervous/anxious.           Past Medical History:   Diagnosis Date   • Back problem    • Depression    • Endometriosis    • Esophageal reflux    • History of Papanicolaou smear of cervi Rfl: 1   Venlafaxine HCl ER 75 MG Oral Capsule SR 24 Hr Take 1 capsule (75 mg total) by mouth daily. Stress and anxiety. Disp: 30 capsule Rfl: 2   Nortriptyline HCl (PAMELOR) 10 MG Oral Cap Take 1 capsule (10 mg total) by mouth nightly.  If after 1 week sti tenderness. No suprapubic tenderness.     Deep tendon reflexes were 2 out of 4 bilateral lower extremity, sensory exam was intact, good strength with plantar flexion and dorsiflexion, gait was normal.  Able to flex forward at the waist and touch the ankles

## 2018-08-31 NOTE — PATIENT INSTRUCTIONS
Get some Hope Products baby shampoo. Apply it to a warm wet washcloth in the shower and 3-4 times a week gently scrub your lower and upper eyelids in the shower. You can actually do this for both eyes.   You may want to try this for at least 2-3 weeks

## 2018-11-12 ENCOUNTER — NURSE TRIAGE (OUTPATIENT)
Dept: OTHER | Age: 38
End: 2018-11-12

## 2018-11-12 NOTE — TELEPHONE ENCOUNTER
Using language line  Del Vogt [id number]:      Action Requested: Summary for Provider     []  Critical Lab, Recommendations Needed  [] Need Additional Advice  []   FYI    []   Need Orders  [] Need Medications Sent to Pharmacy  []  Other     SUM Patient wants to be seen    Protocols used: RASH OR REDNESS - [address], DIZZINESS-A-OH

## 2018-11-13 ENCOUNTER — OFFICE VISIT (OUTPATIENT)
Dept: FAMILY MEDICINE CLINIC | Facility: CLINIC | Age: 38
End: 2018-11-13
Payer: COMMERCIAL

## 2018-11-13 VITALS
SYSTOLIC BLOOD PRESSURE: 132 MMHG | BODY MASS INDEX: 31 KG/M2 | DIASTOLIC BLOOD PRESSURE: 88 MMHG | HEART RATE: 70 BPM | TEMPERATURE: 98 F | WEIGHT: 167 LBS

## 2018-11-13 DIAGNOSIS — L29.9 LOCALIZED PRURITUS: ICD-10-CM

## 2018-11-13 DIAGNOSIS — M79.89 RIGHT AXILLARY SWELLING: ICD-10-CM

## 2018-11-13 DIAGNOSIS — L30.9 DERMATITIS: Primary | ICD-10-CM

## 2018-11-13 DIAGNOSIS — R07.0 THROAT DISCOMFORT: ICD-10-CM

## 2018-11-13 DIAGNOSIS — K21.9 GASTROESOPHAGEAL REFLUX DISEASE, ESOPHAGITIS PRESENCE NOT SPECIFIED: ICD-10-CM

## 2018-11-13 DIAGNOSIS — M79.621 AXILLARY PAIN, RIGHT: ICD-10-CM

## 2018-11-13 PROCEDURE — 99212 OFFICE O/P EST SF 10 MIN: CPT | Performed by: FAMILY MEDICINE

## 2018-11-13 PROCEDURE — 99214 OFFICE O/P EST MOD 30 MIN: CPT | Performed by: FAMILY MEDICINE

## 2018-11-13 RX ORDER — NICOTINE POLACRILEX 4 MG/1
20 GUM, CHEWING ORAL DAILY
Qty: 90 TABLET | Refills: 0 | Status: SHIPPED | OUTPATIENT
Start: 2018-11-13 | End: 2018-12-13

## 2018-11-13 RX ORDER — HYDROXYZINE HYDROCHLORIDE 10 MG/1
10 TABLET, FILM COATED ORAL NIGHTLY PRN
Qty: 10 TABLET | Refills: 0 | Status: SHIPPED | OUTPATIENT
Start: 2018-11-13 | End: 2018-11-21

## 2018-11-13 NOTE — PROGRESS NOTES
Patient ID: Brad Darnell is a 41year old female.     HPI  Patient presents with:  Rash         Action Requested: Summary for Provider      []  Critical Lab, Recommendations Needed  [] Need Additional Advice  []   FYI    []   Need Orders  [] Need Medi          Reason for Disposition  • Patient wants to be seen  • Patient wants to be seen    Protocols used: RASH OR REDNESS - UGEOHUKDA-G-CX, DIZZINESS-A-OH                      Electronically signed by Cassandra De Anda RN at 11/12/2018  8:48 AM   ======== kg/m²  06/14/18 : 29.81 kg/m²  06/13/18 : 30.18 kg/m²  06/08/18 : 30.18 kg/m²      BP Readings from Last 6 Encounters:  11/13/18 : 132/88  08/31/18 : 114/74  08/03/18 : 100/54  06/14/18 : 107/66  06/13/18 : 124/43  06/08/18 : 108/53        Review of System LIGATION  2012          Current Outpatient Medications:  Cyclobenzaprine HCl 10 MG Oral Tab Take 1 tablet (10 mg total) by mouth 3 (three) times daily as needed for Muscle spasms.  Disp: 60 tablet Rfl: 0   ibuprofen 600 MG Oral Tab Take 1 tablet (600 mg tot normal heart sounds. Pulmonary/Chest: Effort normal and breath sounds normal. No respiratory distress. Lymphadenopathy:     Patient has  no cervical adenopathy. Neurological: Patient is alert and oriented to person, place, and time.    Skin: Skin is Omeprazole 20 MG Oral Tab EC; Take 20 mg by mouth daily. For your stomach. I would try omeprazole 20 mg for the warm feeling that goes to her throat.   I am not sure why then she feels it go to her head as well but nonetheless we will see if the omeprazole

## 2018-11-20 ENCOUNTER — TELEPHONE (OUTPATIENT)
Dept: SURGERY | Facility: CLINIC | Age: 38
End: 2018-11-20

## 2018-11-20 ENCOUNTER — OFFICE VISIT (OUTPATIENT)
Dept: SURGERY | Facility: CLINIC | Age: 38
End: 2018-11-20
Payer: COMMERCIAL

## 2018-11-20 VITALS — WEIGHT: 167 LBS | BODY MASS INDEX: 31 KG/M2

## 2018-11-20 DIAGNOSIS — R22.42 HIP MASS, LEFT: ICD-10-CM

## 2018-11-20 DIAGNOSIS — R22.31 MASS OF RIGHT AXILLA: Primary | ICD-10-CM

## 2018-11-20 PROCEDURE — 99212 OFFICE O/P EST SF 10 MIN: CPT | Performed by: SURGERY

## 2018-11-20 PROCEDURE — 99204 OFFICE O/P NEW MOD 45 MIN: CPT | Performed by: SURGERY

## 2018-11-20 NOTE — H&P
HPI:    Patient ID: Freida Uribe is a 41year old female presenting with Patient presents with:  Lump: Swelling in the right axilla. Painful especially when she moves her arm. First noticed one month ago. No drainage, no redness.     .  Also reports Type 2   • Hypertension Mother    • Lipids Mother         Hyperlipidemia   • Diabetes Mother         Type 2   • Diabetes Sister    • Hypertension Sister    • Lipids Brother      Social History    Socioeconomic History      Marital status:  Neurological: Negative. Hematological: Does not bruise/bleed easily. Psychiatric/Behavioral: Negative. Current Outpatient Medications:  triamcinolone acetonide 0.1 % External Cream Apply topically 2 (two) times daily as needed.  For skin rash Constitutional: She is oriented to person, place, and time. Vital signs are normal. She appears well-developed and well-nourished. HENT:   Head: Normocephalic and atraumatic. Neck: Normal range of motion. Neck supple.    Cardiovascular: Normal rate, reg

## 2018-11-20 NOTE — H&P (VIEW-ONLY)
HPI:    Patient ID: Aravind Churchill is a 41year old female presenting with Patient presents with:  Lump: Swelling in the right axilla. Painful especially when she moves her arm. First noticed one month ago. No drainage, no redness.     .  Also reports Type 2   • Hypertension Mother    • Lipids Mother         Hyperlipidemia   • Diabetes Mother         Type 2   • Diabetes Sister    • Hypertension Sister    • Lipids Brother      Social History    Socioeconomic History      Marital status:  Neurological: Negative. Hematological: Does not bruise/bleed easily. Psychiatric/Behavioral: Negative. Current Outpatient Medications:  triamcinolone acetonide 0.1 % External Cream Apply topically 2 (two) times daily as needed.  For skin rash Constitutional: She is oriented to person, place, and time. Vital signs are normal. She appears well-developed and well-nourished. HENT:   Head: Normocephalic and atraumatic. Neck: Normal range of motion. Neck supple.    Cardiovascular: Normal rate, reg

## 2018-11-21 ENCOUNTER — OFFICE VISIT (OUTPATIENT)
Dept: INTERNAL MEDICINE CLINIC | Facility: CLINIC | Age: 38
End: 2018-11-21
Payer: COMMERCIAL

## 2018-11-21 ENCOUNTER — NURSE TRIAGE (OUTPATIENT)
Dept: OTHER | Age: 38
End: 2018-11-21

## 2018-11-21 VITALS
WEIGHT: 167 LBS | DIASTOLIC BLOOD PRESSURE: 68 MMHG | HEART RATE: 72 BPM | SYSTOLIC BLOOD PRESSURE: 128 MMHG | HEIGHT: 62.5 IN | BODY MASS INDEX: 29.96 KG/M2 | TEMPERATURE: 98 F | OXYGEN SATURATION: 95 %

## 2018-11-21 DIAGNOSIS — N64.52 NIPPLE DISCHARGE IN FEMALE: Primary | ICD-10-CM

## 2018-11-21 PROCEDURE — 99213 OFFICE O/P EST LOW 20 MIN: CPT | Performed by: INTERNAL MEDICINE

## 2018-11-21 RX ORDER — OMEPRAZOLE 20 MG/1
CAPSULE, DELAYED RELEASE ORAL
Refills: 0 | COMMUNITY
Start: 2018-11-13 | End: 2018-11-21

## 2018-11-21 RX ORDER — METHOCARBAMOL 500 MG/1
TABLET, FILM COATED ORAL
Refills: 1 | COMMUNITY
Start: 2018-08-31 | End: 2018-11-21

## 2018-11-21 NOTE — TELEPHONE ENCOUNTER
Should be examined to determine best imaging and labs recommended. Please set up an appt. Not emergent - can be next week with Dr. Sisi Munguia. Sometimes old mammary milk ducts can produce again vs something in her hormones.

## 2018-11-21 NOTE — PROGRESS NOTES
HPI:    Patient ID: Ilana Sutton is a 41year old female. HPI she in today complaining of right beast discomfort and discharge . According to her she did have cloudy discharge . she states that she was seen by surgeon yesterday due to axillary mass Psychiatric/Behavioral: Negative for agitation, behavioral problems, confusion, hallucinations and sleep disturbance. The patient is not nervous/anxious.             Current Outpatient Medications:  triamcinolone acetonide 0.1 % External Cream Apply topical • LUMBAR INTERLAMINAR EPIDURAL INJECTION N/A 9/8/2017    Performed by Leeroy Tran DO at [address]   • OTHER SURGICAL HISTORY  4/2014    Per [facility] - Laparoscopic ovarian cyst   • OTHER SURGICAL HISTORY  2007    Cysts removed   • OTHER SURGICAL HISTORY Mouth/Throat: Uvula is midline, oropharynx is clear and moist and mucous membranes are normal. Mucous membranes are not cyanotic. No oropharyngeal exudate, posterior oropharyngeal edema or posterior oropharyngeal erythema.    Eyes: Conjunctivae and EOM are Right axillary mass-seenby surgeon yesterday, ordered ultrasound of the right axilla    No orders of the defined types were placed in this encounter.       Meds This Visit:  Requested Prescriptions      No prescriptions requested or ordered in this encounte

## 2018-11-21 NOTE — TELEPHONE ENCOUNTER
Action Requested: Summary for Provider     []  Critical Lab, Recommendations Needed  [x] Need Additional Advice  []   FYI    [x]   Need Orders  [] Need Medications Sent to Pharmacy  []  Other     SUMMARY: Dr. Sudarshan Shi for  Pt stated that yesterday she we

## 2018-11-21 NOTE — TELEPHONE ENCOUNTER
Advised patient of Dr. Ana Campos note. Patient verbalized understanding. Patient states she is very worried about what might be causing her symptoms and would like appt today. Appt scheduled for today 11/21/18 with Dr. Trever Richardson at 6:15pm at [place].

## 2018-11-22 NOTE — PATIENT INSTRUCTIONS
Nipple discharge in female  (primary encounter diagnosis)+ right axillary mass-we will order diagnostic mammogram of her right breast with ultrasound, to follow-up results    Right axillary mass-seenby surgeon yesterday, ordered ultrasound of the right axi

## 2018-11-26 ENCOUNTER — HOSPITAL ENCOUNTER (OUTPATIENT)
Dept: MAMMOGRAPHY | Facility: HOSPITAL | Age: 38
Discharge: HOME | End: 2018-11-26
Attending: SURGERY
Payer: COMMERCIAL

## 2018-11-26 ENCOUNTER — HOSPITAL ENCOUNTER (OUTPATIENT)
Dept: ULTRASOUND IMAGING | Facility: HOSPITAL | Age: 38
Discharge: HOME | End: 2018-11-26
Attending: SURGERY
Payer: COMMERCIAL

## 2018-11-26 ENCOUNTER — TELEPHONE (OUTPATIENT)
Dept: SURGERY | Facility: CLINIC | Age: 38
End: 2018-11-26

## 2018-11-26 DIAGNOSIS — R22.31 MASS OF RIGHT AXILLA: ICD-10-CM

## 2018-11-26 DIAGNOSIS — R22.42 HIP MASS, LEFT: ICD-10-CM

## 2018-11-26 PROCEDURE — 77062 BREAST TOMOSYNTHESIS BI: CPT | Performed by: SURGERY

## 2018-11-26 PROCEDURE — 76642 ULTRASOUND BREAST LIMITED: CPT | Performed by: SURGERY

## 2018-11-26 PROCEDURE — 76882 US LMTD JT/FCL EVL NVASC XTR: CPT | Performed by: SURGERY

## 2018-11-26 PROCEDURE — 77066 DX MAMMO INCL CAD BI: CPT | Performed by: SURGERY

## 2018-11-27 ENCOUNTER — TELEPHONE (OUTPATIENT)
Dept: FAMILY MEDICINE CLINIC | Facility: CLINIC | Age: 38
End: 2018-11-27

## 2018-11-27 DIAGNOSIS — M79.621 AXILLARY PAIN, RIGHT: ICD-10-CM

## 2018-11-27 DIAGNOSIS — N63.0 BREAST NODULE: Primary | ICD-10-CM

## 2018-11-29 ENCOUNTER — HOSPITAL ENCOUNTER (OUTPATIENT)
Dept: ULTRASOUND IMAGING | Facility: HOSPITAL | Age: 38
Discharge: HOME | End: 2018-11-29
Attending: SURGERY
Payer: COMMERCIAL

## 2018-11-29 DIAGNOSIS — R22.42 HIP MASS, LEFT: ICD-10-CM

## 2018-11-29 PROCEDURE — 76882 US LMTD JT/FCL EVL NVASC XTR: CPT | Performed by: SURGERY

## 2018-11-30 ENCOUNTER — TELEPHONE (OUTPATIENT)
Dept: SURGERY | Facility: CLINIC | Age: 38
End: 2018-11-30

## 2018-11-30 DIAGNOSIS — D17.9 LIPOMA OF JOINT: Primary | ICD-10-CM

## 2018-11-30 DIAGNOSIS — D17.24 LIPOMA OF LEFT LOWER EXTREMITY: ICD-10-CM

## 2018-12-03 NOTE — TELEPHONE ENCOUNTER
Pt requesting to get ultrasound results. Pt. States that RN spoke to her , but he was not able to provide all infor given to him to her.

## 2018-12-03 NOTE — TELEPHONE ENCOUNTER
Per pt she needs a MRI. She states Dr. Renu Allen needs it and only PCP can order the MRI.  Thank you

## 2018-12-03 NOTE — TELEPHONE ENCOUNTER
Using language line  Sindy Dominguez [id number]:   Patient informed of message below. Per the patient she would like a MRI to find out what the lump is for it is hurting and to ease her mind. The area the patient is concerned about is the Right Axilla and the pain goes to the right breast.      Per the Mammogram  No mammographic or sonographic abnormality to correspond to the patient's palpable area of concern in the right axilla. Recommend clinical follow-up of the patient's symptoms. Management should be based upon clinical concern. Per physical examination,   this may represent a lipoma. Consider MRI for further evaluation if clinically indicated.

## 2018-12-03 NOTE — TELEPHONE ENCOUNTER
I had corresponded with Dr. Anabelle Fairbanks. He felt there is nothing dangerous here. He thinks the small nodule in your right breast is benign. The radiologist just wants us to do another mammogram and ultrasound in 6 months.

## 2018-12-03 NOTE — TELEPHONE ENCOUNTER
[place] #[id number]. Speaking with , Mckenzie Singh ([employer]) explaining the US of the right axilla  Was normal, but there is a lipoma on the left hip. She can have the lipoma removed, we can schedule the surgery when she is ready.   Per the , [employer]

## 2018-12-04 ENCOUNTER — TELEPHONE (OUTPATIENT)
Dept: FAMILY MEDICINE CLINIC | Facility: CLINIC | Age: 38
End: 2018-12-04

## 2018-12-04 NOTE — TELEPHONE ENCOUNTER
Pt would like a copy of her order for the MRI faxed to [address] [place] [address] MRI phone; [phone]. Pt is not sure if the insurance will cover that location. Pt does not have an appt scheduled yet and is requesting a return call in [address] [facility]

## 2018-12-04 NOTE — TELEPHONE ENCOUNTER
I went ahead and ordered the MRI of the right breast and right axilla. She will need to call and schedule.

## 2018-12-04 NOTE — TELEPHONE ENCOUNTER
Spoke with patient through Terrie Felipe #[phone]. Patient agrees to surgery on the left hip for lipoma removal.  Advised her to have the MRI done ASAP, but to call her insurance company for pre-approval first.  Patient agreed.   Surgery for the lip

## 2018-12-04 NOTE — TELEPHONE ENCOUNTER
With  #[phone], contacted patient. Informed patient DOS may be changed to 12/19/2018. Patient accepted. She requested letter to be written stating date of surgery, and confirmed her home address to be mailed.  Informed patient we can write

## 2018-12-12 ENCOUNTER — TELEPHONE (OUTPATIENT)
Dept: SURGERY | Facility: CLINIC | Age: 38
End: 2018-12-12

## 2018-12-12 NOTE — TELEPHONE ENCOUNTER
Jared Goodwin,    Was the [address] for Mirtha's right breast ever completed? Her insurance company will want this completed before an MRI is done.     Thank you  Peder Light

## 2018-12-12 NOTE — TELEPHONE ENCOUNTER
Pt Surgery/Procedure: Excision of lesion on left hip  Pt insurance/number to contact: [employer] ID# and group: [id number]  Dx: D17.24  CPT: 99050  Surgeon: Dilan Ahn  Procedure scheduled where: [facility]  Procedure scheduled inpt/

## 2018-12-13 NOTE — TELEPHONE ENCOUNTER
Yes she had this done on November 26, 2018. See results below as it shows clearly ultrasound breast on the right. Study Result     PROCEDURE:  [facility] LISY 2D+3D DIAGNOSTIC [facility] BILAT (CPT=77066/58465)  US BREAST RIGHT TARGETED (LWJ=[id number])     COMPARISON: [facility]. [place], [place] BREAST RIGHT TARGETED (VMS=[id number]), 11/26/2018, 8:15. INDICATIONS:  Localized swelling, mass and lump, right upper limb  The patient presents for evaluation of a one month history of a right axillary palpable abnormality and right nipple discharge. The nipple discharge was clear, expressed 1 time one week ago. TECHNIQUE:    Digital diagnostic mammography was performed and images were reviewed with the R2 JUAN [de-identified] CAD device. 3D tomosynthesis was performed and reviewed. Targeted ultrasound of the right breast.     BREAST COMPOSITION:           CATEGORY c - The breasts are heterogeneously dense, which may obscure small masses. MAMMOGRAPHY FINDINGS:    Digital images were obtained and were reviewed with the R2 JUAN [de-identified] CAD device. A triangular skin marker was placed over the patient's right axillary palpable abnormality. There is no underlying abnormality. Targeted ultrasound was performed for further evaluation. There is no abnormality identified in the right retroareolar breast to correspond to the patient's nipple discharge. Targeted ultrasound was performed for further evaluation. There is no suspicious asymmetry, mass, architectural distortion or microcalcifications identified in either breast.     ULTRASOUND FINDINGS:   Targeted ultrasound of the right axilla and right periareolar/subareolar breast was performed in the patient's palpable area of concern. Targeted physical examination of the patient's right axillary palpable abnormality demonstrates an approximately 4 cm x 2 cm mobile soft mass. There is no abnormality identified to correspond to the palpable area of concern.      In the right breast at 10 o'clock 3 cm from the nipple, there is a 1.2 x 0.5 x 1.1 cm oval circumscribed hypoechoic mass. There is no internal vascularity. This is likely incidental and is probably benign. No abnormality is identified in the right subareolar breast to correspond to the patient's nipple discharge.

## 2018-12-17 ENCOUNTER — TELEPHONE (OUTPATIENT)
Dept: FAMILY MEDICINE CLINIC | Facility: CLINIC | Age: 38
End: 2018-12-17

## 2018-12-17 RX ORDER — DIAZEPAM 5 MG/1
TABLET ORAL
Qty: 2 TABLET | Refills: 0 | OUTPATIENT
Start: 2018-12-17 | End: 2019-02-21

## 2018-12-17 RX ORDER — OMEPRAZOLE 20 MG/1
20 CAPSULE, DELAYED RELEASE ORAL
COMMUNITY
End: 2019-07-15

## 2018-12-17 RX ORDER — ALPRAZOLAM 0.25 MG/1
0.25 TABLET ORAL 3 TIMES DAILY PRN
COMMUNITY
End: 2020-02-14

## 2018-12-17 NOTE — TELEPHONE ENCOUNTER
We will fax in a prescription for Valium 5 mg. She could take 1 or 2 about 15 minutes to 30 minutes prior to the MRI.

## 2018-12-17 NOTE — TELEPHONE ENCOUNTER
Pt is requesting a medication for MRI. She states she is afraid of being inside and would like a medication to keep her calm.

## 2018-12-18 NOTE — TELEPHONE ENCOUNTER
Spoke with patient informed [facility], [facility]

## 2018-12-19 ENCOUNTER — ANESTHESIA (OUTPATIENT)
Dept: SURGERY | Facility: HOSPITAL | Age: 38
End: 2018-12-19
Payer: COMMERCIAL

## 2018-12-19 ENCOUNTER — ANESTHESIA EVENT (OUTPATIENT)
Dept: SURGERY | Facility: HOSPITAL | Age: 38
End: 2018-12-19
Payer: COMMERCIAL

## 2018-12-19 ENCOUNTER — TELEPHONE (OUTPATIENT)
Dept: SURGERY | Facility: CLINIC | Age: 38
End: 2018-12-19

## 2018-12-19 ENCOUNTER — HOSPITAL ENCOUNTER (OUTPATIENT)
Facility: HOSPITAL | Age: 38
Setting detail: HOSPITAL OUTPATIENT SURGERY
Discharge: HOME | End: 2018-12-19
Attending: SURGERY | Admitting: SURGERY
Payer: COMMERCIAL

## 2018-12-19 VITALS
HEIGHT: 62 IN | SYSTOLIC BLOOD PRESSURE: 126 MMHG | BODY MASS INDEX: 30.18 KG/M2 | RESPIRATION RATE: 16 BRPM | TEMPERATURE: 98 F | HEART RATE: 91 BPM | OXYGEN SATURATION: 96 % | WEIGHT: 164 LBS | DIASTOLIC BLOOD PRESSURE: 59 MMHG

## 2018-12-19 DIAGNOSIS — D17.9 LIPOMA OF JOINT: ICD-10-CM

## 2018-12-19 PROCEDURE — 11406 EXC TR-EXT B9+MARG >4.0 CM: CPT | Performed by: SURGERY

## 2018-12-19 PROCEDURE — 0JBM0ZZ EXCISION OF LEFT UPPER LEG SUBCUTANEOUS TISSUE AND FASCIA, OPEN APPROACH: ICD-10-PCS | Performed by: SURGERY

## 2018-12-19 PROCEDURE — 12031 INTMD RPR S/A/T/EXT 2.5 CM/<: CPT | Performed by: SURGERY

## 2018-12-19 RX ORDER — HYDROCODONE BITARTRATE AND ACETAMINOPHEN 5; 325 MG/1; MG/1
1 TABLET ORAL AS NEEDED
Status: COMPLETED | OUTPATIENT
Start: 2018-12-19 | End: 2018-12-19

## 2018-12-19 RX ORDER — CEFAZOLIN SODIUM/WATER 2 G/20 ML
2 SYRINGE (ML) INTRAVENOUS ONCE
Status: COMPLETED | OUTPATIENT
Start: 2018-12-19 | End: 2018-12-19

## 2018-12-19 RX ORDER — POLYETHYLENE GLYCOL 3350 17 G/17G
17 POWDER, FOR SOLUTION ORAL DAILY
Qty: 14 PACKET | Refills: 0 | Status: SHIPPED | OUTPATIENT
Start: 2018-12-19 | End: 2019-01-02

## 2018-12-19 RX ORDER — HYDROCODONE BITARTRATE AND ACETAMINOPHEN 5; 325 MG/1; MG/1
2 TABLET ORAL AS NEEDED
Status: COMPLETED | OUTPATIENT
Start: 2018-12-19 | End: 2018-12-19

## 2018-12-19 RX ORDER — FAMOTIDINE 20 MG/1
20 TABLET ORAL ONCE
Status: COMPLETED | OUTPATIENT
Start: 2018-12-19 | End: 2018-12-19

## 2018-12-19 RX ORDER — MORPHINE SULFATE 4 MG/ML
2 INJECTION, SOLUTION INTRAMUSCULAR; INTRAVENOUS EVERY 10 MIN PRN
Status: DISCONTINUED | OUTPATIENT
Start: 2018-12-19 | End: 2018-12-19

## 2018-12-19 RX ORDER — ONDANSETRON 2 MG/ML
4 INJECTION INTRAMUSCULAR; INTRAVENOUS ONCE AS NEEDED
Status: DISCONTINUED | OUTPATIENT
Start: 2018-12-19 | End: 2018-12-19

## 2018-12-19 RX ORDER — ACETAMINOPHEN 500 MG
1000 TABLET ORAL ONCE
Status: COMPLETED | OUTPATIENT
Start: 2018-12-19 | End: 2018-12-19

## 2018-12-19 RX ORDER — MIDAZOLAM HYDROCHLORIDE 1 MG/ML
INJECTION INTRAMUSCULAR; INTRAVENOUS AS NEEDED
Status: DISCONTINUED | OUTPATIENT
Start: 2018-12-19 | End: 2018-12-19 | Stop reason: SURG

## 2018-12-19 RX ORDER — HYDROCODONE BITARTRATE AND ACETAMINOPHEN 10; 325 MG/1; MG/1
1 TABLET ORAL EVERY 4 HOURS PRN
Qty: 30 TABLET | Refills: 0 | Status: SHIPPED | OUTPATIENT
Start: 2018-12-19 | End: 2019-02-21

## 2018-12-19 RX ORDER — SODIUM CHLORIDE, SODIUM LACTATE, POTASSIUM CHLORIDE, CALCIUM CHLORIDE 600; 310; 30; 20 MG/100ML; MG/100ML; MG/100ML; MG/100ML
INJECTION, SOLUTION INTRAVENOUS CONTINUOUS
Status: DISCONTINUED | OUTPATIENT
Start: 2018-12-19 | End: 2018-12-19

## 2018-12-19 RX ORDER — DEXAMETHASONE SODIUM PHOSPHATE 4 MG/ML
VIAL (ML) INJECTION AS NEEDED
Status: DISCONTINUED | OUTPATIENT
Start: 2018-12-19 | End: 2018-12-19 | Stop reason: SURG

## 2018-12-19 RX ORDER — NALOXONE HYDROCHLORIDE 0.4 MG/ML
80 INJECTION, SOLUTION INTRAMUSCULAR; INTRAVENOUS; SUBCUTANEOUS AS NEEDED
Status: DISCONTINUED | OUTPATIENT
Start: 2018-12-19 | End: 2018-12-19

## 2018-12-19 RX ORDER — MORPHINE SULFATE 4 MG/ML
4 INJECTION, SOLUTION INTRAMUSCULAR; INTRAVENOUS EVERY 10 MIN PRN
Status: DISCONTINUED | OUTPATIENT
Start: 2018-12-19 | End: 2018-12-19

## 2018-12-19 RX ORDER — ONDANSETRON 2 MG/ML
INJECTION INTRAMUSCULAR; INTRAVENOUS AS NEEDED
Status: DISCONTINUED | OUTPATIENT
Start: 2018-12-19 | End: 2018-12-19 | Stop reason: SURG

## 2018-12-19 RX ORDER — LIDOCAINE HYDROCHLORIDE 10 MG/ML
INJECTION, SOLUTION EPIDURAL; INFILTRATION; INTRACAUDAL; PERINEURAL AS NEEDED
Status: DISCONTINUED | OUTPATIENT
Start: 2018-12-19 | End: 2018-12-19 | Stop reason: SURG

## 2018-12-19 RX ORDER — BUPIVACAINE HYDROCHLORIDE AND EPINEPHRINE 5; 5 MG/ML; UG/ML
INJECTION, SOLUTION PERINEURAL AS NEEDED
Status: DISCONTINUED | OUTPATIENT
Start: 2018-12-19 | End: 2018-12-19 | Stop reason: HOSPADM

## 2018-12-19 RX ORDER — MORPHINE SULFATE 10 MG/ML
6 INJECTION, SOLUTION INTRAMUSCULAR; INTRAVENOUS EVERY 10 MIN PRN
Status: DISCONTINUED | OUTPATIENT
Start: 2018-12-19 | End: 2018-12-19

## 2018-12-19 RX ORDER — METOCLOPRAMIDE 10 MG/1
10 TABLET ORAL ONCE
Status: COMPLETED | OUTPATIENT
Start: 2018-12-19 | End: 2018-12-19

## 2018-12-19 RX ADMIN — CEFAZOLIN SODIUM/WATER 2 G: 2 G/20 ML SYRINGE (ML) INTRAVENOUS at 12:44:00

## 2018-12-19 RX ADMIN — SODIUM CHLORIDE, SODIUM LACTATE, POTASSIUM CHLORIDE, CALCIUM CHLORIDE: 600; 310; 30; 20 INJECTION, SOLUTION INTRAVENOUS at 12:28:00

## 2018-12-19 RX ADMIN — SODIUM CHLORIDE, SODIUM LACTATE, POTASSIUM CHLORIDE, CALCIUM CHLORIDE: 600; 310; 30; 20 INJECTION, SOLUTION INTRAVENOUS at 13:00:00

## 2018-12-19 RX ADMIN — DEXAMETHASONE SODIUM PHOSPHATE 4 MG: 4 MG/ML VIAL (ML) INJECTION at 12:45:00

## 2018-12-19 RX ADMIN — ONDANSETRON 4 MG: 2 INJECTION INTRAMUSCULAR; INTRAVENOUS at 12:45:00

## 2018-12-19 RX ADMIN — LIDOCAINE HYDROCHLORIDE 50 MG: 10 INJECTION, SOLUTION EPIDURAL; INFILTRATION; INTRACAUDAL; PERINEURAL at 12:28:00

## 2018-12-19 RX ADMIN — MIDAZOLAM HYDROCHLORIDE 2 MG: 1 INJECTION INTRAMUSCULAR; INTRAVENOUS at 12:26:00

## 2018-12-19 NOTE — INTERVAL H&P NOTE
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   I discussed with radha

## 2018-12-19 NOTE — OPERATIVE REPORT
Operative Report    Patient Name:  Evonne Long  MR:  [id number]  :  1980  DOS:  20    Preop Dx:  Lipoma of joint [D17.9]  Postop Dx:  Lipoma of joint [D17.9]  Procedure:  Procedure(s):  Excision of Lipoma on left Hip  Surgeon:  Berkeley Lat,

## 2018-12-19 NOTE — TELEPHONE ENCOUNTER
I called pt's spouse, Willy Cason, per  Taylor Huizar # [phone]. Post - op appointment scheduled for 12/27 at 9:30, note for work will be written to be off from 12/19/2018 to return to work 12/28/2018 and I will fax to employer as requested.  Explained i

## 2018-12-19 NOTE — ANESTHESIA POSTPROCEDURE EVALUATION
Patient: Ilana Pickgee    Procedure Summary     Date:  12/19/18 Room / Location:  [address] [place] [address] MAIN OR 02 / [address] [place] [address] MAIN OR    Anesthesia Start:  4905 Anesthesia Stop:      Procedure:  EXCISION OF LESION/LIPOMA (Left Hip) Diagnosis:       Lipoma of joint      (Lipo

## 2018-12-19 NOTE — ANESTHESIA PREPROCEDURE EVALUATION
Anesthesia PreOp Note    HPI:     Gregory Ball is a 41year old female who presents for preoperative consultation requested by: Crys Fall MD    Date of Surgery: 12/19/2018    Procedure(s):  EXCISION OF LESION/LIPOMA  Indication: Lipoma of joint [D17. Spondylolisthesis of lumbosacral region 9/8/2017   • Visual impairment     WEARS GLASSES   • Vitamin B12 deficiency    • Vitamin D deficiency        Past Surgical History:   Procedure Laterality Date   • ABDOMINAL HYSTERECTOMY N/A 4/5/2017    Performed by Facility-Administered Medications Ordered in Epic:  lactated ringers infusion  Intravenous Continuous Dede, MD Mj Last Rate: 20 mL/hr at 12/19/18 1129   ceFAZolin sodium (ANCEF/KEFZOL) 2 GM/20ML premix IV syringe 2 g 2 g Intravenous Once Jovani Ruiz MD Bike Helmet: Not Asked        Seat Belt: Not Asked        Self-Exams: Not Asked    Social History Narrative      The patient does not use an assistive device. .        The patient does live in a home with stairs. Available pre-op labs reviewed.

## 2018-12-19 NOTE — TELEPHONE ENCOUNTER
Pts spouse requesting note for work stating pt had sx and how long she will be out of work, note can be faxed to employer [phone] attn: Prosper Buchanan. Pls advise thank you. Israeli speaker.

## 2018-12-19 NOTE — ANESTHESIA PROCEDURE NOTES
ANESTHESIA INTUBATION  Date/Time: 12/19/2018 12:51 PM  Urgency: elective    Airway not difficult    General Information and Staff    Patient location during procedure: OR  Anesthesiologist: Ivan Martin MD  Resident/CRNA: CHING Chilel

## 2018-12-27 ENCOUNTER — OFFICE VISIT (OUTPATIENT)
Dept: SURGERY | Facility: CLINIC | Age: 38
End: 2018-12-27
Payer: COMMERCIAL

## 2018-12-27 VITALS — BODY MASS INDEX: 30 KG/M2 | WEIGHT: 164 LBS

## 2018-12-27 DIAGNOSIS — Z09 POSTOPERATIVE EXAMINATION: Primary | ICD-10-CM

## 2018-12-27 PROCEDURE — 99024 POSTOP FOLLOW-UP VISIT: CPT | Performed by: SURGERY

## 2018-12-27 PROCEDURE — 99212 OFFICE O/P EST SF 10 MIN: CPT | Performed by: SURGERY

## 2018-12-27 NOTE — PROGRESS NOTES
S:  Aravind Embs is a 41year old female sp excision of lipoma of left thigh/hip  Doing well, tolerating a general diet with normal bowel habits      O:  Wt 164 lb (74.4 kg)   LMP 03/22/2017   BMI 30.00 kg/m²   GEN:  No acute distress  Wound: c/d/i

## 2019-01-07 ENCOUNTER — TELEPHONE (OUTPATIENT)
Dept: SURGERY | Facility: CLINIC | Age: 39
End: 2019-01-07

## 2019-01-07 NOTE — TELEPHONE ENCOUNTER
Pt had sx on 12/19, states she continues to have pain and swelling, states she also has a lot of discomfort when sitting down, pt asking if this is normal? Pls call thank you. Persian speaker.

## 2019-01-14 ENCOUNTER — OFFICE VISIT (OUTPATIENT)
Dept: FAMILY MEDICINE CLINIC | Facility: CLINIC | Age: 39
End: 2019-01-14
Payer: COMMERCIAL

## 2019-01-14 VITALS
TEMPERATURE: 97 F | WEIGHT: 169 LBS | DIASTOLIC BLOOD PRESSURE: 60 MMHG | SYSTOLIC BLOOD PRESSURE: 97 MMHG | HEART RATE: 81 BPM | BODY MASS INDEX: 31 KG/M2

## 2019-01-14 DIAGNOSIS — M79.661 PAIN IN BOTH LOWER LEGS: ICD-10-CM

## 2019-01-14 DIAGNOSIS — M79.662 PAIN IN BOTH LOWER LEGS: ICD-10-CM

## 2019-01-14 DIAGNOSIS — M53.3 SACROILIAC JOINT DYSFUNCTION OF LEFT SIDE: Primary | ICD-10-CM

## 2019-01-14 DIAGNOSIS — M54.50 ACUTE BILATERAL LOW BACK PAIN WITHOUT SCIATICA: ICD-10-CM

## 2019-01-14 PROCEDURE — 99212 OFFICE O/P EST SF 10 MIN: CPT | Performed by: FAMILY MEDICINE

## 2019-01-14 PROCEDURE — 99214 OFFICE O/P EST MOD 30 MIN: CPT | Performed by: FAMILY MEDICINE

## 2019-01-14 RX ORDER — LIDOCAINE 50 MG/G
1 PATCH TOPICAL EVERY 24 HOURS
Qty: 30 PATCH | Refills: 0 | Status: SHIPPED | OUTPATIENT
Start: 2019-01-14 | End: 2019-01-17

## 2019-01-14 NOTE — PROGRESS NOTES
Patient ID: Jennifer Smith is a 41year old female. HPI  Patient presents with:  Back Pain (musculoskeletal): Radiates down bilateral extrematies,10 on the pain scale x one week     She states for 1 week now she had some pain in the low back.   She s Laterality Date   • ABDOMINAL HYSTERECTOMY N/A 4/5/2017    Performed by Bridgette Pierre MD at [address] [facility] MAIN OR   • CHOLECYSTECTOMY  2012   • COLONOSCOPY  2004   • CYST REMOVAL  2007, 2014    laporoscopic ovarian cytectomy   • EXCISION OF LESION/LIPOMA Left TOPICAL NUMBING SPRAY USED WHEN STARTING AN             I.V.   Ciprofloxacin           RASH  Sulfa Antibiotics       HIVES, RASH   PHYSICAL EXAM:   Physical Exam  Blood pressure 97/60, pulse 81, temperature 97.4 °F (36.3 °C), temperature source Oral, weight had pain for 12 hours. Take off at night and then repeat in the morning. No weakness of the legs at all. I watched her walk down the walters and she had no difficulty.       Referrals (if applicable)  Orders Placed This Encounter      Physiatry - Dr Dottie Raza

## 2019-01-15 ENCOUNTER — TELEPHONE (OUTPATIENT)
Dept: FAMILY MEDICINE CLINIC | Facility: CLINIC | Age: 39
End: 2019-01-15

## 2019-01-15 ENCOUNTER — TELEPHONE (OUTPATIENT)
Dept: ADMINISTRATIVE | Age: 39
End: 2019-01-15

## 2019-01-15 DIAGNOSIS — N63.0 BREAST NODULE: Primary | ICD-10-CM

## 2019-01-15 DIAGNOSIS — M53.3 SACROILIAC JOINT DYSFUNCTION OF LEFT SIDE: Primary | ICD-10-CM

## 2019-01-15 DIAGNOSIS — M79.621 AXILLARY PAIN, RIGHT: ICD-10-CM

## 2019-01-15 NOTE — TELEPHONE ENCOUNTER
PA for Lidocaine 5% patch completed with [employer] via CMM response time 1-5 business days [id number] [facility].

## 2019-01-17 RX ORDER — GABAPENTIN 100 MG/1
100 CAPSULE ORAL 3 TIMES DAILY
Qty: 90 CAPSULE | Refills: 1 | Status: SHIPPED | OUTPATIENT
Start: 2019-01-17 | End: 2019-02-21

## 2019-01-18 NOTE — TELEPHONE ENCOUNTER
Your insurance refuses to cover the lidocaine patch. They will cover a pill called gabapentin which also works wonderfully for pain. You should take this 3 times daily and not just when you feel the pain.

## 2019-01-18 NOTE — TELEPHONE ENCOUNTER
PA denied. Plan states patient must try and fail gabapentin or Lyrica and must have a diagnosis of post herpetic neuralgia.

## 2019-02-21 ENCOUNTER — OFFICE VISIT (OUTPATIENT)
Dept: FAMILY MEDICINE CLINIC | Facility: CLINIC | Age: 39
End: 2019-02-21
Payer: COMMERCIAL

## 2019-02-21 VITALS
SYSTOLIC BLOOD PRESSURE: 100 MMHG | HEIGHT: 62 IN | HEART RATE: 76 BPM | TEMPERATURE: 98 F | DIASTOLIC BLOOD PRESSURE: 65 MMHG | WEIGHT: 169 LBS | BODY MASS INDEX: 31.1 KG/M2

## 2019-02-21 DIAGNOSIS — M54.50 LUMBAR BACK PAIN: Primary | ICD-10-CM

## 2019-02-21 DIAGNOSIS — M53.3 SACROILIAC JOINT DYSFUNCTION OF LEFT SIDE: ICD-10-CM

## 2019-02-21 DIAGNOSIS — M62.830 SPASM OF THORACIC BACK MUSCLE: ICD-10-CM

## 2019-02-21 PROCEDURE — 99214 OFFICE O/P EST MOD 30 MIN: CPT | Performed by: FAMILY MEDICINE

## 2019-02-21 PROCEDURE — 99212 OFFICE O/P EST SF 10 MIN: CPT | Performed by: FAMILY MEDICINE

## 2019-02-21 RX ORDER — MELOXICAM 15 MG/1
15 TABLET ORAL DAILY
Qty: 90 TABLET | Refills: 0 | Status: SHIPPED | OUTPATIENT
Start: 2019-02-21 | End: 2019-04-15

## 2019-02-21 RX ORDER — PHENTERMINE HYDROCHLORIDE 30 MG/1
CAPSULE ORAL
Refills: 0 | COMMUNITY
Start: 2019-01-28 | End: 2019-04-15

## 2019-02-21 RX ORDER — GABAPENTIN 100 MG/1
100 CAPSULE ORAL 3 TIMES DAILY
Qty: 90 CAPSULE | Refills: 1 | Status: SHIPPED | OUTPATIENT
Start: 2019-02-21 | End: 2019-04-08 | Stop reason: ALTCHOICE

## 2019-02-21 RX ORDER — BACLOFEN 10 MG/1
10 TABLET ORAL 3 TIMES DAILY PRN
Qty: 90 TABLET | Refills: 1 | Status: SHIPPED | OUTPATIENT
Start: 2019-02-21 | End: 2019-03-23

## 2019-02-21 RX ORDER — CYANOCOBALAMIN (VITAMIN B-12) 2000 MCG
TABLET ORAL
COMMUNITY
End: 2019-07-15

## 2019-02-21 NOTE — PROGRESS NOTES
Patient ID: Dorian Lira is a 42year old female. HPI  Patient presents with:  Low Back Pain: denies any other issues    She was last evaluated by me for back pain on 01/14/2019. She feels it has worsened.     She has been having left low back pain nervous/anxious.           Past Medical History:   Diagnosis Date   • Back problem    • Depression    • Endometriosis    • Esophageal reflux    • History of Papanicolaou smear of cervix 5/20/2015   • Hyperlipidemia 2013   • IBS (irritable bowel syndrome) tablet Rfl: 0   Acetaminophen (TYLENOL) 325 MG Oral Cap Take 500 mg by mouth every 6 (six) hours as needed. Disp:  Rfl:    gabapentin 100 MG Oral Cap Take 1 capsule (100 mg total) by mouth 3 (three) times daily.  Disp: 90 capsule Rfl: 1   HYDROcodone-acetam her thighs and legs show no atrophy or tenderness. Nursing note and vitals reviewed. ASSESSMENT/PLAN:     Diagnoses and all orders for this visit:    Lumbar back pain  -     PHYSIATRY - INTERNAL  -     gabapentin 100 MG Oral Cap;  Take 1 capsule applicable)  Orders Placed This Encounter      Physiatry - Dr Sisi Borrego          Order Comments:              Physical medicine and rehabilitation doctor.                             ######Could we do trigger point injections for her on the left thoraco

## 2019-02-21 NOTE — PATIENT INSTRUCTIONS
You can try over-the-counter 4% lidocaine patches and apply this to the left back area where you hurt on a daily basis as directed. Start meloxicam 15 mg every morning with food. This should not make you tired.   Go ahead and take the gabapentin 100 m

## 2019-03-04 ENCOUNTER — TELEPHONE (OUTPATIENT)
Dept: SURGERY | Facility: CLINIC | Age: 39
End: 2019-03-04

## 2019-03-04 NOTE — TELEPHONE ENCOUNTER
Pt states there is a bump near 12/19 sx site, states it is painful to the touch. Also states piece of dressing cam out from sx site. Pt requesting to speak to RN. Tristanian speaker.

## 2019-03-04 NOTE — TELEPHONE ENCOUNTER
Edin Obrien #[id number]. Patient states there has been lump at her surgical site the past 2 - 3 weeks, and it is painful to touch. Also states she has pulled out a small piece of gauze from her incision.   Advised her to call her PCP and see him for a

## 2019-03-14 ENCOUNTER — TELEPHONE (OUTPATIENT)
Dept: NEUROLOGY | Facility: CLINIC | Age: 39
End: 2019-03-14

## 2019-03-14 DIAGNOSIS — M43.17 SPONDYLOLISTHESIS OF LUMBOSACRAL REGION: Primary | ICD-10-CM

## 2019-03-14 NOTE — TELEPHONE ENCOUNTER
Tried calling Dr. Corcoran Boot office, no answer. Will try again later. Ph: [phone]. Left message for patient to call the office back.

## 2019-03-14 NOTE — TELEPHONE ENCOUNTER
Spoke to patient's  and confirmed that patient still has [facility]. Explained that we are checking to see if Dr. Chichi Gonzales takes his insurance. Will contact him once we find out. He was understanding and thankful for the call.

## 2019-03-14 NOTE — TELEPHONE ENCOUNTER
Patient's  notified that they can see Dr. Berna Hankins. Phone number provided. Referral/order entered.  He was thankful for the return freda.

## 2019-03-20 NOTE — TELEPHONE ENCOUNTER
I do not know of any other surgeons who accept her insurance. She will have to contact her insurance.

## 2019-03-20 NOTE — TELEPHONE ENCOUNTER
Pt's  calling stating that Dr Edwar Lutz office has a long wait period and wants to know if there is one with sooner appts.

## 2019-03-20 NOTE — TELEPHONE ENCOUNTER
S/w patient and informed her that Dr. Noni Hylton does not know of any other surgeons who accepts her insurance. Instructed her to contact her insurance to find other surgeons who may have less of a wait time.

## 2019-04-08 ENCOUNTER — OFFICE VISIT (OUTPATIENT)
Dept: SURGERY | Facility: CLINIC | Age: 39
End: 2019-04-08
Payer: COMMERCIAL

## 2019-04-08 VITALS
HEART RATE: 78 BPM | HEIGHT: 62 IN | BODY MASS INDEX: 30.36 KG/M2 | SYSTOLIC BLOOD PRESSURE: 124 MMHG | WEIGHT: 165 LBS | DIASTOLIC BLOOD PRESSURE: 74 MMHG

## 2019-04-08 DIAGNOSIS — M79.672 BILATERAL LEG AND FOOT PAIN: ICD-10-CM

## 2019-04-08 DIAGNOSIS — M79.604 BILATERAL LEG AND FOOT PAIN: ICD-10-CM

## 2019-04-08 DIAGNOSIS — M79.671 BILATERAL LEG AND FOOT PAIN: ICD-10-CM

## 2019-04-08 DIAGNOSIS — M47.816 LUMBAR SPONDYLOSIS: Primary | ICD-10-CM

## 2019-04-08 DIAGNOSIS — M79.605 BILATERAL LEG AND FOOT PAIN: ICD-10-CM

## 2019-04-08 PROCEDURE — 99243 OFF/OP CNSLTJ NEW/EST LOW 30: CPT | Performed by: PHYSICIAN ASSISTANT

## 2019-04-08 NOTE — H&P
Neurosurgery Clinic Visit  2019    Ismael Hernández PCP:  DO WILLY Raya 1980 MRN [id number]       CHIEF COMPLAINT:  Patient presents with:  New Patient: Back Pain       HISTORY OF PRESENT ILLNESS:  Ismael Hernández is a(n) 42year old S Tab Take 1 tablet (15 mg total) by mouth daily. For pain and inflammation. Take with food.  Disp: 90 tablet Rfl: 0   omeprazole 20 MG Oral Capsule Delayed Release Take 20 mg by mouth every morning before breakfast. Disp:  Rfl:    ALPRAZolam 0.25 MG Oral Ta at [facility] MAIN OR     Family History   Problem Relation Age of Onset   • Diabetes Father         Type 2   • Hypertension Mother    • Lipids Mother         Hyperlipidemia   • Diabetes Mother         Type 2   • Diabetes Sister    • Hypertension Sister    • Lipi Right 5/5 5/5 5/5 5/5 5/5 5/5      Hip Flexion Knee Flexion Knee Extension Plantar- flexion Dorsi- flexion EHL   Left 5/5 5/5 5/5 5/5 5/5 5/5   Right 5/5 5/5 5/5 5/5 5/5 5/5     DTRs:   Biceps Triceps Brachioradialis Patellar Achilles   Left 2+ 2+ 2+ 2+ and expected outcomes. Lelo Paula M.S., PA-C  [facility]  [address] [facility],  [place], [address] [facility]  [phone]  4/8/2019  9:13 AM

## 2019-04-08 NOTE — PROGRESS NOTES
Location of Pain: Pt states that she has lower back pain. Pt states that she has radiaiitng pain in the bilateral legs. Pt states that she has numbness and tinging. Pt states that he she has issues with weakness.  Pt states that she has no issues with mahesh

## 2019-04-11 ENCOUNTER — HOSPITAL ENCOUNTER (OUTPATIENT)
Age: 39
Discharge: HOME | End: 2019-04-11
Payer: COMMERCIAL

## 2019-04-11 ENCOUNTER — APPOINTMENT (OUTPATIENT)
Dept: CT IMAGING | Facility: HOSPITAL | Age: 39
End: 2019-04-11
Attending: EMERGENCY MEDICINE
Payer: COMMERCIAL

## 2019-04-11 ENCOUNTER — NURSE TRIAGE (OUTPATIENT)
Dept: FAMILY MEDICINE CLINIC | Facility: CLINIC | Age: 39
End: 2019-04-11

## 2019-04-11 ENCOUNTER — HOSPITAL ENCOUNTER (OUTPATIENT)
Age: 39
Discharge: ANOTHER HOSPITAL | End: 2019-04-11
Attending: EMERGENCY MEDICINE
Payer: COMMERCIAL

## 2019-04-11 ENCOUNTER — HOSPITAL ENCOUNTER (EMERGENCY)
Facility: HOSPITAL | Age: 39
Discharge: HOME | End: 2019-04-11
Attending: EMERGENCY MEDICINE
Payer: COMMERCIAL

## 2019-04-11 VITALS
HEIGHT: 62 IN | HEART RATE: 74 BPM | BODY MASS INDEX: 30.36 KG/M2 | OXYGEN SATURATION: 99 % | SYSTOLIC BLOOD PRESSURE: 124 MMHG | DIASTOLIC BLOOD PRESSURE: 87 MMHG | TEMPERATURE: 98 F | WEIGHT: 165 LBS | RESPIRATION RATE: 14 BRPM

## 2019-04-11 VITALS
DIASTOLIC BLOOD PRESSURE: 79 MMHG | OXYGEN SATURATION: 98 % | HEART RATE: 90 BPM | SYSTOLIC BLOOD PRESSURE: 112 MMHG | TEMPERATURE: 99 F | RESPIRATION RATE: 18 BRPM

## 2019-04-11 DIAGNOSIS — R10.9 LEFT FLANK PAIN: Primary | ICD-10-CM

## 2019-04-11 DIAGNOSIS — N23 RENAL COLIC: Primary | ICD-10-CM

## 2019-04-11 PROCEDURE — 81025 URINE PREGNANCY TEST: CPT

## 2019-04-11 PROCEDURE — 96374 THER/PROPH/DIAG INJ IV PUSH: CPT

## 2019-04-11 PROCEDURE — 99284 EMERGENCY DEPT VISIT MOD MDM: CPT

## 2019-04-11 PROCEDURE — 81003 URINALYSIS AUTO W/O SCOPE: CPT

## 2019-04-11 PROCEDURE — 99212 OFFICE O/P EST SF 10 MIN: CPT

## 2019-04-11 PROCEDURE — 85025 COMPLETE CBC W/AUTO DIFF WBC: CPT | Performed by: EMERGENCY MEDICINE

## 2019-04-11 PROCEDURE — 74176 CT ABD & PELVIS W/O CONTRAST: CPT | Performed by: EMERGENCY MEDICINE

## 2019-04-11 PROCEDURE — 80048 BASIC METABOLIC PNL TOTAL CA: CPT | Performed by: EMERGENCY MEDICINE

## 2019-04-11 PROCEDURE — 99213 OFFICE O/P EST LOW 20 MIN: CPT

## 2019-04-11 PROCEDURE — 81001 URINALYSIS AUTO W/SCOPE: CPT

## 2019-04-11 RX ORDER — KETOROLAC TROMETHAMINE 15 MG/ML
15 INJECTION, SOLUTION INTRAMUSCULAR; INTRAVENOUS ONCE
Status: COMPLETED | OUTPATIENT
Start: 2019-04-11 | End: 2019-04-11

## 2019-04-11 RX ORDER — POTASSIUM CHLORIDE 20 MEQ/1
40 TABLET, EXTENDED RELEASE ORAL ONCE
Status: COMPLETED | OUTPATIENT
Start: 2019-04-11 | End: 2019-04-11

## 2019-04-11 NOTE — TELEPHONE ENCOUNTER
Action Requested: Summary for Provider     []  Critical Lab, Recommendations Needed  [] Need Additional Advice  []   FYI    []   Need Orders  [] Need Medications Sent to Pharmacy  []  Other     SUMMARY: Per protocol advised; OV today. No appts.  Patient megan

## 2019-04-12 NOTE — ED PROVIDER NOTES
Patient Seen in: [facility] AND [facility] Emergency Department    History   Patient presents with:  Urinary Symptoms (urologic)    Stated Complaint: back pain    HPI    Patient is a 53-year-old female who complains of 10 days to 2 weeks of left-sided flank pain LEVEL Bilateral 6/8/2018    Performed by May Sutton DO at [address] [facility] MAIN OR           Social History    Tobacco Use      Smoking status: Never Smoker      Smokeless tobacco: Never Used    Alcohol use:  Yes      Alcohol/week: 0.0 oz      Comment: rare    Drug thought content normal.   Nursing note and vitals reviewed.           ED Course     Labs Reviewed   BASIC METABOLIC PANEL (8) - Abnormal; Notable for the following components:       Result Value    Potassium 3.1 (*)     All other components within normal li Medication List

## 2019-04-12 NOTE — ED PROVIDER NOTES
Patient Seen in: [facility] AND CLINICS Immediate Care In [address] [place] [address]    History   Patient presents with:  Low Back Pain    Stated Complaint: back pain     HPI    Patient is a 42-year-old female with history of lumbar sacral spondylolisthesis presents with left of left fallopian tube, ablation of endometriosis, retrieval and removal of displaced filshie clip   • TRANSFORAMINAL LUMBAR EPIDURAL STEROID INJECTION SINGLE LEVEL Bilateral 6/8/2018    Performed by Samia Garrido DO at [address] [place] [address] MAIN OR       Family history r pyelonephritis. To the emergency department for further evaluation. Disposition and Plan     Clinical Impression:  Left flank pain  (primary encounter diagnosis)    Disposition:   Ic to ed  4/11/2019  7:59 pm    Follow-up:  No follow-up provider

## 2019-04-12 NOTE — ED INITIAL ASSESSMENT (HPI)
Was at [address] and dx w uti. Sent here for r/o kidney stone on left side.  C/o left flank pain

## 2019-04-15 ENCOUNTER — OFFICE VISIT (OUTPATIENT)
Dept: FAMILY MEDICINE CLINIC | Facility: CLINIC | Age: 39
End: 2019-04-15
Payer: COMMERCIAL

## 2019-04-15 VITALS
TEMPERATURE: 99 F | WEIGHT: 168 LBS | DIASTOLIC BLOOD PRESSURE: 73 MMHG | HEIGHT: 62 IN | SYSTOLIC BLOOD PRESSURE: 108 MMHG | HEART RATE: 72 BPM | BODY MASS INDEX: 30.91 KG/M2

## 2019-04-15 DIAGNOSIS — R10.9 LEFT FLANK PAIN: Primary | ICD-10-CM

## 2019-04-15 DIAGNOSIS — M43.17 SPONDYLOLISTHESIS AT L5-S1 LEVEL: ICD-10-CM

## 2019-04-15 DIAGNOSIS — G89.29 CHRONIC BILATERAL LOW BACK PAIN WITHOUT SCIATICA: ICD-10-CM

## 2019-04-15 DIAGNOSIS — M43.06 SPONDYLOLYSIS, LUMBAR REGION: ICD-10-CM

## 2019-04-15 DIAGNOSIS — M43.17 SPONDYLOLISTHESIS OF LUMBOSACRAL REGION: ICD-10-CM

## 2019-04-15 DIAGNOSIS — Z87.442 HISTORY OF KIDNEY STONES: ICD-10-CM

## 2019-04-15 DIAGNOSIS — M54.50 CHRONIC BILATERAL LOW BACK PAIN WITHOUT SCIATICA: ICD-10-CM

## 2019-04-15 PROCEDURE — 99214 OFFICE O/P EST MOD 30 MIN: CPT | Performed by: FAMILY MEDICINE

## 2019-04-15 PROCEDURE — 99212 OFFICE O/P EST SF 10 MIN: CPT | Performed by: FAMILY MEDICINE

## 2019-04-15 RX ORDER — PREGABALIN 75 MG/1
75 CAPSULE ORAL 2 TIMES DAILY
Qty: 60 CAPSULE | Refills: 1 | Status: SHIPPED | OUTPATIENT
Start: 2019-04-15 | End: 2019-07-15

## 2019-04-15 RX ORDER — PHENOL 1.4 %
AEROSOL, SPRAY (ML) MUCOUS MEMBRANE AS NEEDED
COMMUNITY
End: 2019-07-15

## 2019-04-15 NOTE — PROGRESS NOTES
Patient ID: Maxime Lambert is a 42year old female.     HPI  Patient presents with:  ER F/U: kidney stone  Burning On Urination    Patient Seen in: [facility] AND [facility] Emergency Department     History   Patient presents with:  Urinary Symptoms (urologic) sterilization of left fallopian tube, ablation of endometriosis, retrieval and removal of displaced filshie clip   • TRANSFORAMINAL LUMBAR EPIDURAL STEROID INJECTION SINGLE LEVEL Bilateral 6/8/2018     Performed by Jocelin Keita DO at [facility]        reflexes. No cranial nerve deficit. No motor os sensory defecits noted Coordination normal.   Skin: Skin is warm and dry. Psychiatric: Normal mood and affect. Behavior is normal. Judgment and thought content normal.   Nursing note and vitals reviewed.     diagnosis)     Disposition:  Discharge  4/11/2019 10:16 pm     Follow-up:  Laureen Green DO  [address] [facility]  [phone]     Schedule an appointment as soon as possible for a visit in 2 days              Medications Presc 04/11/2019    LEUUR Small (A) 04/11/2019    ASCACIDURINE NEG 12/11/2013    NMIC Microscopic not indicated 08/31/2018    WBCUR 1 04/11/2019    RBCUR 0 04/11/2019    EPIUR Few 04/11/2019    BACUR Few (A) 04/11/2019    HYLUR 1 06/19/2017    MICCOM Completed 1 HYSTERECTOMY N/A 4/5/2017    Performed by Sonia Mark MD at [facility] MAIN OR   • CHOLECYSTECTOMY  2012   • COLONOSCOPY  2004   • CYST REMOVAL  2007, 2014    laporoscopic ovarian cytectomy   • EXCISION OF LESION/LIPOMA Left 12/19/2018    Performed by Jan Cifuentes appears well-developed and well-nourished. No distress   Musculoskeletal:   Has tenderness over the lumbar paraspinal muscles from L4 down to L5 bilaterally. No bony tenderness. Worse on the right than the left.     Deep tendon reflexes were 2 out of 4 jacquelin Take 1 capsule (75 mg total) by mouth 2 (two) times daily.         Referrals (if applicable)  Orders Placed This Encounter      Urol Referral -  Dr Lisa Kang Comments:              Urology referral.++++++ call [phone]++++++++++          R

## 2019-04-19 ENCOUNTER — PROCEDURE VISIT (OUTPATIENT)
Dept: NEUROLOGY | Facility: CLINIC | Age: 39
End: 2019-04-19
Payer: COMMERCIAL

## 2019-04-19 DIAGNOSIS — M43.17 SPONDYLOLISTHESIS AT L5-S1 LEVEL: Primary | ICD-10-CM

## 2019-04-19 PROCEDURE — 95886 MUSC TEST DONE W/N TEST COMP: CPT | Performed by: PHYSICAL MEDICINE & REHABILITATION

## 2019-04-19 PROCEDURE — 95910 NRV CNDJ TEST 7-8 STUDIES: CPT | Performed by: PHYSICAL MEDICINE & REHABILITATION

## 2019-04-19 NOTE — PROGRESS NOTES
[address]   Nerve Conduction Study and Electromyography Procedure Note    Patient: Aidan Chaves YOB: 1980  Patient ID: [id number] Hand Dominance: right handed  Sex: Female Referring Dr: Dr. Santos Manual R. TIB POSTERIOR N None None None None N N N N   R. VAST MEDIALIS N None None None None N N N N   R. GASTROCN (MED) N None None None None N N N N   R. TIB ANTERIOR N None None None None N N N N   R. PERON LONGUS N None None None None N N N N 1. There is electrodiagnostic evidence of a BILATERAL CHRONIC L5 radiculopathy given the abnormalities seen with the motor peroneal nerve bilaterally on nerve conduction studies with no involvement of the sensory nerves.  There is also evidence of an ACUTE

## 2019-04-30 ENCOUNTER — OFFICE VISIT (OUTPATIENT)
Dept: FAMILY MEDICINE CLINIC | Facility: CLINIC | Age: 39
End: 2019-04-30
Payer: COMMERCIAL

## 2019-04-30 ENCOUNTER — TELEPHONE (OUTPATIENT)
Dept: OTHER | Age: 39
End: 2019-04-30

## 2019-04-30 VITALS
DIASTOLIC BLOOD PRESSURE: 77 MMHG | TEMPERATURE: 99 F | HEART RATE: 77 BPM | WEIGHT: 165 LBS | HEIGHT: 62 IN | BODY MASS INDEX: 30.36 KG/M2 | SYSTOLIC BLOOD PRESSURE: 120 MMHG

## 2019-04-30 DIAGNOSIS — R35.0 FREQUENT URINATION: Primary | ICD-10-CM

## 2019-04-30 DIAGNOSIS — N30.00 ACUTE CYSTITIS WITHOUT HEMATURIA: ICD-10-CM

## 2019-04-30 PROCEDURE — 81002 URINALYSIS NONAUTO W/O SCOPE: CPT | Performed by: NURSE PRACTITIONER

## 2019-04-30 PROCEDURE — 99213 OFFICE O/P EST LOW 20 MIN: CPT | Performed by: NURSE PRACTITIONER

## 2019-04-30 PROCEDURE — G0463 HOSPITAL OUTPT CLINIC VISIT: HCPCS | Performed by: NURSE PRACTITIONER

## 2019-04-30 RX ORDER — NITROFURANTOIN 25; 75 MG/1; MG/1
100 CAPSULE ORAL 2 TIMES DAILY
Qty: 14 CAPSULE | Refills: 0 | Status: SHIPPED | OUTPATIENT
Start: 2019-04-30 | End: 2019-05-10

## 2019-04-30 NOTE — TELEPHONE ENCOUNTER
LOV 4/15/19 with L side flank pain,  ,with [facility] #[id number], now with R side flank pain, no fever,with  burning,frequency and urgency, cloudy urine, no bleeding, took OTC PHENAZOPIRIDINE.  OV made to different provider and agrees, advised to drink lots

## 2019-04-30 NOTE — PATIENT INSTRUCTIONS
URINARY TRACT INFECTION    -encourage fluids 8-12 glasses of non-caffeinated fluids/day  -encourage drinking cranberry juice  -urinate after intercourse  -keep good hygiene, avoid harsh soaps and lotions to perineal area  -females-wipe front to back  -ashlie

## 2019-04-30 NOTE — PROGRESS NOTES
HPI    Patient presents with right sided flank pain, urinary pain and frequency x3 days. Denies hematuria. Review of Systems   Genitourinary: Positive for dysuria, frequency and flank pain. Negative for hematuria.    All other systems reviewed and are Type 2   • Diabetes Sister    • Hypertension Sister    • Lipids Brother        Social History    Socioeconomic History      Marital status:       Spouse name: Not on file      Number of children: 3      Years of education: Not on file      H Not Asked        Self-Exams: Not Asked    Social History Narrative      The patient does not use an assistive device. .        The patient does live in a home with stairs.         Current Outpatient Medications:  Melatonin 10 MG Oral Tab Take by mouth as nee

## 2019-05-09 ENCOUNTER — TELEPHONE (OUTPATIENT)
Dept: OTHER | Age: 39
End: 2019-05-09

## 2019-05-09 NOTE — TELEPHONE ENCOUNTER
Pt stated she was seen 4/30/19 for UTI- s/s never went away but calm down but now s/s on yesterday returned pain in lower back, burning with and without urination, have increased water IT, Pain level 6-7/10 and sometimes stronger - Pain radiated from left

## 2019-05-10 ENCOUNTER — OFFICE VISIT (OUTPATIENT)
Dept: INTERNAL MEDICINE CLINIC | Facility: CLINIC | Age: 39
End: 2019-05-10
Payer: COMMERCIAL

## 2019-05-10 VITALS
SYSTOLIC BLOOD PRESSURE: 123 MMHG | HEART RATE: 99 BPM | WEIGHT: 168.19 LBS | DIASTOLIC BLOOD PRESSURE: 80 MMHG | BODY MASS INDEX: 31 KG/M2 | TEMPERATURE: 98 F

## 2019-05-10 DIAGNOSIS — M54.50 ACUTE RIGHT-SIDED LOW BACK PAIN WITHOUT SCIATICA: Primary | ICD-10-CM

## 2019-05-10 DIAGNOSIS — R30.0 DYSURIA: ICD-10-CM

## 2019-05-10 PROCEDURE — 99212 OFFICE O/P EST SF 10 MIN: CPT | Performed by: INTERNAL MEDICINE

## 2019-05-10 PROCEDURE — 81003 URINALYSIS AUTO W/O SCOPE: CPT | Performed by: INTERNAL MEDICINE

## 2019-05-10 PROCEDURE — 99214 OFFICE O/P EST MOD 30 MIN: CPT | Performed by: INTERNAL MEDICINE

## 2019-05-10 RX ORDER — CYCLOBENZAPRINE HCL 10 MG
10 TABLET ORAL 3 TIMES DAILY
Qty: 30 TABLET | Refills: 1 | Status: SHIPPED | OUTPATIENT
Start: 2019-05-10 | End: 2019-05-30

## 2019-05-10 RX ORDER — MELOXICAM 7.5 MG/1
7.5 TABLET ORAL DAILY
Qty: 30 TABLET | Refills: 0 | Status: SHIPPED | OUTPATIENT
Start: 2019-05-10 | End: 2019-07-15

## 2019-05-10 NOTE — PROGRESS NOTES
HPI:    Patient ID: Ismael Hernández is a 42year old female. Patient presents with:  Back Pain: right mid back pain. HPI  Burning back pain  Patient c/o burning, right-sided LBP. This is a new problem; Onset 05/07/2019.  Patient denies any associated Respiratory: Negative for choking, shortness of breath and wheezing. Cardiovascular: Negative for chest pain and leg swelling. Gastrointestinal: Negative for abdominal pain, diarrhea, nausea and vomiting.    Endocrine: Negative for polydipsia, polyph shoulder subacromial space   • OTHER SURGICAL HISTORY  06/08/16    laparoscopy, filshie clip sterilization of left fallopian tube, ablation of endometriosis, retrieval and removal of displaced filshie clip   • TRANSFORAMINAL LUMBAR EPIDURAL STEROID INJECTI and well-nourished. HENT:   Head: Normocephalic and atraumatic. Right Ear: External ear normal.   Left Ear: External ear normal.   Mouth/Throat: Uvula is midline, oropharynx is clear and moist and mucous membranes are normal. No oropharyngeal exudate. index is 30.76 kg/m².   Diabetes:    Lab Results   Component Value Date    A1C 5.1 06/19/2017    A1C 5.0 03/15/2016     Lab Results   Component Value Date    CHOLEST 204 (H) 06/19/2017    CHOLEST 216 (H) 03/15/2016     Lab Results   Component Value Date recent OV with BASIL Jordan on 04/30/2019 for (Dx) UTI. Patient presents today with mild dysuria. Patient is currently taking OTC AZO Urinary Pain Relief with significant relief of Sx, and was instructed to continue medication as directed.  In-office

## 2019-05-10 NOTE — TELEPHONE ENCOUNTER
Using language line  Isiah Garcia [id number]:  The patient did not go to the ED. The patient stated the only pain she has now is the middle back right side. She did leave work yesterday because of the pain she works at night.     The patient

## 2019-05-13 ENCOUNTER — OFFICE VISIT (OUTPATIENT)
Dept: SURGERY | Facility: CLINIC | Age: 39
End: 2019-05-13
Payer: COMMERCIAL

## 2019-05-13 VITALS — DIASTOLIC BLOOD PRESSURE: 70 MMHG | SYSTOLIC BLOOD PRESSURE: 118 MMHG | HEART RATE: 74 BPM

## 2019-05-13 DIAGNOSIS — M54.16 LUMBAR RADICULOPATHY: Primary | ICD-10-CM

## 2019-05-13 PROCEDURE — 99213 OFFICE O/P EST LOW 20 MIN: CPT | Performed by: PHYSICIAN ASSISTANT

## 2019-05-13 NOTE — PROGRESS NOTES
Pt is here for follow up post EMG. Pt states that she is still the same since LOV. No new symptome's to be reported.

## 2019-05-13 NOTE — PROGRESS NOTES
Neurosurgery Clinic Visit  2019    Ilana Sutton PCP:  DO WILLY Cason 1980 MRN [id number]     HISTORY OF PRESENT ILLNESS:  Ilana Sutton is a(n) 42year old female who is here for follow-up for back pain.   Her  helped to tr imaging completed. Pt and family were appreciative.       Total time spent: 15 Minutes  Greater than 50% of the time was spent on counseling/coordination of care. Nature of education / counseling: Pathology, treatment options, and expected outcomes.

## 2019-05-14 ENCOUNTER — HOSPITAL ENCOUNTER (OUTPATIENT)
Dept: GENERAL RADIOLOGY | Age: 39
Discharge: HOME | End: 2019-05-14
Attending: PHYSICIAN ASSISTANT
Payer: COMMERCIAL

## 2019-05-14 DIAGNOSIS — M54.16 LUMBAR RADICULOPATHY: ICD-10-CM

## 2019-05-14 PROCEDURE — 72114 X-RAY EXAM L-S SPINE BENDING: CPT | Performed by: PHYSICIAN ASSISTANT

## 2019-05-17 ENCOUNTER — TELEPHONE (OUTPATIENT)
Dept: SURGERY | Facility: CLINIC | Age: 39
End: 2019-05-17

## 2019-05-17 NOTE — TELEPHONE ENCOUNTER
[facility] w/[facility] [employer] request MRI Spine Lumbar order to be faxed to [phone]; order faxed, confirmation received

## 2019-06-04 ENCOUNTER — TELEPHONE (OUTPATIENT)
Dept: SURGERY | Facility: CLINIC | Age: 39
End: 2019-06-04

## 2019-06-04 NOTE — TELEPHONE ENCOUNTER
Spoke with patient and patient's son and reminded them to bring in CD of lumbar MRI that was completed at [facility] to our office so it could be loaded into our system. They will be dropping the CD off later on today.     Report endorsed to

## 2019-06-04 NOTE — TELEPHONE ENCOUNTER
pt dropped off disc, uploaded & confirmed in PACS; pt stated she does not need the disc back, disc placed in shred bin

## 2019-06-13 ENCOUNTER — OFFICE VISIT (OUTPATIENT)
Dept: SURGERY | Facility: CLINIC | Age: 39
End: 2019-06-13
Payer: COMMERCIAL

## 2019-06-13 VITALS — DIASTOLIC BLOOD PRESSURE: 72 MMHG | SYSTOLIC BLOOD PRESSURE: 118 MMHG | HEART RATE: 76 BPM

## 2019-06-13 DIAGNOSIS — M54.16 LUMBAR RADICULOPATHY: Primary | ICD-10-CM

## 2019-06-13 PROCEDURE — 99214 OFFICE O/P EST MOD 30 MIN: CPT | Performed by: NEUROLOGICAL SURGERY

## 2019-06-13 RX ORDER — TRAMADOL HYDROCHLORIDE 50 MG/1
TABLET ORAL EVERY 6 HOURS PRN
Qty: 60 TABLET | Refills: 0 | Status: SHIPPED | OUTPATIENT
Start: 2019-06-13 | End: 2019-07-15

## 2019-06-13 NOTE — PROGRESS NOTES
Neurosurgery Clinic Visit  2019    Mickey Coulter PCP:  DO WILLY Bailon 1980 MRN [id number]     HISTORY OF PRESENT ILLNESS:  Mickey Coulter is a(n) 42year old female here for follow-up  She got her MRI  She is here to discuss treatm

## 2019-06-13 NOTE — PROGRESS NOTES
Pt is here for follow up post imaging MRI and Xray of the lumbar. Pt states that she is still the same since LOV.  Pt states that no new changes to be reported

## 2019-07-15 ENCOUNTER — OFFICE VISIT (OUTPATIENT)
Dept: FAMILY MEDICINE CLINIC | Facility: CLINIC | Age: 39
End: 2019-07-15
Payer: COMMERCIAL

## 2019-07-15 VITALS
HEIGHT: 62 IN | BODY MASS INDEX: 32.17 KG/M2 | TEMPERATURE: 99 F | WEIGHT: 174.81 LBS | DIASTOLIC BLOOD PRESSURE: 70 MMHG | RESPIRATION RATE: 12 BRPM | SYSTOLIC BLOOD PRESSURE: 116 MMHG | HEART RATE: 74 BPM

## 2019-07-15 DIAGNOSIS — M54.16 LUMBAR RADICULOPATHY: ICD-10-CM

## 2019-07-15 DIAGNOSIS — R45.4 EXCESSIVE ANGER: ICD-10-CM

## 2019-07-15 DIAGNOSIS — M54.42 CHRONIC BILATERAL LOW BACK PAIN WITH BILATERAL SCIATICA: Primary | ICD-10-CM

## 2019-07-15 DIAGNOSIS — M54.41 CHRONIC BILATERAL LOW BACK PAIN WITH BILATERAL SCIATICA: Primary | ICD-10-CM

## 2019-07-15 DIAGNOSIS — R68.2 DRY MOUTH: ICD-10-CM

## 2019-07-15 DIAGNOSIS — M48.061 FORAMINAL STENOSIS OF LUMBAR REGION: ICD-10-CM

## 2019-07-15 DIAGNOSIS — F41.9 ANXIETY: ICD-10-CM

## 2019-07-15 DIAGNOSIS — M51.26 HERNIATED NUCLEUS PULPOSUS, LUMBAR: ICD-10-CM

## 2019-07-15 DIAGNOSIS — G44.209 TENSION HEADACHE: ICD-10-CM

## 2019-07-15 DIAGNOSIS — M43.06 SPONDYLOLYSIS, LUMBAR REGION: ICD-10-CM

## 2019-07-15 DIAGNOSIS — F32.A DEPRESSIVE DISORDER: ICD-10-CM

## 2019-07-15 DIAGNOSIS — G89.29 CHRONIC BILATERAL LOW BACK PAIN WITH BILATERAL SCIATICA: Primary | ICD-10-CM

## 2019-07-15 DIAGNOSIS — M54.2 BILATERAL POSTERIOR NECK PAIN: ICD-10-CM

## 2019-07-15 DIAGNOSIS — M43.17 SPONDYLOLISTHESIS OF LUMBOSACRAL REGION: ICD-10-CM

## 2019-07-15 PROCEDURE — 99215 OFFICE O/P EST HI 40 MIN: CPT | Performed by: FAMILY MEDICINE

## 2019-07-15 RX ORDER — METHOCARBAMOL 750 MG/1
750 TABLET, FILM COATED ORAL 3 TIMES DAILY PRN
Qty: 60 TABLET | Refills: 0 | Status: SHIPPED | OUTPATIENT
Start: 2019-07-15 | End: 2019-08-03

## 2019-07-15 RX ORDER — TRAMADOL HYDROCHLORIDE 50 MG/1
TABLET ORAL EVERY 6 HOURS PRN
Qty: 60 TABLET | Refills: 0 | Status: SHIPPED | OUTPATIENT
Start: 2019-07-15 | End: 2019-10-31

## 2019-07-15 RX ORDER — DULOXETIN HYDROCHLORIDE 30 MG/1
30 CAPSULE, DELAYED RELEASE ORAL DAILY
Qty: 60 CAPSULE | Refills: 2 | Status: SHIPPED | OUTPATIENT
Start: 2019-07-15 | End: 2019-10-31

## 2019-07-15 NOTE — PROGRESS NOTES
Patient ID: Ilana Sutton is a 42year old female. HPI  Patient presents with: Follow - Up: back pain, disk   Dryness: dry mouth   Mood Disturbance: anger/frustration     Was told she will need surgery by Dr. Dr. George Chung and would like my opinion. kg/m²  04/08/19 : 30.18 kg/m²      BP Readings from Last 6 Encounters:  07/15/19 : 116/70  06/13/19 : 118/72  05/13/19 : 118/70  05/10/19 : 123/80  04/30/19 : 120/77  04/15/19 : 108/73        Review of Systems   Constitutional: Negative for chills and feve Left 12/19/2018    Performed by Edy Zacarias MD at [facility] MAIN OR   • LIPOMA REMOVAL Left 12/29/2018   • LUMBAR INTERLAMINAR EPIDURAL INJECTION Bilateral 9/29/2017    Performed by Traci Muro DO at [facility] OR   • LUMBAR INTERLAMINAR EPIDURAL INJECTION N/A Cardiovascular: Normal rate, regular rhythm and normal heart sounds. Pulmonary/Chest: Effort normal and breath sounds normal. No respiratory distress. Trapezius: Has tenderness bilateral  trapezius up into cervical perispinal muscles and into scalp. tired.    Spondylolisthesis of lumbosacral region  -     traMADol HCl 50 MG Oral Tab; Take 1-2 tablets ( mg total) by mouth every 6 (six) hours as needed for Pain.  -     methocarbamol 750 MG Oral Tab;  Take 1 tablet (750 mg total) by mouth 3 (three) week and then start taking 2 pills at the same time. Dry mouth  Oropharynx is clear and moist.    Tension headache  I think this has to do with tension and muscle pain. This is not a migraine.   I told her the medications I have run should also help wit

## 2019-07-30 ENCOUNTER — OFFICE VISIT (OUTPATIENT)
Dept: SURGERY | Facility: CLINIC | Age: 39
End: 2019-07-30
Payer: COMMERCIAL

## 2019-07-30 VITALS — BODY MASS INDEX: 32 KG/M2 | WEIGHT: 174 LBS

## 2019-07-30 DIAGNOSIS — M79.621 PAIN IN RIGHT AXILLA: Primary | ICD-10-CM

## 2019-07-30 PROCEDURE — 99211 OFF/OP EST MAY X REQ PHY/QHP: CPT | Performed by: SURGERY

## 2019-07-30 RX ORDER — METHOCARBAMOL 750 MG/1
750 TABLET, FILM COATED ORAL 3 TIMES DAILY
COMMUNITY
End: 2019-10-31

## 2019-08-01 ENCOUNTER — TELEPHONE (OUTPATIENT)
Dept: FAMILY MEDICINE CLINIC | Facility: CLINIC | Age: 39
End: 2019-08-01

## 2019-08-01 DIAGNOSIS — N63.0 BREAST NODULE: Primary | ICD-10-CM

## 2019-08-01 DIAGNOSIS — M79.621 PAIN IN RIGHT AXILLA: ICD-10-CM

## 2019-08-01 NOTE — TELEPHONE ENCOUNTER
Dr Kyra Collazo please review pended order. Patient last seen Dr Catherine Nichols on 07/30/19 ofc note still opened.

## 2019-08-01 NOTE — PROGRESS NOTES
Patient presents with:  Lump: Has a painful lump in the right axilla. Prior visit was told it was very deep. US done in November. It stopped hurting and went away, but came back one month ago, and is painful when moving her arm.   No redness, swelling no

## 2019-08-01 NOTE — TELEPHONE ENCOUNTER
Per pt Dr. Catherine Nichols referred her to see another specialist but was told she needs a referral from PCP. Referral Dr. Julian Sit, for balls/bumps on chest area. Diagnostic needs to be what Dr. Catherine Nichols stated pt has (per pt).

## 2019-08-03 DIAGNOSIS — M54.16 LUMBAR RADICULOPATHY: ICD-10-CM

## 2019-08-03 DIAGNOSIS — M43.06 SPONDYLOLYSIS, LUMBAR REGION: ICD-10-CM

## 2019-08-03 DIAGNOSIS — M43.17 SPONDYLOLISTHESIS OF LUMBOSACRAL REGION: ICD-10-CM

## 2019-08-03 DIAGNOSIS — M54.42 CHRONIC BILATERAL LOW BACK PAIN WITH BILATERAL SCIATICA: ICD-10-CM

## 2019-08-03 DIAGNOSIS — M51.26 HERNIATED NUCLEUS PULPOSUS, LUMBAR: ICD-10-CM

## 2019-08-03 DIAGNOSIS — G89.29 CHRONIC BILATERAL LOW BACK PAIN WITH BILATERAL SCIATICA: ICD-10-CM

## 2019-08-03 DIAGNOSIS — M48.061 FORAMINAL STENOSIS OF LUMBAR REGION: ICD-10-CM

## 2019-08-03 DIAGNOSIS — M54.41 CHRONIC BILATERAL LOW BACK PAIN WITH BILATERAL SCIATICA: ICD-10-CM

## 2019-08-03 RX ORDER — METHOCARBAMOL 750 MG/1
750 TABLET, FILM COATED ORAL 3 TIMES DAILY PRN
Qty: 60 TABLET | Refills: 0 | Status: SHIPPED | OUTPATIENT
Start: 2019-08-03 | End: 2019-08-13

## 2019-08-03 NOTE — TELEPHONE ENCOUNTER
rx listed as historical.     Review pended refill request as it does not fall under a protocol.   Requested Prescriptions     Pending Prescriptions Disp Refills   • METHOCARBAMOL 750 MG Oral Tab [Pharmacy Med Name: METHOCARBAMOL 750MG TABLET] 60 tablet 0

## 2019-08-07 ENCOUNTER — NURSE TRIAGE (OUTPATIENT)
Dept: OTHER | Age: 39
End: 2019-08-07

## 2019-08-07 ENCOUNTER — OFFICE VISIT (OUTPATIENT)
Dept: FAMILY MEDICINE CLINIC | Facility: CLINIC | Age: 39
End: 2019-08-07
Payer: COMMERCIAL

## 2019-08-07 VITALS
BODY MASS INDEX: 30.91 KG/M2 | HEIGHT: 62 IN | HEART RATE: 70 BPM | DIASTOLIC BLOOD PRESSURE: 81 MMHG | WEIGHT: 168 LBS | SYSTOLIC BLOOD PRESSURE: 119 MMHG

## 2019-08-07 DIAGNOSIS — G44.209 TENSION HEADACHE: Primary | ICD-10-CM

## 2019-08-07 DIAGNOSIS — E87.6 HYPOKALEMIA: ICD-10-CM

## 2019-08-07 DIAGNOSIS — R43.2 ABNORMAL TASTE IN MOUTH: ICD-10-CM

## 2019-08-07 DIAGNOSIS — Z83.3 FAMILY HISTORY OF DIABETES MELLITUS: ICD-10-CM

## 2019-08-07 PROCEDURE — 99214 OFFICE O/P EST MOD 30 MIN: CPT | Performed by: NURSE PRACTITIONER

## 2019-08-07 NOTE — PROGRESS NOTES
HPI    Pt here for headache for past 3 days. Worse on right side. Has h/o migraines-has never been prescribed migraine meds. Has had for many years. Will get 4-5 migraines per month but sometimes will last for days.    Headache is different-feels dizzy an arthrocentesis of the right shoulder subacromial space   • Other surgical history  06/08/16    laparoscopy, filshie clip sterilization of left fallopian tube, ablation of endometriosis, retrieval and removal of displaced filshie clip   • Total abdominal h Emotionally abused: Not on file        Physically abused: Not on file        Forced sexual activity: Not on file    Other Topics      Concerns:         Service: Not Asked        Blood Transfusions: Not Asked        Caffeine Concern:  No Constitutional: She is oriented to person, place, and time. She appears well-developed and well-nourished. No distress. HENT:   Head: Normocephalic and atraumatic.    Right Ear: Tympanic membrane and ear canal normal. No cerumen present  Left Ear: Tympani Tension headache - Primary     Ice post neck 20 min 3-4 times per day  Neck exercises  Tramadol and robaxin-will cause drowsiness  F/u if s/s not improving.           Relevant Orders    COMP METABOLIC PANEL (14)    HEMOGLOBIN A1C    TSH W REFLEX TO FREE T4

## 2019-08-07 NOTE — TELEPHONE ENCOUNTER
appt made today- Pt stated s/s head ache/dizziness since Monday taking Tylenol/tramadol for relief, helps a little but pain comes back, also have sweet taste in mouth, eye twitching   Reason for Disposition  • Patient wants to be seen    Protocols used:

## 2019-08-07 NOTE — PATIENT INSTRUCTIONS
Dolor de [place] tensional    [place] tensión muscular es samra causa muy común de dolor de [place]. También se conoce lui “dolor de rebeka por estrés”.  Cuando están estresadas, algunas personas tensan los músculos de los hombros, del kim y el cuero cabellud Programe samra visita de control con tejada proveedor de atención médica si tejada dolor de rebeka no ha keegan en las 24 horas siguientes. Hable con teajda proveedor si tiene deepali de rebeka frecuentes. El proveedor puede darle un plan de tratamiento.  Pregúntele si Los cambios en el estilo de rosa son fundamentales para prevenir los deepali de rebeka tensionales. Pansy Sprain cuáles son los cambios en tejada entorno y las actividades diarias que le ayudan a prevenir el esfuerzo y la tensión que resultan en dolor de [place].  Si · Estrés emocional. Existen muchos factores que pueden producir estrés emocional: exceso de trabajo, problemas familiares, dificultades financieras o cambios súbitos en tejada rosa personal. [place] puede causar tensión muscular.   Medidas que usted puede rory  U El ejercicio puede mejorar tejada blue general y ayudarle a relajarse. · Los ejercicios para el kim ayudan a mantener los músculos del kim relajados rachele el día. Pruebe los ejercicios para el kim que se muestran en esta hoja.  Comience en samra posi

## 2019-08-09 NOTE — ASSESSMENT & PLAN NOTE
Ice post neck 20 min 3-4 times per day  Neck exercises  Tramadol and robaxin-will cause drowsiness  F/u if s/s not improving.

## 2019-08-10 ENCOUNTER — APPOINTMENT (OUTPATIENT)
Dept: LAB | Age: 39
End: 2019-08-10
Attending: NURSE PRACTITIONER
Payer: COMMERCIAL

## 2019-08-10 DIAGNOSIS — R43.2 ABNORMAL TASTE IN MOUTH: ICD-10-CM

## 2019-08-10 DIAGNOSIS — G44.209 TENSION HEADACHE: ICD-10-CM

## 2019-08-10 LAB
ALBUMIN SERPL-MCNC: 3.7 G/DL (ref 3.4–5)
ALBUMIN/GLOB SERPL: 1 {RATIO} (ref 1–2)
ALP LIVER SERPL-CCNC: 72 U/L (ref 37–98)
ALT SERPL-CCNC: 50 U/L (ref 13–56)
ANION GAP SERPL CALC-SCNC: 6 MMOL/L (ref 0–18)
AST SERPL-CCNC: 25 U/L (ref 15–37)
BILIRUB SERPL-MCNC: 0.5 MG/DL (ref 0.1–2)
BUN BLD-MCNC: 12 MG/DL (ref 7–18)
BUN/CREAT SERPL: 15.2 (ref 10–20)
CALCIUM BLD-MCNC: 8.9 MG/DL (ref 8.5–10.1)
CHLORIDE SERPL-SCNC: 107 MMOL/L (ref 98–112)
CO2 SERPL-SCNC: 28 MMOL/L (ref 21–32)
CREAT BLD-MCNC: 0.79 MG/DL (ref 0.55–1.02)
EST. AVERAGE GLUCOSE BLD GHB EST-MCNC: 103 MG/DL (ref 68–126)
GLOBULIN PLAS-MCNC: 3.6 G/DL (ref 2.8–4.4)
GLUCOSE BLD-MCNC: 93 MG/DL (ref 70–99)
HBA1C MFR BLD HPLC: 5.2 % (ref ?–5.7)
M PROTEIN MFR SERPL ELPH: 7.3 G/DL (ref 6.4–8.2)
OSMOLALITY SERPL CALC.SUM OF ELEC: 291 MOSM/KG (ref 275–295)
PATIENT FASTING: YES
POTASSIUM SERPL-SCNC: 4.4 MMOL/L (ref 3.5–5.1)
SODIUM SERPL-SCNC: 141 MMOL/L (ref 136–145)
TSI SER-ACNC: 2.78 MIU/ML (ref 0.36–3.74)

## 2019-08-10 PROCEDURE — 80053 COMPREHEN METABOLIC PANEL: CPT

## 2019-08-10 PROCEDURE — 84443 ASSAY THYROID STIM HORMONE: CPT

## 2019-08-10 PROCEDURE — 83036 HEMOGLOBIN GLYCOSYLATED A1C: CPT

## 2019-08-10 PROCEDURE — 36415 COLL VENOUS BLD VENIPUNCTURE: CPT

## 2019-08-13 ENCOUNTER — TELEPHONE (OUTPATIENT)
Dept: FAMILY MEDICINE CLINIC | Facility: CLINIC | Age: 39
End: 2019-08-13

## 2019-08-13 DIAGNOSIS — R51.9 HEADACHE DISORDER: Primary | ICD-10-CM

## 2019-08-13 NOTE — TELEPHONE ENCOUNTER
All test results were wnl. I will give note for work. I advise pt to f/u with neuro for further evaluation of headaches. Referral placed.    Note is available for  at [facility]

## 2019-08-13 NOTE — TELEPHONE ENCOUNTER
Pt would like a call back with her blood test results. Pt is requesting a call back in [address] [facility]

## 2019-08-13 NOTE — TELEPHONE ENCOUNTER
Green An pt is calling to know about her test results from 8/10. Please Advise  Pt stated that she continues to have headaches and since Sunday the headache feels worse.  She stated that on Sunday she started to feel a sharp pain but it only last  5 to10 min a

## 2019-08-13 NOTE — TELEPHONE ENCOUNTER
Pt is calling to follow up on her referral. Pt cannot make an appointment until she gets a referral. Pt can be reached at [phone].

## 2019-08-13 NOTE — TELEPHONE ENCOUNTER
With Language CODX#[id number], advised Kamryn Sides note and verbalized understanding.  States that she does not have any pen and paper right now and cannot get the neuro office number,States that she is on her way to the office for her son's shot at 1 PM, requboris

## 2019-08-14 NOTE — TELEPHONE ENCOUNTER
Pt informed referral signed for Neuro and work note ready to  in [address] [place] [address]. Pt verb understanding.

## 2019-08-15 NOTE — TELEPHONE ENCOUNTER
Managed care please update patient on referral progress, thank you.  Please reply to pool: [facility]

## 2019-08-22 ENCOUNTER — OFFICE VISIT (OUTPATIENT)
Dept: SURGERY | Facility: CLINIC | Age: 39
End: 2019-08-22
Payer: COMMERCIAL

## 2019-08-22 VITALS — WEIGHT: 174 LBS | HEIGHT: 62 IN | BODY MASS INDEX: 32.02 KG/M2

## 2019-08-22 DIAGNOSIS — R22.31 AXILLARY MASS, RIGHT: Primary | ICD-10-CM

## 2019-08-22 DIAGNOSIS — M79.621 PAIN, AXILLARY, RIGHT: ICD-10-CM

## 2019-08-22 PROCEDURE — 99243 OFF/OP CNSLTJ NEW/EST LOW 30: CPT | Performed by: SURGERY

## 2019-08-22 RX ORDER — HYDROCODONE BITARTRATE AND ACETAMINOPHEN 5; 325 MG/1; MG/1
1 TABLET ORAL EVERY 6 HOURS PRN
Qty: 20 TABLET | Refills: 0 | Status: SHIPPED | OUTPATIENT
Start: 2019-08-22 | End: 2020-01-07

## 2019-08-22 NOTE — H&P
[facility] SURGERY    History and Physical    Kristen Shore Patient Status:  Outpatient    1980 MRN [id number]   Location [address] [facility] Attending No att. providers found   Hosp Day # 0 PCP Jade CHOLECYSTECTOMY  2012   • COLONOSCOPY  2004   • CYST REMOVAL  2007, 2014    laporoscopic ovarian cytectomy   • EXCISION OF LESION/LIPOMA Left 12/19/2018    Performed by Ariana Smith MD at [address] [place] [address] OR   • HYSTERECTOMY     • LIPOMA REMOVAL Left 12/29/2018   • hematemesis, no BRBPR, no worsening heartburn  : no dysuria, no blood in urine, no difficulty urinating  MUSCULOSKELETAL: no new musculoskeletal complaints  NEURO: no persistent, recurrent  headaches  PSYCHE:no depression or anxiety  HEMATOLOGIC: no hx o BUN 12 08/10/2019     08/10/2019    K 4.4 08/10/2019     08/10/2019    CO2 28.0 08/10/2019    GLU 93 08/10/2019    CA 8.9 08/10/2019    ALB 3.7 08/10/2019    ALKPHO 72 08/10/2019    BILT 0.5 08/10/2019    TP 7.3 08/10/2019    AST 25 08/10/2019

## 2019-08-22 NOTE — PATIENT INSTRUCTIONS
Use Norco as needed, but cuatiously and as seldom as possible, as we discussed. Use turmeric for pain as well. Obtain CT scan and return to clinic to discuss with me.   Use a roll or pillow for support under the upper chest/armpit area while sleeping on yo

## 2019-08-23 ENCOUNTER — TELEPHONE (OUTPATIENT)
Dept: SURGERY | Facility: CLINIC | Age: 39
End: 2019-08-23

## 2019-08-23 NOTE — TELEPHONE ENCOUNTER
Please advise. Do you want to call her? Did you discuss your interventions for breast pain with her?

## 2019-08-23 NOTE — TELEPHONE ENCOUNTER
Pt states she forgot to mention R breast pain that she has been having at [address] yesterday, states pain is around areola area, and feels soreness, pt asking if this is related to R axilla issue ? Pls call thank you. Urdu speaking.

## 2019-08-24 NOTE — TELEPHONE ENCOUNTER
Pt states having the scan would be cheaper through her ins if she have it done elsewhere. Pt is requesting for order for scan to be faxed to [phone].  Phone number to Clinic is  [phone] pls advise thank you

## 2019-09-03 ENCOUNTER — TELEPHONE (OUTPATIENT)
Dept: SURGERY | Facility: CLINIC | Age: 39
End: 2019-09-03

## 2019-09-03 NOTE — TELEPHONE ENCOUNTER
Dr. Laxmi Linares has denied coverage of the CT Chest (CPT 63584). Please see below for detailed denial reasoning.        We have two appeal options - a peer to peer review or written appeal. Both your most recent office visit note as well a

## 2019-09-13 ENCOUNTER — OFFICE VISIT (OUTPATIENT)
Dept: FAMILY MEDICINE CLINIC | Facility: CLINIC | Age: 39
End: 2019-09-13
Payer: COMMERCIAL

## 2019-09-13 ENCOUNTER — TELEPHONE (OUTPATIENT)
Dept: FAMILY MEDICINE CLINIC | Facility: CLINIC | Age: 39
End: 2019-09-13

## 2019-09-13 VITALS
DIASTOLIC BLOOD PRESSURE: 66 MMHG | BODY MASS INDEX: 31.47 KG/M2 | HEART RATE: 70 BPM | WEIGHT: 171 LBS | HEIGHT: 62 IN | TEMPERATURE: 97 F | SYSTOLIC BLOOD PRESSURE: 103 MMHG

## 2019-09-13 DIAGNOSIS — M79.604 RIGHT LEG PAIN: ICD-10-CM

## 2019-09-13 DIAGNOSIS — M43.17 SPONDYLOLISTHESIS OF LUMBOSACRAL REGION: ICD-10-CM

## 2019-09-13 DIAGNOSIS — G89.29 CHRONIC BILATERAL LOW BACK PAIN WITH BILATERAL SCIATICA: Primary | ICD-10-CM

## 2019-09-13 DIAGNOSIS — M54.41 CHRONIC BILATERAL LOW BACK PAIN WITH BILATERAL SCIATICA: Primary | ICD-10-CM

## 2019-09-13 DIAGNOSIS — Z28.21 IMMUNIZATION NOT CARRIED OUT BECAUSE OF PATIENT REFUSAL: ICD-10-CM

## 2019-09-13 DIAGNOSIS — G44.209 TENSION HEADACHE: ICD-10-CM

## 2019-09-13 DIAGNOSIS — R51.9 HEADACHE DISORDER: ICD-10-CM

## 2019-09-13 DIAGNOSIS — M48.061 FORAMINAL STENOSIS OF LUMBAR REGION: ICD-10-CM

## 2019-09-13 DIAGNOSIS — M54.42 CHRONIC BILATERAL LOW BACK PAIN WITH BILATERAL SCIATICA: Primary | ICD-10-CM

## 2019-09-13 DIAGNOSIS — M43.06 SPONDYLOLYSIS, LUMBAR REGION: ICD-10-CM

## 2019-09-13 DIAGNOSIS — M54.16 LUMBAR RADICULOPATHY: ICD-10-CM

## 2019-09-13 PROCEDURE — 99214 OFFICE O/P EST MOD 30 MIN: CPT | Performed by: FAMILY MEDICINE

## 2019-09-13 NOTE — PROGRESS NOTES
Patient ID: Warren Ariza is a 42year old female. HPI  Patient presents with:   Follow - Up: back pain, needs 2nd opinion with a back surgeon to be sure she does need the surgery on her back or no    Pt brought in FMLA and insurance change paperwor Positive for headaches. Negative for speech difficulty. Hematological: Does not bruise/bleed easily. Psychiatric/Behavioral: The patient is not nervous/anxious.           Past Medical History:   Diagnosis Date   • Back problem    • Depression    • Endom times daily. Disp:  Rfl:    traMADol HCl 50 MG Oral Tab Take 1-2 tablets ( mg total) by mouth every 6 (six) hours as needed for Pain. Disp: 60 tablet Rfl: 0   ALPRAZolam 0.25 MG Oral Tab Take 0.25 mg by mouth 3 (three) times daily as needed.  Disp:  R breastfeeding. ASSESSMENT/PLAN:     Diagnoses and all orders for this visit:    Chronic bilateral low back pain with bilateral sciatica  -     NEUROSURGERY - INTERNAL  I did refer her to Dr. Pippa Kwan.   I know he takes [facility] which s have reviewed the chart and discharge instructions (if applicable) and agree that the record reflects my personal performance and is accurate and complete.   Macario Gamez DO, 9/13/2019, 9:58 AM

## 2019-09-24 NOTE — TELEPHONE ENCOUNTER
Dr. Maza Favors,    Please sign off on form: Resert FMLA     -Highlight the patient and hit \"Chart\" button. -In Chart Review, w/in the Encounter tab - click 1 time on the Telephone call encounter for 9/13/19.  Scroll down the telephone encounter.  -Click \"sc

## 2019-10-22 ENCOUNTER — OFFICE VISIT (OUTPATIENT)
Dept: FAMILY MEDICINE CLINIC | Facility: CLINIC | Age: 39
End: 2019-10-22

## 2019-10-22 VITALS
BODY MASS INDEX: 32.2 KG/M2 | HEIGHT: 62 IN | HEART RATE: 68 BPM | RESPIRATION RATE: 20 BRPM | DIASTOLIC BLOOD PRESSURE: 73 MMHG | SYSTOLIC BLOOD PRESSURE: 117 MMHG | WEIGHT: 175 LBS | TEMPERATURE: 98 F

## 2019-10-22 DIAGNOSIS — N30.90 CYSTITIS: ICD-10-CM

## 2019-10-22 DIAGNOSIS — M43.06 SPONDYLOLYSIS, LUMBAR REGION: ICD-10-CM

## 2019-10-22 DIAGNOSIS — R30.0 DYSURIA: Primary | ICD-10-CM

## 2019-10-22 DIAGNOSIS — M79.604 RIGHT LEG PAIN: ICD-10-CM

## 2019-10-22 DIAGNOSIS — M54.16 LUMBAR RADICULOPATHY: ICD-10-CM

## 2019-10-22 DIAGNOSIS — M54.6 CHRONIC LEFT-SIDED THORACIC BACK PAIN: ICD-10-CM

## 2019-10-22 DIAGNOSIS — M43.17 SPONDYLOLISTHESIS OF LUMBOSACRAL REGION: ICD-10-CM

## 2019-10-22 DIAGNOSIS — G89.29 CHRONIC BILATERAL LOW BACK PAIN WITH BILATERAL SCIATICA: ICD-10-CM

## 2019-10-22 DIAGNOSIS — M54.42 CHRONIC BILATERAL LOW BACK PAIN WITH BILATERAL SCIATICA: ICD-10-CM

## 2019-10-22 DIAGNOSIS — M48.061 FORAMINAL STENOSIS OF LUMBAR REGION: ICD-10-CM

## 2019-10-22 DIAGNOSIS — R11.0 NAUSEA: ICD-10-CM

## 2019-10-22 DIAGNOSIS — G89.29 CHRONIC LEFT-SIDED THORACIC BACK PAIN: ICD-10-CM

## 2019-10-22 DIAGNOSIS — M54.41 CHRONIC BILATERAL LOW BACK PAIN WITH BILATERAL SCIATICA: ICD-10-CM

## 2019-10-22 PROCEDURE — 81003 URINALYSIS AUTO W/O SCOPE: CPT | Performed by: FAMILY MEDICINE

## 2019-10-22 PROCEDURE — 99214 OFFICE O/P EST MOD 30 MIN: CPT | Performed by: FAMILY MEDICINE

## 2019-10-22 RX ORDER — NITROFURANTOIN 25; 75 MG/1; MG/1
100 CAPSULE ORAL 2 TIMES DAILY
Qty: 10 CAPSULE | Refills: 0 | Status: SHIPPED | OUTPATIENT
Start: 2019-10-22 | End: 2019-10-27

## 2019-10-22 RX ORDER — METHOCARBAMOL 500 MG/1
500 TABLET, FILM COATED ORAL 3 TIMES DAILY PRN
Qty: 90 TABLET | Refills: 0 | Status: SHIPPED | OUTPATIENT
Start: 2019-10-22 | End: 2019-10-31

## 2019-10-22 RX ORDER — ONDANSETRON HYDROCHLORIDE 8 MG/1
8 TABLET, FILM COATED ORAL EVERY 8 HOURS PRN
Qty: 20 TABLET | Refills: 0 | Status: SHIPPED | OUTPATIENT
Start: 2019-10-22 | End: 2019-10-29

## 2019-10-22 NOTE — PROGRESS NOTES
Patient ID: Adrian Cason is a 42year old female. HPI  Patient presents with:  Pain: Patient present for back pain    Pt began to feel a burning sensation while urinating yesterday then began to feel worsening thoracic back pain.  Still feels burni vomiting. Genitourinary: Positive for dysuria. Negative for hematuria. Musculoskeletal: Positive for back pain. Skin: Negative for color change. Neurological: Positive for headaches. Negative for speech difficulty.    Psychiatric/Behavioral: The pat Disp: 20 tablet, Rfl: 0  ALPRAZolam 0.25 MG Oral Tab, Take 0.25 mg by mouth 3 (three) times daily as needed. , Disp: , Rfl:   Acetaminophen (TYLENOL) 325 MG Oral Cap, Take 500 mg by mouth every 6 (six) hours as needed. , Disp: , Rfl:   methocarbamol 750 MG O not currently breastfeeding. Urinalysis in the office today was positive for nitrites.     ASSESSMENT/PLAN:     Diagnoses and all orders for this visit:    Dysuria  -     URINALYSIS, AUTO, W/O SCOPE  -     URINALYSIS WITH CULTURE REFLEX  -     Nitro Tab; Take 1 tablet (500 mg total) by mouth 3 (three) times daily as needed (pain in back. ). Lumbar radiculopathy  -     NEUROSURGERY - INTERNAL  -     methocarbamol 500 MG Oral Tab;  Take 1 tablet (500 mg total) by mouth 3 (three) times daily as needed (

## 2019-10-24 ENCOUNTER — TELEPHONE (OUTPATIENT)
Dept: FAMILY MEDICINE CLINIC | Facility: CLINIC | Age: 39
End: 2019-10-24

## 2019-10-24 NOTE — TELEPHONE ENCOUNTER
Patient states that she is still having kidney pain. Patient also stated that she still has nausea even with the medication. Transferred call to triage.

## 2019-10-25 NOTE — TELEPHONE ENCOUNTER
Patient calling to follow-up on message below.    Per patient, she has pain rated 8/10 in back pain in kidney area and also complains of intemittent pain to upper abdomen below breast.   Per patient, she wnet to work yesterday and pulled a heavy door and st

## 2019-10-29 NOTE — TELEPHONE ENCOUNTER
Have her see me Thursday before noon if possible. will be a focused visit for this pain that happened after she pulled a heavy door.

## 2019-10-31 ENCOUNTER — OFFICE VISIT (OUTPATIENT)
Dept: FAMILY MEDICINE CLINIC | Facility: CLINIC | Age: 39
End: 2019-10-31

## 2019-10-31 ENCOUNTER — APPOINTMENT (OUTPATIENT)
Dept: LAB | Age: 39
End: 2019-10-31
Attending: FAMILY MEDICINE
Payer: COMMERCIAL

## 2019-10-31 VITALS
SYSTOLIC BLOOD PRESSURE: 101 MMHG | DIASTOLIC BLOOD PRESSURE: 68 MMHG | TEMPERATURE: 97 F | WEIGHT: 174 LBS | HEIGHT: 62 IN | BODY MASS INDEX: 32.02 KG/M2 | HEART RATE: 81 BPM

## 2019-10-31 DIAGNOSIS — G44.209 TENSION HEADACHE: ICD-10-CM

## 2019-10-31 DIAGNOSIS — M54.42 CHRONIC BILATERAL LOW BACK PAIN WITH BILATERAL SCIATICA: ICD-10-CM

## 2019-10-31 DIAGNOSIS — R14.0 ABDOMINAL BLOATING: ICD-10-CM

## 2019-10-31 DIAGNOSIS — G89.29 CHRONIC LEFT-SIDED THORACIC BACK PAIN: Primary | ICD-10-CM

## 2019-10-31 DIAGNOSIS — R11.0 NAUSEA: ICD-10-CM

## 2019-10-31 DIAGNOSIS — F41.9 ANXIETY: ICD-10-CM

## 2019-10-31 DIAGNOSIS — G89.29 CHRONIC BILATERAL LOW BACK PAIN WITH BILATERAL SCIATICA: ICD-10-CM

## 2019-10-31 DIAGNOSIS — E55.9 VITAMIN D DEFICIENCY: ICD-10-CM

## 2019-10-31 DIAGNOSIS — E53.8 VITAMIN B12 DEFICIENCY: ICD-10-CM

## 2019-10-31 DIAGNOSIS — R10.30 LOWER ABDOMINAL PAIN: ICD-10-CM

## 2019-10-31 DIAGNOSIS — G47.00 INSOMNIA, UNSPECIFIED TYPE: ICD-10-CM

## 2019-10-31 DIAGNOSIS — M54.41 CHRONIC BILATERAL LOW BACK PAIN WITH BILATERAL SCIATICA: ICD-10-CM

## 2019-10-31 DIAGNOSIS — M54.6 CHRONIC LEFT-SIDED THORACIC BACK PAIN: Primary | ICD-10-CM

## 2019-10-31 DIAGNOSIS — M48.061 FORAMINAL STENOSIS OF LUMBAR REGION: ICD-10-CM

## 2019-10-31 DIAGNOSIS — F32.A DEPRESSIVE DISORDER: ICD-10-CM

## 2019-10-31 PROCEDURE — 82306 VITAMIN D 25 HYDROXY: CPT

## 2019-10-31 PROCEDURE — 99215 OFFICE O/P EST HI 40 MIN: CPT | Performed by: FAMILY MEDICINE

## 2019-10-31 PROCEDURE — 36415 COLL VENOUS BLD VENIPUNCTURE: CPT

## 2019-10-31 PROCEDURE — 87086 URINE CULTURE/COLONY COUNT: CPT

## 2019-10-31 PROCEDURE — 82607 VITAMIN B-12: CPT

## 2019-10-31 RX ORDER — NORTRIPTYLINE HYDROCHLORIDE 10 MG/1
10 CAPSULE ORAL NIGHTLY
Qty: 60 CAPSULE | Refills: 2 | Status: SHIPPED | OUTPATIENT
Start: 2019-10-31 | End: 2020-01-09

## 2019-10-31 RX ORDER — DULOXETIN HYDROCHLORIDE 30 MG/1
30 CAPSULE, DELAYED RELEASE ORAL DAILY
Qty: 60 CAPSULE | Refills: 2 | Status: SHIPPED | OUTPATIENT
Start: 2019-10-31 | End: 2019-11-14 | Stop reason: ALTCHOICE

## 2019-10-31 NOTE — PROGRESS NOTES
Patient ID: Evonne Long is a 42year old female. HPI  Patient presents with:  Back Pain: Pt is here for a follow up visit for her lower back pain left side    Patient's came in with one issue but then brought up numerous issues.   Clearly has depr 04/11/2019    MPV 9.3 02/12/2018    MCV 90.9 04/11/2019    MCH 30.7 04/11/2019    MCHC 33.8 04/11/2019    RDW 12.5 04/11/2019    NEPRELIM 6.26 04/11/2019    NEUT 3.6 03/15/2016    LYMPH 1.9 03/15/2016    MON 0.4 03/15/2016    EOS 0.1 03/15/2016    BASO 0.0 10/22/2019    EPIUR Few 04/11/2019    BACUR Negative 10/22/2019    HYLUR 1 10/22/2019    MICCOM Completed 12/11/2013     Lab Results   Component Value Date     08/10/2019    A1C 5.2 08/10/2019    A1C 5.1 06/19/2017    A1C 5.0 03/15/2016       Lab Re • Esophageal reflux    • History of Papanicolaou smear of cervix 5/20/2015   • Hyperlipidemia 2013   • IBS (irritable bowel syndrome)    • Migraines     Per [facility] - Migraine headaches   • Ovarian cyst    • Postpartum depression    • Pregnancy 2002, 2005, methocarbamol 750 MG Oral Tab, Take 750 mg by mouth 3 (three) times daily. , Disp: , Rfl:   DULoxetine HCl (CYMBALTA) 30 MG Oral Cap DR Particles, Take 1 capsule (30 mg total) by mouth daily. For 1 week and then start taking 2 pills at the same time. , Dis daily. For 1 week and then start taking 2 pills at the same time. -     Nortriptyline HCl (PAMELOR) 10 MG Oral Cap; Take 1 capsule (10 mg total) by mouth nightly. If after 1 week still in pain start taking 2 at night. Restart the duloxetine.   I explained Cymbalta helps. Follow up if symptoms persist.  Take medicine (if given) as prescribed. Approach to treatment discussed and patient/family member understands and agrees to plan. Return in about 2 weeks (around 11/14/2019).         Carina Merida

## 2019-11-01 ENCOUNTER — TELEPHONE (OUTPATIENT)
Dept: FAMILY MEDICINE CLINIC | Facility: CLINIC | Age: 39
End: 2019-11-01

## 2019-11-01 DIAGNOSIS — R10.2 PELVIC PAIN: Primary | ICD-10-CM

## 2019-11-01 DIAGNOSIS — R11.0 NAUSEA: ICD-10-CM

## 2019-11-01 DIAGNOSIS — Z87.442 HISTORY OF NEPHROLITHIASIS: ICD-10-CM

## 2019-11-01 DIAGNOSIS — R30.0 DYSURIA: ICD-10-CM

## 2019-11-01 RX ORDER — DICLOFENAC SODIUM 75 MG/1
75 TABLET, DELAYED RELEASE ORAL 2 TIMES DAILY
Qty: 60 TABLET | Refills: 1 | Status: SHIPPED | OUTPATIENT
Start: 2019-11-01 | End: 2019-12-31

## 2019-11-01 NOTE — TELEPHONE ENCOUNTER
ER f/u 11/2   I called pt to give her her test results and then she mentioned that she noticed her urine was red. She  mentioned that she noticed this morning that her urine was red.  She stated that she did have a beet juice but she has had  this juice b

## 2019-11-01 NOTE — TELEPHONE ENCOUNTER
Kamlesh Crum pt stated that she has been taking the  Nortriptyline HCl  for 2 days and when she wakes up she feels dizzy, tired,  nausea and her mouth has a sour taste. Pt also stated that she feels like crying. Like she feels very sensitive.  She feels it's t

## 2019-11-02 NOTE — TELEPHONE ENCOUNTER
If she could even try 1/2 tablet of the Pamelor at bedtime and see how that helps that would be wonderful. The fatigue in the morning should get better within 7 to 10 days, if not then of course she should stop the medication.   I will also add a pain/anti

## 2019-11-02 NOTE — TELEPHONE ENCOUNTER
With Luis E Garces #[id number],[facility] Dr Enedina Mccormick notes below  and verbalized understanding.   States that she will wait for Dr Rubi Lay recommendation regarding her medication, states that the urine test  was done the day that she is asymptomatic, states that s

## 2019-11-02 NOTE — TELEPHONE ENCOUNTER
Will wait for dr. Trinh Sanches. He will address on Monday.  If she does not like the medications effects, stop for now

## 2019-11-02 NOTE — TELEPHONE ENCOUNTER
Patient confirms did not go to ED, does still have red color to urine, unsure if it's blood or diet related, reports to drink beet juice often, recent urine culture negative.  Reports continues to have symptoms of pelvic pressure felt inside, mild symptoms

## 2019-11-02 NOTE — TELEPHONE ENCOUNTER
Patient advised of treatment recommendations, agreed with advice. Reports her Nortriptyline is a capsule, unable to break in half for 5mg dose.  Please advise    (*Also see TE condition update)

## 2019-11-02 NOTE — TELEPHONE ENCOUNTER
Urine had no blood so kidney stone less likely and culture negative. Can try otc pyridium. TID x 3 days.   -urostat

## 2019-11-06 PROBLEM — Z87.442 HISTORY OF NEPHROLITHIASIS: Status: ACTIVE | Noted: 2019-11-06

## 2019-11-06 NOTE — TELEPHONE ENCOUNTER
With Romanian interpretor, patient indicated that was taking the phenazophiridine but stopped yesterday because was not feeling good with the medication. Was making her stomach upset and a bitter taste in her mouth.  Patient still having low back pain and na

## 2019-11-07 ENCOUNTER — TELEPHONE (OUTPATIENT)
Dept: FAMILY MEDICINE CLINIC | Facility: CLINIC | Age: 39
End: 2019-11-07

## 2019-11-07 NOTE — TELEPHONE ENCOUNTER
Per patient she cannot have CT till next week. She was advised to have Dr assist in getting this approved ASAP by insurance so she can schedule sooner. Please advise. Thank you.

## 2019-11-07 NOTE — TELEPHONE ENCOUNTER
With  Christ Stokes ID# [id number]  Patient calling in regards to her symptoms, did not receive a call back    Dr. Emily Armendariz placed orders for a CT of abd/ pelvis; patient provided with info for Central Scheduling  [phone]    Patient will call today for an

## 2019-11-11 ENCOUNTER — TELEPHONE (OUTPATIENT)
Dept: FAMILY MEDICINE CLINIC | Facility: CLINIC | Age: 39
End: 2019-11-11

## 2019-11-11 NOTE — TELEPHONE ENCOUNTER
Dr Lena Gomez, please advise. Patient stated she is still having pain. Scheduled for the CT scan abdomen/pelvis on Wed 11/13/19 at 11 am, she had to wait for the insurance to authorize. She was told she could have sooner if the doctor said it was sooner.  Sh

## 2019-11-11 NOTE — TELEPHONE ENCOUNTER
Patient called. All results and recommendations reviewed. Patient verbalizes understanding, denies further questions and agrees with plan of care.      [id number]      Notes recorded by Pollo Caraballo RN on 11/11/2019 at 1:27 PM CST  Pha

## 2019-11-12 NOTE — TELEPHONE ENCOUNTER
If the pain is that severe then she needs to go to the emergency room but otherwise just try to wait 1 more day and get the test done.

## 2019-11-12 NOTE — TELEPHONE ENCOUNTER
As of this moment, no ER note/encounter. With [employer] #[id number], left message to call us back.

## 2019-11-13 ENCOUNTER — OFFICE VISIT (OUTPATIENT)
Dept: FAMILY MEDICINE CLINIC | Facility: CLINIC | Age: 39
End: 2019-11-13

## 2019-11-13 ENCOUNTER — HOSPITAL ENCOUNTER (OUTPATIENT)
Dept: CT IMAGING | Age: 39
Discharge: HOME | End: 2019-11-13
Attending: FAMILY MEDICINE
Payer: COMMERCIAL

## 2019-11-13 VITALS
HEART RATE: 106 BPM | HEIGHT: 62 IN | BODY MASS INDEX: 32.02 KG/M2 | SYSTOLIC BLOOD PRESSURE: 124 MMHG | TEMPERATURE: 97 F | DIASTOLIC BLOOD PRESSURE: 79 MMHG | WEIGHT: 174 LBS

## 2019-11-13 DIAGNOSIS — R30.0 DYSURIA: ICD-10-CM

## 2019-11-13 DIAGNOSIS — R10.2 PELVIC PAIN: ICD-10-CM

## 2019-11-13 DIAGNOSIS — R10.31 RIGHT LOWER QUADRANT ABDOMINAL PAIN: Primary | ICD-10-CM

## 2019-11-13 DIAGNOSIS — R11.0 NAUSEA: ICD-10-CM

## 2019-11-13 DIAGNOSIS — Z87.442 HISTORY OF NEPHROLITHIASIS: ICD-10-CM

## 2019-11-13 PROCEDURE — 99213 OFFICE O/P EST LOW 20 MIN: CPT | Performed by: NURSE PRACTITIONER

## 2019-11-13 PROCEDURE — 74176 CT ABD & PELVIS W/O CONTRAST: CPT | Performed by: FAMILY MEDICINE

## 2019-11-13 RX ORDER — ONDANSETRON 4 MG/1
4 TABLET, FILM COATED ORAL EVERY 8 HOURS PRN
Qty: 20 TABLET | Refills: 0 | Status: SHIPPED | OUTPATIENT
Start: 2019-11-13 | End: 2020-12-11

## 2019-11-13 RX ORDER — DICYCLOMINE HYDROCHLORIDE 10 MG/1
10 CAPSULE ORAL 3 TIMES DAILY PRN
Qty: 30 CAPSULE | Refills: 0 | Status: SHIPPED | OUTPATIENT
Start: 2019-11-13 | End: 2020-01-09

## 2019-11-13 RX ORDER — GINKGO BILOBA 60 MG
TABLET ORAL
COMMUNITY
End: 2020-01-09

## 2019-11-13 RX ORDER — CAT'S CLAW 500 MG
CAPSULE ORAL
COMMUNITY
End: 2020-01-09

## 2019-11-13 NOTE — TELEPHONE ENCOUNTER
Using language line  Rex [id number]: The pain stated has constant back and towards the abdomin pain. The pain is constant 8/10. The patient did have the CT scan done today.     Per the patient she is not going to the ED and is requestin

## 2019-11-13 NOTE — PATIENT INSTRUCTIONS
Dolor Abdominal    El dolor abdominal es dolor en el vientre o en la cheikh del intestino. Todo el mague tiene sunday tipo de dolor de vez en cuando. En muchos casos el dolor desaparece por sí solo.  Pat en ciertas ocasiones el dolor abdominal puede ser cons Ciertas causas de [employer], lui samra apendicitis o samra obstrucción intestinal, requieren tratamiento urgente. Otros problemas pueden tratarse mediante descanso, consumo de líquidos o medicamentos.  Doran proveedor de [employer] dará instrucciones chacho · Kay menos alcohol y coma menos de esos alimentos que aumentan tejada acidez estomacal.  · Pierda el exceso de [place]. · Termine de comer lui mínimo 2 horas antes de acostarse. · Eleve un poco la cabecera de tejada cama.   Date Last Reviewed: 7/1/2016  © 2000-20

## 2019-11-13 NOTE — TELEPHONE ENCOUNTER
Pt calling to report she had CT done today, but still has \"strong pain\" in her abdomen that radiates to the right side and \"also the right side of my back\"    Advised pt she can go to ER and pt states \"if it gets worse I will go to the ER, if not I wi

## 2019-11-13 NOTE — PROGRESS NOTES
HPI    Patient presents for intermittent abdominal pain that has been present for the past 6 days. Was stronger yesterday. Started as thoracic back pain last month and now feels like it has moved to her stomach. Denies diarrhea and fever.   With some dacia history  06/08/16    laparoscopy, filshie clip sterilization of left fallopian tube, ablation of endometriosis, retrieval and removal of displaced filshie clip   • Total abdominal hysterectomy N/A 4/5/2017    Procedure: ABDOMINAL HYSTERECTOMY;  Surgeon:   Forced sexual activity: Not on file    Other Topics      Concerns:         Service: Not Asked        Blood Transfusions: Not Asked        Caffeine Concern: No          1 cup coffee daily        Occupational Exposure: Not Asked        Hobby Hazards: pain start taking 2 at night. 60 capsule 2       Allergies: Other                   SHORTNESS OF BREATH    Comment:SOME TOPICAL NUMBING SPRAY USED WHEN STARTING AN             I.V.   Ciprofloxacin           RASH  Sulfa Antibiotics       HIVES, RASH    Ph

## 2019-11-14 ENCOUNTER — TELEPHONE (OUTPATIENT)
Dept: FAMILY MEDICINE CLINIC | Facility: CLINIC | Age: 39
End: 2019-11-14

## 2019-11-14 ENCOUNTER — OFFICE VISIT (OUTPATIENT)
Dept: FAMILY MEDICINE CLINIC | Facility: CLINIC | Age: 39
End: 2019-11-14

## 2019-11-14 VITALS
HEIGHT: 62 IN | DIASTOLIC BLOOD PRESSURE: 75 MMHG | TEMPERATURE: 99 F | WEIGHT: 168 LBS | SYSTOLIC BLOOD PRESSURE: 114 MMHG | BODY MASS INDEX: 30.91 KG/M2 | HEART RATE: 72 BPM

## 2019-11-14 DIAGNOSIS — Z01.818 PREOP EXAMINATION: ICD-10-CM

## 2019-11-14 DIAGNOSIS — K58.2 IRRITABLE BOWEL SYNDROME WITH BOTH CONSTIPATION AND DIARRHEA: ICD-10-CM

## 2019-11-14 DIAGNOSIS — R14.0 POSTPRANDIAL ABDOMINAL BLOATING: ICD-10-CM

## 2019-11-14 DIAGNOSIS — M43.17 SPONDYLOLISTHESIS OF LUMBOSACRAL REGION: ICD-10-CM

## 2019-11-14 DIAGNOSIS — G89.29 CHRONIC LEFT-SIDED THORACIC BACK PAIN: ICD-10-CM

## 2019-11-14 DIAGNOSIS — R10.33 PERIUMBILICAL ABDOMINAL PAIN: Primary | ICD-10-CM

## 2019-11-14 DIAGNOSIS — G47.00 INSOMNIA, UNSPECIFIED TYPE: ICD-10-CM

## 2019-11-14 DIAGNOSIS — F41.9 ANXIETY: ICD-10-CM

## 2019-11-14 DIAGNOSIS — F32.A DEPRESSIVE DISORDER: ICD-10-CM

## 2019-11-14 DIAGNOSIS — M54.41 CHRONIC BILATERAL LOW BACK PAIN WITH BILATERAL SCIATICA: Primary | ICD-10-CM

## 2019-11-14 DIAGNOSIS — G89.29 CHRONIC BILATERAL LOW BACK PAIN WITH BILATERAL SCIATICA: Primary | ICD-10-CM

## 2019-11-14 DIAGNOSIS — M54.6 CHRONIC LEFT-SIDED THORACIC BACK PAIN: ICD-10-CM

## 2019-11-14 DIAGNOSIS — E66.09 NON MORBID OBESITY DUE TO EXCESS CALORIES: ICD-10-CM

## 2019-11-14 DIAGNOSIS — M54.42 CHRONIC BILATERAL LOW BACK PAIN WITH BILATERAL SCIATICA: Primary | ICD-10-CM

## 2019-11-14 DIAGNOSIS — H57.89 REDNESS OF LEFT EYE: ICD-10-CM

## 2019-11-14 DIAGNOSIS — H11.152 PINGUECULA OF LEFT EYE: ICD-10-CM

## 2019-11-14 PROCEDURE — 99215 OFFICE O/P EST HI 40 MIN: CPT | Performed by: FAMILY MEDICINE

## 2019-11-14 RX ORDER — DULOXETIN HYDROCHLORIDE 60 MG/1
60 CAPSULE, DELAYED RELEASE ORAL DAILY
Qty: 90 CAPSULE | Refills: 1 | Status: SHIPPED | OUTPATIENT
Start: 2019-11-14 | End: 2020-02-03

## 2019-11-14 NOTE — TELEPHONE ENCOUNTER
Patient was seen by neurosurgery, Dr. Aubrie South on 11/13/2019. Phone number [phone]. Fax number [phone]. Problem #1 is degenerative disc disease of the lumbosacral spine. #2 spondylolisthesis lumbosacral region.   They would need to d

## 2019-11-14 NOTE — TELEPHONE ENCOUNTER
Patient would like a call back with her CT results. Patient is also requesting a return call in [address] [place] [address]

## 2019-11-14 NOTE — TELEPHONE ENCOUNTER
Can she come in at 9:20 AM today? We need to discuss her CAT scan and find out why she is in so much pain.

## 2019-11-14 NOTE — PATIENT INSTRUCTIONS
Start taking the 60 mg of Cymbalta daily. This is for mood but also to help your stomach. Continue the 10 mg of Pamelor at bedtime to help you sleep. It also helps with chronic pain.         10 days prior to your surgery you should stop any medication tejada

## 2019-11-14 NOTE — TELEPHONE ENCOUNTER
Inform her that all the tests are in the system. She will need to get these done and then because they need a preoperative clearance we will need to see her back to clear her. Let me await the labs first and then I will decide when to see her.

## 2019-11-14 NOTE — TELEPHONE ENCOUNTER
Patient states Dr Dereje Lawson office faxed pre-op exam information to Dr Maddie Almeida.     Patient would like Pre-op labs ordered, please advise when ready.      Thank you.

## 2019-11-14 NOTE — PROGRESS NOTES
Patient ID: Warren Ariza is a 42year old female. HPI  Patient presents with:  Test Results: CT scan results,also labs     Last seen by me on 10/31/19.   We were current as she is very anxious about the CAT scan results due to the abdominal pain th days ago. It is a little itchy and it burns. She does not wear contacts. She has not seen an eye doctor recently. She has used lubricating eye drops which did not help. Has pain in her eye when she is looking at her phone and she starts to get a headache. Psychiatric/Behavioral: Positive for sleep disturbance. Negative for dysphoric mood. The patient is not nervous/anxious.           Past Medical History:   Diagnosis Date   • Back problem    • Depression    • Endometriosis    • Esophageal reflux    • Histo • Ondansetron HCl (ZOFRAN) 4 mg tablet Take 1 tablet (4 mg total) by mouth every 8 (eight) hours as needed for Nausea. 20 tablet 0   • ergocalciferol 1.25 MG (70822 UT) Oral Cap Take 1 capsule (50,000 Units total) by mouth once a week.  12 capsule 1   • A (37.2 °C), temperature source Oral, height 5' 2\" (1.575 m), weight 168 lb (76.2 kg), last menstrual period 03/22/2017, not currently breastfeeding.          ASSESSMENT/PLAN:     Diagnoses and all orders for this visit:    Periumbilical abdominal pain  - Encounter      GI Referral - Dr Bianca Liang          Referral Priority:Routine          Referral Type:OFFICE VISIT          Referred to Provider:Karin Krishnamurthy MD          Requested Specialty:GASTROENTEROLOGY          Number of Visits Requested:3      Ophth

## 2019-12-09 ENCOUNTER — OFFICE VISIT (OUTPATIENT)
Dept: FAMILY MEDICINE CLINIC | Facility: CLINIC | Age: 39
End: 2019-12-09

## 2019-12-09 VITALS
BODY MASS INDEX: 32.57 KG/M2 | DIASTOLIC BLOOD PRESSURE: 76 MMHG | SYSTOLIC BLOOD PRESSURE: 126 MMHG | TEMPERATURE: 99 F | HEIGHT: 62 IN | WEIGHT: 177 LBS | HEART RATE: 84 BPM

## 2019-12-09 DIAGNOSIS — M54.6 ACUTE RIGHT-SIDED THORACIC BACK PAIN: ICD-10-CM

## 2019-12-09 DIAGNOSIS — N30.90 CYSTITIS: ICD-10-CM

## 2019-12-09 DIAGNOSIS — R30.0 DYSURIA: Primary | ICD-10-CM

## 2019-12-09 DIAGNOSIS — M43.17 SPONDYLOLISTHESIS OF LUMBOSACRAL REGION: ICD-10-CM

## 2019-12-09 PROCEDURE — 81002 URINALYSIS NONAUTO W/O SCOPE: CPT | Performed by: FAMILY MEDICINE

## 2019-12-09 PROCEDURE — 99214 OFFICE O/P EST MOD 30 MIN: CPT | Performed by: FAMILY MEDICINE

## 2019-12-09 RX ORDER — NITROFURANTOIN 25; 75 MG/1; MG/1
100 CAPSULE ORAL 2 TIMES DAILY
Qty: 10 CAPSULE | Refills: 0 | Status: SHIPPED | OUTPATIENT
Start: 2019-12-09 | End: 2019-12-14

## 2019-12-09 NOTE — PATIENT INSTRUCTIONS
Your surgery is on January 13, 2020. Hold off on the diclofenac or ibuprofen or Advil or Motrin after January 3, 2020. You can take your medicine dose for the mood every day except hold off on the morning of surgery.     You can still take the nortriptyli

## 2019-12-09 NOTE — PROGRESS NOTES
Patient ID: Maxime Lambert is a 42year old female. HPI  Patient presents with:  Low Back Pain: right side back pain, x 1 week now  Urinary Symptoms: pain with urination    Present today with her . Pt no longer has dysuria for 2 days now. Diagnosis Date   • Back problem    • Depression    • Endometriosis    • Esophageal reflux    • History of Papanicolaou smear of cervix 5/20/2015   • Hyperlipidemia 2013   • IBS (irritable bowel syndrome)    • Migraines     Per [facility] - Migraine headaches MG Oral Cap Take 1 capsule (10 mg total) by mouth 3 (three) times daily as needed.  (Patient not taking: Reported on 12/9/2019 ) 30 capsule 0   • Ondansetron HCl (ZOFRAN) 4 mg tablet Take 1 tablet (4 mg total) by mouth every 8 (eight) hours as needed for Na Skin: Skin is warm. Psychiatry: Normal mood and affect. Back: Tenderness over T11 to T12 on the right paraspinal muscle. Vitals reviewed.     Blood pressure 126/76, pulse 84, temperature 99.4 °F (37.4 °C), temperature source Oral, height 5' 2\" (1.5 11:33 AM.    I, Manoj Weaver DO,  personally performed the services described in this documentation. All medical record entries made by the scribe were at my direction and in my presence.   I have reviewed the chart and discharge instructions (if applicab

## 2019-12-10 ENCOUNTER — TELEPHONE (OUTPATIENT)
Dept: FAMILY MEDICINE CLINIC | Facility: CLINIC | Age: 39
End: 2019-12-10

## 2019-12-10 RX ORDER — CEPHALEXIN 500 MG/1
500 CAPSULE ORAL 3 TIMES DAILY
Qty: 21 CAPSULE | Refills: 0 | Status: SHIPPED | OUTPATIENT
Start: 2019-12-10 | End: 2019-12-17

## 2019-12-10 NOTE — TELEPHONE ENCOUNTER
Spoke with the patient who is requesting to know her results for the UA and culture collected yesterday 12/9/19. Patient made aware that the urine culture is still in process.  Patient also reports during the visit yesterday she informed Dr. Ronda Rivera that she

## 2019-12-10 NOTE — TELEPHONE ENCOUNTER
Stop taking the Macrobid but do not throw it away. I will try Keflex 3 times daily for 7 days and see if that helps. We are still awaiting the culture.

## 2019-12-16 ENCOUNTER — APPOINTMENT (OUTPATIENT)
Dept: LAB | Age: 39
End: 2019-12-16
Attending: FAMILY MEDICINE
Payer: COMMERCIAL

## 2019-12-16 ENCOUNTER — LAB ENCOUNTER (OUTPATIENT)
Dept: LAB | Age: 39
End: 2019-12-16
Attending: FAMILY MEDICINE
Payer: COMMERCIAL

## 2019-12-16 ENCOUNTER — HOSPITAL ENCOUNTER (OUTPATIENT)
Dept: GENERAL RADIOLOGY | Age: 39
Discharge: HOME | End: 2019-12-16
Attending: FAMILY MEDICINE
Payer: COMMERCIAL

## 2019-12-16 DIAGNOSIS — M54.42 CHRONIC BILATERAL LOW BACK PAIN WITH BILATERAL SCIATICA: ICD-10-CM

## 2019-12-16 DIAGNOSIS — Z01.818 PREOP EXAMINATION: ICD-10-CM

## 2019-12-16 DIAGNOSIS — M54.41 CHRONIC BILATERAL LOW BACK PAIN WITH BILATERAL SCIATICA: ICD-10-CM

## 2019-12-16 DIAGNOSIS — G89.29 CHRONIC BILATERAL LOW BACK PAIN WITH BILATERAL SCIATICA: ICD-10-CM

## 2019-12-16 DIAGNOSIS — R30.0 DYSURIA: ICD-10-CM

## 2019-12-16 DIAGNOSIS — M43.17 SPONDYLOLISTHESIS OF LUMBOSACRAL REGION: ICD-10-CM

## 2019-12-16 PROCEDURE — 81003 URINALYSIS AUTO W/O SCOPE: CPT

## 2019-12-16 PROCEDURE — 85730 THROMBOPLASTIN TIME PARTIAL: CPT

## 2019-12-16 PROCEDURE — 36415 COLL VENOUS BLD VENIPUNCTURE: CPT

## 2019-12-16 PROCEDURE — 87081 CULTURE SCREEN ONLY: CPT

## 2019-12-16 PROCEDURE — 85025 COMPLETE CBC W/AUTO DIFF WBC: CPT

## 2019-12-16 PROCEDURE — 85610 PROTHROMBIN TIME: CPT

## 2019-12-16 PROCEDURE — 93010 ELECTROCARDIOGRAM REPORT: CPT | Performed by: FAMILY MEDICINE

## 2019-12-16 PROCEDURE — 71046 X-RAY EXAM CHEST 2 VIEWS: CPT | Performed by: FAMILY MEDICINE

## 2019-12-16 PROCEDURE — 93005 ELECTROCARDIOGRAM TRACING: CPT

## 2019-12-16 PROCEDURE — 80053 COMPREHEN METABOLIC PANEL: CPT

## 2020-01-07 ENCOUNTER — TELEPHONE (OUTPATIENT)
Dept: FAMILY MEDICINE CLINIC | Facility: CLINIC | Age: 40
End: 2020-01-07

## 2020-01-07 ENCOUNTER — OFFICE VISIT (OUTPATIENT)
Dept: FAMILY MEDICINE CLINIC | Facility: CLINIC | Age: 40
End: 2020-01-07

## 2020-01-07 VITALS
HEART RATE: 81 BPM | SYSTOLIC BLOOD PRESSURE: 111 MMHG | BODY MASS INDEX: 33.79 KG/M2 | TEMPERATURE: 98 F | DIASTOLIC BLOOD PRESSURE: 66 MMHG | WEIGHT: 183.63 LBS | HEIGHT: 62 IN

## 2020-01-07 DIAGNOSIS — M48.061 FORAMINAL STENOSIS OF LUMBAR REGION: ICD-10-CM

## 2020-01-07 DIAGNOSIS — N30.00 ACUTE CYSTITIS WITHOUT HEMATURIA: Primary | ICD-10-CM

## 2020-01-07 DIAGNOSIS — F32.A DEPRESSIVE DISORDER: ICD-10-CM

## 2020-01-07 DIAGNOSIS — F41.9 ANXIETY: ICD-10-CM

## 2020-01-07 DIAGNOSIS — M43.06 SPONDYLOLYSIS, LUMBAR REGION: ICD-10-CM

## 2020-01-07 DIAGNOSIS — Z01.818 PREOP EXAMINATION: ICD-10-CM

## 2020-01-07 DIAGNOSIS — K58.2 IRRITABLE BOWEL SYNDROME WITH BOTH CONSTIPATION AND DIARRHEA: ICD-10-CM

## 2020-01-07 DIAGNOSIS — M54.16 LUMBAR RADICULOPATHY: Primary | ICD-10-CM

## 2020-01-07 DIAGNOSIS — R14.0 POSTPRANDIAL ABDOMINAL BLOATING: ICD-10-CM

## 2020-01-07 PROCEDURE — 99214 OFFICE O/P EST MOD 30 MIN: CPT | Performed by: FAMILY MEDICINE

## 2020-01-07 NOTE — TELEPHONE ENCOUNTER
She has had labs, EKG, chest x-ray. She is medically optimized for her procedure and can proceed. Thank you Dr. José Miguel Abdalla for allowing me to be a part of Mirtha's care.       Cain Ramírez, DO

## 2020-01-07 NOTE — PROGRESS NOTES
Patient ID: Harjeet Corral is a 42year old female.     HPI  Patient presents with:  Pre-Op Exam    Pt is present today for a pre-operative exam. She is scheduled for a right L5-S1 transforaminal lumbar interbody fusion surgery with Dr. Cali Garza on 1/13 12/16/2019    OSMOCALC 290 12/16/2019    ALKPHO 75 12/16/2019    AST 14 (L) 12/16/2019    ALT 24 12/16/2019    ALKPHOS 56 03/15/2016    BILT 0.2 12/16/2019    TP 6.9 12/16/2019    ALB 3.4 12/16/2019    GLOBULIN 3.5 12/16/2019    AGRATIO 1.3 03/15/2016    N kg)  12/09/19 : 177 lb (80.3 kg)  11/14/19 : 168 lb (76.2 kg)  11/13/19 : 174 lb (78.9 kg)  10/31/19 : 174 lb (78.9 kg)  10/22/19 : 175 lb (79.4 kg)      BMI Readings from Last 6 Encounters:  01/07/20 : 33.58 kg/m²  12/09/19 : 32.37 kg/m²  11/14/19 : 30.73 • LUMBAR INTERLAMINAR EPIDURAL INJECTION Bilateral 9/29/2017    Performed by Jacqueline Salas DO at [address] [place] [address]    • LUMBAR INTERLAMINAR EPIDURAL INJECTION N/A 9/8/2017    Performed by Jacqueline Salas DO at [address] [place] [address]    • OTHER SURGICAL HISTORY      arthr well-developed and well-nourished. No distress. Head: Normocephalic. THROAT: Oropharynx is clear without exudate. Eyes: Conjunctivae and EOM are normal.   Cardiovascular: Normal rate, regular rhythm and normal heart sounds.     Pulmonary/Chest: Effort made by the scribe were at my direction and in my presence. I have reviewed the chart and discharge instructions (if applicable) and agree that the record reflects my personal performance and is accurate and complete.   Lupis Page DO, 1/7/2020, 3:08 PM

## 2020-01-09 RX ORDER — METOCLOPRAMIDE 10 MG/1
10 TABLET ORAL ONCE
Status: CANCELLED | OUTPATIENT
Start: 2020-01-09 | End: 2020-01-09

## 2020-01-09 RX ORDER — OMEPRAZOLE 20 MG/1
20 CAPSULE, DELAYED RELEASE ORAL EVERY MORNING
COMMUNITY
End: 2020-12-23

## 2020-01-09 RX ORDER — POLYETHYLENE GLYCOL 3350 17 G/17G
17 POWDER, FOR SOLUTION ORAL DAILY
COMMUNITY
End: 2020-09-10

## 2020-01-10 ENCOUNTER — LAB ENCOUNTER (OUTPATIENT)
Dept: LAB | Age: 40
End: 2020-01-10
Attending: NEUROLOGICAL SURGERY
Payer: COMMERCIAL

## 2020-01-10 DIAGNOSIS — Z01.818 PREOP TESTING: ICD-10-CM

## 2020-01-10 LAB
ANTIBODY SCREEN: NEGATIVE
RH BLOOD TYPE: POSITIVE

## 2020-01-10 PROCEDURE — 36415 COLL VENOUS BLD VENIPUNCTURE: CPT

## 2020-01-10 PROCEDURE — 86901 BLOOD TYPING SEROLOGIC RH(D): CPT

## 2020-01-10 PROCEDURE — 86900 BLOOD TYPING SEROLOGIC ABO: CPT

## 2020-01-10 PROCEDURE — 86850 RBC ANTIBODY SCREEN: CPT

## 2020-01-13 ENCOUNTER — ANESTHESIA EVENT (OUTPATIENT)
Dept: PREOP | Facility: HOSPITAL | Age: 40
End: 2020-01-13
Payer: COMMERCIAL

## 2020-01-13 ENCOUNTER — HOSPITAL ENCOUNTER (OUTPATIENT)
Facility: HOSPITAL | Age: 40
Discharge: HOME | End: 2020-01-13
Attending: NEUROLOGICAL SURGERY | Admitting: NEUROLOGICAL SURGERY
Payer: COMMERCIAL

## 2020-01-13 ENCOUNTER — ANESTHESIA (OUTPATIENT)
Dept: PREOP | Facility: HOSPITAL | Age: 40
End: 2020-01-13
Payer: COMMERCIAL

## 2020-01-13 VITALS
BODY MASS INDEX: 33.31 KG/M2 | OXYGEN SATURATION: 98 % | TEMPERATURE: 99 F | HEIGHT: 62 IN | SYSTOLIC BLOOD PRESSURE: 130 MMHG | RESPIRATION RATE: 18 BRPM | DIASTOLIC BLOOD PRESSURE: 59 MMHG | HEART RATE: 75 BPM | WEIGHT: 181 LBS

## 2020-01-13 DIAGNOSIS — Z01.818 PREOP TESTING: Primary | ICD-10-CM

## 2020-01-13 LAB
BILIRUB UR QL: NEGATIVE
CLARITY UR: CLEAR
GLUCOSE UR-MCNC: NEGATIVE MG/DL
KETONES UR-MCNC: NEGATIVE MG/DL
NITRITE UR QL STRIP.AUTO: POSITIVE
PH UR: 5 [PH] (ref 5–8)
PROT UR-MCNC: NEGATIVE MG/DL
RBC #/AREA URNS AUTO: 3 /HPF
SP GR UR STRIP: 1.01 (ref 1–1.03)
UROBILINOGEN UR STRIP-ACNC: 2
WBC #/AREA URNS AUTO: 37 /HPF

## 2020-01-13 PROCEDURE — 81001 URINALYSIS AUTO W/SCOPE: CPT | Performed by: NEUROLOGICAL SURGERY

## 2020-01-13 PROCEDURE — 87086 URINE CULTURE/COLONY COUNT: CPT | Performed by: NEUROLOGICAL SURGERY

## 2020-01-13 RX ORDER — ONDANSETRON 2 MG/ML
4 INJECTION INTRAMUSCULAR; INTRAVENOUS ONCE AS NEEDED
Status: CANCELLED | OUTPATIENT
Start: 2020-01-13 | End: 2020-01-13

## 2020-01-13 RX ORDER — MORPHINE SULFATE 10 MG/ML
6 INJECTION, SOLUTION INTRAMUSCULAR; INTRAVENOUS EVERY 10 MIN PRN
Status: CANCELLED | OUTPATIENT
Start: 2020-01-13

## 2020-01-13 RX ORDER — HYDROMORPHONE HYDROCHLORIDE 1 MG/ML
0.2 INJECTION, SOLUTION INTRAMUSCULAR; INTRAVENOUS; SUBCUTANEOUS EVERY 5 MIN PRN
Status: CANCELLED | OUTPATIENT
Start: 2020-01-13

## 2020-01-13 RX ORDER — MORPHINE SULFATE 2 MG/ML
2 INJECTION, SOLUTION INTRAMUSCULAR; INTRAVENOUS EVERY 10 MIN PRN
Status: CANCELLED | OUTPATIENT
Start: 2020-01-13

## 2020-01-13 RX ORDER — SODIUM CHLORIDE, SODIUM LACTATE, POTASSIUM CHLORIDE, CALCIUM CHLORIDE 600; 310; 30; 20 MG/100ML; MG/100ML; MG/100ML; MG/100ML
INJECTION, SOLUTION INTRAVENOUS CONTINUOUS
Status: DISCONTINUED | OUTPATIENT
Start: 2020-01-13 | End: 2020-01-13

## 2020-01-13 RX ORDER — CEFAZOLIN SODIUM/WATER 2 G/20 ML
2 SYRINGE (ML) INTRAVENOUS ONCE
Status: DISCONTINUED | OUTPATIENT
Start: 2020-01-13 | End: 2020-01-13

## 2020-01-13 RX ORDER — HYDROCODONE BITARTRATE AND ACETAMINOPHEN 5; 325 MG/1; MG/1
1 TABLET ORAL AS NEEDED
Status: CANCELLED | OUTPATIENT
Start: 2020-01-13

## 2020-01-13 RX ORDER — PROCHLORPERAZINE EDISYLATE 5 MG/ML
5 INJECTION INTRAMUSCULAR; INTRAVENOUS ONCE AS NEEDED
Status: CANCELLED | OUTPATIENT
Start: 2020-01-13 | End: 2020-01-13

## 2020-01-13 RX ORDER — FAMOTIDINE 20 MG/1
20 TABLET ORAL ONCE
Status: COMPLETED | OUTPATIENT
Start: 2020-01-13 | End: 2020-01-13

## 2020-01-13 RX ORDER — MORPHINE SULFATE 4 MG/ML
4 INJECTION, SOLUTION INTRAMUSCULAR; INTRAVENOUS EVERY 10 MIN PRN
Status: CANCELLED | OUTPATIENT
Start: 2020-01-13

## 2020-01-13 RX ORDER — NALOXONE HYDROCHLORIDE 0.4 MG/ML
80 INJECTION, SOLUTION INTRAMUSCULAR; INTRAVENOUS; SUBCUTANEOUS AS NEEDED
Status: CANCELLED | OUTPATIENT
Start: 2020-01-13 | End: 2020-01-13

## 2020-01-13 RX ORDER — SODIUM CHLORIDE, SODIUM LACTATE, POTASSIUM CHLORIDE, CALCIUM CHLORIDE 600; 310; 30; 20 MG/100ML; MG/100ML; MG/100ML; MG/100ML
INJECTION, SOLUTION INTRAVENOUS CONTINUOUS
Status: CANCELLED | OUTPATIENT
Start: 2020-01-13

## 2020-01-13 RX ORDER — HYDROMORPHONE HYDROCHLORIDE 1 MG/ML
0.4 INJECTION, SOLUTION INTRAMUSCULAR; INTRAVENOUS; SUBCUTANEOUS EVERY 5 MIN PRN
Status: CANCELLED | OUTPATIENT
Start: 2020-01-13

## 2020-01-13 RX ORDER — HYDROMORPHONE HYDROCHLORIDE 1 MG/ML
0.6 INJECTION, SOLUTION INTRAMUSCULAR; INTRAVENOUS; SUBCUTANEOUS EVERY 5 MIN PRN
Status: CANCELLED | OUTPATIENT
Start: 2020-01-13

## 2020-01-13 RX ORDER — HYDROCODONE BITARTRATE AND ACETAMINOPHEN 5; 325 MG/1; MG/1
2 TABLET ORAL AS NEEDED
Status: CANCELLED | OUTPATIENT
Start: 2020-01-13

## 2020-01-13 RX ORDER — HALOPERIDOL 5 MG/ML
0.25 INJECTION INTRAMUSCULAR ONCE AS NEEDED
Status: CANCELLED | OUTPATIENT
Start: 2020-01-13 | End: 2020-01-13

## 2020-01-13 RX ORDER — ACETAMINOPHEN 500 MG
1000 TABLET ORAL ONCE
Status: DISCONTINUED | OUTPATIENT
Start: 2020-01-13 | End: 2020-01-13

## 2020-01-13 NOTE — ANESTHESIA PREPROCEDURE EVALUATION
Anesthesia PreOp Note    HPI:     Patrick Hsieh is a 42year old female who presents for preoperative consultation requested by: Becky Meier MD    Date of Surgery: 1/13/2020    Procedure(s):  POSTERIOR LUMBAR INTERBODY FUSION - MIS TLIF Gissel BLUNT Spondylolisthesis of lumbosacral region         Date Noted: 09/08/2017      Postcoital bleeding         Date Noted: 11/17/2016      Pelvic pain in female         Date Noted: 11/17/2016      Uterine leiomyoma         Date Noted: 11/17/2016      Endometriosi Jocelin Keita DO at [address] [facility] MAIN OR   • OTHER SURGICAL HISTORY      arthrocentesis of the right shoulder subacromial space   • TRANSFORAMINAL LUMBAR EPIDURAL STEROID INJECTION SINGLE LEVEL Bilateral 6/8/2018    Performed by Jocelin Keita DO at [facility] RASH  Ciprofloxacin           RASH    Family History   Problem Relation Age of Onset   • Diabetes Father         Type 2   • Hypertension Mother    • Lipids Mother         Hyperlipidemia   • Diabetes Mother         Type 2   • Diabetes Sister    • Hypertensi Sleep Concern: Not Asked        Stress Concern: Not Asked        Weight Concern: Not Asked        Special Diet: Not Asked        Back Care: Not Asked        Exercise: No        Bike Helmet: Not Asked        Seat Belt: Not Asked        Self-Exams: Not Asked Anesthesia Plan:   ASA:  2  Plan:   General  Monitors and Lines:   Additonal IV  Airway:  ETT  Informed Consent Plan and Risks Discussed With:  Patient and spouse  Discussed plan with:  CRNA, attending and surgeon  Provider Attestation (if pr

## 2020-01-13 NOTE — INTERVAL H&P NOTE
Pre-op Diagnosis: degenerative disc disease    She has a UTI therefore she will not have surgery today but until the UTI is Rx; she is otherwise fine.     The above referenced H&P was reviewed by Terry Reeves MD on 1/13/2020, the patient was examined

## 2020-01-13 NOTE — OR PREOP
Patient states she woke up feeling burning with/without urination. Put an anti itch cream on. States she has had a UTI in the past and it feels like this, in past has taken AZO over the counter.  States she has had UTI and stones in the past but this feels

## 2020-01-15 ENCOUNTER — OFFICE VISIT (OUTPATIENT)
Dept: FAMILY MEDICINE CLINIC | Facility: CLINIC | Age: 40
End: 2020-01-15

## 2020-01-15 VITALS
HEART RATE: 77 BPM | BODY MASS INDEX: 34.04 KG/M2 | HEIGHT: 62 IN | DIASTOLIC BLOOD PRESSURE: 68 MMHG | SYSTOLIC BLOOD PRESSURE: 112 MMHG | WEIGHT: 185 LBS

## 2020-01-15 DIAGNOSIS — R30.0 DYSURIA: Primary | ICD-10-CM

## 2020-01-15 DIAGNOSIS — N39.0 FREQUENT UTI: ICD-10-CM

## 2020-01-15 LAB
APPEARANCE: CLEAR
BILIRUBIN: NEGATIVE
GLUCOSE (URINE DIPSTICK): NEGATIVE MG/DL
KETONES (URINE DIPSTICK): NEGATIVE MG/DL
LEUKOCYTES: NEGATIVE
MULTISTIX LOT#: NORMAL NUMERIC
NITRITE, URINE: NEGATIVE
PH, URINE: 5.5 (ref 4.5–8)
PROTEIN (URINE DIPSTICK): NEGATIVE MG/DL
SPECIFIC GRAVITY: 1.03 (ref 1–1.03)
URINE-COLOR: YELLOW
UROBILINOGEN,SEMI-QN: 0.2 MG/DL (ref 0–1.9)

## 2020-01-15 PROCEDURE — 81003 URINALYSIS AUTO W/O SCOPE: CPT | Performed by: NURSE PRACTITIONER

## 2020-01-15 PROCEDURE — 99213 OFFICE O/P EST LOW 20 MIN: CPT | Performed by: NURSE PRACTITIONER

## 2020-01-15 RX ORDER — NITROFURANTOIN 25; 75 MG/1; MG/1
100 CAPSULE ORAL 2 TIMES DAILY
Qty: 14 CAPSULE | Refills: 0 | Status: SHIPPED | OUTPATIENT
Start: 2020-01-15 | End: 2020-01-18

## 2020-01-15 NOTE — PATIENT INSTRUCTIONS
Infección De La Vejiga,Eliana [Bladder Infection: Female, Adult]    Kayy infección de la vejiga (“cistitis” [cystitis - UTI]) suele provocar constantes deseos de orinar y ardor al orinar.  Es posible que la [facility] se joshua turbia u Anthony, o que [address] · Si está tomando píldoras anticonceptivas y tiene frecuentes infecciones de vejiga, háblelo con tejada médico.  Visita De Control  Visite a tejada médico si no meza desparecido todos los síntomas después de puma días de [place].   Busque Prontamente Atención Mé

## 2020-01-15 NOTE — PROGRESS NOTES
HPI    Patient presents for uti follow up. Was scheduled to have lumbar spine surgery on 1/13 and was canceled due to uti. Was never treated with antibiotics but took azo as needed. Still with some burning and some flank pain.   Was told that she needed HYSTERECTOMY;  Surgeon: Nestor Mendieta MD;  Location: [address] [place] [address] MAIN OR       Family History   Problem Relation Age of Onset   • Diabetes Father         Type 2   • Hypertension Mother    • Lipids Mother         Hyperlipidemia   • Diabetes Mother         Ty Asked        Hobby Hazards: Not Asked        Sleep Concern: Not Asked        Stress Concern: Not Asked        Weight Concern: Not Asked        Special Diet: Not Asked        Back Care: Not Asked        Exercise: No        Bike Helmet: Not Asked        Seat Normocephalic and atraumatic. Eyes: Conjunctivae are normal. Right eye exhibits no discharge. Left eye exhibits no discharge. Neck: Normal range of motion. Neck supple. Cardiovascular: Normal rate, regular rhythm and normal heart sounds.     No murmur

## 2020-01-17 ENCOUNTER — TELEPHONE (OUTPATIENT)
Dept: FAMILY MEDICINE CLINIC | Facility: CLINIC | Age: 40
End: 2020-01-17

## 2020-01-17 NOTE — TELEPHONE ENCOUNTER
Patient's urine culture showed no growth. Next week have her come in sometime in the morning and I will have an order for a urinalysis in the lab as we need to make sure that the white blood cells are also resolved.

## 2020-01-17 NOTE — TELEPHONE ENCOUNTER
Patient speaks Palestinian. Patient states the medication prescribed to her has been causing her really bad headaches. Requesting a new medication.     Nitrofurantoin Monohyd Macro 100 MG Oral Cap

## 2020-01-18 RX ORDER — METRONIDAZOLE 7.5 MG/G
1 GEL VAGINAL NIGHTLY
Qty: 1 TUBE | Refills: 0 | Status: SHIPPED | OUTPATIENT
Start: 2020-01-18 | End: 2020-01-23

## 2020-01-18 NOTE — TELEPHONE ENCOUNTER
Verified pt name and . Reviewed doctor's recommendations as noted below. Pt asked about side effects of med, informed her med may cause headaches and advised her to call back if she does experience headaches.  Pt agreed with plan of care and had no furth

## 2020-01-18 NOTE — TELEPHONE ENCOUNTER
Patient advised of notes per [facility]. Agreed to stop treatment. Pt requesting other treatment recommendations as she does still have some burning with urination and general vaginal discomfort, please advise.

## 2020-01-18 NOTE — TELEPHONE ENCOUNTER
Pt states she does have slight itching in vaginal area but mostly it is uncomfortable. Pt also states she does have a fishy odor. Pt states if med prescribed should be sent to [address].  Pt states she was advised to have another urine test, asking i

## 2020-01-23 ENCOUNTER — NURSE TRIAGE (OUTPATIENT)
Dept: FAMILY MEDICINE CLINIC | Facility: CLINIC | Age: 40
End: 2020-01-23

## 2020-01-23 DIAGNOSIS — E55.9 VITAMIN D DEFICIENCY: ICD-10-CM

## 2020-01-23 RX ORDER — ERGOCALCIFEROL 1.25 MG/1
50000 CAPSULE ORAL WEEKLY
Qty: 12 CAPSULE | Refills: 1 | Status: SHIPPED | OUTPATIENT
Start: 2020-01-23 | End: 2020-02-03 | Stop reason: ALTCHOICE

## 2020-01-23 NOTE — TELEPHONE ENCOUNTER
Call Details: pt states that she slipped on the ice last week. Pt states that she has been having back and leg pain. Pt also stated that the tylenol OTC is not working. Call transferred to RN Triage ([id number]).

## 2020-01-23 NOTE — TELEPHONE ENCOUNTER
Action Requested: Summary for Provider     []  Critical Lab, Recommendations Needed  [] Need Additional Advice  []   FYI    []   Need Orders  [] Need Medications Sent to Pharmacy  []  Other     SUMMARY:  Per protocol advised office visit today, UC or ER.

## 2020-01-23 NOTE — TELEPHONE ENCOUNTER
Review pended refill request as it does not fall under a protocol.     Last Rx: 11/1/19  LOV: 01/07/20  Last vitamin D level 10/31/19 was 21.5

## 2020-02-03 RX ORDER — ONDANSETRON 2 MG/ML
4 INJECTION INTRAMUSCULAR; INTRAVENOUS ONCE AS NEEDED
Status: DISCONTINUED | OUTPATIENT
Start: 2020-02-03 | End: 2020-02-03

## 2020-02-03 RX ORDER — HYDROMORPHONE HYDROCHLORIDE 1 MG/ML
0.6 INJECTION, SOLUTION INTRAMUSCULAR; INTRAVENOUS; SUBCUTANEOUS EVERY 5 MIN PRN
Status: DISCONTINUED | OUTPATIENT
Start: 2020-02-03 | End: 2020-02-03

## 2020-02-03 RX ORDER — MORPHINE SULFATE 10 MG/ML
6 INJECTION, SOLUTION INTRAMUSCULAR; INTRAVENOUS EVERY 10 MIN PRN
Status: DISCONTINUED | OUTPATIENT
Start: 2020-02-03 | End: 2020-02-03

## 2020-02-03 RX ORDER — NALOXONE HYDROCHLORIDE 0.4 MG/ML
80 INJECTION, SOLUTION INTRAMUSCULAR; INTRAVENOUS; SUBCUTANEOUS AS NEEDED
Status: DISCONTINUED | OUTPATIENT
Start: 2020-02-03 | End: 2020-02-03

## 2020-02-03 RX ORDER — HYDROMORPHONE HYDROCHLORIDE 1 MG/ML
0.2 INJECTION, SOLUTION INTRAMUSCULAR; INTRAVENOUS; SUBCUTANEOUS EVERY 5 MIN PRN
Status: DISCONTINUED | OUTPATIENT
Start: 2020-02-03 | End: 2020-02-03

## 2020-02-03 RX ORDER — PROCHLORPERAZINE EDISYLATE 5 MG/ML
5 INJECTION INTRAMUSCULAR; INTRAVENOUS ONCE AS NEEDED
Status: DISCONTINUED | OUTPATIENT
Start: 2020-02-03 | End: 2020-02-03

## 2020-02-03 RX ORDER — HYDROCODONE BITARTRATE AND ACETAMINOPHEN 5; 325 MG/1; MG/1
1 TABLET ORAL AS NEEDED
Status: DISCONTINUED | OUTPATIENT
Start: 2020-02-03 | End: 2020-02-03

## 2020-02-03 RX ORDER — HYDROCODONE BITARTRATE AND ACETAMINOPHEN 5; 325 MG/1; MG/1
2 TABLET ORAL AS NEEDED
Status: DISCONTINUED | OUTPATIENT
Start: 2020-02-03 | End: 2020-02-03

## 2020-02-03 RX ORDER — SODIUM CHLORIDE, SODIUM LACTATE, POTASSIUM CHLORIDE, CALCIUM CHLORIDE 600; 310; 30; 20 MG/100ML; MG/100ML; MG/100ML; MG/100ML
INJECTION, SOLUTION INTRAVENOUS CONTINUOUS
Status: DISCONTINUED | OUTPATIENT
Start: 2020-02-03 | End: 2020-02-03

## 2020-02-03 RX ORDER — HALOPERIDOL 5 MG/ML
0.25 INJECTION INTRAMUSCULAR ONCE AS NEEDED
Status: DISCONTINUED | OUTPATIENT
Start: 2020-02-03 | End: 2020-02-03

## 2020-02-03 RX ORDER — HYDROMORPHONE HYDROCHLORIDE 1 MG/ML
0.4 INJECTION, SOLUTION INTRAMUSCULAR; INTRAVENOUS; SUBCUTANEOUS EVERY 5 MIN PRN
Status: DISCONTINUED | OUTPATIENT
Start: 2020-02-03 | End: 2020-02-03

## 2020-02-03 RX ORDER — MORPHINE SULFATE 4 MG/ML
4 INJECTION, SOLUTION INTRAMUSCULAR; INTRAVENOUS EVERY 10 MIN PRN
Status: DISCONTINUED | OUTPATIENT
Start: 2020-02-03 | End: 2020-02-03

## 2020-02-03 RX ORDER — MORPHINE SULFATE 4 MG/ML
2 INJECTION, SOLUTION INTRAMUSCULAR; INTRAVENOUS EVERY 10 MIN PRN
Status: DISCONTINUED | OUTPATIENT
Start: 2020-02-03 | End: 2020-02-03

## 2020-02-04 ENCOUNTER — OFFICE VISIT (OUTPATIENT)
Dept: FAMILY MEDICINE CLINIC | Facility: CLINIC | Age: 40
End: 2020-02-04

## 2020-02-04 VITALS
DIASTOLIC BLOOD PRESSURE: 81 MMHG | SYSTOLIC BLOOD PRESSURE: 116 MMHG | HEIGHT: 62 IN | HEART RATE: 100 BPM | RESPIRATION RATE: 18 BRPM | TEMPERATURE: 99 F | BODY MASS INDEX: 34.14 KG/M2 | WEIGHT: 185.5 LBS

## 2020-02-04 DIAGNOSIS — M54.16 LUMBAR RADICULOPATHY: ICD-10-CM

## 2020-02-04 DIAGNOSIS — M54.41 CHRONIC BILATERAL LOW BACK PAIN WITH BILATERAL SCIATICA: ICD-10-CM

## 2020-02-04 DIAGNOSIS — M43.06 SPONDYLOLYSIS, LUMBAR REGION: ICD-10-CM

## 2020-02-04 DIAGNOSIS — M54.6 ACUTE BILATERAL THORACIC BACK PAIN: ICD-10-CM

## 2020-02-04 DIAGNOSIS — M51.26 HERNIATED NUCLEUS PULPOSUS, LUMBAR: ICD-10-CM

## 2020-02-04 DIAGNOSIS — M79.604 LEG PAIN, BILATERAL: ICD-10-CM

## 2020-02-04 DIAGNOSIS — G89.29 CHRONIC BILATERAL LOW BACK PAIN WITH BILATERAL SCIATICA: ICD-10-CM

## 2020-02-04 DIAGNOSIS — M54.42 CHRONIC BILATERAL LOW BACK PAIN WITH BILATERAL SCIATICA: ICD-10-CM

## 2020-02-04 DIAGNOSIS — M43.17 SPONDYLOLISTHESIS OF LUMBOSACRAL REGION: ICD-10-CM

## 2020-02-04 DIAGNOSIS — M79.605 LEG PAIN, BILATERAL: ICD-10-CM

## 2020-02-04 DIAGNOSIS — M48.061 FORAMINAL STENOSIS OF LUMBAR REGION: ICD-10-CM

## 2020-02-04 DIAGNOSIS — R30.0 BURNING WITH URINATION: Primary | ICD-10-CM

## 2020-02-04 LAB
APPEARANCE: CLEAR
BILIRUBIN: NEGATIVE
GLUCOSE (URINE DIPSTICK): NEGATIVE MG/DL
KETONES (URINE DIPSTICK): NEGATIVE MG/DL
LEUKOCYTES: NEGATIVE
MULTISTIX LOT#: ABNORMAL NUMERIC
NITRITE, URINE: NEGATIVE
PH, URINE: 6 (ref 4.5–8)
PROTEIN (URINE DIPSTICK): NEGATIVE MG/DL
SPECIFIC GRAVITY: 1.02 (ref 1–1.03)
URINE-COLOR: YELLOW
UROBILINOGEN,SEMI-QN: 0.2 MG/DL (ref 0–1.9)

## 2020-02-04 PROCEDURE — 81003 URINALYSIS AUTO W/O SCOPE: CPT | Performed by: FAMILY MEDICINE

## 2020-02-04 PROCEDURE — 99214 OFFICE O/P EST MOD 30 MIN: CPT | Performed by: FAMILY MEDICINE

## 2020-02-04 RX ORDER — TRAMADOL HYDROCHLORIDE 50 MG/1
TABLET ORAL EVERY 6 HOURS PRN
Qty: 60 TABLET | Refills: 0 | Status: ON HOLD | OUTPATIENT
Start: 2020-02-04 | End: 2020-02-14

## 2020-02-04 NOTE — PROGRESS NOTES
Patient ID: Johnny Urbina is a 42year old female. HPI  Patient presents with:  Back Pain: upper back pain with burning x 1 days.  States had a fall on 1/17/2020    Her back surgery to be canceled by Dr. Jane Peñaloza due to possible urinary tract infect Psychiatric/Behavioral: The patient is not nervous/anxious.           Past Medical History:   Diagnosis Date   • Anxiety state    • Back problem    • Calculus of kidney    • Depression    • Endometriosis    • Esophageal reflux    • History of Papanicolaou every 6 (six) hours as needed. • PEG 3350 Oral Powd Pack Take 17 g by mouth daily. • Ondansetron HCl (ZOFRAN) 4 mg tablet Take 1 tablet (4 mg total) by mouth every 8 (eight) hours as needed for Nausea.  (Patient not taking: Reported on 2/4/2020 ) 20 it could be due to the back issue. The urinalysis at this time looks normal.  We will send it for urine culture to make sure it is negative and then hopefully she can proceed with her surgery on February 13, 2020.   Due to her discomfort we will add magen described in this documentation. All medical record entries made by the scribe were at my direction and in my presence.   I have reviewed the chart and discharge instructions (if applicable) and agree that the record reflects my personal performance and is

## 2020-02-05 ENCOUNTER — TELEPHONE (OUTPATIENT)
Dept: FAMILY MEDICINE CLINIC | Facility: CLINIC | Age: 40
End: 2020-02-05

## 2020-02-05 NOTE — TELEPHONE ENCOUNTER
Benny,    Her urine culture is clear and shows no infection. She can proceed with her surgery.     Javed Rodriguez

## 2020-02-07 ENCOUNTER — LAB ENCOUNTER (OUTPATIENT)
Dept: LAB | Age: 40
End: 2020-02-07
Attending: NEUROLOGICAL SURGERY
Payer: COMMERCIAL

## 2020-02-07 DIAGNOSIS — N30.00 ACUTE CYSTITIS WITHOUT HEMATURIA: ICD-10-CM

## 2020-02-07 DIAGNOSIS — M51.36 DEGENERATIVE DISC DISEASE, LUMBAR: ICD-10-CM

## 2020-02-07 LAB
ANTIBODY SCREEN: NEGATIVE
BACTERIA UR QL AUTO: NEGATIVE /HPF
BILIRUB UR QL: NEGATIVE
CLARITY UR: CLEAR
COLOR UR: YELLOW
GLUCOSE UR-MCNC: NEGATIVE MG/DL
KETONES UR-MCNC: NEGATIVE MG/DL
LEUKOCYTE ESTERASE UR QL STRIP.AUTO: NEGATIVE
NITRITE UR QL STRIP.AUTO: NEGATIVE
PH UR: 6 [PH] (ref 5–8)
PROT UR-MCNC: NEGATIVE MG/DL
RBC #/AREA URNS AUTO: 2 /HPF
RH BLOOD TYPE: POSITIVE
SP GR UR STRIP: 1.02 (ref 1–1.03)
UROBILINOGEN UR STRIP-ACNC: <2
WBC #/AREA URNS AUTO: <1 /HPF

## 2020-02-07 PROCEDURE — 81001 URINALYSIS AUTO W/SCOPE: CPT

## 2020-02-07 PROCEDURE — 86850 RBC ANTIBODY SCREEN: CPT

## 2020-02-07 PROCEDURE — 86900 BLOOD TYPING SEROLOGIC ABO: CPT

## 2020-02-07 PROCEDURE — 87641 MR-STAPH DNA AMP PROBE: CPT

## 2020-02-07 PROCEDURE — 86901 BLOOD TYPING SEROLOGIC RH(D): CPT

## 2020-02-07 PROCEDURE — 36415 COLL VENOUS BLD VENIPUNCTURE: CPT

## 2020-02-07 NOTE — TELEPHONE ENCOUNTER
Informed patient of results per Dr. Lisa Saldana. Patient stated she saw blood in urine from UA and was concerned. Informed patient only small amount and of no concern per Dr. Lisa Saldana.     Patient states she continues to have burning upon urination, back and lef

## 2020-02-07 NOTE — TELEPHONE ENCOUNTER
I really think she should proceed with surgery as low back pain can cause urinary symptoms and discomfort.   After her procedure with the back she could of course see urogynecology and March is actually perfect as by that time she should be healing quite we

## 2020-02-07 NOTE — TELEPHONE ENCOUNTER
With  Del Fortune [id number]  Advised patient of Dr Carmen Gerardo  note. Patient verbalized understanding. If any other concerns will call back.

## 2020-02-08 LAB — MRSA DNA SPEC QL NAA+PROBE: NEGATIVE

## 2020-02-10 ENCOUNTER — TELEPHONE (OUTPATIENT)
Dept: FAMILY MEDICINE CLINIC | Facility: CLINIC | Age: 40
End: 2020-02-10

## 2020-02-13 ENCOUNTER — ANESTHESIA (OUTPATIENT)
Dept: SURGERY | Facility: HOSPITAL | Age: 40
DRG: 460 | End: 2020-02-13
Payer: COMMERCIAL

## 2020-02-13 ENCOUNTER — HOSPITAL ENCOUNTER (INPATIENT)
Facility: HOSPITAL | Age: 40
LOS: 2 days | Discharge: HOME | DRG: 460 | End: 2020-02-15
Attending: NEUROLOGICAL SURGERY | Admitting: NEUROLOGICAL SURGERY
Payer: COMMERCIAL

## 2020-02-13 ENCOUNTER — ANESTHESIA EVENT (OUTPATIENT)
Dept: SURGERY | Facility: HOSPITAL | Age: 40
DRG: 460 | End: 2020-02-13
Payer: COMMERCIAL

## 2020-02-13 ENCOUNTER — APPOINTMENT (OUTPATIENT)
Dept: GENERAL RADIOLOGY | Facility: HOSPITAL | Age: 40
DRG: 460 | End: 2020-02-13
Attending: NEUROLOGICAL SURGERY
Payer: COMMERCIAL

## 2020-02-13 DIAGNOSIS — M51.36 DEGENERATIVE DISC DISEASE, LUMBAR: Primary | ICD-10-CM

## 2020-02-13 PROBLEM — M51.369 DEGENERATIVE DISC DISEASE, LUMBAR: Status: ACTIVE | Noted: 2020-02-13

## 2020-02-13 PROCEDURE — 4A11X4G MONITORING OF PERIPHERAL NERVOUS ELECTRICAL ACTIVITY, INTRAOPERATIVE, EXTERNAL APPROACH: ICD-10-PCS | Performed by: ANESTHESIOLOGY

## 2020-02-13 PROCEDURE — 0SB40ZZ EXCISION OF LUMBOSACRAL DISC, OPEN APPROACH: ICD-10-PCS | Performed by: NEUROLOGICAL SURGERY

## 2020-02-13 PROCEDURE — 99232 SBSQ HOSP IP/OBS MODERATE 35: CPT | Performed by: HOSPITALIST

## 2020-02-13 PROCEDURE — 76000 FLUOROSCOPY <1 HR PHYS/QHP: CPT | Performed by: NEUROLOGICAL SURGERY

## 2020-02-13 PROCEDURE — 0SG30AJ FUSION OF LUMBOSACRAL JOINT WITH INTERBODY FUSION DEVICE, POSTERIOR APPROACH, ANTERIOR COLUMN, OPEN APPROACH: ICD-10-PCS | Performed by: NEUROLOGICAL SURGERY

## 2020-02-13 DEVICE — IMPLANTABLE DEVICE: Type: IMPLANTABLE DEVICE | Site: BACK | Status: FUNCTIONAL

## 2020-02-13 DEVICE — RELINE MAS TI ROD 5.5X25 LRDTC: Type: IMPLANTABLE DEVICE | Site: BACK | Status: FUNCTIONAL

## 2020-02-13 DEVICE — BONE GRAFT KIT 7510100 INFUSE X SMALL
Type: IMPLANTABLE DEVICE | Site: BACK | Status: FUNCTIONAL
Brand: INFUSE® BONE GRAFT

## 2020-02-13 DEVICE — OSTEOCEL PRO LARGE BULK BUY: Type: IMPLANTABLE DEVICE | Site: BACK | Status: FUNCTIONAL

## 2020-02-13 DEVICE — RELINE MAS MOD REDUCTION EXT: Type: IMPLANTABLE DEVICE | Site: BACK | Status: FUNCTIONAL

## 2020-02-13 DEVICE — RELINE LCK SCRW 5.5 OPEN TULIP: Type: IMPLANTABLE DEVICE | Site: BACK | Status: FUNCTIONAL

## 2020-02-13 DEVICE — RELINE MAS TI ROD 5.5X30 LRDTC: Type: IMPLANTABLE DEVICE | Site: BACK | Status: FUNCTIONAL

## 2020-02-13 DEVICE — RELINE MAS RED SCREW 6.5X35 2C: Type: IMPLANTABLE DEVICE | Site: BACK | Status: FUNCTIONAL

## 2020-02-13 RX ORDER — MORPHINE SULFATE 4 MG/ML
2 INJECTION, SOLUTION INTRAMUSCULAR; INTRAVENOUS EVERY 10 MIN PRN
Status: DISCONTINUED | OUTPATIENT
Start: 2020-02-13 | End: 2020-02-13 | Stop reason: HOSPADM

## 2020-02-13 RX ORDER — SODIUM CHLORIDE, SODIUM LACTATE, POTASSIUM CHLORIDE, CALCIUM CHLORIDE 600; 310; 30; 20 MG/100ML; MG/100ML; MG/100ML; MG/100ML
INJECTION, SOLUTION INTRAVENOUS CONTINUOUS
Status: DISCONTINUED | OUTPATIENT
Start: 2020-02-13 | End: 2020-02-13

## 2020-02-13 RX ORDER — HYDROMORPHONE HYDROCHLORIDE 1 MG/ML
0.6 INJECTION, SOLUTION INTRAMUSCULAR; INTRAVENOUS; SUBCUTANEOUS EVERY 5 MIN PRN
Status: DISCONTINUED | OUTPATIENT
Start: 2020-02-13 | End: 2020-02-13 | Stop reason: HOSPADM

## 2020-02-13 RX ORDER — ONDANSETRON 2 MG/ML
INJECTION INTRAMUSCULAR; INTRAVENOUS AS NEEDED
Status: DISCONTINUED | OUTPATIENT
Start: 2020-02-13 | End: 2020-02-13 | Stop reason: SURG

## 2020-02-13 RX ORDER — BISACODYL 10 MG
10 SUPPOSITORY, RECTAL RECTAL
Status: DISCONTINUED | OUTPATIENT
Start: 2020-02-13 | End: 2020-02-15

## 2020-02-13 RX ORDER — DEXAMETHASONE SODIUM PHOSPHATE 4 MG/ML
VIAL (ML) INJECTION AS NEEDED
Status: DISCONTINUED | OUTPATIENT
Start: 2020-02-13 | End: 2020-02-13 | Stop reason: SURG

## 2020-02-13 RX ORDER — METOCLOPRAMIDE HYDROCHLORIDE 5 MG/ML
10 INJECTION INTRAMUSCULAR; INTRAVENOUS EVERY 6 HOURS PRN
Status: DISCONTINUED | OUTPATIENT
Start: 2020-02-13 | End: 2020-02-15

## 2020-02-13 RX ORDER — ACETAMINOPHEN 500 MG
1000 TABLET ORAL ONCE
Status: COMPLETED | OUTPATIENT
Start: 2020-02-13 | End: 2020-02-13

## 2020-02-13 RX ORDER — SENNOSIDES 8.6 MG
17.2 TABLET ORAL NIGHTLY
Status: DISCONTINUED | OUTPATIENT
Start: 2020-02-13 | End: 2020-02-15

## 2020-02-13 RX ORDER — HYDROMORPHONE HYDROCHLORIDE 1 MG/ML
0.4 INJECTION, SOLUTION INTRAMUSCULAR; INTRAVENOUS; SUBCUTANEOUS EVERY 2 HOUR PRN
Status: DISCONTINUED | OUTPATIENT
Start: 2020-02-13 | End: 2020-02-15

## 2020-02-13 RX ORDER — HYDROCODONE BITARTRATE AND ACETAMINOPHEN 5; 325 MG/1; MG/1
2 TABLET ORAL AS NEEDED
Status: DISCONTINUED | OUTPATIENT
Start: 2020-02-13 | End: 2020-02-13 | Stop reason: HOSPADM

## 2020-02-13 RX ORDER — HYDROMORPHONE HYDROCHLORIDE 1 MG/ML
0.8 INJECTION, SOLUTION INTRAMUSCULAR; INTRAVENOUS; SUBCUTANEOUS EVERY 2 HOUR PRN
Status: DISCONTINUED | OUTPATIENT
Start: 2020-02-13 | End: 2020-02-15

## 2020-02-13 RX ORDER — DOCUSATE SODIUM 100 MG/1
100 CAPSULE, LIQUID FILLED ORAL 2 TIMES DAILY
Status: DISCONTINUED | OUTPATIENT
Start: 2020-02-13 | End: 2020-02-15

## 2020-02-13 RX ORDER — ACETAMINOPHEN 325 MG/1
650 TABLET ORAL EVERY 4 HOURS PRN
Status: DISCONTINUED | OUTPATIENT
Start: 2020-02-13 | End: 2020-02-15

## 2020-02-13 RX ORDER — HYDROCODONE BITARTRATE AND ACETAMINOPHEN 5; 325 MG/1; MG/1
1 TABLET ORAL AS NEEDED
Status: DISCONTINUED | OUTPATIENT
Start: 2020-02-13 | End: 2020-02-13 | Stop reason: HOSPADM

## 2020-02-13 RX ORDER — HYDROCODONE BITARTRATE AND ACETAMINOPHEN 10; 325 MG/1; MG/1
2 TABLET ORAL EVERY 4 HOURS PRN
Status: DISCONTINUED | OUTPATIENT
Start: 2020-02-13 | End: 2020-02-15

## 2020-02-13 RX ORDER — BUPIVACAINE HYDROCHLORIDE 2.5 MG/ML
INJECTION, SOLUTION EPIDURAL; INFILTRATION; INTRACAUDAL AS NEEDED
Status: DISCONTINUED | OUTPATIENT
Start: 2020-02-13 | End: 2020-02-13 | Stop reason: HOSPADM

## 2020-02-13 RX ORDER — KETAMINE HYDROCHLORIDE 50 MG/ML
INJECTION, SOLUTION, CONCENTRATE INTRAMUSCULAR; INTRAVENOUS AS NEEDED
Status: DISCONTINUED | OUTPATIENT
Start: 2020-02-13 | End: 2020-02-13 | Stop reason: SURG

## 2020-02-13 RX ORDER — HYDROMORPHONE HYDROCHLORIDE 1 MG/ML
0.4 INJECTION, SOLUTION INTRAMUSCULAR; INTRAVENOUS; SUBCUTANEOUS EVERY 5 MIN PRN
Status: DISCONTINUED | OUTPATIENT
Start: 2020-02-13 | End: 2020-02-13 | Stop reason: HOSPADM

## 2020-02-13 RX ORDER — MORPHINE SULFATE 4 MG/ML
4 INJECTION, SOLUTION INTRAMUSCULAR; INTRAVENOUS EVERY 10 MIN PRN
Status: DISCONTINUED | OUTPATIENT
Start: 2020-02-13 | End: 2020-02-13 | Stop reason: HOSPADM

## 2020-02-13 RX ORDER — SODIUM CHLORIDE 9 MG/ML
INJECTION, SOLUTION INTRAVENOUS CONTINUOUS PRN
Status: DISCONTINUED | OUTPATIENT
Start: 2020-02-13 | End: 2020-02-13 | Stop reason: SURG

## 2020-02-13 RX ORDER — ACETAMINOPHEN 325 MG/1
325 TABLET ORAL EVERY 6 HOURS PRN
Status: ON HOLD | COMMUNITY
End: 2020-02-14

## 2020-02-13 RX ORDER — PROCHLORPERAZINE EDISYLATE 5 MG/ML
5 INJECTION INTRAMUSCULAR; INTRAVENOUS ONCE AS NEEDED
Status: DISCONTINUED | OUTPATIENT
Start: 2020-02-13 | End: 2020-02-13 | Stop reason: HOSPADM

## 2020-02-13 RX ORDER — FAMOTIDINE 20 MG/1
20 TABLET ORAL ONCE
Status: COMPLETED | OUTPATIENT
Start: 2020-02-13 | End: 2020-02-13

## 2020-02-13 RX ORDER — PANTOPRAZOLE SODIUM 20 MG/1
20 TABLET, DELAYED RELEASE ORAL
Status: DISCONTINUED | OUTPATIENT
Start: 2020-02-14 | End: 2020-02-15

## 2020-02-13 RX ORDER — DIPHENHYDRAMINE HYDROCHLORIDE 50 MG/ML
25 INJECTION INTRAMUSCULAR; INTRAVENOUS EVERY 4 HOURS PRN
Status: DISCONTINUED | OUTPATIENT
Start: 2020-02-13 | End: 2020-02-15

## 2020-02-13 RX ORDER — HALOPERIDOL 5 MG/ML
0.25 INJECTION INTRAMUSCULAR ONCE AS NEEDED
Status: DISCONTINUED | OUTPATIENT
Start: 2020-02-13 | End: 2020-02-13 | Stop reason: HOSPADM

## 2020-02-13 RX ORDER — MIDAZOLAM HYDROCHLORIDE 1 MG/ML
INJECTION INTRAMUSCULAR; INTRAVENOUS AS NEEDED
Status: DISCONTINUED | OUTPATIENT
Start: 2020-02-13 | End: 2020-02-13 | Stop reason: SURG

## 2020-02-13 RX ORDER — ONDANSETRON 2 MG/ML
4 INJECTION INTRAMUSCULAR; INTRAVENOUS EVERY 4 HOURS PRN
Status: ACTIVE | OUTPATIENT
Start: 2020-02-13 | End: 2020-02-14

## 2020-02-13 RX ORDER — SODIUM CHLORIDE, SODIUM LACTATE, POTASSIUM CHLORIDE, CALCIUM CHLORIDE 600; 310; 30; 20 MG/100ML; MG/100ML; MG/100ML; MG/100ML
INJECTION, SOLUTION INTRAVENOUS CONTINUOUS
Status: DISCONTINUED | OUTPATIENT
Start: 2020-02-13 | End: 2020-02-13 | Stop reason: HOSPADM

## 2020-02-13 RX ORDER — DIPHENHYDRAMINE HCL 25 MG
25 CAPSULE ORAL EVERY 4 HOURS PRN
Status: DISCONTINUED | OUTPATIENT
Start: 2020-02-13 | End: 2020-02-15

## 2020-02-13 RX ORDER — ONDANSETRON 2 MG/ML
4 INJECTION INTRAMUSCULAR; INTRAVENOUS ONCE AS NEEDED
Status: DISCONTINUED | OUTPATIENT
Start: 2020-02-13 | End: 2020-02-13 | Stop reason: HOSPADM

## 2020-02-13 RX ORDER — POLYETHYLENE GLYCOL 3350 17 G/17G
17 POWDER, FOR SOLUTION ORAL DAILY PRN
Status: DISCONTINUED | OUTPATIENT
Start: 2020-02-13 | End: 2020-02-15

## 2020-02-13 RX ORDER — MORPHINE SULFATE 10 MG/ML
6 INJECTION, SOLUTION INTRAMUSCULAR; INTRAVENOUS EVERY 10 MIN PRN
Status: DISCONTINUED | OUTPATIENT
Start: 2020-02-13 | End: 2020-02-13 | Stop reason: HOSPADM

## 2020-02-13 RX ORDER — HYDROCODONE BITARTRATE AND ACETAMINOPHEN 10; 325 MG/1; MG/1
1 TABLET ORAL EVERY 4 HOURS PRN
Status: DISCONTINUED | OUTPATIENT
Start: 2020-02-13 | End: 2020-02-15

## 2020-02-13 RX ORDER — LIDOCAINE HYDROCHLORIDE 10 MG/ML
INJECTION, SOLUTION EPIDURAL; INFILTRATION; INTRACAUDAL; PERINEURAL AS NEEDED
Status: DISCONTINUED | OUTPATIENT
Start: 2020-02-13 | End: 2020-02-13 | Stop reason: SURG

## 2020-02-13 RX ORDER — ALPRAZOLAM 0.25 MG/1
0.25 TABLET ORAL 3 TIMES DAILY PRN
Status: DISCONTINUED | OUTPATIENT
Start: 2020-02-13 | End: 2020-02-15

## 2020-02-13 RX ORDER — CEFAZOLIN SODIUM/WATER 2 G/20 ML
2 SYRINGE (ML) INTRAVENOUS ONCE
Status: COMPLETED | OUTPATIENT
Start: 2020-02-13 | End: 2020-02-13

## 2020-02-13 RX ORDER — SODIUM PHOSPHATE, DIBASIC AND SODIUM PHOSPHATE, MONOBASIC 7; 19 G/133ML; G/133ML
1 ENEMA RECTAL ONCE AS NEEDED
Status: DISCONTINUED | OUTPATIENT
Start: 2020-02-13 | End: 2020-02-15

## 2020-02-13 RX ORDER — NALOXONE HYDROCHLORIDE 0.4 MG/ML
80 INJECTION, SOLUTION INTRAMUSCULAR; INTRAVENOUS; SUBCUTANEOUS AS NEEDED
Status: DISCONTINUED | OUTPATIENT
Start: 2020-02-13 | End: 2020-02-13 | Stop reason: HOSPADM

## 2020-02-13 RX ORDER — HYDROMORPHONE HYDROCHLORIDE 1 MG/ML
0.2 INJECTION, SOLUTION INTRAMUSCULAR; INTRAVENOUS; SUBCUTANEOUS EVERY 2 HOUR PRN
Status: DISCONTINUED | OUTPATIENT
Start: 2020-02-13 | End: 2020-02-15

## 2020-02-13 RX ORDER — SODIUM CHLORIDE 9 MG/ML
INJECTION, SOLUTION INTRAVENOUS CONTINUOUS
Status: DISCONTINUED | OUTPATIENT
Start: 2020-02-13 | End: 2020-02-15

## 2020-02-13 RX ORDER — HYDROMORPHONE HYDROCHLORIDE 1 MG/ML
0.2 INJECTION, SOLUTION INTRAMUSCULAR; INTRAVENOUS; SUBCUTANEOUS EVERY 5 MIN PRN
Status: DISCONTINUED | OUTPATIENT
Start: 2020-02-13 | End: 2020-02-13 | Stop reason: HOSPADM

## 2020-02-13 RX ADMIN — SODIUM CHLORIDE: 9 INJECTION, SOLUTION INTRAVENOUS at 08:10:00

## 2020-02-13 RX ADMIN — SODIUM CHLORIDE, SODIUM LACTATE, POTASSIUM CHLORIDE, CALCIUM CHLORIDE: 600; 310; 30; 20 INJECTION, SOLUTION INTRAVENOUS at 12:14:00

## 2020-02-13 RX ADMIN — CEFAZOLIN SODIUM/WATER 1 G: 2 G/20 ML SYRINGE (ML) INTRAVENOUS at 12:10:00

## 2020-02-13 RX ADMIN — KETAMINE HYDROCHLORIDE 50 MG: 50 INJECTION, SOLUTION, CONCENTRATE INTRAMUSCULAR; INTRAVENOUS at 08:20:00

## 2020-02-13 RX ADMIN — ONDANSETRON 4 MG: 2 INJECTION INTRAMUSCULAR; INTRAVENOUS at 07:41:00

## 2020-02-13 RX ADMIN — LIDOCAINE HYDROCHLORIDE 50 MG: 10 INJECTION, SOLUTION EPIDURAL; INFILTRATION; INTRACAUDAL; PERINEURAL at 07:41:00

## 2020-02-13 RX ADMIN — MIDAZOLAM HYDROCHLORIDE 2 MG: 1 INJECTION INTRAMUSCULAR; INTRAVENOUS at 07:34:00

## 2020-02-13 RX ADMIN — CEFAZOLIN SODIUM/WATER 2 G: 2 G/20 ML SYRINGE (ML) INTRAVENOUS at 08:10:00

## 2020-02-13 RX ADMIN — DEXAMETHASONE SODIUM PHOSPHATE 4 MG: 4 MG/ML VIAL (ML) INJECTION at 07:41:00

## 2020-02-13 NOTE — ANESTHESIA POSTPROCEDURE EVALUATION
Patient: Mita Aguilar    Procedure Summary     Date:  02/13/20 Room / Location:  [facility] OR  / [facility] OR    Anesthesia Start:  6403 Anesthesia Stop:  0873    Procedure:  POSTERIOR LUMBAR INTERBODY FUSION - MIS TLIF 1 LEVEL (Right ) Diagnosis:  (d

## 2020-02-13 NOTE — BRIEF OP NOTE
Pre-Operative Diagnosis: degenerative disc disease, L5-S1 spondylolisthesis     Post-Operative Diagnosis: degenerative disc disease, L5-S1 spondylolisthesis     Procedure Performed: right L5-S1 transforaminal lumbar interbody fusion    Surgeon(s) and Role:

## 2020-02-13 NOTE — PROGRESS NOTES
[facility] HOSP - [facility]    Progress Note    Evonne Mercedes Patient Status:  Surgery Admit - Inpt    1980 MRN [id number]   Location [address] [facility] Attending Laura Tomlinson MD   Hosp Day # 0 PCP Bin Black DO Results:     Lab Results   Component Value Date    WBC 8.9 12/16/2019    HGB 12.2 12/16/2019    HCT 37.7 12/16/2019    .0 12/16/2019    CREATSERUM 0.74 12/16/2019    BUN 15 12/16/2019     12/16/2019    K 4.2 12/16/2019     12/16/2019

## 2020-02-13 NOTE — INTERVAL H&P NOTE
Pre-op Diagnosis: degenerative disc disease    The above referenced H&P was reviewed by Latoya Dugan MD on 2/13/2020, the patient was examined and no significant changes have occurred in the patient's condition since the H&P was performed.   I discus

## 2020-02-13 NOTE — ANESTHESIA PREPROCEDURE EVALUATION
Anesthesia PreOp Note    HPI:     Mita Aguilar is a 47year old female who presents for preoperative consultation requested by: Froilan Pro MD    Date of Surgery: 2/13/2020    Procedure(s):  POSTERIOR LUMBAR INTERBODY FUSION - MIS TLIF 1 MERLENE Spondylolisthesis of lumbosacral region         Date Noted: 09/08/2017      Postcoital bleeding         Date Noted: 11/17/2016      Pelvic pain in female         Date Noted: 11/17/2016      Uterine leiomyoma         Date Noted: 11/17/2016      Endometriosi 9/8/2017    Performed by Britney Jack DO at [address] [place] [address] MAIN OR   • OTHER SURGICAL HISTORY      arthrocentesis of the right shoulder subacromial space   • TRANSFORAMINAL LUMBAR EPIDURAL STEROID INJECTION SINGLE LEVEL Bilateral 6/8/2018    Performed by Raj Cueto       Spouse name: Not on file      Number of children: 3      Years of education: Not on file      Highest education level: Not on file    Occupational History      Not on file    Social Needs      Financial resource strain: Not on file      Food i patient does live in a home with stairs. Available pre-op labs reviewed.   Lab Results   Component Value Date    WBC 8.9 12/16/2019    RBC 4.12 12/16/2019    HGB 12.2 12/16/2019    HCT 37.7 12/16/2019    MCV 91.5 12/16/2019    MCH 29.6 12/16/2019    MC planned.   FATIMAH CINTRON  2/13/2020 7:20 AM

## 2020-02-13 NOTE — OPERATIVE REPORT
[facility]    PATIENT'S NAME: Rubi Gaobrigdett   ATTENDING PHYSICIAN: Jg Sylvester MD   OPERATING PHYSICIAN: Benny Hennessy MD   PATIENT ACCOUNT#:   [id number]    LOCATION:  [facility] [address] PACU [address] [place] [address]  MEDICAL RECORD #:   [id number] severe low back pain. She had an MRI scan that clearly showed the pars defect and grade 1 L5-S1 spondylolisthesis with collapse of the disc space. She was therefore recommended to have surgery as the only definitive treatment for her condition.   The bene with a #10 scalpel blade, also paramedian 2.5 cm off to the left of midline. We made it with a #10 scalpel blade, and then with electrocautery we cauterized through the subcutaneous tissue down to the fascia.   We opened the fascia sharply with Metzaidabaum subcuticular technique. We applied Steri-Strips and then Telfa and Tegaderm. The patient was turned supine, reversed from anesthesia and then transferred to Recovery in stable condition.   There were no incidents and no complications, and the total estima

## 2020-02-13 NOTE — ANESTHESIA PROCEDURE NOTES
Airway  Urgency: Elective      General Information and Staff    Patient location during procedure: OR  Anesthesiologist: Melissa Johnson MD  Resident/CRNA: Effie Hatchet, CRNA  Performed: CRNA     Indications and Patient Condition  Indications for airway

## 2020-02-14 PROCEDURE — 99233 SBSQ HOSP IP/OBS HIGH 50: CPT | Performed by: HOSPITALIST

## 2020-02-14 RX ORDER — HYDROCODONE BITARTRATE AND ACETAMINOPHEN 10; 325 MG/1; MG/1
1 TABLET ORAL EVERY 4 HOURS PRN
Refills: 0 | Status: SHIPPED | COMMUNITY
Start: 2020-02-14 | End: 2020-06-01

## 2020-02-14 RX ORDER — PSEUDOEPHEDRINE HCL 30 MG
100 TABLET ORAL 2 TIMES DAILY
Refills: 0 | Status: SHIPPED | COMMUNITY
Start: 2020-02-14 | End: 2020-09-10

## 2020-02-14 NOTE — TELEPHONE ENCOUNTER
Patient requesting refill sent to bernie on chart.  Patient had surgery and is still in hospital     ALPRAZolam Krysta Castaneda tab 0.25 mg

## 2020-02-14 NOTE — PROGRESS NOTES
[facility]    Neurosurgery Progress Note    Mickey Coulter Patient Status:  Inpatient    1980 MRN [id number]   Location [facility] 4W/SW/SE Attending Ermias Jones MD   Hosp Day # 1 PCP Ermias Trevino DO     Subjective injection 0.2 mg, 0.2 mg, Intravenous, Q2H PRN **OR** HYDROmorphone HCl (DILAUDID) 1 MG/ML injection 0.4 mg, 0.4 mg, Intravenous, Q2H PRN **OR** HYDROmorphone HCl (DILAUDID) 1 MG/ML injection 0.8 mg, 0.8 mg, Intravenous, Q2H PRN    Labs:  Lab Results   Com

## 2020-02-14 NOTE — DISCHARGE SUMMARY
[facility]OS[facility] - [facility]    Discharge Summary    Ryann Sweeney Patient Status:  Inpatient    1980 MRN [id number]   Location [facility] 4W/SW/SE Attending Margo Hancock MD   Hosp Day # 1 PCP Deborah Pinedo DO     Date of Admission: S1, S2 normal, no murmur, click, rub or gallop, regular rate and rhythm  Abdominal: soft, non-tender; bowel sounds normal; no masses,  no organomegaly  Extremities: extremities normal, atraumatic, no cyanosis or edema  Psychiatric: calm        History of P PM    > 35 min

## 2020-02-14 NOTE — PLAN OF CARE
Pt is POD #0-1. Primarily Bangladeshi speaking but does speak some [place] w/ family assisting to translate. PRN Bronson provided for pain. Alert and oriented x4. States having a \"small\" amount of tingling to right foot that was PTA. On room air.  Tolerating ge schedule  Outcome: Progressing     Problem: DISCHARGE PLANNING  Goal: Discharge to home or other facility with appropriate resources  Description  INTERVENTIONS:  - Identify barriers to discharge w/pt and caregiver  - Include patient/family/discharge partn  as needed  - Communicate barriers and strategies to overcome with those who interact with patient  Outcome: Progressing

## 2020-02-14 NOTE — OCCUPATIONAL THERAPY NOTE
OCCUPATIONAL THERAPY EVALUATION - INPATIENT     Room Number: 418/418-A  Evaluation Date: 2/14/2020  Type of Evaluation: Initial       Physician Order: IP Consult to Occupational Therapy  Reason for Therapy: ADL/IADL Dysfunction and Discharge Planning new functional limitation presents during this admission.     OCCUPATIONAL THERAPY MEDICAL/SOCIAL HISTORY     Problem List  Principal Problem:    Herniated nucleus pulposus, lumbar  Active Problems:    Degenerative disc disease, lumbar      Past Medical His Maynor Moncada, DO at [facility] MAIN OR       HOME SITUATION  Type of Home: House  Home Layout: One level  Lives With: Family     Toilet and Equipment: Standard height toilet  Shower/Tub and Equipment: Tub-shower combo  Other Equipment: None          Drives: No  Patient Education Provided: Role of OT, POC, Spine precautions + ADL retraining, Safety, Car transfers-    Patient and spouse asked about a brace- there are currently no orders for a brace to be worn- encouraged to discuss with neurosurgeon at follow up  Nikole Salazar

## 2020-02-14 NOTE — PHYSICAL THERAPY NOTE
PHYSICAL THERAPY TREATMENT NOTE - INPATIENT     Room Number: 979/356-H       Presenting Problem: TLIF L5-S1    Problem List  Principal Problem:    Herniated nucleus pulposus, lumbar  Active Problems:    Degenerative disc disease, lumbar      PHYSICAL THERA RESTRICTION  Weight Bearing Restriction: None                PAIN ASSESSMENT   Rating: 10  Location: low back, back of RLE  Management Techniques: Activity promotion; Body mechanics;Repositioning    BALANCE @ level: independent      Goal #1   Current Status  NT   Goal #2 Patient is able to demonstrate transfers Sit to/from Stand at assistance level: modified independent with LRAD      Goal #2  Current Status  Supervision   Goal #3 Patient is able to ambulate

## 2020-02-14 NOTE — PLAN OF CARE
Patient A/Ox4, VSS. On room air. Caldwell catheter d/c, voiding freely. Saline locked per protocol. SCD's and karissa hose in place bilaterally for VTE prophylaxis.  Pain treated with PRN Norco. Anxiety treated with PRN Xanax per patient request. Peripheral IV rem of injury  - Provide assistive devices as appropriate  - Consider OT/PT consult to assist with strengthening/mobility  - Encourage toileting schedule  Outcome: Adequate for Discharge     Problem: DISCHARGE PLANNING  Goal: Discharge to home or other facilit do not interrupt  - Minimize distractions  - Allow time for understanding and response  - Establish method for patient to ask for assistance (call light)  - Provide an  as needed  - Communicate barriers and strategies to overcome with those who

## 2020-02-14 NOTE — PROGRESS NOTES
requested pt be served communion by Supriya Copeland. If the  who serves the patients floor has already been here today (2/14), she will be served communion tomorrow when they return.       02/14/20 1233   Clinical Encounter Type

## 2020-02-14 NOTE — PHYSICAL THERAPY NOTE
PHYSICAL THERAPY EVALUATION - INPATIENT     Room Number: 418/418-A  Evaluation Date: 2/14/2020  Type of Evaluation: Initial   Physician Order: PT Eval and Treat    Presenting Problem: TLIF L5-S1  Reason for Therapy: Mobility Dysfunction and Discharge Plan level of impairment may benefit from home with home PT- however, this level of deficit is to be expected POD: 1- plan for home with family support.    *patient will benefit from RW being issued at DC    Patient will benefit from continued IP PT services to • LIPOMA REMOVAL Left 12/29/2018   • LUMBAR INTERLAMINAR EPIDURAL INJECTION Bilateral 9/29/2017    Performed by Traci Muro DO at [address] [place] [address]    • LUMBAR INTERLAMINAR EPIDURAL INJECTION N/A 9/8/2017    Performed by Traci Muro DO at [address] [place] [address]  Automatic  Patient Position: Sitting      AM-PAC '6-Clicks' INPATIENT SHORT FORM - BASIC MOBILITY  How much difficulty does the patient currently have. ..  -   Turning over in bed (including adjusting bedclothes, sheets and blankets)?: A Little   -   Sittin assistance level: modified independent   Goal #3   Current Status    Goal #4 Patient will negotiate one curb w/ assistive device and Mod I   Goal #4   Current Status    Goal #5 Patient to demonstrate independence with home activity/exercise instructions pr

## 2020-02-15 VITALS
TEMPERATURE: 99 F | OXYGEN SATURATION: 97 % | SYSTOLIC BLOOD PRESSURE: 112 MMHG | RESPIRATION RATE: 18 BRPM | WEIGHT: 190.19 LBS | HEART RATE: 71 BPM | HEIGHT: 62 IN | BODY MASS INDEX: 35 KG/M2 | DIASTOLIC BLOOD PRESSURE: 55 MMHG

## 2020-02-15 PROCEDURE — 99239 HOSP IP/OBS DSCHRG MGMT >30: CPT | Performed by: HOSPITALIST

## 2020-02-15 RX ORDER — ALPRAZOLAM 0.25 MG/1
0.25 TABLET ORAL 3 TIMES DAILY PRN
Qty: 90 TABLET | Refills: 1 | Status: SHIPPED | OUTPATIENT
Start: 2020-02-15 | End: 2020-12-11

## 2020-02-15 NOTE — PLAN OF CARE
Problem: PAIN - ADULT  Goal: Verbalizes/displays adequate comfort level or patient's stated pain goal  Description  INTERVENTIONS:  - Encourage pt to monitor pain and request assistance  - Assess pain using appropriate pain scale  - Administer analgesics needed  - Consider post-discharge preferences of patient/family/discharge partner  - Complete POLST form as appropriate  - Assess patient's ability to be responsible for managing their own health  - Refer to Case Management Department for coordinating disc spouse. All questions answered.

## 2020-02-15 NOTE — PROGRESS NOTES
Patient was discharged by ortho on call, Dr. Holden Mccormick, and hospitalist, Dr. Mukund Rodríguez. While reviewing discharge instructions with patient, patient asked for Xanax prescription for home.  Dr. Mukund Rodríguez states she will not write home Rx for Xanax; pt to contact orderi

## 2020-02-15 NOTE — PROGRESS NOTES
[facility]    Progress Note    Harjeet Codelmira Patient Status:  Surgery Admit - Inpt    1980 MRN [id number]   Location [address] [facility] Attending Roney Hernandez MD   Hosp Day # 1 PCP Edith Melissa DO fusion  PAIN CONTROL  NEURO CHECKS  DVT PROPHYLAXIS  PT/OT.    Plan to go home today, but pain uncontrolled  Plan titrate pain meds further    Anxiety disorder  -cont home xanax 0.25 tid  -monitor  -recommend ssri for ongoing anxiety  -f/u with pcp    Dispo

## 2020-02-15 NOTE — DISCHARGE SUMMARY
[facility]    Discharge Summary    Evonne Long Patient Status:  Inpatient    1980 MRN [id number]   Location [facility] 4W/SW/SE Attending Maddie Vargas MD   Hosp Day # 2 PCP Bin Black DO     Date of Admission: S1, S2 normal, no murmur, click, rub or gallop, regular rate and rhythm  Abdominal: soft, non-tender; bowel sounds normal; no masses,  no organomegaly  Extremities: extremities normal, atraumatic, no cyanosis or edema  Psychiatric: calm        History of P Instructions: f./u with Dr. Kellee Haddad MD  02/15/20  11:32 AM      > 35 min

## 2020-03-05 ENCOUNTER — MED REC SCAN ONLY (OUTPATIENT)
Dept: FAMILY MEDICINE CLINIC | Facility: CLINIC | Age: 40
End: 2020-03-05

## 2020-03-05 ENCOUNTER — TELEPHONE (OUTPATIENT)
Dept: FAMILY MEDICINE CLINIC | Facility: CLINIC | Age: 40
End: 2020-03-05

## 2020-05-28 ENCOUNTER — TELEPHONE (OUTPATIENT)
Dept: FAMILY MEDICINE CLINIC | Facility: CLINIC | Age: 40
End: 2020-05-28

## 2020-05-28 NOTE — TELEPHONE ENCOUNTER
With Language Line   [place]ante [place] ID# [id number]    Identified  patient's name and     Patient calling reports went to have post-op  X- rays done ordered by Dr. Clara Kahn and had a cough when she tried to get x-rays    States when she gets  nervous she

## 2020-06-01 ENCOUNTER — VIRTUAL PHONE E/M (OUTPATIENT)
Dept: FAMILY MEDICINE CLINIC | Facility: CLINIC | Age: 40
End: 2020-06-01

## 2020-06-01 DIAGNOSIS — Z86.19 HISTORY OF VIRAL INFECTION: ICD-10-CM

## 2020-06-01 DIAGNOSIS — R05.9 COUGH: Primary | ICD-10-CM

## 2020-06-01 PROCEDURE — 99213 OFFICE O/P EST LOW 20 MIN: CPT | Performed by: FAMILY MEDICINE

## 2020-06-01 NOTE — PROGRESS NOTES
TELEPHONE VISIT PROGRESS NOTE  Todays date: 6/1/2020 9:32 AM        Most recent Nurse Triage message / Juan Post message from patient:      Mara Mcdermott RN   Registered Nurse   Registered Nurse   Telephone Encounter   Signed   Encounter Date:  5/28/2020 visit.  Note, many times the patient however is nearby and I can hear the patient answering questions while the person on the phone with me is talking to the patient.     This also goes for family members who would rather speak to me on behalf of their Span []      No [x]      • Headache:      Yes []     No [x]      • Chest pain:   Yes []     No [x]      • Other symptoms:   • Runny Nose Yes []     No [x]     • Stuffy Nose Yes []     No [x]     • Post Nasal Drip Yes []     No [x]         Past medical/social hi History:  Past Medical History:   Diagnosis Date   • Anxiety state    • Back problem    • Calculus of kidney    • Depression    • Endometriosis    • Esophageal reflux    • History of Papanicolaou smear of cervix 5/20/2015   • Hyperlipidemia 2013   • IBS (i Jackeline Moncada understands phone evaluation is not a substitute for face-to-face examination or emergency care. Patient advised to go to ER or call 911 for worsening symptoms or acute distress.  (NOTE: Not every complaint above will be related to the COVID-19 pa

## 2020-06-03 ENCOUNTER — TELEPHONE (OUTPATIENT)
Dept: FAMILY MEDICINE CLINIC | Facility: CLINIC | Age: 40
End: 2020-06-03

## 2020-06-10 NOTE — TELEPHONE ENCOUNTER
Patient checking status of her letter. Patient was advised letter was mailed 06/03. Patient has not received anything in the mail. Patient requesting letter to be mailed again.  Patient was also advised her letter was available to print out on her MyChart,

## 2020-06-15 ENCOUNTER — APPOINTMENT (OUTPATIENT)
Dept: LAB | Age: 40
End: 2020-06-15
Attending: PHYSICIAN ASSISTANT
Payer: COMMERCIAL

## 2020-06-15 ENCOUNTER — HOSPITAL ENCOUNTER (OUTPATIENT)
Dept: GENERAL RADIOLOGY | Age: 40
Discharge: HOME | End: 2020-06-15
Attending: PHYSICIAN ASSISTANT
Payer: COMMERCIAL

## 2020-06-15 DIAGNOSIS — M51.37 DDD (DEGENERATIVE DISC DISEASE), LUMBOSACRAL: ICD-10-CM

## 2020-06-15 DIAGNOSIS — Z86.19 HISTORY OF VIRAL INFECTION: ICD-10-CM

## 2020-06-15 DIAGNOSIS — M43.17 SPONDYLOLISTHESIS OF LUMBOSACRAL REGION: ICD-10-CM

## 2020-06-15 PROCEDURE — 36415 COLL VENOUS BLD VENIPUNCTURE: CPT

## 2020-06-15 PROCEDURE — 86769 SARS-COV-2 COVID-19 ANTIBODY: CPT

## 2020-06-15 PROCEDURE — 72100 X-RAY EXAM L-S SPINE 2/3 VWS: CPT | Performed by: PHYSICIAN ASSISTANT

## 2020-07-01 ENCOUNTER — TELEPHONE (OUTPATIENT)
Dept: FAMILY MEDICINE CLINIC | Facility: CLINIC | Age: 40
End: 2020-07-01

## 2020-07-01 DIAGNOSIS — M51.37 OTHER INTERVERTEBRAL DISC DEGENERATION, LUMBOSACRAL REGION: Primary | ICD-10-CM

## 2020-07-01 NOTE — TELEPHONE ENCOUNTER
Patient requesting referral to see Dr Pippa Kwan for follow up. States previous referral ran out of visits. States Dr Pippa Kwan is sending her to get CT of neck, CT of spine and an EMG. Will also need orders for tests.

## 2020-07-02 NOTE — TELEPHONE ENCOUNTER
Dr. Lazarus Escobar, patient is requesting a referral to Dr. Tere Humphrey. Referral has been pended, please advise.      Called Dr. Dashawn Clark office, left message for Alfredo Gee requesting notes indicating testing is being requested with diagnosis information to be faxed

## 2020-07-14 ENCOUNTER — TELEPHONE (OUTPATIENT)
Dept: FAMILY MEDICINE CLINIC | Facility: CLINIC | Age: 40
End: 2020-07-14

## 2020-07-14 NOTE — TELEPHONE ENCOUNTER
Shelly Nance from [employer] requesting authorization for a nebulizer ordered on 02/14/2020. Informed Shelly Nance that I'm unable to locate order. Shelly Nance reviewed the order received. Order indicates Dr. Jean Anderson ordered the nebulizer.  Shelly Nance will reach o

## 2020-08-05 ENCOUNTER — TELEPHONE (OUTPATIENT)
Dept: FAMILY MEDICINE CLINIC | Facility: CLINIC | Age: 40
End: 2020-08-05

## 2020-08-05 DIAGNOSIS — R30.0 DYSURIA: Primary | ICD-10-CM

## 2020-08-05 NOTE — TELEPHONE ENCOUNTER
Maori Speaking - Patient is requesting a referral for Dr. Jennifer Calles (Urology) for her kidneys. Patient states she does not have an appointment yet with Dr. Jennifer Calles and was advised by the office to contact Dr. Thad Menchaca office for referral since there is no referral on file.     # [phone]  Patient states she does not have fax#

## 2020-08-05 NOTE — TELEPHONE ENCOUNTER
Spoke to patient who is requesting referral be faxed to Dr. Anthony Police office.      Referral faxed to [phone]

## 2020-08-06 NOTE — TELEPHONE ENCOUNTER
Dr. Vincenzo Diaz, patient is requesting a referral to Dr. Brooklyn Villa. Referral has been pended, please advise.

## 2020-08-07 ENCOUNTER — TELEPHONE (OUTPATIENT)
Dept: FAMILY MEDICINE CLINIC | Facility: CLINIC | Age: 40
End: 2020-08-07

## 2020-08-07 NOTE — TELEPHONE ENCOUNTER
Dr. Fidel Magaña, patient needs to have testing done prior to seeing Dr. Kenneth Hernandez.      CT and EMG's

## 2020-08-10 NOTE — TELEPHONE ENCOUNTER
Please provide documentation to support request for patient to see Dr Krystal Mclean.      Thank you, Gary

## 2020-08-12 NOTE — TELEPHONE ENCOUNTER
Why does she need to see a urologist?  I looked at her CAT scan and there is nothing wrong with her kidneys from what I can see.

## 2020-08-13 NOTE — TELEPHONE ENCOUNTER
Dr. Gutierrez, patient was referred on 2019 to Dr. Jacqueline Armstrong for Dysuria.  Referral authorization has , patient is requesting a new referral.

## 2020-08-21 ENCOUNTER — MED REC SCAN ONLY (OUTPATIENT)
Dept: FAMILY MEDICINE CLINIC | Facility: CLINIC | Age: 40
End: 2020-08-21

## 2020-08-31 NOTE — TELEPHONE ENCOUNTER
Dr. Kyra Collazo, patient needs a CT scan and EMG completed prior to following up with Dr. Garry Silva. See my previous message below.

## 2020-09-02 ENCOUNTER — TELEPHONE (OUTPATIENT)
Dept: FAMILY MEDICINE CLINIC | Facility: CLINIC | Age: 40
End: 2020-09-02

## 2020-09-02 NOTE — TELEPHONE ENCOUNTER
With  Hung Pap #[id number])    Patient needs a referral to see an Urogynecologist. She stated Dr. Bay Ch office did not want to schedule her in. She does not know why. Please advise and call patient back. Thank you.

## 2020-09-02 NOTE — TELEPHONE ENCOUNTER
Dr. Marjorie Albarado, see patients message below.    [facility]-in network providers  Radha Michel MD  [employer]  [address]  [phone]    [facility] [address]

## 2020-09-02 NOTE — TELEPHONE ENCOUNTER
With         Patient would like to know the status of her request.    The CT Neck and EMG orders. Please call patient back because Dr. Octavia Kelsey won't see her until she gets those tests done. Thank you.

## 2020-09-09 NOTE — TELEPHONE ENCOUNTER
[address] [address] #[id number]    Patient would like to know the status of her requests. (CT Neck and EMG orders)    Please advise and call patient back.     Thank you.

## 2020-09-10 ENCOUNTER — OFFICE VISIT (OUTPATIENT)
Dept: FAMILY MEDICINE CLINIC | Facility: CLINIC | Age: 40
End: 2020-09-10

## 2020-09-10 VITALS
BODY MASS INDEX: 35.03 KG/M2 | DIASTOLIC BLOOD PRESSURE: 70 MMHG | WEIGHT: 190.38 LBS | SYSTOLIC BLOOD PRESSURE: 122 MMHG | HEART RATE: 81 BPM | TEMPERATURE: 99 F | HEIGHT: 62 IN

## 2020-09-10 DIAGNOSIS — M43.17 SPONDYLOLISTHESIS OF LUMBOSACRAL REGION: ICD-10-CM

## 2020-09-10 DIAGNOSIS — Z12.4 CERVICAL CANCER SCREENING: ICD-10-CM

## 2020-09-10 DIAGNOSIS — M51.26 HERNIATED NUCLEUS PULPOSUS, LUMBAR: ICD-10-CM

## 2020-09-10 DIAGNOSIS — M54.41 CHRONIC BILATERAL LOW BACK PAIN WITH BILATERAL SCIATICA: ICD-10-CM

## 2020-09-10 DIAGNOSIS — M51.37 OTHER INTERVERTEBRAL DISC DEGENERATION, LUMBOSACRAL REGION: Primary | ICD-10-CM

## 2020-09-10 DIAGNOSIS — M79.604 PAIN IN BOTH LOWER EXTREMITIES: ICD-10-CM

## 2020-09-10 DIAGNOSIS — N39.0 RECURRENT UTI (URINARY TRACT INFECTION): ICD-10-CM

## 2020-09-10 DIAGNOSIS — M54.42 CHRONIC BILATERAL LOW BACK PAIN WITH BILATERAL SCIATICA: ICD-10-CM

## 2020-09-10 DIAGNOSIS — M79.605 PAIN IN BOTH LOWER EXTREMITIES: ICD-10-CM

## 2020-09-10 DIAGNOSIS — G89.29 CHRONIC BILATERAL LOW BACK PAIN WITH BILATERAL SCIATICA: ICD-10-CM

## 2020-09-10 DIAGNOSIS — M48.02 SPINAL STENOSIS, CERVICAL REGION: ICD-10-CM

## 2020-09-10 DIAGNOSIS — M48.061 FORAMINAL STENOSIS OF LUMBAR REGION: ICD-10-CM

## 2020-09-10 PROCEDURE — 3074F SYST BP LT 130 MM HG: CPT | Performed by: FAMILY MEDICINE

## 2020-09-10 PROCEDURE — 3078F DIAST BP <80 MM HG: CPT | Performed by: FAMILY MEDICINE

## 2020-09-10 PROCEDURE — 3008F BODY MASS INDEX DOCD: CPT | Performed by: FAMILY MEDICINE

## 2020-09-10 PROCEDURE — 99215 OFFICE O/P EST HI 40 MIN: CPT | Performed by: FAMILY MEDICINE

## 2020-09-10 NOTE — PROGRESS NOTES
Patient ID: Alessio Sanders is a 47year old female. HPI  Patient presents with: Follow - Up: requesting referral to nephrology   Other: requesting CT/MRI referral    Last seen by me for virtual visit on 6/1/2020.     Pt is seeing Dr. Alex Juárez for ne Encounters:  09/10/20 : 122/70  02/15/20 : 112/55  02/04/20 : 116/81  01/15/20 : 112/68  01/13/20 : 130/59  01/07/20 : 111/66        Review of Systems   Respiratory: Negative for shortness of breath. Cardiovascular: Negative for chest pain.    Musculoske 2/13/2020    Performed by Josefina Lynne MD at [address] [facility]   • TRANSFORAMINAL LUMBAR EPIDURAL STEROID INJECTION SINGLE LEVEL Bilateral 6/8/2018    Performed by Shay Duarte DO at [address] [facility] MAIN OR          Current Outpatient Medications   Medication Sig incision looks good. Neurological: Patient is alert and oriented to person, place, and time. DTR 2/4 at knees and ankles bilaterally. Skin: Skin is warm. Psychiatry: Normal mood and affect. Vitals reviewed.            Assessment/Plan:      Diagnoses in the lumbosacral region due to disc disorder. This is been requested by Dr. Dilcia Tenorio the neurosurgeon.           Referral Priority:Routine          Referral Type:OFFICE VISIT          Referred to Carie Holt MD          Requested Specialty:PHYS

## 2020-09-10 NOTE — TELEPHONE ENCOUNTER
It appears physician assistant Veryl Lines ordered study, patient will need to reach out to the office for assistance.      Thank you, Gary

## 2020-09-10 NOTE — TELEPHONE ENCOUNTER
Patient called with , Steven Sorto informed of below (philip philip). Informed will send message  to Annika Antoine if we can change referral to any of the in network providers below.  Please advise    Patient stated has appt below and will discuss a

## 2020-09-11 ENCOUNTER — LAB ENCOUNTER (OUTPATIENT)
Dept: LAB | Age: 40
End: 2020-09-11
Attending: FAMILY MEDICINE
Payer: COMMERCIAL

## 2020-09-11 DIAGNOSIS — N39.0 RECURRENT UTI (URINARY TRACT INFECTION): ICD-10-CM

## 2020-09-11 LAB
BILIRUB UR QL: NEGATIVE
COLOR UR: YELLOW
GLUCOSE UR-MCNC: NEGATIVE MG/DL
KETONES UR-MCNC: NEGATIVE MG/DL
LEUKOCYTE ESTERASE UR QL STRIP.AUTO: NEGATIVE
NITRITE UR QL STRIP.AUTO: NEGATIVE
PH UR: 5 [PH] (ref 5–8)
PROT UR-MCNC: NEGATIVE MG/DL
RBC #/AREA URNS AUTO: 2 /HPF
SP GR UR STRIP: 1.02 (ref 1–1.03)
UROBILINOGEN UR STRIP-ACNC: <2
WBC #/AREA URNS AUTO: 8 /HPF

## 2020-09-11 PROCEDURE — 81001 URINALYSIS AUTO W/SCOPE: CPT

## 2020-09-11 PROCEDURE — 87086 URINE CULTURE/COLONY COUNT: CPT

## 2020-09-11 NOTE — TELEPHONE ENCOUNTER
Patient returned call. Advised of message below. Patient states Dr. Karla Soliz already provided the orders.

## 2020-09-15 ENCOUNTER — TELEPHONE (OUTPATIENT)
Dept: FAMILY MEDICINE CLINIC | Facility: CLINIC | Age: 40
End: 2020-09-15

## 2020-09-15 DIAGNOSIS — G89.29 CHRONIC BILATERAL LOW BACK PAIN WITH BILATERAL SCIATICA: Primary | ICD-10-CM

## 2020-09-15 DIAGNOSIS — M43.17 SPONDYLOLISTHESIS OF LUMBOSACRAL REGION: ICD-10-CM

## 2020-09-15 DIAGNOSIS — M54.42 CHRONIC BILATERAL LOW BACK PAIN WITH BILATERAL SCIATICA: Primary | ICD-10-CM

## 2020-09-15 DIAGNOSIS — M54.41 CHRONIC BILATERAL LOW BACK PAIN WITH BILATERAL SCIATICA: Primary | ICD-10-CM

## 2020-09-15 DIAGNOSIS — M51.26 HERNIATED NUCLEUS PULPOSUS, LUMBAR: ICD-10-CM

## 2020-09-15 DIAGNOSIS — M48.061 FORAMINAL STENOSIS OF LUMBAR REGION: ICD-10-CM

## 2020-09-15 NOTE — TELEPHONE ENCOUNTER
Stateless Speaking - Patient is requesting order for EMG and is requesting that order be faxed to fax# [phone].

## 2020-09-15 NOTE — TELEPHONE ENCOUNTER
Referral for neurology was already placed on 9-10-20.  She is needing an EMG order so she can have the test done

## 2020-09-16 ENCOUNTER — OFFICE VISIT (OUTPATIENT)
Dept: OBGYN CLINIC | Facility: CLINIC | Age: 40
End: 2020-09-16

## 2020-09-16 VITALS — SYSTOLIC BLOOD PRESSURE: 125 MMHG | DIASTOLIC BLOOD PRESSURE: 76 MMHG | BODY MASS INDEX: 34 KG/M2 | WEIGHT: 188.38 LBS

## 2020-09-16 DIAGNOSIS — R30.0 DYSURIA: ICD-10-CM

## 2020-09-16 DIAGNOSIS — M62.89 PELVIC FLOOR DYSFUNCTION IN FEMALE: ICD-10-CM

## 2020-09-16 DIAGNOSIS — N94.19 DYSPAREUNIA DUE TO MEDICAL CONDITION IN FEMALE: ICD-10-CM

## 2020-09-16 DIAGNOSIS — R10.2 PELVIC PAIN: ICD-10-CM

## 2020-09-16 DIAGNOSIS — Z01.419 ENCOUNTER FOR WELL WOMAN EXAM WITH ROUTINE GYNECOLOGICAL EXAM: Primary | ICD-10-CM

## 2020-09-16 PROCEDURE — 99395 PREV VISIT EST AGE 18-39: CPT | Performed by: OBSTETRICS & GYNECOLOGY

## 2020-09-16 PROCEDURE — 99213 OFFICE O/P EST LOW 20 MIN: CPT | Performed by: OBSTETRICS & GYNECOLOGY

## 2020-09-16 PROCEDURE — 3074F SYST BP LT 130 MM HG: CPT | Performed by: OBSTETRICS & GYNECOLOGY

## 2020-09-16 PROCEDURE — 3078F DIAST BP <80 MM HG: CPT | Performed by: OBSTETRICS & GYNECOLOGY

## 2020-09-16 NOTE — PROGRESS NOTES
HPI:   Rohan Norris is a 47year old female who presents for an annual, and has a hx of pelvic pain for approx 1 month.   Pt also with hx of on/off dysuria for a month, pt with neg urine cx last week with her pcp,  Pt already plans to call her urology Endometriosis    • Esophageal reflux    • History of Papanicolaou smear of cervix 5/20/2015   • Hyperlipidemia 2013   • IBS (irritable bowel syndrome)    • Migraines     Per [facility] - Migraine headaches   • Ovarian cyst    • Postpartum depression    • Pregnan Alcohol use:  Yes      Alcohol/week: 0.0 standard drinks      Comment: rare    Drug use: No           REVIEW OF SYSTEMS:   GENERAL: feels well otherwise  SKIN: denies any unusual skin lesions  EYES:denies blurred vision or double vision  HEENT: denies nasal for 2 weeks first, will call if sx not resolved with self tx at home.  The pt plans to make an appt with urology, try self tx for pelvic floor spasm first for 2 weeks, and if not resolved, will call and ask for a referral to PT at [address] [facility],  [place]

## 2020-09-17 PROBLEM — M62.89 PELVIC FLOOR DYSFUNCTION IN FEMALE: Status: ACTIVE | Noted: 2020-09-17

## 2020-09-17 PROBLEM — N94.19 DYSPAREUNIA DUE TO MEDICAL CONDITION IN FEMALE: Status: ACTIVE | Noted: 2020-09-17

## 2020-10-01 ENCOUNTER — TELEPHONE (OUTPATIENT)
Dept: FAMILY MEDICINE CLINIC | Facility: CLINIC | Age: 40
End: 2020-10-01

## 2020-10-01 RX ORDER — DIAZEPAM 5 MG/1
TABLET ORAL
Qty: 2 TABLET | Refills: 0 | Status: SHIPPED | OUTPATIENT
Start: 2020-10-01 | End: 2020-12-11

## 2020-10-01 NOTE — TELEPHONE ENCOUNTER
Armenian speaking - pt has to do an MRI on 10/3 and wanted to know if Dr is able to prescribe something to help her relax. Pt stts she is claustrophobic.  Please advise

## 2020-10-02 DIAGNOSIS — E55.9 VITAMIN D DEFICIENCY: ICD-10-CM

## 2020-10-02 RX ORDER — ERGOCALCIFEROL 1.25 MG/1
50000 CAPSULE ORAL WEEKLY
Qty: 12 CAPSULE | Refills: 1 | OUTPATIENT
Start: 2020-10-02 | End: 2020-12-31

## 2020-10-02 NOTE — TELEPHONE ENCOUNTER
The original prescription was discontinued on 2/3/2020 by Meena Huitron RN for the following reason: Therapy completed. Renewing this prescription may not be appropriate. Sig: TAKE 1 CAPSULE (50,000 UNITS TOTAL) BY MOUTH ONCE A WEEK.

## 2020-10-12 ENCOUNTER — HOSPITAL ENCOUNTER (OUTPATIENT)
Dept: CT IMAGING | Age: 40
Discharge: HOME | End: 2020-10-12
Attending: FAMILY MEDICINE
Payer: COMMERCIAL

## 2020-10-12 DIAGNOSIS — M51.37 OTHER INTERVERTEBRAL DISC DEGENERATION, LUMBOSACRAL REGION: ICD-10-CM

## 2020-10-12 DIAGNOSIS — M48.061 FORAMINAL STENOSIS OF LUMBAR REGION: ICD-10-CM

## 2020-10-12 DIAGNOSIS — M51.26 HERNIATED NUCLEUS PULPOSUS, LUMBAR: ICD-10-CM

## 2020-10-12 DIAGNOSIS — M54.42 CHRONIC BILATERAL LOW BACK PAIN WITH BILATERAL SCIATICA: ICD-10-CM

## 2020-10-12 DIAGNOSIS — M54.41 CHRONIC BILATERAL LOW BACK PAIN WITH BILATERAL SCIATICA: ICD-10-CM

## 2020-10-12 DIAGNOSIS — M43.17 SPONDYLOLISTHESIS OF LUMBOSACRAL REGION: ICD-10-CM

## 2020-10-12 DIAGNOSIS — G89.29 CHRONIC BILATERAL LOW BACK PAIN WITH BILATERAL SCIATICA: ICD-10-CM

## 2020-10-12 PROCEDURE — 72131 CT LUMBAR SPINE W/O DYE: CPT | Performed by: FAMILY MEDICINE

## 2020-10-15 ENCOUNTER — PROCEDURE VISIT (OUTPATIENT)
Dept: NEUROLOGY | Facility: CLINIC | Age: 40
End: 2020-10-15

## 2020-10-15 DIAGNOSIS — M54.59 MECHANICAL LOW BACK PAIN: Primary | ICD-10-CM

## 2020-10-15 DIAGNOSIS — R29.898 RIGHT LEG WEAKNESS: ICD-10-CM

## 2020-10-15 PROCEDURE — 95886 MUSC TEST DONE W/N TEST COMP: CPT | Performed by: PHYSICAL MEDICINE & REHABILITATION

## 2020-10-15 PROCEDURE — 95911 NRV CNDJ TEST 9-10 STUDIES: CPT | Performed by: PHYSICAL MEDICINE & REHABILITATION

## 2020-10-16 PROBLEM — M54.59 MECHANICAL LOW BACK PAIN: Status: ACTIVE | Noted: 2019-04-15

## 2020-10-16 PROBLEM — R29.898 RIGHT LEG WEAKNESS: Status: ACTIVE | Noted: 2020-10-16

## 2020-10-16 NOTE — PROCEDURES
[address]   Nerve Conduction Study and Electromyography Procedure Note      Patient: arcenio cline Hand Dominance:  RIGHT   Patient ID: [id number] Referring Dr:  Mary Newby   Sex: Female Test Dr:  Catracho Draper   Date Pop fossa AH 11.35 13.6 75.2 6.46 Pop fossa - Ankle 37 5.99 62       Sensory NCS      Nerve / Sites Rec. Site Onset Lat Peak Lat NP Amp PP Amp Segments Distance Velocity     ms ms µV µV  cm m/s   R Sural - Ankle (Calf)      Calf Ankle 2.50 3.23 24.7 39. ? The LEFT tibial nerve to the [facility] demonstrates normal onset latency, amplitude, and conduction velocity (temporal dispersion)    ? The RIGHT peroneal nerve to the EDB demonstrates prolonged onset latency, normal amplitude, and normal conduction velocity. · When compared to the study on 4/19/2019, there is improvement in the nerve conduction studies primarily the left peroneal study as this has normalized. There is increased EMG findings now in the right lower extremity as above.     There is no evidence fo

## 2020-10-24 ENCOUNTER — TELEPHONE (OUTPATIENT)
Dept: FAMILY MEDICINE CLINIC | Facility: CLINIC | Age: 40
End: 2020-10-24

## 2020-10-24 NOTE — TELEPHONE ENCOUNTER
With  #[id number]  Patient rescheduled MRI for another day.  States she was unable to get sufficient rest last night  She will re-schedule MRI for future  Advised she call approximately a week before her next MRI and request medication to freda

## 2020-10-26 ENCOUNTER — TELEPHONE (OUTPATIENT)
Dept: FAMILY MEDICINE CLINIC | Facility: CLINIC | Age: 40
End: 2020-10-26

## 2020-10-26 DIAGNOSIS — M48.02 SPINAL STENOSIS, CERVICAL REGION: Primary | ICD-10-CM

## 2020-10-26 NOTE — TELEPHONE ENCOUNTER
Patient is requesting a new MRI order that includes sedation. Patient has anxiety in enclosed places. Please advise.

## 2020-10-29 NOTE — TELEPHONE ENCOUNTER
Spoke to patient and she states that she took 1 diazpeam to see if it would make her fall asleep and she states it made her feel dizzy and worried. I stated to her that the diazepam would not make her go to sleep it was to relax. She said she wont be able to do the imaging without her being put to sleep.  Please advise

## 2020-11-09 ENCOUNTER — ORDER TRANSCRIPTION (OUTPATIENT)
Dept: ADMINISTRATIVE | Facility: HOSPITAL | Age: 40
End: 2020-11-09

## 2020-11-09 DIAGNOSIS — Z11.59 ENCOUNTER FOR SCREENING FOR OTHER VIRAL DISEASES: Primary | ICD-10-CM

## 2020-11-11 ENCOUNTER — OFFICE VISIT (OUTPATIENT)
Dept: SURGERY | Facility: CLINIC | Age: 40
End: 2020-11-11

## 2020-11-11 VITALS
SYSTOLIC BLOOD PRESSURE: 126 MMHG | HEART RATE: 83 BPM | DIASTOLIC BLOOD PRESSURE: 83 MMHG | WEIGHT: 188 LBS | BODY MASS INDEX: 34 KG/M2

## 2020-11-11 DIAGNOSIS — R35.0 URINARY FREQUENCY: ICD-10-CM

## 2020-11-11 DIAGNOSIS — R39.82 CHRONIC BLADDER PAIN: Primary | ICD-10-CM

## 2020-11-11 DIAGNOSIS — R39.15 URINARY URGENCY: ICD-10-CM

## 2020-11-11 DIAGNOSIS — G89.29 CHRONIC BILATERAL LOW BACK PAIN WITHOUT SCIATICA: ICD-10-CM

## 2020-11-11 DIAGNOSIS — M54.50 CHRONIC BILATERAL LOW BACK PAIN WITHOUT SCIATICA: ICD-10-CM

## 2020-11-11 DIAGNOSIS — R30.0 DYSURIA: ICD-10-CM

## 2020-11-11 PROCEDURE — 3074F SYST BP LT 130 MM HG: CPT | Performed by: NURSE PRACTITIONER

## 2020-11-11 PROCEDURE — 99244 OFF/OP CNSLTJ NEW/EST MOD 40: CPT | Performed by: NURSE PRACTITIONER

## 2020-11-11 PROCEDURE — 3079F DIAST BP 80-89 MM HG: CPT | Performed by: NURSE PRACTITIONER

## 2020-11-11 RX ORDER — ERGOCALCIFEROL 1.25 MG/1
50000 CAPSULE ORAL WEEKLY
COMMUNITY
Start: 2020-10-02 | End: 2020-12-23

## 2020-11-11 RX ORDER — PHENTERMINE HYDROCHLORIDE 37.5 MG/1
TABLET ORAL
COMMUNITY
Start: 2020-09-26 | End: 2020-12-11

## 2020-11-11 NOTE — PATIENT INSTRUCTIONS
Avoid bladder irritants. Ultrasound of the kidneys and bladder to check for any abnormality contributing to your symptoms.     Pelvic floor rehab     Follow up in 6-8 weeks    The most common five bladder irritants are:  Fruit juices (such as cranberry o

## 2020-11-11 NOTE — PROGRESS NOTES
HPI:    Patient ID: Ismael Hernández is a 47year old female. HPI     Patient is a 47year old female who presents to the clinic for a consult regarding recurrent UTI.   Past medical history of anxiety, depression, endometriosis, GERD, kidney stones, [employer]   Constitutional: Negative for chills and fever. Respiratory: Negative for cough and shortness of breath. Cardiovascular: Negative for chest pain and palpitations. Gastrointestinal: Positive for constipation.  Negative for abdominal pain, alexander • Vitamin D deficiency       Past Surgical History:   Procedure Laterality Date   • ABDOMINAL HYSTERECTOMY N/A 4/5/2017    Performed by Viktor Ballard MD at [facility] OR   • CHOLECYSTECTOMY  2012   • COLONOSCOPY  2004   • CYST REMOVAL  2007, 2014 She exhibits no distension. There is abdominal tenderness (mild suprapubic tenderness). Genitourinary:    Genitourinary Comments: No CVA tenderness     Musculoskeletal: Normal range of motion.    Neurological: She is alert and oriented to person, place, a

## 2020-11-19 ENCOUNTER — VIRTUAL PHONE E/M (OUTPATIENT)
Dept: FAMILY MEDICINE CLINIC | Facility: CLINIC | Age: 40
End: 2020-11-19

## 2020-11-19 DIAGNOSIS — L85.3 DRY SKIN: ICD-10-CM

## 2020-11-19 DIAGNOSIS — M54.12 CERVICAL RADICULITIS: Primary | ICD-10-CM

## 2020-11-19 PROCEDURE — 99214 OFFICE O/P EST MOD 30 MIN: CPT | Performed by: NURSE PRACTITIONER

## 2020-11-19 RX ORDER — DIAZEPAM 5 MG/1
5 TABLET ORAL ONCE
Qty: 1 TABLET | Refills: 0 | Status: SHIPPED | OUTPATIENT
Start: 2020-11-19 | End: 2020-11-19

## 2020-11-22 NOTE — PROGRESS NOTES
HPI         Pt presents to f/u for MRI ordered. Initially had MRI order for her neck. She knew that she was going to have anxiety so was rx'd diazepam. Pt took diazepam 2 days prior to MRI  (took one tab to see if it would work).  Felt anxiious with di laporoscopic ovarian cytectomy       •     Laparoscopy,diagnostic           06/08/2016             laparoscopy, filshie clip sterilization of left fallopian tube, ablation of endometriosis, retrieval and removal of displaced filshie clip       •     Alyce Carmichael (three) times daily as needed. (Patient not taking: Reported on 9/16/2020 )     90 tablet     1       •     omeprazole 20 MG Oral Capsule Delayed Release     Take 20 mg by mouth every morning.                    •     Ondansetron HCl (ZOFRAN) 4 mg tablet emergency. The patient was also advised of the potential privacy & security concerns related to the telehealth platform.      The patient was made aware of where to find [facility] notice of privacy practices, telehealth consent form and other related consent f

## 2020-12-01 ENCOUNTER — TELEPHONE (OUTPATIENT)
Dept: FAMILY MEDICINE CLINIC | Facility: CLINIC | Age: 40
End: 2020-12-01

## 2020-12-01 DIAGNOSIS — M54.42 CHRONIC BILATERAL LOW BACK PAIN WITH BILATERAL SCIATICA: Primary | ICD-10-CM

## 2020-12-01 DIAGNOSIS — G89.29 CHRONIC BILATERAL LOW BACK PAIN WITH BILATERAL SCIATICA: Primary | ICD-10-CM

## 2020-12-01 DIAGNOSIS — M54.41 CHRONIC BILATERAL LOW BACK PAIN WITH BILATERAL SCIATICA: Primary | ICD-10-CM

## 2020-12-01 DIAGNOSIS — M51.37 OTHER INTERVERTEBRAL DISC DEGENERATION, LUMBOSACRAL REGION: ICD-10-CM

## 2020-12-01 NOTE — TELEPHONE ENCOUNTER
Aris Wilson, patient is requesting a referral to Dr. Jorge L Brumfield. Referral has been pended, please advise.      Please reply to pool: [facility] CARE - MAIN

## 2020-12-01 NOTE — TELEPHONE ENCOUNTER
Greenlandic Speaking - Patient is requesting a referral for Dr. Payton Shields (Neurosurgery). Patient states she needs to see. Dr. Sofi Canales due to a post op follow up, however, has not made appointment yet.

## 2020-12-11 ENCOUNTER — OFFICE VISIT (OUTPATIENT)
Dept: FAMILY MEDICINE CLINIC | Facility: CLINIC | Age: 40
End: 2020-12-11

## 2020-12-11 VITALS
WEIGHT: 190.63 LBS | HEART RATE: 96 BPM | BODY MASS INDEX: 35.08 KG/M2 | HEIGHT: 62 IN | DIASTOLIC BLOOD PRESSURE: 79 MMHG | SYSTOLIC BLOOD PRESSURE: 124 MMHG

## 2020-12-11 DIAGNOSIS — R20.2 PARESTHESIA OF ARM: ICD-10-CM

## 2020-12-11 DIAGNOSIS — M54.12 CERVICAL RADICULITIS: Primary | ICD-10-CM

## 2020-12-11 DIAGNOSIS — F41.9 ANXIETY: ICD-10-CM

## 2020-12-11 PROCEDURE — 3078F DIAST BP <80 MM HG: CPT | Performed by: NURSE PRACTITIONER

## 2020-12-11 PROCEDURE — 99214 OFFICE O/P EST MOD 30 MIN: CPT | Performed by: NURSE PRACTITIONER

## 2020-12-11 PROCEDURE — 3008F BODY MASS INDEX DOCD: CPT | Performed by: NURSE PRACTITIONER

## 2020-12-11 PROCEDURE — 3074F SYST BP LT 130 MM HG: CPT | Performed by: NURSE PRACTITIONER

## 2020-12-11 NOTE — PROGRESS NOTES
HPI  Pt here for f/u MRI-was given 5mg valium x 2 and pt was not able to have MRI completed-did not get tired, just slightly dizzy. Dr Shira Clark is neuro sx  caring for pt-needs MRI, EMG and ct scan. EMG and ct scan have been completed.      Review of Sy Diabetes Father         Type 2   • Hypertension Mother    • Lipids Mother         Hyperlipidemia   • Diabetes Mother         Type 2   • Diabetes Sister    • Hypertension Sister    • Lipids Brother        Social History    Socioeconomic History      Marital Special Diet: Not Asked        Back Care: Not Asked        Exercise: No        Bike Helmet: Not Asked        Seat Belt: Not Asked        Self-Exams: Not Asked    Social History Narrative      The patient does not use an assistive device. .        The pat

## 2020-12-13 NOTE — PROGRESS NOTES
Patient ID: Ismael Hernández is a 30year old female. HPI  Patient presents with:  Abdominal Pain: pain after hysterectomy     April 5th [place]. RT side pain.    She has had a CAT scan of her abdomen and she is even seeing the urologist.  She continu SURGICAL HISTORY      Comment: laparoscopy, filshie clip sterilization of                left fallopian tube, ablation of endometriosis,               retrieval and removal of displaced filshie clip  4/5/2017: TOTAL ABDOMINAL HYSTERECTOMY N/A      Comment: oriented to person, place, and time. Skin: Skin is warm and dry. Psychiatric: Patient has a normal mood and affect. Vitals reviewed. BACK: Has tenderness over the lumbar paraspinal muscles from L2 down to L5 RT side.   Nolvia's test and straight leg MILD

## 2020-12-14 ENCOUNTER — TELEPHONE (OUTPATIENT)
Dept: FAMILY MEDICINE CLINIC | Facility: CLINIC | Age: 40
End: 2020-12-14

## 2020-12-14 DIAGNOSIS — Z01.818 ENCOUNTER FOR PREADMISSION TESTING: Primary | ICD-10-CM

## 2020-12-14 NOTE — TELEPHONE ENCOUNTER
Patient states that she phoned [facility] to schedule her MRI, but,was told that in order for them to schedule it she needs to have an EKG and labs done.  Pt was told that because she is doing the MRI with anaesthesia this things need to be done [facility]

## 2020-12-15 ENCOUNTER — ORDER TRANSCRIPTION (OUTPATIENT)
Dept: ADMINISTRATIVE | Facility: HOSPITAL | Age: 40
End: 2020-12-15

## 2020-12-15 DIAGNOSIS — Z11.59 ENCOUNTER FOR SCREENING FOR OTHER VIRAL DISEASES: ICD-10-CM

## 2020-12-15 DIAGNOSIS — Z01.818 PRE-OP TESTING: Primary | ICD-10-CM

## 2020-12-15 NOTE — TELEPHONE ENCOUNTER
Tripp Harvey ,MRI technician returned call. She states she is not aware of what labs or if EKG required.         Spoke with Vick Caro, who contacted the head of MRI, the preadmission department usually will contact the provider prior to the test.   Pre

## 2020-12-15 NOTE — TELEPHONE ENCOUNTER
Spoke with preadmission department. Pt does not need any testing prior MRI appointment.    Pt notified

## 2020-12-15 NOTE — TELEPHONE ENCOUNTER
Left message for MRI dept to call back to see what blood work is needed for patient and if an EKG is needed

## 2020-12-23 RX ORDER — ERGOCALCIFEROL 1.25 MG/1
CAPSULE ORAL
Qty: 12 CAPSULE | Refills: 0 | Status: SHIPPED | OUTPATIENT
Start: 2020-12-23 | End: 2021-06-10

## 2020-12-23 RX ORDER — ALPRAZOLAM 0.25 MG/1
0.25 TABLET ORAL NIGHTLY PRN
COMMUNITY
End: 2021-08-30

## 2020-12-23 RX ORDER — ACETAMINOPHEN 500 MG
500 TABLET ORAL EVERY 6 HOURS PRN
COMMUNITY
End: 2021-03-22

## 2020-12-24 ENCOUNTER — LAB ENCOUNTER (OUTPATIENT)
Dept: LAB | Age: 40
End: 2020-12-24
Attending: NURSE PRACTITIONER
Payer: COMMERCIAL

## 2020-12-24 ENCOUNTER — TELEPHONE (OUTPATIENT)
Dept: FAMILY MEDICINE CLINIC | Facility: CLINIC | Age: 40
End: 2020-12-24

## 2020-12-24 ENCOUNTER — OFFICE VISIT (OUTPATIENT)
Dept: FAMILY MEDICINE CLINIC | Facility: CLINIC | Age: 40
End: 2020-12-24

## 2020-12-24 VITALS
DIASTOLIC BLOOD PRESSURE: 72 MMHG | BODY MASS INDEX: 35.37 KG/M2 | HEIGHT: 62 IN | WEIGHT: 192.19 LBS | HEART RATE: 71 BPM | TEMPERATURE: 98 F | SYSTOLIC BLOOD PRESSURE: 114 MMHG

## 2020-12-24 DIAGNOSIS — Z01.818 ENCOUNTER FOR PREADMISSION TESTING: ICD-10-CM

## 2020-12-24 DIAGNOSIS — M54.12 CERVICAL RADICULITIS: Primary | ICD-10-CM

## 2020-12-24 PROCEDURE — 93010 ELECTROCARDIOGRAM REPORT: CPT | Performed by: NURSE PRACTITIONER

## 2020-12-24 PROCEDURE — 80053 COMPREHEN METABOLIC PANEL: CPT

## 2020-12-24 PROCEDURE — 99213 OFFICE O/P EST LOW 20 MIN: CPT | Performed by: PHYSICIAN ASSISTANT

## 2020-12-24 PROCEDURE — 3008F BODY MASS INDEX DOCD: CPT | Performed by: PHYSICIAN ASSISTANT

## 2020-12-24 PROCEDURE — 90471 IMMUNIZATION ADMIN: CPT | Performed by: PHYSICIAN ASSISTANT

## 2020-12-24 PROCEDURE — 36415 COLL VENOUS BLD VENIPUNCTURE: CPT

## 2020-12-24 PROCEDURE — 93005 ELECTROCARDIOGRAM TRACING: CPT

## 2020-12-24 PROCEDURE — 85027 COMPLETE CBC AUTOMATED: CPT

## 2020-12-24 PROCEDURE — 90686 IIV4 VACC NO PRSV 0.5 ML IM: CPT | Performed by: PHYSICIAN ASSISTANT

## 2020-12-24 PROCEDURE — 3074F SYST BP LT 130 MM HG: CPT | Performed by: PHYSICIAN ASSISTANT

## 2020-12-24 PROCEDURE — 3078F DIAST BP <80 MM HG: CPT | Performed by: PHYSICIAN ASSISTANT

## 2020-12-24 NOTE — TELEPHONE ENCOUNTER
[facility] pre admissions is requesting a physician co sign on the MRI of the spine. Please call ext [phone] with any questions.

## 2020-12-24 NOTE — PROGRESS NOTES
HPI:     HPI  47year-old female is here in the office for medical clearance. Patient is scheduled for MRI with anesthesia next week. Patient is feeling fine at this time.  Patient denies of chest pain, SOB, N/V/C/D, fever, dizziness, syncope, abdominal coty 2004   • Cyst removal  2007, 2014    laporoscopic ovarian cytectomy   • Hysterectomy     • Laparoscopy,diagnostic  06/08/2016    laparoscopy, filshie clip sterilization of left fallopian tube, ablation of endometriosis, retrieval and removal of displaced f Attends meetings of clubs or organizations: Not on file        Relationship status: Not on file      Intimate partner violence        Fear of current or ex partner: Not on file        Emotionally abused: Not on file        Physically abused: Not on file and atraumatic. Right Ear: Tympanic membrane, external ear and ear canal normal. Tympanic membrane is not erythematous. No cerumen present  Left Ear: Tympanic membrane, external ear and ear canal normal. Tympanic membrane is not erythematous.  No cerumen

## 2020-12-26 ENCOUNTER — LAB ENCOUNTER (OUTPATIENT)
Dept: LAB | Age: 40
End: 2020-12-26
Attending: NURSE PRACTITIONER
Payer: COMMERCIAL

## 2020-12-26 DIAGNOSIS — Z01.818 PRE-OP TESTING: ICD-10-CM

## 2020-12-26 DIAGNOSIS — Z11.59 ENCOUNTER FOR SCREENING FOR OTHER VIRAL DISEASES: ICD-10-CM

## 2020-12-28 ENCOUNTER — HOSPITAL ENCOUNTER (OUTPATIENT)
Dept: MRI IMAGING | Facility: HOSPITAL | Age: 40
Discharge: HOME | End: 2020-12-28
Attending: NURSE PRACTITIONER
Payer: COMMERCIAL

## 2020-12-28 ENCOUNTER — ANESTHESIA EVENT (OUTPATIENT)
Dept: MRI IMAGING | Facility: HOSPITAL | Age: 40
End: 2020-12-28
Payer: COMMERCIAL

## 2020-12-28 ENCOUNTER — ANESTHESIA (OUTPATIENT)
Dept: MRI IMAGING | Facility: HOSPITAL | Age: 40
End: 2020-12-28
Payer: COMMERCIAL

## 2020-12-28 VITALS
DIASTOLIC BLOOD PRESSURE: 73 MMHG | BODY MASS INDEX: 35.51 KG/M2 | OXYGEN SATURATION: 97 % | WEIGHT: 193 LBS | SYSTOLIC BLOOD PRESSURE: 127 MMHG | HEART RATE: 91 BPM | HEIGHT: 62 IN | TEMPERATURE: 98 F | RESPIRATION RATE: 18 BRPM

## 2020-12-28 DIAGNOSIS — R20.2 PARESTHESIA OF ARM: ICD-10-CM

## 2020-12-28 DIAGNOSIS — M54.12 CERVICAL RADICULITIS: ICD-10-CM

## 2020-12-28 DIAGNOSIS — F41.9 ANXIETY: ICD-10-CM

## 2020-12-28 PROCEDURE — 72141 MRI NECK SPINE W/O DYE: CPT | Performed by: NURSE PRACTITIONER

## 2020-12-28 RX ORDER — MORPHINE SULFATE 4 MG/ML
2 INJECTION, SOLUTION INTRAMUSCULAR; INTRAVENOUS EVERY 10 MIN PRN
Status: DISCONTINUED | OUTPATIENT
Start: 2020-12-28 | End: 2020-12-30

## 2020-12-28 RX ORDER — HALOPERIDOL 5 MG/ML
0.25 INJECTION INTRAMUSCULAR ONCE AS NEEDED
Status: ACTIVE | OUTPATIENT
Start: 2020-12-28 | End: 2020-12-28

## 2020-12-28 RX ORDER — HYDROMORPHONE HYDROCHLORIDE 1 MG/ML
0.2 INJECTION, SOLUTION INTRAMUSCULAR; INTRAVENOUS; SUBCUTANEOUS EVERY 5 MIN PRN
Status: DISCONTINUED | OUTPATIENT
Start: 2020-12-28 | End: 2020-12-30

## 2020-12-28 RX ORDER — ONDANSETRON 2 MG/ML
4 INJECTION INTRAMUSCULAR; INTRAVENOUS ONCE AS NEEDED
Status: ACTIVE | OUTPATIENT
Start: 2020-12-28 | End: 2020-12-28

## 2020-12-28 RX ORDER — PROCHLORPERAZINE EDISYLATE 5 MG/ML
5 INJECTION INTRAMUSCULAR; INTRAVENOUS ONCE AS NEEDED
Status: ACTIVE | OUTPATIENT
Start: 2020-12-28 | End: 2020-12-28

## 2020-12-28 RX ORDER — FAMOTIDINE 20 MG/1
20 TABLET ORAL ONCE
Status: COMPLETED | OUTPATIENT
Start: 2020-12-28 | End: 2020-12-28

## 2020-12-28 RX ORDER — LIDOCAINE HYDROCHLORIDE 10 MG/ML
INJECTION, SOLUTION EPIDURAL; INFILTRATION; INTRACAUDAL; PERINEURAL AS NEEDED
Status: DISCONTINUED | OUTPATIENT
Start: 2020-12-28 | End: 2020-12-28 | Stop reason: SURG

## 2020-12-28 RX ORDER — HYDROCODONE BITARTRATE AND ACETAMINOPHEN 5; 325 MG/1; MG/1
1 TABLET ORAL AS NEEDED
Status: DISCONTINUED | OUTPATIENT
Start: 2020-12-28 | End: 2020-12-30

## 2020-12-28 RX ORDER — SODIUM CHLORIDE, SODIUM LACTATE, POTASSIUM CHLORIDE, CALCIUM CHLORIDE 600; 310; 30; 20 MG/100ML; MG/100ML; MG/100ML; MG/100ML
INJECTION, SOLUTION INTRAVENOUS CONTINUOUS
Status: DISCONTINUED | OUTPATIENT
Start: 2020-12-28 | End: 2020-12-30

## 2020-12-28 RX ORDER — NALOXONE HYDROCHLORIDE 0.4 MG/ML
80 INJECTION, SOLUTION INTRAMUSCULAR; INTRAVENOUS; SUBCUTANEOUS AS NEEDED
Status: ACTIVE | OUTPATIENT
Start: 2020-12-28 | End: 2020-12-28

## 2020-12-28 RX ORDER — ACETAMINOPHEN 500 MG
1000 TABLET ORAL ONCE
Status: COMPLETED | OUTPATIENT
Start: 2020-12-28 | End: 2020-12-28

## 2020-12-28 RX ORDER — HYDROCODONE BITARTRATE AND ACETAMINOPHEN 5; 325 MG/1; MG/1
2 TABLET ORAL AS NEEDED
Status: DISCONTINUED | OUTPATIENT
Start: 2020-12-28 | End: 2020-12-30

## 2020-12-28 RX ORDER — METOCLOPRAMIDE 10 MG/1
10 TABLET ORAL ONCE
Status: COMPLETED | OUTPATIENT
Start: 2020-12-28 | End: 2020-12-28

## 2020-12-28 RX ADMIN — FAMOTIDINE 20 MG: 20 TABLET ORAL at 11:27:00

## 2020-12-28 RX ADMIN — METOCLOPRAMIDE 10 MG: 10 TABLET ORAL at 11:27:00

## 2020-12-28 RX ADMIN — LIDOCAINE HYDROCHLORIDE 50 MG: 10 INJECTION, SOLUTION EPIDURAL; INFILTRATION; INTRACAUDAL; PERINEURAL at 13:42:00

## 2020-12-28 RX ADMIN — SODIUM CHLORIDE, SODIUM LACTATE, POTASSIUM CHLORIDE, CALCIUM CHLORIDE: 600; 310; 30; 20 INJECTION, SOLUTION INTRAVENOUS at 11:28:00

## 2020-12-28 RX ADMIN — ACETAMINOPHEN 1000 MG: 500 MG TABLET ORAL at 11:28:00

## 2020-12-28 NOTE — ANESTHESIA POSTPROCEDURE EVALUATION
Patient: Freida Uribe    Procedure Summary     Date: 12/28/20 Room / Location: [facility] MRI; [address] Anesthesia Care Unit    Anesthesia Start: 1384 Anesthesia Stop: 3433    Procedure: MRI SPINE CERVICAL (CPT=72141) Diagnosis:

## 2020-12-28 NOTE — TELEPHONE ENCOUNTER
Please call patient to set up an appointment. Or have document from [facility] Teagan Willoughby. come to my desk.

## 2020-12-28 NOTE — ANESTHESIA PREPROCEDURE EVALUATION
Anesthesia PreOp Note    HPI:     Judy Mantilla is a 47year old female who presents for preoperative consultation requested by: * No surgeons listed *    Date of Surgery: 12/28/2020    * No procedures listed *  Indication: * No pre-op diagnosis entere left         Date Noted: 11/20/2018      Anxiety         Date Noted: 02/12/2018      Depressive disorder         Date Noted: 02/12/2018      Psychophysiological insomnia         Date Noted: 02/12/2018      Postprandial abdominal bloating         Date Noted Performed by Man Prabhakar MD at [facility] OR   • HYSTERECTOMY     • [address] [facility]  06/08/2016    laparoscopy, filshie clip sterilization of left fallopian tube, ablation of endometriosis, retrieval and removal of displaced filshie clip   • LIPOMA REM children: 3      Years of education: Not on file      Highest education level: Not on file    Occupational History      Not on file    Social Needs      Financial resource strain: Not on file      Food insecurity        Worry: Not on file        Inability: labs reviewed.   Lab Results   Component Value Date    WBC 5.9 12/24/2020    RBC 4.36 12/24/2020    HGB 13.4 12/24/2020    HCT 40.4 12/24/2020    MCV 92.7 12/24/2020    MCH 30.7 12/24/2020    MCHC 33.2 12/24/2020    RDW 12.5 12/24/2020    .0 12/24/20 ability. The patient desires the anesthetic management as planned.   Hanna Cole  12/28/2020 12:53 PM

## 2020-12-28 NOTE — ANESTHESIA PROCEDURE NOTES
Airway  Date/Time: 12/28/2020 1:40 PM  Urgency: Elective    Airway not difficult    General Information and Staff    Patient location during procedure: OR  Anesthesiologist: Daryle Masse, MD  Resident/CRNA: Mo Pabon CRNA  Performed: Malik Zabala

## 2020-12-30 ENCOUNTER — TELEPHONE (OUTPATIENT)
Dept: FAMILY MEDICINE CLINIC | Facility: CLINIC | Age: 40
End: 2020-12-30

## 2020-12-30 DIAGNOSIS — R73.09 ELEVATED GLUCOSE: Primary | ICD-10-CM

## 2020-12-30 DIAGNOSIS — R74.8 ELEVATED LIVER ENZYMES: ICD-10-CM

## 2020-12-30 NOTE — TELEPHONE ENCOUNTER
Patient is requesting the referral for Dr. Pippa Kwan to be faxed over to their office. Fax # [phone].

## 2021-01-11 NOTE — TELEPHONE ENCOUNTER
Patient was advised Dr Gabi Navarro has not received referral. Patient has appointment on 1/12. Requesting for it to be faxed again.

## 2021-01-14 ENCOUNTER — TELEPHONE (OUTPATIENT)
Dept: PHYSICAL THERAPY | Facility: HOSPITAL | Age: 41
End: 2021-01-14

## 2021-01-14 ENCOUNTER — ORDER TRANSCRIPTION (OUTPATIENT)
Dept: PHYSICAL THERAPY | Facility: HOSPITAL | Age: 41
End: 2021-01-14

## 2021-01-14 DIAGNOSIS — M50.122 CERVICAL DISC DISORDER AT C5-C6 LEVEL WITH RADICULOPATHY: Primary | ICD-10-CM

## 2021-01-25 ENCOUNTER — OFFICE VISIT (OUTPATIENT)
Dept: FAMILY MEDICINE CLINIC | Facility: CLINIC | Age: 41
End: 2021-01-25

## 2021-01-25 ENCOUNTER — LAB ENCOUNTER (OUTPATIENT)
Dept: LAB | Age: 41
End: 2021-01-25
Attending: NURSE PRACTITIONER
Payer: COMMERCIAL

## 2021-01-25 VITALS
SYSTOLIC BLOOD PRESSURE: 124 MMHG | WEIGHT: 188.63 LBS | DIASTOLIC BLOOD PRESSURE: 74 MMHG | BODY MASS INDEX: 34.71 KG/M2 | HEART RATE: 87 BPM | HEIGHT: 62 IN | TEMPERATURE: 97 F

## 2021-01-25 DIAGNOSIS — Z12.4 CERVICAL CANCER SCREENING: ICD-10-CM

## 2021-01-25 DIAGNOSIS — R73.9 HYPERGLYCEMIA: ICD-10-CM

## 2021-01-25 DIAGNOSIS — M51.26 HERNIATED NUCLEUS PULPOSUS, LUMBAR: ICD-10-CM

## 2021-01-25 DIAGNOSIS — R74.8 ELEVATED LIVER ENZYMES: Primary | ICD-10-CM

## 2021-01-25 DIAGNOSIS — M79.601 RIGHT ARM PAIN: Primary | ICD-10-CM

## 2021-01-25 DIAGNOSIS — M48.061 FORAMINAL STENOSIS OF LUMBAR REGION: ICD-10-CM

## 2021-01-25 DIAGNOSIS — R74.8 ELEVATED LIVER ENZYMES: ICD-10-CM

## 2021-01-25 DIAGNOSIS — M43.06 SPONDYLOLYSIS, LUMBAR REGION: ICD-10-CM

## 2021-01-25 DIAGNOSIS — M54.16 LUMBAR RADICULOPATHY: ICD-10-CM

## 2021-01-25 DIAGNOSIS — R73.09 ELEVATED GLUCOSE: ICD-10-CM

## 2021-01-25 DIAGNOSIS — M54.12 CERVICAL RADICULOPATHY: ICD-10-CM

## 2021-01-25 DIAGNOSIS — M50.222 HERNIATED NUCLEUS PULPOSUS, C5-6: ICD-10-CM

## 2021-01-25 DIAGNOSIS — F41.9 ANXIETY: ICD-10-CM

## 2021-01-25 DIAGNOSIS — F32.A DEPRESSIVE DISORDER: ICD-10-CM

## 2021-01-25 DIAGNOSIS — M43.17 SPONDYLOLISTHESIS OF LUMBOSACRAL REGION: ICD-10-CM

## 2021-01-25 LAB
ALBUMIN SERPL-MCNC: 3.8 G/DL (ref 3.4–5)
ALBUMIN/GLOB SERPL: 1 {RATIO} (ref 1–2)
ALP LIVER SERPL-CCNC: 82 U/L
ALT SERPL-CCNC: 96 U/L
ANION GAP SERPL CALC-SCNC: 6 MMOL/L (ref 0–18)
AST SERPL-CCNC: 45 U/L (ref 15–37)
BILIRUB SERPL-MCNC: 0.5 MG/DL (ref 0.1–2)
BUN BLD-MCNC: 7 MG/DL (ref 7–18)
BUN/CREAT SERPL: 9.2 (ref 10–20)
CALCIUM BLD-MCNC: 9.1 MG/DL (ref 8.5–10.1)
CHLORIDE SERPL-SCNC: 110 MMOL/L (ref 98–112)
CO2 SERPL-SCNC: 25 MMOL/L (ref 21–32)
CREAT BLD-MCNC: 0.76 MG/DL
EST. AVERAGE GLUCOSE BLD GHB EST-MCNC: 108 MG/DL (ref 68–126)
GLOBULIN PLAS-MCNC: 3.8 G/DL (ref 2.8–4.4)
GLUCOSE BLD-MCNC: 98 MG/DL (ref 70–99)
HBA1C MFR BLD HPLC: 5.4 % (ref ?–5.7)
M PROTEIN MFR SERPL ELPH: 7.6 G/DL (ref 6.4–8.2)
OSMOLALITY SERPL CALC.SUM OF ELEC: 290 MOSM/KG (ref 275–295)
PATIENT FASTING Y/N/NP: YES
POTASSIUM SERPL-SCNC: 3.8 MMOL/L (ref 3.5–5.1)
SODIUM SERPL-SCNC: 141 MMOL/L (ref 136–145)

## 2021-01-25 PROCEDURE — 83036 HEMOGLOBIN GLYCOSYLATED A1C: CPT

## 2021-01-25 PROCEDURE — 3074F SYST BP LT 130 MM HG: CPT | Performed by: FAMILY MEDICINE

## 2021-01-25 PROCEDURE — 36415 COLL VENOUS BLD VENIPUNCTURE: CPT

## 2021-01-25 PROCEDURE — 99215 OFFICE O/P EST HI 40 MIN: CPT | Performed by: FAMILY MEDICINE

## 2021-01-25 PROCEDURE — 3078F DIAST BP <80 MM HG: CPT | Performed by: FAMILY MEDICINE

## 2021-01-25 PROCEDURE — 80053 COMPREHEN METABOLIC PANEL: CPT

## 2021-01-25 PROCEDURE — 3008F BODY MASS INDEX DOCD: CPT | Performed by: FAMILY MEDICINE

## 2021-01-25 RX ORDER — MELATONIN
1000 DAILY
COMMUNITY
End: 2021-03-22

## 2021-01-25 NOTE — PROGRESS NOTES
Patient ID: Maxime Lambert is a 47year old female. HPI  Patient presents with:  Complete Form  Referral    Last seen by me on 9/10/2020. Pt presents today for a referral for physical therapy.  She brought in paperwork for a psychological function lb  09/16/20 : 188 lb 6.4 oz      BMI Readings from Last 6 Encounters:  01/25/21 : 34.50 kg/m²  12/28/20 : 35.30 kg/m²  12/24/20 : 35.15 kg/m²  12/11/20 : 34.86 kg/m²  11/11/20 : 34.39 kg/m²  09/16/20 : 34.46 kg/m²      BP Readings from Last 6 Encounters: INTERLAMINAR EPIDURAL INJECTION Bilateral 9/29/2017    Performed by aMrina Beckford DO at [address] [place] [address]   • LUMBAR INTERLAMINAR EPIDURAL INJECTION N/A 9/8/2017    Performed by Marina Beckford DO at [facility] OR   • OTHER SURGICAL HISTORY      arthrocentesis o saw her walk down the walters into the room and get up onto the table. Vitals reviewed.            Assessment/Plan:      Diagnoses and all orders for this visit:    Right arm pain  -     PHYSICAL THERAPY - INTERNAL  -     NEUROSURGERY - INTERNAL  She needed a Activities of daily living and Education               Frequency: 2-3 times per week. ...... Vaishnavi Mason Duration: 4-6 wks. ...... Vaishnavi Mason Number of visits: 9                            ++++ Needs functional capacity evaluation for job and life insurance++++          Referral P

## 2021-01-29 ENCOUNTER — TELEPHONE (OUTPATIENT)
Dept: PHYSICAL THERAPY | Facility: HOSPITAL | Age: 41
End: 2021-01-29

## 2021-02-01 ENCOUNTER — APPOINTMENT (OUTPATIENT)
Dept: PHYSICAL THERAPY | Age: 41
End: 2021-02-01
Attending: FAMILY MEDICINE
Payer: COMMERCIAL

## 2021-02-08 ENCOUNTER — OFFICE VISIT (OUTPATIENT)
Dept: PHYSICAL THERAPY | Age: 41
End: 2021-02-08
Attending: FAMILY MEDICINE
Payer: COMMERCIAL

## 2021-02-08 DIAGNOSIS — M50.122 CERVICAL DISC DISORDER AT C5-C6 LEVEL WITH RADICULOPATHY: ICD-10-CM

## 2021-02-08 PROCEDURE — 97161 PT EVAL LOW COMPLEX 20 MIN: CPT | Performed by: PHYSICAL THERAPIST

## 2021-02-08 PROCEDURE — 97110 THERAPEUTIC EXERCISES: CPT | Performed by: PHYSICAL THERAPIST

## 2021-02-08 NOTE — PROGRESS NOTES
P.T. EVALUATION:   Referring Physician: Dr. Kerri Paris  Diagnosis: Right arm pain (M79.601)  Cervical radiculopathy (M54.12)  Herniated nucleus pulposus, C5-6 (D24.188)    Date of Onset: 2/8/2021 Date of Service: 2/8/2021     PATIENT SUMMARY   Patient Meryl Kim presents with the following limitations: Patient hasdifficulty reaching overhead, decreased tolerance to household activities. Decreased tolerance to lifting and carrying. Decreased neck mobility with difficulty sleeping.   Sujey Marshall would benefit from skilled to grossly 5/5 into all planes to improve tolerance to household activities. 5. Patient will verbalize and demonstrate strategies to improve posture during activity.     Frequency / Duration: Patient will be seen for 2 x/week or a total of 10 visits over a

## 2021-02-10 ENCOUNTER — OFFICE VISIT (OUTPATIENT)
Dept: PHYSICAL THERAPY | Age: 41
End: 2021-02-10
Attending: FAMILY MEDICINE
Payer: COMMERCIAL

## 2021-02-10 DIAGNOSIS — M79.601 RIGHT ARM PAIN: ICD-10-CM

## 2021-02-10 DIAGNOSIS — M50.222 HERNIATED NUCLEUS PULPOSUS, C5-6: ICD-10-CM

## 2021-02-10 DIAGNOSIS — M54.12 CERVICAL RADICULOPATHY: ICD-10-CM

## 2021-02-10 PROCEDURE — 97110 THERAPEUTIC EXERCISES: CPT | Performed by: PHYSICAL THERAPIST

## 2021-02-10 PROCEDURE — 97140 MANUAL THERAPY 1/> REGIONS: CPT | Performed by: PHYSICAL THERAPIST

## 2021-02-10 NOTE — PROGRESS NOTES
Diagnosis: Right arm pain (M79.601)  Cervical radiculopathy (M54.12)  Herniated nucleus pulposus, C5-6 (G55.554)          Next MD visit: none scheduled  Fall Risk: standard         Precautions: n/a          Medication Changes since last visit?: No      S

## 2021-02-15 ENCOUNTER — OFFICE VISIT (OUTPATIENT)
Dept: PHYSICAL THERAPY | Age: 41
End: 2021-02-15
Attending: FAMILY MEDICINE
Payer: COMMERCIAL

## 2021-02-15 ENCOUNTER — TELEPHONE (OUTPATIENT)
Dept: FAMILY MEDICINE CLINIC | Facility: CLINIC | Age: 41
End: 2021-02-15

## 2021-02-15 PROCEDURE — 97110 THERAPEUTIC EXERCISES: CPT | Performed by: PHYSICAL THERAPIST

## 2021-02-15 NOTE — PROGRESS NOTES
Diagnosis: Right arm pain (M79.601)  Cervical radiculopathy (M54.12)  Herniated nucleus pulposus, C5-6 (I99.603)          Next MD visit: none scheduled  Fall Risk: standard         Precautions: n/a          Medication Changes since last visit?: No      S all planes to improve tolerance to household activities. 5. Patient will verbalize and demonstrate strategies to improve posture during activity. Plan: Patient is scheduled to follow up with MD on Friday.  Will wait until after MD visit to deter

## 2021-02-16 ENCOUNTER — MED REC SCAN ONLY (OUTPATIENT)
Dept: ADMINISTRATIVE | Age: 41
End: 2021-02-16

## 2021-02-16 ENCOUNTER — HOSPITAL ENCOUNTER (OUTPATIENT)
Dept: ULTRASOUND IMAGING | Age: 41
Discharge: HOME | End: 2021-02-16
Attending: NURSE PRACTITIONER
Payer: COMMERCIAL

## 2021-02-16 DIAGNOSIS — R74.8 ELEVATED LIVER ENZYMES: ICD-10-CM

## 2021-02-16 PROCEDURE — 76700 US EXAM ABDOM COMPLETE: CPT | Performed by: NURSE PRACTITIONER

## 2021-02-17 ENCOUNTER — APPOINTMENT (OUTPATIENT)
Dept: PHYSICAL THERAPY | Age: 41
End: 2021-02-17
Attending: FAMILY MEDICINE
Payer: COMMERCIAL

## 2021-02-17 NOTE — TELEPHONE ENCOUNTER
Spoke with pt using  Fabienne Garcia, [address] [place] [address], informed pt that the forms department received disability forms from [facility] for Dr Bonnie Ruff to complete.  Per pt forms have been completed by her specialist already and this request can be disregarded at t

## 2021-02-20 ENCOUNTER — TELEPHONE (OUTPATIENT)
Dept: FAMILY MEDICINE CLINIC | Facility: CLINIC | Age: 41
End: 2021-02-20

## 2021-02-20 DIAGNOSIS — E66.9 OBESITY (BMI 30.0-34.9): ICD-10-CM

## 2021-02-20 DIAGNOSIS — R74.8 ELEVATED LIVER ENZYMES: ICD-10-CM

## 2021-02-20 DIAGNOSIS — Z83.3 FAMILY HISTORY OF DIABETES MELLITUS: Primary | ICD-10-CM

## 2021-02-20 DIAGNOSIS — K76.0 HEPATIC STEATOSIS: ICD-10-CM

## 2021-02-20 NOTE — TELEPHONE ENCOUNTER
Cambodian Speaking - Patient is requesting call back from nurse to discuss results for ultrasound of the abdomen.

## 2021-02-20 NOTE — TELEPHONE ENCOUNTER
With [employer]  Glade Bloch ID# [id number]    Verified name and .     Patient was advised of BASIL Yanez's result note as seen below:    [address] (CPT=76700): Result Notes   BASIL Panda   2021 11:22 AM      Your

## 2021-02-22 ENCOUNTER — TELEPHONE (OUTPATIENT)
Dept: PHYSICAL THERAPY | Facility: HOSPITAL | Age: 41
End: 2021-02-22

## 2021-02-22 ENCOUNTER — APPOINTMENT (OUTPATIENT)
Dept: PHYSICAL THERAPY | Age: 41
End: 2021-02-22
Attending: FAMILY MEDICINE
Payer: COMMERCIAL

## 2021-02-22 PROBLEM — E66.811 OBESITY (BMI 30.0-34.9): Status: ACTIVE | Noted: 2021-02-22

## 2021-02-22 PROBLEM — K76.0 HEPATIC STEATOSIS: Status: ACTIVE | Noted: 2021-02-22

## 2021-02-22 PROBLEM — E66.9 OBESITY (BMI 30.0-34.9): Status: ACTIVE | Noted: 2021-02-22

## 2021-02-22 NOTE — TELEPHONE ENCOUNTER
With MARIA EUGENIA Farris  Leighton Santos ID# [id number]. Identified patient's name and . Informed patient of advice/referrals per Tessa Krabbe, APRN. Patient verbalizes understanding and agrees.

## 2021-02-22 NOTE — TELEPHONE ENCOUNTER
I will refer her to bariatric clinic and nutritionist-it is important that she work on diet and exercise and not just take medications to lose weight. I don't prescribed phentermine for weight loss.    Referrals placed

## 2021-02-24 ENCOUNTER — TELEPHONE (OUTPATIENT)
Dept: FAMILY MEDICINE CLINIC | Facility: CLINIC | Age: 41
End: 2021-02-24

## 2021-02-24 ENCOUNTER — APPOINTMENT (OUTPATIENT)
Dept: PHYSICAL THERAPY | Age: 41
End: 2021-02-24
Attending: FAMILY MEDICINE
Payer: COMMERCIAL

## 2021-02-24 NOTE — TELEPHONE ENCOUNTER
Pt states that she has an appointment with the nutrition department on 3-22-21. Pt would like to know if it is ok for her to start taking phentermine again. Pt states that she goes to a clinic in [place] for this.  Pt would like to know if the doctor thinks

## 2021-02-24 NOTE — TELEPHONE ENCOUNTER
With MARIA EUGENIA Farris  [facility]LET ID# [id number]. Identified patient's name and . Patient states she's going to another clinic in [place] and paying for Phentermine. Patient asking if this medication would interfere with her liver.

## 2021-02-24 NOTE — TELEPHONE ENCOUNTER
The liver tests are all elevated due to fatty liver. Reducing her weight and fat would be helpful. Phentermine can help with reducing her weight so I do not think it should be an issue.   The doctor that she is getting the phentermine from should follow h

## 2021-02-24 NOTE — TELEPHONE ENCOUNTER
Patient informed in Austrian of provider's response below and voiced understating and agrees with all. States the doctor that prescribes the phentermine does order regular lab work. Denies further questions at this time.

## 2021-03-01 ENCOUNTER — APPOINTMENT (OUTPATIENT)
Dept: PHYSICAL THERAPY | Age: 41
End: 2021-03-01
Attending: FAMILY MEDICINE
Payer: COMMERCIAL

## 2021-03-11 ENCOUNTER — APPOINTMENT (OUTPATIENT)
Dept: PHYSICAL THERAPY | Age: 41
End: 2021-03-11
Attending: FAMILY MEDICINE
Payer: COMMERCIAL

## 2021-03-15 ENCOUNTER — APPOINTMENT (OUTPATIENT)
Dept: PHYSICAL THERAPY | Age: 41
End: 2021-03-15
Attending: FAMILY MEDICINE
Payer: COMMERCIAL

## 2021-03-17 ENCOUNTER — APPOINTMENT (OUTPATIENT)
Dept: PHYSICAL THERAPY | Age: 41
End: 2021-03-17
Attending: FAMILY MEDICINE
Payer: COMMERCIAL

## 2021-03-22 ENCOUNTER — OFFICE VISIT (OUTPATIENT)
Dept: SURGERY | Facility: CLINIC | Age: 41
End: 2021-03-22

## 2021-03-22 VITALS
DIASTOLIC BLOOD PRESSURE: 67 MMHG | HEART RATE: 85 BPM | SYSTOLIC BLOOD PRESSURE: 120 MMHG | OXYGEN SATURATION: 98 % | WEIGHT: 191.63 LBS | BODY MASS INDEX: 34.38 KG/M2 | HEIGHT: 62.5 IN

## 2021-03-22 DIAGNOSIS — K76.0 HEPATIC STEATOSIS: ICD-10-CM

## 2021-03-22 DIAGNOSIS — E78.00 HYPERCHOLESTEREMIA: Primary | ICD-10-CM

## 2021-03-22 DIAGNOSIS — Z87.442 HISTORY OF NEPHROLITHIASIS: ICD-10-CM

## 2021-03-22 DIAGNOSIS — E66.9 OBESITY (BMI 30-39.9): ICD-10-CM

## 2021-03-22 DIAGNOSIS — E53.8 VITAMIN B12 DEFICIENCY: ICD-10-CM

## 2021-03-22 DIAGNOSIS — F41.9 ANXIETY: ICD-10-CM

## 2021-03-22 DIAGNOSIS — M54.16 LUMBAR RADICULOPATHY: ICD-10-CM

## 2021-03-22 DIAGNOSIS — R63.5 WEIGHT GAIN: ICD-10-CM

## 2021-03-22 DIAGNOSIS — E55.9 VITAMIN D DEFICIENCY: ICD-10-CM

## 2021-03-22 DIAGNOSIS — R74.8 ELEVATED LIVER ENZYMES: ICD-10-CM

## 2021-03-22 PROCEDURE — 99204 OFFICE O/P NEW MOD 45 MIN: CPT | Performed by: NURSE PRACTITIONER

## 2021-03-22 PROCEDURE — 3008F BODY MASS INDEX DOCD: CPT | Performed by: NURSE PRACTITIONER

## 2021-03-22 PROCEDURE — 3078F DIAST BP <80 MM HG: CPT | Performed by: NURSE PRACTITIONER

## 2021-03-22 PROCEDURE — 3074F SYST BP LT 130 MM HG: CPT | Performed by: NURSE PRACTITIONER

## 2021-03-22 RX ORDER — DULOXETIN HYDROCHLORIDE 30 MG/1
30 CAPSULE, DELAYED RELEASE ORAL DAILY
Qty: 30 CAPSULE | Refills: 1 | Status: SHIPPED | OUTPATIENT
Start: 2021-03-22 | End: 2021-05-27

## 2021-03-22 RX ORDER — GABAPENTIN 300 MG/1
300 CAPSULE ORAL 3 TIMES DAILY
COMMUNITY
Start: 2021-02-19 | End: 2021-06-10

## 2021-03-22 NOTE — PROGRESS NOTES
The [facility]ness [facility] Weight Loss Consultation Note       Date of Consult:  3/22/2021    Patient:  Gregory Ball  :      1980  MRN:      [id number]    Referring Provider: Dr. Lisa Saldana        Chief Complaint:  Patient presents with:  Consult  Weight M (85.5 kg)  12/28/20 : 193 lb (87.5 kg)  12/24/20 : 192 lb 3.2 oz (87.2 kg)  12/11/20 : 190 lb 9.6 oz (86.5 kg)  11/11/20 : 188 lb (85.3 kg)       Patient Medications:    Current Outpatient Medications   Medication Sig Dispense Refill   • Multiple Vitamin ( patient does live in a home with stairs.     Social Determinants of Health  Financial Resource Strain:       Difficulty of Paying Living Expenses:   Food Insecurity:       Worried About Running Out of Food in the Last Year:       Ran Out of Food in the Last EPIDURAL STEROID INJECTION SINGLE LEVEL Bilateral 6/8/2018    Performed by Aroldo Hassan DO at [facility] MAIN OR       Family History:    Family History   Problem Relation Age of Onset   • Diabetes Father         Type 2   • Hypertension Mother    • Lipids Moth enlarged, symmetric, no tenderness/mass/nodules  Lungs: clear to auscultation bilaterally  Heart: S1, S2 normal, no murmur, click, rub or gallop, regular rate and rhythm  Abdomen: soft, non-tender; bowel sounds normal; no masses,  no organomegaly obese  Ex GAIN:  ELEVATED LFTs:  Mild hepatic steatosis:    Discussed starting weight: 191.6 lbs; BMI: 34.46; WC: 42.5 in. Recommended intensive lifestyle and behavioral modifications at this time for weight loss.     Educated patient on lifestyle modifications:

## 2021-04-08 ENCOUNTER — IMMUNIZATION (OUTPATIENT)
Dept: LAB | Age: 41
End: 2021-04-08
Attending: HOSPITALIST
Payer: COMMERCIAL

## 2021-04-08 DIAGNOSIS — Z23 NEED FOR VACCINATION: Primary | ICD-10-CM

## 2021-04-08 PROCEDURE — 0001A SARSCOV2 VAC 30MCG/0.3ML IM: CPT

## 2021-04-29 ENCOUNTER — IMMUNIZATION (OUTPATIENT)
Dept: LAB | Age: 41
End: 2021-04-29
Attending: HOSPITALIST
Payer: OTHER GOVERNMENT

## 2021-04-29 DIAGNOSIS — Z23 NEED FOR VACCINATION: Primary | ICD-10-CM

## 2021-04-29 PROCEDURE — 0002A SARSCOV2 VAC 30MCG/0.3ML IM: CPT

## 2021-05-25 ENCOUNTER — OFFICE VISIT (OUTPATIENT)
Dept: SURGERY | Facility: CLINIC | Age: 41
End: 2021-05-25
Payer: COMMERCIAL

## 2021-05-25 VITALS — BODY MASS INDEX: 34.92 KG/M2 | WEIGHT: 194.63 LBS | HEIGHT: 62.5 IN

## 2021-05-25 DIAGNOSIS — E66.09 CLASS 2 OBESITY DUE TO EXCESS CALORIES WITH BODY MASS INDEX (BMI) OF 35.0 TO 35.9 IN ADULT, UNSPECIFIED WHETHER SERIOUS COMORBIDITY PRESENT: Primary | ICD-10-CM

## 2021-05-25 PROCEDURE — 97802 MEDICAL NUTRITION INDIV IN: CPT | Performed by: DIETITIAN, REGISTERED

## 2021-05-25 PROCEDURE — 3008F BODY MASS INDEX DOCD: CPT | Performed by: DIETITIAN, REGISTERED

## 2021-05-25 NOTE — PROGRESS NOTES
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 Encouraged pt to find a positive alternative to eating sweets. Examples include, 5 deep breaths, listening to favorite song, or even short spurts of exercise. Pt agreeable to try this idea.  Reports not having a structured

## 2021-05-27 ENCOUNTER — OFFICE VISIT (OUTPATIENT)
Dept: SURGERY | Facility: CLINIC | Age: 41
End: 2021-05-27
Payer: COMMERCIAL

## 2021-05-27 VITALS
DIASTOLIC BLOOD PRESSURE: 66 MMHG | HEART RATE: 78 BPM | HEIGHT: 62.5 IN | WEIGHT: 196.88 LBS | OXYGEN SATURATION: 100 % | BODY MASS INDEX: 35.32 KG/M2 | SYSTOLIC BLOOD PRESSURE: 107 MMHG

## 2021-05-27 DIAGNOSIS — F41.9 ANXIETY: ICD-10-CM

## 2021-05-27 DIAGNOSIS — M54.16 LUMBAR RADICULOPATHY: ICD-10-CM

## 2021-05-27 DIAGNOSIS — E78.00 HYPERCHOLESTEREMIA: Primary | ICD-10-CM

## 2021-05-27 DIAGNOSIS — E53.8 VITAMIN B12 DEFICIENCY: ICD-10-CM

## 2021-05-27 DIAGNOSIS — R63.5 WEIGHT GAIN: ICD-10-CM

## 2021-05-27 DIAGNOSIS — E66.9 OBESITY (BMI 30-39.9): ICD-10-CM

## 2021-05-27 DIAGNOSIS — R74.8 ELEVATED LIVER ENZYMES: ICD-10-CM

## 2021-05-27 DIAGNOSIS — E55.9 VITAMIN D DEFICIENCY: ICD-10-CM

## 2021-05-27 DIAGNOSIS — K76.0 HEPATIC STEATOSIS: ICD-10-CM

## 2021-05-27 DIAGNOSIS — Z87.442 HISTORY OF NEPHROLITHIASIS: ICD-10-CM

## 2021-05-27 PROCEDURE — 99214 OFFICE O/P EST MOD 30 MIN: CPT | Performed by: NURSE PRACTITIONER

## 2021-05-27 PROCEDURE — 3078F DIAST BP <80 MM HG: CPT | Performed by: NURSE PRACTITIONER

## 2021-05-27 PROCEDURE — 3008F BODY MASS INDEX DOCD: CPT | Performed by: NURSE PRACTITIONER

## 2021-05-27 PROCEDURE — 3074F SYST BP LT 130 MM HG: CPT | Performed by: NURSE PRACTITIONER

## 2021-05-27 RX ORDER — PHENTERMINE HYDROCHLORIDE 15 MG/1
15 CAPSULE ORAL EVERY MORNING
Qty: 30 CAPSULE | Refills: 1 | Status: SHIPPED | OUTPATIENT
Start: 2021-05-27 | End: 2021-07-14 | Stop reason: DRUGHIGH

## 2021-05-27 RX ORDER — BUPROPION HYDROCHLORIDE 150 MG/1
150 TABLET ORAL DAILY
Qty: 30 TABLET | Refills: 1 | Status: SHIPPED | OUTPATIENT
Start: 2021-05-27 | End: 2021-07-14

## 2021-05-27 NOTE — PATIENT INSTRUCTIONS
Start phentermine 15 mg by mouth in the AM.    After two weeks, start wellbutrin 150 mg in the evening around dinnertime. Exercise-   Walk 30 minutes, 3 days/week. Sleep-  Magnesium glycinate 250 to 500 mg/day.    Will also help constipation.      Fo

## 2021-05-27 NOTE — PROGRESS NOTES
[address] [address]  [address] [facility] [address]  Dept: [phone]       Patient:  Pierce Rhodes  :      1980  MRN:      [id number]    Chief Complaint:  Patient pres (1.588 m)   Wt 196 lb 14.4 oz (89.3 kg)   LMP 03/22/2017   SpO2 100%   BMI 35.44 kg/m²     Initial weight loss: +05   Total weight loss: +05   Start weight: 191    Wt Readings from Last 6 Encounters:  05/27/21 : 196 lb 14.4 oz (89.3 kg)  05/25/21 : 194 lb Weight Concern: Not Asked        Special Diet: Not Asked        Back Care: Not Asked        Exercise: No        Bike Helmet: Not Asked        Seat Belt: Not Asked        Self-Exams: Not Asked    Social History Narrative      The patient does not use an ass Lipids Mother         Hyperlipidemia   • Diabetes Mother         Type 2   • Diabetes Sister    • Hypertension Sister    • Lipids Brother      Initial Intake:  After dinner behavior: -  Night eating: -  Portion sizes:   Binge:   Emotional: +  Depression:  To negative  Hematologic/lymphatic: negative  Musculoskeletal:negative  Neurological: negative  Behavioral/Psych: negative  Endocrine: negative  All other systems were reviewed and are negative    Physical Exam:   General appearance: alert, appears stated age at this time for weight loss.     Reviewed lifestyle modifications: Whole Food/Plant Strong/Low Glycemic Index diet, moderate alcohol consumption, reduced sodium intake to no more than 2,400 mg/day, and at least 150 minutes of moderate physical activity pe days/week. Sleep-  Magnesium glycinate 250 to 500 mg/day.    Will also help constipation.      For nutrition-  Met with dietician, follow up as recommended.      RTC 4 to 6 weeks.      BASIL Reis

## 2021-06-10 ENCOUNTER — OFFICE VISIT (OUTPATIENT)
Dept: FAMILY MEDICINE CLINIC | Facility: CLINIC | Age: 41
End: 2021-06-10
Payer: COMMERCIAL

## 2021-06-10 VITALS
WEIGHT: 192.81 LBS | TEMPERATURE: 98 F | HEART RATE: 92 BPM | HEIGHT: 62.5 IN | SYSTOLIC BLOOD PRESSURE: 138 MMHG | DIASTOLIC BLOOD PRESSURE: 79 MMHG | BODY MASS INDEX: 34.6 KG/M2

## 2021-06-10 DIAGNOSIS — R20.2 PARESTHESIA OF RIGHT ARM: ICD-10-CM

## 2021-06-10 DIAGNOSIS — E55.9 VITAMIN D DEFICIENCY: ICD-10-CM

## 2021-06-10 DIAGNOSIS — M54.12 CERVICAL RADICULITIS: ICD-10-CM

## 2021-06-10 DIAGNOSIS — G44.209 TENSION HEADACHE: Primary | ICD-10-CM

## 2021-06-10 DIAGNOSIS — R29.898 WEAKNESS OF RIGHT HAND: ICD-10-CM

## 2021-06-10 DIAGNOSIS — F41.9 ANXIETY: ICD-10-CM

## 2021-06-10 DIAGNOSIS — E66.09 NON MORBID OBESITY DUE TO EXCESS CALORIES: ICD-10-CM

## 2021-06-10 DIAGNOSIS — R07.9 RIGHT-SIDED CHEST PAIN: ICD-10-CM

## 2021-06-10 DIAGNOSIS — M43.17 SPONDYLOLISTHESIS OF LUMBOSACRAL REGION: ICD-10-CM

## 2021-06-10 PROCEDURE — 3078F DIAST BP <80 MM HG: CPT | Performed by: FAMILY MEDICINE

## 2021-06-10 PROCEDURE — 99215 OFFICE O/P EST HI 40 MIN: CPT | Performed by: FAMILY MEDICINE

## 2021-06-10 PROCEDURE — 3075F SYST BP GE 130 - 139MM HG: CPT | Performed by: FAMILY MEDICINE

## 2021-06-10 PROCEDURE — 3008F BODY MASS INDEX DOCD: CPT | Performed by: FAMILY MEDICINE

## 2021-06-10 RX ORDER — NORTRIPTYLINE HYDROCHLORIDE 10 MG/1
10 CAPSULE ORAL NIGHTLY
Qty: 60 CAPSULE | Refills: 2 | Status: SHIPPED | OUTPATIENT
Start: 2021-06-10 | End: 2021-07-20

## 2021-06-10 RX ORDER — ERGOCALCIFEROL 1.25 MG/1
50000 CAPSULE ORAL WEEKLY
Qty: 12 CAPSULE | Refills: 1 | Status: SHIPPED | OUTPATIENT
Start: 2021-06-10 | End: 2021-09-15

## 2021-06-10 RX ORDER — FAMOTIDINE 20 MG/1
20 TABLET ORAL 2 TIMES DAILY
COMMUNITY
End: 2021-08-30

## 2021-06-10 NOTE — PROGRESS NOTES
Patient ID: Jose Anderson is a 46year old female. HPI  Patient presents with:  Headache: Contant headaches after receiving Covid vaccine 4/8  Follow - Up: intervetebral disc degeneration    Last seen by me on 1/25/2021.   He has numerous complaints oz  03/22/21 : 191 lb 9.6 oz  01/25/21 : 188 lb 9.6 oz  12/28/20 : 193 lb      BMI Readings from Last 6 Encounters:  06/10/21 : 34.70 kg/m²  05/27/21 : 35.44 kg/m²  05/25/21 : 35.03 kg/m²  03/22/21 : 34.49 kg/m²  01/25/21 : 34.50 kg/m²  12/28/20 : 35.30 kg Sig Dispense Refill   • famoTIDine 20 MG Oral Tab Take 20 mg by mouth 2 (two) times daily. • MAGNESIUM OR Take by mouth. • Ibuprofen (ADVIL OR) Take by mouth as needed.      • buPROPion HCl ER, XL, 150 MG Oral Tablet 24 Hr Take 1 tablet (150 mg tota headache  -     Nortriptyline HCl (PAMELOR) 10 MG Oral Cap; Take 1 capsule (10 mg total) by mouth nightly. If after 1 week still in pain start taking 2 at night. Lets try Pamelor and see if this helps her chronic pain.   She has pain in her back, chronic h Renny    6/10/2021    By signing my name below, Janna Hussein,  attest that this documentation has been prepared under the direction and in the presence of Carmen Alarcon DO.    Electronically Signed: Arloa Klinefelter, 6/10/2021, 1:45 PM.    I, V

## 2021-06-22 ENCOUNTER — TELEPHONE (OUTPATIENT)
Dept: SURGERY | Facility: CLINIC | Age: 41
End: 2021-06-22

## 2021-06-22 NOTE — TELEPHONE ENCOUNTER
Office contacted patient to reschedule upcoming appointment with Chapo Rodríguez on 7/6/21. No answer. TCB

## 2021-07-07 ENCOUNTER — PROCEDURE VISIT (OUTPATIENT)
Dept: NEUROLOGY | Facility: CLINIC | Age: 41
End: 2021-07-07
Payer: COMMERCIAL

## 2021-07-07 DIAGNOSIS — R20.2 PARESTHESIA OF ARM: ICD-10-CM

## 2021-07-07 DIAGNOSIS — M79.601 RIGHT ARM PAIN: ICD-10-CM

## 2021-07-07 PROCEDURE — 95886 MUSC TEST DONE W/N TEST COMP: CPT | Performed by: PHYSICAL MEDICINE & REHABILITATION

## 2021-07-07 PROCEDURE — 95908 NRV CNDJ TST 3-4 STUDIES: CPT | Performed by: PHYSICAL MEDICINE & REHABILITATION

## 2021-07-08 NOTE — PROCEDURES
[address]  Phone: [phone]  Fax: [phone] REPORT          Patient: Steve Ly Hand Dominance:  Right   Patient ID: [id number] Referring Dr:  Dr. Saul Dobbins (carpal tunnel syndrome). The findings are demyelinating in nature, affecting the sensory nerves only.   3) There is no electrophysiologic evidence of other mononeuropathy, brachial plexopathy, cervical radiculopathy or myopathy in the right upper extremity Normal N N N N   R. First dorsal interosseous Ulnar C8-T1 N None None None None Normal N N N N   R.  Abductor pollicis brevis Median M9-I9 N None None None None Normal N N N N   R. C7 paraspinal Spinal C7- N None None None None Normal

## 2021-07-14 ENCOUNTER — OFFICE VISIT (OUTPATIENT)
Dept: SURGERY | Facility: CLINIC | Age: 41
End: 2021-07-14
Payer: COMMERCIAL

## 2021-07-14 VITALS
SYSTOLIC BLOOD PRESSURE: 112 MMHG | HEART RATE: 109 BPM | DIASTOLIC BLOOD PRESSURE: 76 MMHG | HEIGHT: 62.5 IN | WEIGHT: 191.38 LBS | BODY MASS INDEX: 34.34 KG/M2 | OXYGEN SATURATION: 99 %

## 2021-07-14 DIAGNOSIS — E66.9 OBESITY (BMI 30-39.9): ICD-10-CM

## 2021-07-14 DIAGNOSIS — E53.8 VITAMIN B12 DEFICIENCY: ICD-10-CM

## 2021-07-14 DIAGNOSIS — K76.0 HEPATIC STEATOSIS: ICD-10-CM

## 2021-07-14 DIAGNOSIS — E78.00 HYPERCHOLESTEREMIA: Primary | ICD-10-CM

## 2021-07-14 DIAGNOSIS — R74.8 ELEVATED LIVER ENZYMES: ICD-10-CM

## 2021-07-14 DIAGNOSIS — E55.9 VITAMIN D DEFICIENCY: ICD-10-CM

## 2021-07-14 DIAGNOSIS — M54.16 LUMBAR RADICULOPATHY: ICD-10-CM

## 2021-07-14 DIAGNOSIS — Z87.442 HISTORY OF NEPHROLITHIASIS: ICD-10-CM

## 2021-07-14 DIAGNOSIS — F41.9 ANXIETY: ICD-10-CM

## 2021-07-14 PROCEDURE — 3078F DIAST BP <80 MM HG: CPT | Performed by: NURSE PRACTITIONER

## 2021-07-14 PROCEDURE — 3008F BODY MASS INDEX DOCD: CPT | Performed by: NURSE PRACTITIONER

## 2021-07-14 PROCEDURE — 99214 OFFICE O/P EST MOD 30 MIN: CPT | Performed by: NURSE PRACTITIONER

## 2021-07-14 PROCEDURE — 3074F SYST BP LT 130 MM HG: CPT | Performed by: NURSE PRACTITIONER

## 2021-07-14 RX ORDER — PHENTERMINE HYDROCHLORIDE 30 MG/1
30 CAPSULE ORAL EVERY MORNING
Qty: 30 CAPSULE | Refills: 1 | Status: SHIPPED | OUTPATIENT
Start: 2021-07-14 | End: 2021-09-02 | Stop reason: DRUGHIGH

## 2021-07-14 NOTE — PROGRESS NOTES
[address] [facility] [address]  [address] [facility] [address]  Dept: [phone]       Patient:  Jose Anderson  :      1980  MRN:      [id number]    Chief Complaint:  Patient pres (BP Location: Right arm, Patient Position: Sitting)   Pulse 109   Ht 5' 2.5\" (1.588 m)   Wt 191 lb 6.4 oz (86.8 kg)   LMP 03/22/2017   SpO2 99%   BMI 34.45 kg/m²     Initial weight loss: -05   Total weight loss: -00   Start weight: 191    Wt Readings from Birth control/protection: Hysterectomy    Other Topics      Concerns:         Service: Not Asked        Blood Transfusions: Not Asked        Caffeine Concern: No          1 cup coffee daily        Occupational Exposure: Not Asked        Kaya Rehman LIPOMA REMOVAL Left 12/29/2018   • OTHER  2019    low back surgery    • TOTAL ABDOMINAL HYSTERECTOMY N/A 4/5/2017    Procedure: ABDOMINAL HYSTERECTOMY;  Surgeon: Viktor Ballard MD;  Location: [address] [place] [address] MAIN OR     Family History:    Family History   Proble 64 oz of water per day, Maintain a daily food journal, Utlize portion control strategies to reduce calorie intake, Identify triggers for eating and manage cues and Eat slowly and take 20 to 30 minutes to complete each meal    Exercise Goals Reviewed and Wendy Mcgrath below. Monitor.          Lab Results   Component Value Date/Time    CHOLEST 204 (H) 06/19/2017 12:18 PM     (H) 06/19/2017 12:18 PM    HDL 53 06/19/2017 12:18 PM    TRIG 58 06/19/2017 12:18 PM     OBESITY/WEIGHT GAIN:  ELEVATED LFTs:  Mild hepatic st an appointment to have the medication refilled. Must avoid pregnancy during use, counseled on adequate contraception during use. Patient states understanding of all information and instructions.      Consider saxenda in the future.      Avoid duloxetine- se

## 2021-07-20 ENCOUNTER — HOSPITAL ENCOUNTER (OUTPATIENT)
Dept: GENERAL RADIOLOGY | Age: 41
Discharge: HOME | End: 2021-07-20
Attending: FAMILY MEDICINE
Payer: COMMERCIAL

## 2021-07-20 ENCOUNTER — OFFICE VISIT (OUTPATIENT)
Dept: FAMILY MEDICINE CLINIC | Facility: CLINIC | Age: 41
End: 2021-07-20
Payer: COMMERCIAL

## 2021-07-20 VITALS
HEIGHT: 62.5 IN | HEART RATE: 79 BPM | TEMPERATURE: 97 F | DIASTOLIC BLOOD PRESSURE: 78 MMHG | WEIGHT: 187.69 LBS | BODY MASS INDEX: 33.67 KG/M2 | SYSTOLIC BLOOD PRESSURE: 119 MMHG

## 2021-07-20 DIAGNOSIS — M25.571 ACUTE RIGHT ANKLE PAIN: ICD-10-CM

## 2021-07-20 DIAGNOSIS — M79.674 GREAT TOE PAIN, RIGHT: ICD-10-CM

## 2021-07-20 DIAGNOSIS — L91.8 INFLAMED SKIN TAG: ICD-10-CM

## 2021-07-20 DIAGNOSIS — G56.01 CARPAL TUNNEL SYNDROME OF RIGHT WRIST: Primary | ICD-10-CM

## 2021-07-20 PROCEDURE — L3908 WHO COCK-UP NONMOLDE PRE OTS: HCPCS | Performed by: FAMILY MEDICINE

## 2021-07-20 PROCEDURE — 99214 OFFICE O/P EST MOD 30 MIN: CPT | Performed by: FAMILY MEDICINE

## 2021-07-20 PROCEDURE — 3008F BODY MASS INDEX DOCD: CPT | Performed by: FAMILY MEDICINE

## 2021-07-20 PROCEDURE — 73610 X-RAY EXAM OF ANKLE: CPT | Performed by: FAMILY MEDICINE

## 2021-07-20 PROCEDURE — 3074F SYST BP LT 130 MM HG: CPT | Performed by: FAMILY MEDICINE

## 2021-07-20 PROCEDURE — 73660 X-RAY EXAM OF TOE(S): CPT | Performed by: FAMILY MEDICINE

## 2021-07-20 PROCEDURE — 3078F DIAST BP <80 MM HG: CPT | Performed by: FAMILY MEDICINE

## 2021-07-20 NOTE — PROGRESS NOTES
Patient ID: Gregory Ball is a 46year old female. HPI  Patient presents with: Follow - Up: EMG  Medication Problem: Pamelor - c/o pain to urinate  Foot Pain: R. foot pain/great toe     Last seen by me on 6/10/2021.     Pt c/o pain in the right arm shortness of breath. Cardiovascular: Negative for chest pain. Musculoskeletal: Positive for arthralgias. Skin:        Skin tags   Neurological: Positive for numbness.            Medical History:      Past Medical History:   Diagnosis Date   • Anxiety Oral Cap Take 1 capsule (10 mg total) by mouth nightly. If after 1 week still in pain start taking 2 at night. (Patient not taking: Reported on 7/20/2021 ) 60 capsule 2   • Ibuprofen (ADVIL OR) Take by mouth as needed.  (Patient not taking: Reported on 7/20 Diagnoses and all orders for this visit:    Carpal tunnel syndrome of right wrist  Reviewed her EMG with her today. It was mild right median neuropathy. We provided her with a right wrist brace that she should wear when she is sleeping.   She will fol PM

## 2021-07-24 ENCOUNTER — TELEPHONE (OUTPATIENT)
Dept: FAMILY MEDICINE CLINIC | Facility: CLINIC | Age: 41
End: 2021-07-24

## 2021-07-24 NOTE — TELEPHONE ENCOUNTER
XR TOE(S) (MIN 2 VIEWS), RIGHT 1ST (CPT=73660): Comments to Patient     X-ray of the right great toe was normal.  No arthritis.  This is most likely just from hitting your toe against the chair.  This should get better over time.  Ice the area if you need

## 2021-07-24 NOTE — TELEPHONE ENCOUNTER
XR ANKLE (MIN 3 VIEWS), RIGHT (CPT=73610): Comments to Patient     X-ray of the right ankle lower leg are normal.  No fractures.  Ice the area that hurts.    Written by Ivonne Flaherty DO on 7/20/2021  6:15 PM CDT  Not seen

## 2021-07-28 ENCOUNTER — OFFICE VISIT (OUTPATIENT)
Dept: DERMATOLOGY CLINIC | Facility: CLINIC | Age: 41
End: 2021-07-28
Payer: COMMERCIAL

## 2021-07-28 DIAGNOSIS — L91.8 SKIN TAG: Primary | ICD-10-CM

## 2021-07-28 PROCEDURE — 11200 RMVL SKIN TAGS UP TO&INC 15: CPT | Performed by: PHYSICIAN ASSISTANT

## 2021-07-28 PROCEDURE — 99213 OFFICE O/P EST LOW 20 MIN: CPT | Performed by: PHYSICIAN ASSISTANT

## 2021-07-28 NOTE — PROCEDURES
Procedure:  Skin tag removal  With the patient is a supine position, the skin was scrubbed with alcohol. Anesthesia was obtained by injecting 2 mL of 1% Xylocaine with Epinephrine around the neck bilaterally.  Scissors and forceps were used to remove the s

## 2021-07-28 NOTE — PROGRESS NOTES
HPI:    Patient ID: Evonne Long is a 46year old female. Patient presents for skin tags on the neck bilaterally. They do bother and irritate her. She has had them for some time. No draining. Hx of allergies to medications noted.        Review of Sy tags  -Care instructions reviewed. -To call or follow-up with worsening symptoms or concerns.   -Pt was agreeable to plan and will comply with discussion above. No orders of the defined types were placed in this encounter.       Meds This Visit:

## 2021-08-30 ENCOUNTER — OFFICE VISIT (OUTPATIENT)
Dept: FAMILY MEDICINE CLINIC | Facility: CLINIC | Age: 41
End: 2021-08-30
Payer: COMMERCIAL

## 2021-08-30 ENCOUNTER — LAB ENCOUNTER (OUTPATIENT)
Dept: LAB | Age: 41
End: 2021-08-30
Attending: FAMILY MEDICINE
Payer: COMMERCIAL

## 2021-08-30 VITALS
BODY MASS INDEX: 32.65 KG/M2 | TEMPERATURE: 98 F | HEART RATE: 89 BPM | SYSTOLIC BLOOD PRESSURE: 127 MMHG | WEIGHT: 182 LBS | HEIGHT: 62.5 IN | DIASTOLIC BLOOD PRESSURE: 80 MMHG

## 2021-08-30 DIAGNOSIS — K21.9 GASTROESOPHAGEAL REFLUX DISEASE, UNSPECIFIED WHETHER ESOPHAGITIS PRESENT: ICD-10-CM

## 2021-08-30 DIAGNOSIS — R30.0 DYSURIA: ICD-10-CM

## 2021-08-30 DIAGNOSIS — M25.562 ACUTE BILATERAL KNEE PAIN: ICD-10-CM

## 2021-08-30 DIAGNOSIS — M25.561 ACUTE BILATERAL KNEE PAIN: ICD-10-CM

## 2021-08-30 DIAGNOSIS — M54.50 ACUTE RIGHT-SIDED LOW BACK PAIN WITHOUT SCIATICA: ICD-10-CM

## 2021-08-30 DIAGNOSIS — R20.2 PARESTHESIA OF BILATERAL LEGS: ICD-10-CM

## 2021-08-30 DIAGNOSIS — R14.0 POSTPRANDIAL ABDOMINAL BLOATING: ICD-10-CM

## 2021-08-30 DIAGNOSIS — G56.01 CARPAL TUNNEL SYNDROME OF RIGHT WRIST: Primary | ICD-10-CM

## 2021-08-30 DIAGNOSIS — R10.13 EPIGASTRIC ABDOMINAL PAIN: ICD-10-CM

## 2021-08-30 DIAGNOSIS — K76.0 FATTY LIVER: ICD-10-CM

## 2021-08-30 LAB
ALT SERPL-CCNC: 51 U/L
AST SERPL-CCNC: 24 U/L (ref 15–37)
BILIRUB UR QL: NEGATIVE
CLARITY UR: CLEAR
COLOR UR: COLORLESS
GLUCOSE UR-MCNC: NEGATIVE MG/DL
HGB UR QL STRIP.AUTO: NEGATIVE
KETONES UR-MCNC: NEGATIVE MG/DL
LEUKOCYTE ESTERASE UR QL STRIP.AUTO: NEGATIVE
NITRITE UR QL STRIP.AUTO: NEGATIVE
PH UR: 7 [PH] (ref 5–8)
PROT UR-MCNC: NEGATIVE MG/DL
SP GR UR STRIP: 1 (ref 1–1.03)
UROBILINOGEN UR STRIP-ACNC: <2

## 2021-08-30 PROCEDURE — 3008F BODY MASS INDEX DOCD: CPT | Performed by: FAMILY MEDICINE

## 2021-08-30 PROCEDURE — 84460 ALANINE AMINO (ALT) (SGPT): CPT

## 2021-08-30 PROCEDURE — 36415 COLL VENOUS BLD VENIPUNCTURE: CPT

## 2021-08-30 PROCEDURE — 3074F SYST BP LT 130 MM HG: CPT | Performed by: FAMILY MEDICINE

## 2021-08-30 PROCEDURE — 3079F DIAST BP 80-89 MM HG: CPT | Performed by: FAMILY MEDICINE

## 2021-08-30 PROCEDURE — 84450 TRANSFERASE (AST) (SGOT): CPT

## 2021-08-30 PROCEDURE — 99215 OFFICE O/P EST HI 40 MIN: CPT | Performed by: FAMILY MEDICINE

## 2021-08-30 PROCEDURE — 81003 URINALYSIS AUTO W/O SCOPE: CPT

## 2021-08-30 RX ORDER — PANTOPRAZOLE SODIUM 40 MG/1
40 TABLET, DELAYED RELEASE ORAL
Qty: 90 TABLET | Refills: 1 | Status: SHIPPED | OUTPATIENT
Start: 2021-08-30 | End: 2022-08-30

## 2021-08-30 RX ORDER — METHOCARBAMOL 500 MG/1
500 TABLET, FILM COATED ORAL 3 TIMES DAILY PRN
Qty: 90 TABLET | Refills: 0 | Status: SHIPPED | OUTPATIENT
Start: 2021-08-30 | End: 2021-09-25

## 2021-08-30 RX ORDER — NITROFURANTOIN 25; 75 MG/1; MG/1
100 CAPSULE ORAL 2 TIMES DAILY
Qty: 10 CAPSULE | Refills: 0 | Status: SHIPPED | OUTPATIENT
Start: 2021-08-30 | End: 2021-09-04

## 2021-08-30 NOTE — PROGRESS NOTES
Patient ID: Joe Garcia is a 46year old female. HPI  Patient presents with: Follow - Up: Carpal tunnel   Abdominal Pain: abdominal pain   Back Pain: x4 days     Last seen by me on 7/20/2021. Pt presents today with her .      Pt c/o ca Encounters:  08/30/21 : 32.76 kg/m²  07/20/21 : 33.78 kg/m²  07/14/21 : 34.45 kg/m²  06/10/21 : 34.70 kg/m²  05/27/21 : 35.44 kg/m²  05/25/21 : 35.03 kg/m²      BP Readings from Last 6 Encounters:  08/30/21 : 127/80  07/20/21 : 119/78  07/14/21 : 112/76  0 HYSTERECTOMY;  Surgeon: Eleazar Gosselin, MD;  Location: [address] [place] [address] MAIN OR          Current Outpatient Medications   Medication Sig Dispense Refill   • Phentermine HCl 30 MG Oral Cap Take 1 capsule (30 mg total) by mouth every morning.  30 capsule 1   • ergoca no rebound, no guarding. Epigastric tenderness. Winces with epigastric palpation. No right upper quadrant tenderness. Negative Andrade sign. No suprapubic tenderness. No mass palpated. No flank tenderness. Back: SLR negative bilaterally.  Right and lef pantoprazole 40 MG Oral Tab EC; Take 1 tablet (40 mg total) by mouth every morning before breakfast. To help with stomach acid and stomach irritation/inflammation    Acute bilateral knee pain  Weight loss would be helpful.   Paresthesia of bilateral legs

## 2021-09-02 ENCOUNTER — OFFICE VISIT (OUTPATIENT)
Dept: SURGERY | Facility: CLINIC | Age: 41
End: 2021-09-02
Payer: COMMERCIAL

## 2021-09-02 VITALS
HEART RATE: 82 BPM | DIASTOLIC BLOOD PRESSURE: 68 MMHG | OXYGEN SATURATION: 100 % | BODY MASS INDEX: 33.62 KG/M2 | HEIGHT: 62.5 IN | WEIGHT: 187.38 LBS | SYSTOLIC BLOOD PRESSURE: 124 MMHG

## 2021-09-02 DIAGNOSIS — F41.9 ANXIETY: ICD-10-CM

## 2021-09-02 DIAGNOSIS — Z87.442 HISTORY OF NEPHROLITHIASIS: ICD-10-CM

## 2021-09-02 DIAGNOSIS — M54.16 LUMBAR RADICULOPATHY: ICD-10-CM

## 2021-09-02 DIAGNOSIS — E55.9 VITAMIN D DEFICIENCY: ICD-10-CM

## 2021-09-02 DIAGNOSIS — E53.8 VITAMIN B12 DEFICIENCY: ICD-10-CM

## 2021-09-02 DIAGNOSIS — E66.9 OBESITY (BMI 30-39.9): ICD-10-CM

## 2021-09-02 DIAGNOSIS — R74.8 ELEVATED LIVER ENZYMES: ICD-10-CM

## 2021-09-02 DIAGNOSIS — K76.0 HEPATIC STEATOSIS: ICD-10-CM

## 2021-09-02 DIAGNOSIS — E78.00 HYPERCHOLESTEREMIA: Primary | ICD-10-CM

## 2021-09-02 PROCEDURE — 99214 OFFICE O/P EST MOD 30 MIN: CPT | Performed by: NURSE PRACTITIONER

## 2021-09-02 PROCEDURE — 3074F SYST BP LT 130 MM HG: CPT | Performed by: NURSE PRACTITIONER

## 2021-09-02 PROCEDURE — 3078F DIAST BP <80 MM HG: CPT | Performed by: NURSE PRACTITIONER

## 2021-09-02 PROCEDURE — 3008F BODY MASS INDEX DOCD: CPT | Performed by: NURSE PRACTITIONER

## 2021-09-02 RX ORDER — PEN NEEDLE, DIABETIC 30 GX3/16"
1 NEEDLE, DISPOSABLE MISCELLANEOUS DAILY
Qty: 100 EACH | Refills: 1 | Status: SHIPPED | OUTPATIENT
Start: 2021-09-02 | End: 2021-10-25

## 2021-09-02 RX ORDER — LIRAGLUTIDE 6 MG/ML
3 INJECTION, SOLUTION SUBCUTANEOUS DAILY
Qty: 5 EACH | Refills: 0 | Status: SHIPPED | OUTPATIENT
Start: 2021-09-02 | End: 2021-10-25

## 2021-09-02 RX ORDER — PHENTERMINE HYDROCHLORIDE 37.5 MG/1
37.5 TABLET ORAL
Qty: 30 TABLET | Refills: 2 | Status: SHIPPED | OUTPATIENT
Start: 2021-09-02 | End: 2021-10-25

## 2021-09-02 NOTE — PROGRESS NOTES
[address] [address]  [address] [facility] [address]  Dept: [phone]       Patient:  Warren Ariza  :      1980  MRN:      [id number]    Chief Complaint:  Patient pres SpO2 100%   BMI 33.73 kg/m²     Initial weight loss: -04   Total weight loss: -04   Start weight: 191    Wt Readings from Last 6 Encounters:  09/02/21 : 187 lb 6.4 oz (85 kg)  08/30/21 : 182 lb (82.6 kg)  07/20/21 : 187 lb 11.2 oz (85.1 kg)  07/14/21 : 191  Service: Not Asked        Blood Transfusions: Not Asked        Caffeine Concern: No          1 cup coffee daily        Occupational Exposure: Not Asked        Hobby Hazards: Not Asked        Sleep Concern: Not Asked        Stress Concern: Not A ABDOMINAL HYSTERECTOMY N/A 4/5/2017    Procedure: ABDOMINAL HYSTERECTOMY;  Surgeon: Yadira Blandon MD;  Location: [address] [place] [address] MAIN OR     Family History:    Family History   Problem Relation Age of Onset   • Diabetes Father         Type 2   • Hypertension Mo breakfast, Eat 3 meals per day, Plan meals in advance, Read nutrition labels, Drink 64 oz of water per day, Maintain a daily food journal, Utlize portion control strategies to reduce calorie intake, Identify triggers for eating and manage cues and Eat slow 30-39. 9)      DYSLIPIDEMIA: Recommend dietary changes and lifestyle modifications as discussed below. Monitor.      Lab Results   Component Value Date/Time    CHOLEST 204 (H) 06/19/2017 12:18 PM     (H) 06/19/2017 12:18 PM    HDL 53 06/19/2017 12:18 others. She understands that I will not call in the prescription for her; she has to have an appointment to have the medication refilled. Must avoid pregnancy during use, counseled on adequate contraception during use.  Patient states understanding of all i

## 2021-09-02 NOTE — PATIENT INSTRUCTIONS
Increase phentermine to 37.5 mg/day at time of next refill. We will check Saxenda coverage. Do not start medication yet. We will discuss further next visit.

## 2021-09-03 ENCOUNTER — TELEPHONE (OUTPATIENT)
Dept: SURGERY | Facility: CLINIC | Age: 41
End: 2021-09-03

## 2021-09-03 NOTE — TELEPHONE ENCOUNTER
Pt made aware Dr. Sauer Roles has reviewed her chart and more appropriate to schedule appointment with Physiatry. Contact information given and pt verbalized understanding. Appointment cancelled 9/20/21 per Dr. Sauer Roles.

## 2021-09-07 ENCOUNTER — TELEPHONE (OUTPATIENT)
Dept: FAMILY MEDICINE CLINIC | Facility: CLINIC | Age: 41
End: 2021-09-07

## 2021-09-07 DIAGNOSIS — R14.0 POSTPRANDIAL ABDOMINAL BLOATING: Primary | ICD-10-CM

## 2021-09-07 DIAGNOSIS — G56.01 CARPAL TUNNEL SYNDROME OF RIGHT WRIST: ICD-10-CM

## 2021-09-07 NOTE — TELEPHONE ENCOUNTER
Patient calling for 8/30/21 lab results and was informed of result note copied below. Patient states pain in \"liver area\" discussed with Dr Kyra Collazo continues (the same) but states x 2 days sometimes feels it on the left side.  States the pain on the left s

## 2021-09-12 RX ORDER — DICYCLOMINE HYDROCHLORIDE 10 MG/1
10 CAPSULE ORAL 3 TIMES DAILY PRN
Qty: 90 CAPSULE | Refills: 1 | Status: SHIPPED | OUTPATIENT
Start: 2021-09-12 | End: 2021-09-13

## 2021-09-12 NOTE — TELEPHONE ENCOUNTER
It sounds like she may have irritable bowel syndrome. I want to try a medication called Bentyl on her and she can take it when she has the discomfort or cramps up to 3 times daily. If she is not having any discomfort she does not need to take it.

## 2021-09-13 RX ORDER — DICYCLOMINE HYDROCHLORIDE 10 MG/1
10 CAPSULE ORAL 3 TIMES DAILY PRN
Qty: 90 CAPSULE | Refills: 1 | Status: SHIPPED | OUTPATIENT
Start: 2021-09-13 | End: 2021-09-23

## 2021-09-13 NOTE — TELEPHONE ENCOUNTER
Yes, she can stop the pantoprazole and try the Bentyl that I sent in instead. Because the carpal tunnel syndrome is mild, she may benefit from a steroid injection into the carpal tunnel instead of having surgery.   I will send her to a physiatrist speciali

## 2021-09-13 NOTE — TELEPHONE ENCOUNTER
Dr. Sisi Munguia, please advise if patient can stop taking pantoprazole. Patient stating it is causing heartburn to worsen. Patient also asking if there's any other kind of doctor for carpal tunnel.     Please reply to juan m: VERA Alexis [phone]

## 2021-09-13 NOTE — TELEPHONE ENCOUNTER
Language line #[phone] assisted with the phone call. Reviewed dr's message line by line. Pt verbalized understanding and willingness to contact Dr. Kenzie Lopez.

## 2021-09-15 ENCOUNTER — TELEPHONE (OUTPATIENT)
Dept: FAMILY MEDICINE CLINIC | Facility: CLINIC | Age: 41
End: 2021-09-15

## 2021-09-15 DIAGNOSIS — E55.9 VITAMIN D DEFICIENCY: ICD-10-CM

## 2021-09-15 RX ORDER — ERGOCALCIFEROL 1.25 MG/1
50000 CAPSULE ORAL WEEKLY
Qty: 12 CAPSULE | Refills: 0 | Status: SHIPPED | OUTPATIENT
Start: 2021-09-15 | End: 2021-10-11

## 2021-09-15 NOTE — TELEPHONE ENCOUNTER
Spoke with pharmacist Cosme at [address] [place] [address] pharmacy--conforms receipt of 9/13/2021 Dicyclomine Rx and will get ready for patient--he does not have Ergocalciferol on file (records show Rx was sent to [facility] pharmacy 6/2021.     Spoke with [employer]

## 2021-09-15 NOTE — TELEPHONE ENCOUNTER
Patient states the pharmacy has not received any prescriptions. Please re send to [address] [facility] (on file).       dicyclomine 10 MG Oral Cap    ERGOCALCIFEROL 1.25 MG ([id number] UT) Oral Cap

## 2021-09-23 DIAGNOSIS — M54.50 ACUTE RIGHT-SIDED LOW BACK PAIN WITHOUT SCIATICA: ICD-10-CM

## 2021-09-23 NOTE — TELEPHONE ENCOUNTER
Please review. Protocol failed / No protocol.     Requested Prescriptions   Pending Prescriptions Disp Refills    METHOCARBAMOL 500 MG Oral Tab [Pharmacy Med Name: METHOCARBAMOL 500MG TAB] 90 tablet 0     Sig: TAKE 1 TABLET BY MOUTH 3 TIMES DAILY AS NEEDED

## 2021-09-24 ENCOUNTER — TELEPHONE (OUTPATIENT)
Dept: FAMILY MEDICINE CLINIC | Facility: CLINIC | Age: 41
End: 2021-09-24

## 2021-09-24 NOTE — TELEPHONE ENCOUNTER
Pt calling and states she is still having intermittent abdominal cramping despite taking Methocarbamol.  Asked why she didn't take Bentyl as previously called in by Dr Irene Fierro for this issue and she states the pharmacy states she couldn't pick it up until ne

## 2021-09-25 RX ORDER — METHOCARBAMOL 500 MG/1
TABLET, FILM COATED ORAL
Qty: 90 TABLET | Refills: 0 | Status: SHIPPED | OUTPATIENT
Start: 2021-09-25 | End: 2021-10-11

## 2021-10-06 DIAGNOSIS — R74.8 ELEVATED LIVER ENZYMES: ICD-10-CM

## 2021-10-06 DIAGNOSIS — K76.0 HEPATIC STEATOSIS: ICD-10-CM

## 2021-10-06 DIAGNOSIS — F41.9 ANXIETY: ICD-10-CM

## 2021-10-06 DIAGNOSIS — E55.9 VITAMIN D DEFICIENCY: ICD-10-CM

## 2021-10-06 DIAGNOSIS — E53.8 VITAMIN B12 DEFICIENCY: ICD-10-CM

## 2021-10-06 DIAGNOSIS — M54.16 LUMBAR RADICULOPATHY: ICD-10-CM

## 2021-10-06 DIAGNOSIS — Z87.442 HISTORY OF NEPHROLITHIASIS: ICD-10-CM

## 2021-10-06 DIAGNOSIS — R63.5 WEIGHT GAIN: ICD-10-CM

## 2021-10-06 DIAGNOSIS — E66.9 OBESITY (BMI 30-39.9): ICD-10-CM

## 2021-10-06 DIAGNOSIS — E78.00 HYPERCHOLESTEREMIA: Primary | ICD-10-CM

## 2021-10-07 ENCOUNTER — OFFICE VISIT (OUTPATIENT)
Dept: SURGERY | Facility: CLINIC | Age: 41
End: 2021-10-07
Payer: COMMERCIAL

## 2021-10-07 VITALS — BODY MASS INDEX: 33.28 KG/M2 | WEIGHT: 185.5 LBS | HEIGHT: 62.5 IN

## 2021-10-07 DIAGNOSIS — E66.09 CLASS 1 OBESITY DUE TO EXCESS CALORIES WITHOUT SERIOUS COMORBIDITY WITH BODY MASS INDEX (BMI) OF 33.0 TO 33.9 IN ADULT: Primary | ICD-10-CM

## 2021-10-07 PROCEDURE — 3008F BODY MASS INDEX DOCD: CPT | Performed by: DIETITIAN, REGISTERED

## 2021-10-07 PROCEDURE — 97803 MED NUTRITION INDIV SUBSEQ: CPT | Performed by: DIETITIAN, REGISTERED

## 2021-10-07 NOTE — PROGRESS NOTES
FOLLOW UP OUTPATIENT NUTRITION CONSULTATION    Nutrition Assessment    Medical Diagnosis: Obesity    Physical Findings: fatigue, back pain, knee pain and impaired sleep    Client Age and Gender: 46year old female    Marital Status and Occupation: [employer] - [employer]         Height:  Ht Readings from Last 1 Encounters:  10/07/21 : 5' 2.5\" (1.588 m)      Weight:   Wt Readings from Last 6 Encounters:  10/07/21 : 185 lb 8 oz (84.1 kg)  09/02/21 : 187 lb 6.4 oz (85 kg)  08/30/21 : 182 lb (82.6 kg)  07/20/21 feels well. Water intake is excellent. Started exercising more regularly now that she has found low impact alternatives. Patient agreed to goals below. Goals:   1. Cont portion controlled sweets  2. Protein + produce focused snacks/meals  3.  Continue wi

## 2021-10-11 ENCOUNTER — OFFICE VISIT (OUTPATIENT)
Dept: FAMILY MEDICINE CLINIC | Facility: CLINIC | Age: 41
End: 2021-10-11
Payer: COMMERCIAL

## 2021-10-11 VITALS
SYSTOLIC BLOOD PRESSURE: 114 MMHG | BODY MASS INDEX: 33.48 KG/M2 | WEIGHT: 186.63 LBS | TEMPERATURE: 98 F | HEIGHT: 62.5 IN | DIASTOLIC BLOOD PRESSURE: 67 MMHG | HEART RATE: 83 BPM

## 2021-10-11 DIAGNOSIS — S16.1XXA STRAIN OF NECK MUSCLE, INITIAL ENCOUNTER: ICD-10-CM

## 2021-10-11 DIAGNOSIS — G47.00 INSOMNIA, UNSPECIFIED TYPE: ICD-10-CM

## 2021-10-11 DIAGNOSIS — R14.0 POSTPRANDIAL ABDOMINAL BLOATING: ICD-10-CM

## 2021-10-11 DIAGNOSIS — R13.10 PAINFUL SWALLOWING: ICD-10-CM

## 2021-10-11 DIAGNOSIS — G44.209 TENSION HEADACHE: ICD-10-CM

## 2021-10-11 DIAGNOSIS — R10.9 LATERAL ABDOMINAL PAIN: Primary | ICD-10-CM

## 2021-10-11 DIAGNOSIS — K59.09 OTHER CONSTIPATION: ICD-10-CM

## 2021-10-11 DIAGNOSIS — K58.1 IRRITABLE BOWEL SYNDROME WITH CONSTIPATION: ICD-10-CM

## 2021-10-11 DIAGNOSIS — E55.9 VITAMIN D DEFICIENCY: ICD-10-CM

## 2021-10-11 DIAGNOSIS — L65.9 HAIR LOSS DISORDER: ICD-10-CM

## 2021-10-11 DIAGNOSIS — H57.9 EYE EXAM ABNORMAL: ICD-10-CM

## 2021-10-11 PROCEDURE — 99215 OFFICE O/P EST HI 40 MIN: CPT | Performed by: FAMILY MEDICINE

## 2021-10-11 PROCEDURE — 3074F SYST BP LT 130 MM HG: CPT | Performed by: FAMILY MEDICINE

## 2021-10-11 PROCEDURE — 3008F BODY MASS INDEX DOCD: CPT | Performed by: FAMILY MEDICINE

## 2021-10-11 PROCEDURE — 3078F DIAST BP <80 MM HG: CPT | Performed by: FAMILY MEDICINE

## 2021-10-11 RX ORDER — AMITRIPTYLINE HYDROCHLORIDE 10 MG/1
TABLET, FILM COATED ORAL
Qty: 60 TABLET | Refills: 1 | Status: SHIPPED | OUTPATIENT
Start: 2021-10-11 | End: 2021-12-21

## 2021-10-11 RX ORDER — ERGOCALCIFEROL 1.25 MG/1
50000 CAPSULE ORAL WEEKLY
Qty: 12 CAPSULE | Refills: 0 | Status: SHIPPED | OUTPATIENT
Start: 2021-10-11

## 2021-10-11 NOTE — PROGRESS NOTES
Patient ID: Mita Aguilar is a 46year old female. HPI  Patient presents with:  Abdominal Pain: c/o on and off abdominal pain - x 2 weeks   Hair/Scalp Problem: Hair loss    Last seen by me on 8/30/2021.      Pt c/o abdominal bloating and abdominal p kg/m²  08/30/21 : 32.76 kg/m²  07/20/21 : 33.78 kg/m²  07/14/21 : 34.45 kg/m²      BP Readings from Last 6 Encounters:  10/11/21 : 114/67  09/02/21 : 124/68  08/30/21 : 127/80  07/20/21 : 119/78  07/14/21 : 112/76  06/10/21 : 138/79        Review of System • Phentermine HCl 37.5 MG Oral Tab Take 1 tablet (37.5 mg total) by mouth every morning before breakfast. 30 tablet 2   • SAXENDA 18 MG/3ML Subcutaneous Solution Pen-injector Inject 3 mg into the skin daily. Week 1- 0.6mg daily. Week 2- 1.2mg daily.  Week person, place, and time. Deep tendon reflexes are 2 out of 4. Sensory exam is normal.  All 4 extremities with full strength. Speaking normally. Skin: Skin is warm. Psychiatry: Pleasant. Mildly anxious affect. Possible \"worried well\".   Abdominal: this has anything to do with her cervical spine surgery. Possibly due to GERD but she had an EGD in the past but should see GI for this.   Irritable bowel syndrome with constipation  -     GASTRO - INTERNAL    Eye exam abnormal  -     OPHTHALMOLOGY - INTER prescribed. Approach to treatment discussed and patient/family member understands and agrees to plan. No follow-ups on file. There are no Patient Instructions on file for this visit.     Ashley Sanches    10/11/2021    By signing my name below, I

## 2021-10-18 ENCOUNTER — LAB ENCOUNTER (OUTPATIENT)
Dept: LAB | Age: 41
End: 2021-10-18
Attending: FAMILY MEDICINE
Payer: COMMERCIAL

## 2021-10-18 DIAGNOSIS — E55.9 VITAMIN D DEFICIENCY: ICD-10-CM

## 2021-10-18 DIAGNOSIS — L65.9 HAIR LOSS DISORDER: ICD-10-CM

## 2021-10-18 PROCEDURE — 82306 VITAMIN D 25 HYDROXY: CPT

## 2021-10-18 PROCEDURE — 36415 COLL VENOUS BLD VENIPUNCTURE: CPT

## 2021-10-18 PROCEDURE — 84443 ASSAY THYROID STIM HORMONE: CPT

## 2021-10-21 DIAGNOSIS — M54.50 ACUTE RIGHT-SIDED LOW BACK PAIN WITHOUT SCIATICA: ICD-10-CM

## 2021-10-25 ENCOUNTER — OFFICE VISIT (OUTPATIENT)
Dept: SURGERY | Facility: CLINIC | Age: 41
End: 2021-10-25
Payer: COMMERCIAL

## 2021-10-25 VITALS
HEIGHT: 62.5 IN | BODY MASS INDEX: 33.76 KG/M2 | WEIGHT: 188.19 LBS | DIASTOLIC BLOOD PRESSURE: 73 MMHG | OXYGEN SATURATION: 98 % | HEART RATE: 98 BPM | SYSTOLIC BLOOD PRESSURE: 120 MMHG

## 2021-10-25 DIAGNOSIS — E78.00 HYPERCHOLESTEREMIA: Primary | ICD-10-CM

## 2021-10-25 DIAGNOSIS — K76.0 HEPATIC STEATOSIS: ICD-10-CM

## 2021-10-25 DIAGNOSIS — E66.9 OBESITY (BMI 30-39.9): ICD-10-CM

## 2021-10-25 DIAGNOSIS — Z87.442 HISTORY OF NEPHROLITHIASIS: ICD-10-CM

## 2021-10-25 DIAGNOSIS — E53.8 VITAMIN B12 DEFICIENCY: ICD-10-CM

## 2021-10-25 DIAGNOSIS — R63.5 WEIGHT GAIN: ICD-10-CM

## 2021-10-25 DIAGNOSIS — R74.8 ELEVATED LIVER ENZYMES: ICD-10-CM

## 2021-10-25 DIAGNOSIS — F41.9 ANXIETY: ICD-10-CM

## 2021-10-25 DIAGNOSIS — E55.9 VITAMIN D DEFICIENCY: ICD-10-CM

## 2021-10-25 DIAGNOSIS — M54.16 LUMBAR RADICULOPATHY: ICD-10-CM

## 2021-10-25 PROCEDURE — 3074F SYST BP LT 130 MM HG: CPT | Performed by: NURSE PRACTITIONER

## 2021-10-25 PROCEDURE — 3078F DIAST BP <80 MM HG: CPT | Performed by: NURSE PRACTITIONER

## 2021-10-25 PROCEDURE — 99214 OFFICE O/P EST MOD 30 MIN: CPT | Performed by: NURSE PRACTITIONER

## 2021-10-25 PROCEDURE — 3008F BODY MASS INDEX DOCD: CPT | Performed by: NURSE PRACTITIONER

## 2021-10-25 RX ORDER — METHOCARBAMOL 500 MG/1
TABLET, FILM COATED ORAL
Qty: 90 TABLET | Refills: 0 | OUTPATIENT
Start: 2021-10-25

## 2021-10-25 NOTE — PROGRESS NOTES
[address] [facility] [address]  [address] [facility] [address]  Dept: [phone]       Patient:  Jennifer Smith  :      1980  MRN:      [id number]    Chief Complaint:  Patient pres Wt 188 lb 3.2 oz (85.4 kg)   LMP 03/22/2017   SpO2 98%   BMI 33.87 kg/m²     Initial weight loss: +01   Total weight loss: -03   Start weight: 191    Wt Readings from Last 6 Encounters:  10/25/21 : 188 lb 3.2 oz (85.4 kg)  10/11/21 : 186 lb 9.6 oz (84.6 kg Never Used    Vaping Use      Vaping Use: Never used    Substance and Sexual Activity      Alcohol use:  Yes        Alcohol/week: 0.0 standard drinks        Comment: rarely       Drug use: No      Sexual activity: Yes        Birth control/protection: Hyster Sexually Abused: Not on file  Housing Stability:       Unable to Pay for Housing in the Last Year: Not on file      Number of Places Lived in the Last Year: Not on file      Unstable Housing in the Last Year: Not on file  Surgical History:    Past Surgical meal:    · # of snacks per day:    · Type of snacks:  Fruit   · Amount of soda consumption per day:  Occasional coke zero, 2 days/week   · Amount of water (in ounces) per day:  Adequate   · Toughest challenge:    · Sleep:  Ok with magnesium    Fruits:    B: color, texture, turgor normal. No rashes or lesions  Neurologic: Grossly normal    ASSESSMENT       Encounter Diagnosis(ses):   Hypercholesteremia  (primary encounter diagnosis)  Hepatic steatosis  Vitamin b12 deficiency  History of nephrolithiasis  Anxiet Keep a food log, aim for 100 grams carbs/day. Meet with RD.   2. Drink 64 ounces of non-caloric beverages per day. No fruit juices or regular soda. 3. Aim for 150 minutes moderate exercise per week.     4. Increase fruit and vegetable servings to 5-6 per d

## 2021-10-26 ENCOUNTER — TELEPHONE (OUTPATIENT)
Dept: PHYSICAL MEDICINE AND REHAB | Facility: CLINIC | Age: 41
End: 2021-10-26

## 2021-10-26 ENCOUNTER — OFFICE VISIT (OUTPATIENT)
Dept: PHYSICAL MEDICINE AND REHAB | Facility: CLINIC | Age: 41
End: 2021-10-26
Payer: COMMERCIAL

## 2021-10-26 VITALS
DIASTOLIC BLOOD PRESSURE: 74 MMHG | WEIGHT: 188 LBS | BODY MASS INDEX: 33.73 KG/M2 | OXYGEN SATURATION: 98 % | HEIGHT: 62.5 IN | HEART RATE: 95 BPM | SYSTOLIC BLOOD PRESSURE: 130 MMHG

## 2021-10-26 DIAGNOSIS — G56.01 RIGHT CARPAL TUNNEL SYNDROME: ICD-10-CM

## 2021-10-26 DIAGNOSIS — M75.41 ROTATOR CUFF IMPINGEMENT SYNDROME OF RIGHT SHOULDER: Primary | ICD-10-CM

## 2021-10-26 PROCEDURE — 3075F SYST BP GE 130 - 139MM HG: CPT | Performed by: PHYSICAL MEDICINE & REHABILITATION

## 2021-10-26 PROCEDURE — 3078F DIAST BP <80 MM HG: CPT | Performed by: PHYSICAL MEDICINE & REHABILITATION

## 2021-10-26 PROCEDURE — 3008F BODY MASS INDEX DOCD: CPT | Performed by: PHYSICAL MEDICINE & REHABILITATION

## 2021-10-26 PROCEDURE — 99214 OFFICE O/P EST MOD 30 MIN: CPT | Performed by: PHYSICAL MEDICINE & REHABILITATION

## 2021-10-26 NOTE — TELEPHONE ENCOUNTER
Completed [facility] outpatient prior authorization form for Right carpal tunnel steroid injection under US guidance CPT [id number]  Completed Outpatient PA Form and clinicals faxed to [phone]  Status: pending

## 2021-10-26 NOTE — PATIENT INSTRUCTIONS
I recommend a whole food, plant powered diet with low glycemic index:     Aim for 3 meals a day and 1-3 snacks as needed. Breakfast ideas:  1. Fruit and nuts/seeds. 2. Eggs scrambled with vegetables, cottage cheese.   3. Oatmeal (stovetop), cinnamon,

## 2021-10-27 ENCOUNTER — OFFICE VISIT (OUTPATIENT)
Dept: DERMATOLOGY CLINIC | Facility: CLINIC | Age: 41
End: 2021-10-27
Payer: COMMERCIAL

## 2021-10-27 DIAGNOSIS — L65.9 HAIR LOSS: Primary | ICD-10-CM

## 2021-10-27 PROCEDURE — 99213 OFFICE O/P EST LOW 20 MIN: CPT | Performed by: PHYSICIAN ASSISTANT

## 2021-10-27 RX ORDER — CLOBETASOL PROPIONATE 0.46 MG/ML
1 SOLUTION TOPICAL
Qty: 60 ML | Refills: 3 | Status: SHIPPED | OUTPATIENT
Start: 2021-10-27

## 2021-10-27 RX ORDER — KETOCONAZOLE 20 MG/ML
SHAMPOO TOPICAL
Qty: 120 ML | Refills: 3 | Status: SHIPPED | OUTPATIENT
Start: 2021-10-27

## 2021-10-27 NOTE — PROGRESS NOTES
Progress note    C/C: right neck, shoulder, arm pain; numbness and tingling in the right hand    HPI: 72-year-old female presents for follow-up. Primarily speaks Macedonian, visit conducted with the use of a phone .   I last saw her referring EMG o would be to further evaluate the right shoulder with x-ray + MRI. F/u for injection. Documentation was created using Dragon speech technology; please excuse any typos.     Maite Bee DO  Physical Medicine and Rehabilitation  [facility]

## 2021-10-27 NOTE — PROGRESS NOTES
HPI:    Patient ID: Freida Uribe is a 46year old female. Patient presents with hair loss. She has itching. Some papules noted. No draining. No tenderness noted. She has noted hair around the house, combing and taking a bath.  She has noted around t RASH  Ciprofloxacin           RASH  Acetaminophen           OTHER (SEE COMMENTS)    Comment:Fatty Liver   LMP 03/22/2017   There is no height or weight on file to calculate BMI.   PHYSICAL EXAM:   Physical Exam  Constitutional:       General: She is not in

## 2021-10-29 NOTE — TELEPHONE ENCOUNTER
Received notice of Approval from [facility] for Right carpal tunnel steroid injection under US guidance as authorization is not required per health plan reference #[id number]

## 2021-11-17 ENCOUNTER — TELEPHONE (OUTPATIENT)
Dept: FAMILY MEDICINE CLINIC | Facility: CLINIC | Age: 41
End: 2021-11-17

## 2021-11-17 NOTE — H&P
[address] [facility] [address] Gastroenterology                                                                                                  Clinic History and Physical     Pa • Ovarian cyst    • PONV (postoperative nausea and vomiting)    • Postpartum depression    • Pregnancy , ,        • Spondylolisthesis of lumbosacral region 2017   • Visual impairment     WEARS GLASSES   • Vitamin B12 deficiency    • V 1 tablet (40 mg total) by mouth every morning before breakfast. To help with stomach acid and stomach irritation/inflammation 90 tablet 1   • MAGNESIUM OR Take by mouth in the morning, at noon, and at bedtime.        • Multiple Vitamin (MULTIVITAMIN OR) Wally Motts sounds noted, no masses are palpated  : no CVA tenderness  Skin: dry, warm, no jaundice  Ext: no cyanosis, clubbing or edema is evident.    Neuro: Alert and oriented x4, and patient is having movements of all 4 extremities   Psych: Pt has a normal mood an

## 2021-11-17 NOTE — TELEPHONE ENCOUNTER
Patient is calling to let PCP know that the following medication has not helped or improved symptoms:      Amitriptyline    Please notes her appetite has increased and she's gained weight; patient believes this medication has caused this and constipation and only helps her sleep at night but during the day pain continues. Patient is asking if you are able to prescribed a different medication. Please advise.

## 2021-11-18 NOTE — TELEPHONE ENCOUNTER
Pt saw Dr. Seun Rivera on 10/11/21 and was supposed to f/u with GI   Pt has an appt with GI on 12/1/21. Dr. Seun Rivera do you want pt to stop med for now? Lateral abdominal pain  -     amitriptyline 10 MG Oral Tab; 1 by mouth at nighttime. Can start taking 2 at nighttime if needed after 1 week. -     GASTRO - INTERNAL  This sure sounds like IBS that she has had for years. I am going to try amitriptyline as she also states she gets poor sleep and she also gets tension headaches. This may help with everything.

## 2021-11-19 NOTE — TELEPHONE ENCOUNTER
With Language Line  Loni España ID# [id number]      Identified  patient's name and      informed of 's  instructions below  Patient verbalizes understanding and agrees. Reviewed appt for GI   Future Appointments   Date Time Provider Ankita Topete   2021  1:00 PM BASIL Wylie [facility]   2021  1:30 PM Stacey Canas DO PM&R [facility]   2022  5:20 PM BASIL Monson [address] [facility] OF [facility]   2022  4:30 PM Mike Zhu MD [address] [facility] OF [facility]     Asking what can she take for the pain, she cannot take any Tylenol      Allergies reviewed and pharmacy confirmed  Please advise and thank you.   Please reply to pool: VERA Motta

## 2021-12-01 ENCOUNTER — OFFICE VISIT (OUTPATIENT)
Dept: GASTROENTEROLOGY | Facility: CLINIC | Age: 41
End: 2021-12-01
Payer: COMMERCIAL

## 2021-12-01 VITALS
WEIGHT: 181 LBS | HEART RATE: 80 BPM | HEIGHT: 62 IN | SYSTOLIC BLOOD PRESSURE: 140 MMHG | DIASTOLIC BLOOD PRESSURE: 83 MMHG | BODY MASS INDEX: 33.31 KG/M2

## 2021-12-01 DIAGNOSIS — R14.0 ABDOMINAL BLOATING: ICD-10-CM

## 2021-12-01 DIAGNOSIS — K59.00 CONSTIPATION, UNSPECIFIED CONSTIPATION TYPE: Primary | ICD-10-CM

## 2021-12-01 PROCEDURE — 3008F BODY MASS INDEX DOCD: CPT | Performed by: NURSE PRACTITIONER

## 2021-12-01 PROCEDURE — 3079F DIAST BP 80-89 MM HG: CPT | Performed by: NURSE PRACTITIONER

## 2021-12-01 PROCEDURE — 3077F SYST BP >= 140 MM HG: CPT | Performed by: NURSE PRACTITIONER

## 2021-12-01 PROCEDURE — 99243 OFF/OP CNSLTJ NEW/EST LOW 30: CPT | Performed by: NURSE PRACTITIONER

## 2021-12-01 NOTE — PATIENT INSTRUCTIONS
-Increase water/fiber intake  -Start probiotics/MiraLAX 2 scoops daily and titrate dose as needed   -F/u as needed

## 2021-12-02 ENCOUNTER — OFFICE VISIT (OUTPATIENT)
Dept: PHYSICAL MEDICINE AND REHAB | Facility: CLINIC | Age: 41
End: 2021-12-02
Payer: COMMERCIAL

## 2021-12-02 VITALS
DIASTOLIC BLOOD PRESSURE: 70 MMHG | OXYGEN SATURATION: 97 % | WEIGHT: 181 LBS | HEART RATE: 91 BPM | HEIGHT: 62 IN | SYSTOLIC BLOOD PRESSURE: 130 MMHG | BODY MASS INDEX: 33.31 KG/M2

## 2021-12-02 DIAGNOSIS — G56.01 RIGHT CARPAL TUNNEL SYNDROME: ICD-10-CM

## 2021-12-02 PROCEDURE — 20526 THER INJECTION CARP TUNNEL: CPT | Performed by: PHYSICAL MEDICINE & REHABILITATION

## 2021-12-02 PROCEDURE — 3075F SYST BP GE 130 - 139MM HG: CPT | Performed by: PHYSICAL MEDICINE & REHABILITATION

## 2021-12-02 PROCEDURE — 76942 ECHO GUIDE FOR BIOPSY: CPT | Performed by: PHYSICAL MEDICINE & REHABILITATION

## 2021-12-02 PROCEDURE — 3078F DIAST BP <80 MM HG: CPT | Performed by: PHYSICAL MEDICINE & REHABILITATION

## 2021-12-02 PROCEDURE — 3008F BODY MASS INDEX DOCD: CPT | Performed by: PHYSICAL MEDICINE & REHABILITATION

## 2021-12-02 RX ORDER — TRIAMCINOLONE ACETONIDE 40 MG/ML
20 INJECTION, SUSPENSION INTRA-ARTICULAR; INTRAMUSCULAR ONCE
Status: COMPLETED | OUTPATIENT
Start: 2021-12-02 | End: 2021-12-02

## 2021-12-02 RX ORDER — LIDOCAINE HYDROCHLORIDE 10 MG/ML
0.5 INJECTION, SOLUTION INFILTRATION; PERINEURAL ONCE
Status: COMPLETED | OUTPATIENT
Start: 2021-12-02 | End: 2021-12-02

## 2021-12-02 NOTE — PROCEDURES
Carpal tunnel corticosteroid injection under ultrasound guidance    Dx: right carpal tunnel syndrome    Using the transverse view, the right median nerve was identified at the carpal tunnel and scanned proximal and distal.  The patient's skin was cleaned w

## 2021-12-03 ENCOUNTER — TELEPHONE (OUTPATIENT)
Dept: SURGERY | Facility: CLINIC | Age: 41
End: 2021-12-03

## 2021-12-03 NOTE — TELEPHONE ENCOUNTER
PATIENT IS REQUESTING A REFILL FOR VYVANSE, SHE STATES THAT SHE DIDN'T RECEIVE A 90 DAY SUPPLY AT HER LAST VISIT IN OCT. THANK YOU.

## 2021-12-03 NOTE — TELEPHONE ENCOUNTER
Noted. Deisi Bliss to pharmacy. There is an order for Vyvanse that can be dispensed. They will fill and call for her to come .

## 2021-12-20 DIAGNOSIS — G44.209 TENSION HEADACHE: ICD-10-CM

## 2021-12-20 DIAGNOSIS — R10.9 LATERAL ABDOMINAL PAIN: ICD-10-CM

## 2021-12-20 DIAGNOSIS — R14.0 POSTPRANDIAL ABDOMINAL BLOATING: ICD-10-CM

## 2021-12-20 DIAGNOSIS — G47.00 INSOMNIA, UNSPECIFIED TYPE: ICD-10-CM

## 2021-12-21 RX ORDER — AMITRIPTYLINE HYDROCHLORIDE 10 MG/1
TABLET, FILM COATED ORAL
Qty: 60 TABLET | Refills: 0 | Status: SHIPPED | OUTPATIENT
Start: 2021-12-21 | End: 2022-01-13

## 2021-12-21 NOTE — TELEPHONE ENCOUNTER
Refill passed per [place] [employer] [facility] protocol.   Requested Prescriptions   Pending Prescriptions Disp Refills    AMITRIPTYLINE 10 MG Oral Tab [Pharmacy Med Name: AMITRIPTYLINE HYDROC 10MG TAB] 60 tablet 0     Sig: TAKE 1 TABLET BY MOUTH AT BEDTIME FOR 1 WEEK, CAN START TAKING 2 TABLETS BY MOUTH AT BEDTIME IF NEEDED AFTER 1 WEEK        Psychiatric Non-Scheduled (Anti-Anxiety) Passed - 12/21/2021 12:21 PM        Passed - Appointment in last 6 or next 3 months             Recent Outpatient Visits              2 weeks ago Right carpal tunnel syndrome    [address] [facility] Spencer Chinchilla DO    Office Visit    2 weeks ago Constipation, unspecified constipation type    [facility], [address] [facility], Ilan Reardon APRN    Office Visit    1 month ago Hair loss    [facility] D[facility]at[facility] Jo Ann Hairston PA-C    Office Visit    1 month ago Rotator cuff impingement syndrome of right shoulder    [facility] Spencer Chinchilla DO    Office Visit    1 month ago Jen Swanson Dewain Millin, APRN    Office Visit           Future Appointments         Provider Department Appt Notes    In 2 weeks Spencer Chinchilla, [facility] 1 month f/u    In 1 month Familia Webber, [address] [facility], [address]     In 1 month Camilo Ramos, [address] [facility] Ophthalmology np Eye exam, sched with , policy informed

## 2021-12-21 NOTE — TELEPHONE ENCOUNTER
Gerald Garcia from [facility] calling to check the status of refill for amitriptyline 10 MG Oral Tab  Phone#[phone]  AUP#[phone]

## 2022-01-13 DIAGNOSIS — G47.00 INSOMNIA, UNSPECIFIED TYPE: ICD-10-CM

## 2022-01-13 DIAGNOSIS — G44.209 TENSION HEADACHE: ICD-10-CM

## 2022-01-13 DIAGNOSIS — R14.0 POSTPRANDIAL ABDOMINAL BLOATING: ICD-10-CM

## 2022-01-13 DIAGNOSIS — R10.9 LATERAL ABDOMINAL PAIN: ICD-10-CM

## 2022-01-13 RX ORDER — AMITRIPTYLINE HYDROCHLORIDE 10 MG/1
TABLET, FILM COATED ORAL
Qty: 60 TABLET | Refills: 0 | Status: SHIPPED | OUTPATIENT
Start: 2022-01-13

## 2022-02-14 ENCOUNTER — OFFICE VISIT (OUTPATIENT)
Dept: FAMILY MEDICINE CLINIC | Facility: CLINIC | Age: 42
End: 2022-02-14
Payer: COMMERCIAL

## 2022-02-14 VITALS
DIASTOLIC BLOOD PRESSURE: 78 MMHG | HEART RATE: 92 BPM | HEIGHT: 62 IN | BODY MASS INDEX: 34.89 KG/M2 | SYSTOLIC BLOOD PRESSURE: 120 MMHG | TEMPERATURE: 98 F | WEIGHT: 189.63 LBS

## 2022-02-14 DIAGNOSIS — E55.9 VITAMIN D DEFICIENCY: ICD-10-CM

## 2022-02-14 DIAGNOSIS — F32.A DEPRESSIVE DISORDER: ICD-10-CM

## 2022-02-14 DIAGNOSIS — E66.09 NON MORBID OBESITY DUE TO EXCESS CALORIES: ICD-10-CM

## 2022-02-14 DIAGNOSIS — G89.29 CHRONIC LEFT-SIDED LOW BACK PAIN WITHOUT SCIATICA: Primary | ICD-10-CM

## 2022-02-14 DIAGNOSIS — Z86.16 HISTORY OF COVID-19: ICD-10-CM

## 2022-02-14 DIAGNOSIS — M54.6 CHRONIC LEFT-SIDED THORACIC BACK PAIN: ICD-10-CM

## 2022-02-14 DIAGNOSIS — M54.50 CHRONIC LEFT-SIDED LOW BACK PAIN WITHOUT SCIATICA: Primary | ICD-10-CM

## 2022-02-14 DIAGNOSIS — R07.89 LEFT-SIDED CHEST WALL PAIN: ICD-10-CM

## 2022-02-14 DIAGNOSIS — R39.15 URGENCY OF URINATION: ICD-10-CM

## 2022-02-14 DIAGNOSIS — Z72.821 POOR SLEEP HYGIENE: ICD-10-CM

## 2022-02-14 DIAGNOSIS — G89.29 CHRONIC LEFT-SIDED THORACIC BACK PAIN: ICD-10-CM

## 2022-02-14 DIAGNOSIS — F41.9 ANXIETY: ICD-10-CM

## 2022-02-14 DIAGNOSIS — M72.2 PLANTAR FASCIITIS, BILATERAL: ICD-10-CM

## 2022-02-14 PROCEDURE — 3078F DIAST BP <80 MM HG: CPT | Performed by: FAMILY MEDICINE

## 2022-02-14 PROCEDURE — 3074F SYST BP LT 130 MM HG: CPT | Performed by: FAMILY MEDICINE

## 2022-02-14 PROCEDURE — 3008F BODY MASS INDEX DOCD: CPT | Performed by: FAMILY MEDICINE

## 2022-02-14 PROCEDURE — 99215 OFFICE O/P EST HI 40 MIN: CPT | Performed by: FAMILY MEDICINE

## 2022-02-14 RX ORDER — ERGOCALCIFEROL 1.25 MG/1
50000 CAPSULE ORAL WEEKLY
Qty: 12 CAPSULE | Refills: 0 | Status: SHIPPED | OUTPATIENT
Start: 2022-02-14

## 2022-02-14 RX ORDER — DULOXETIN HYDROCHLORIDE 60 MG/1
60 CAPSULE, DELAYED RELEASE ORAL DAILY
Qty: 90 CAPSULE | Refills: 0 | Status: SHIPPED | OUTPATIENT
Start: 2022-02-14 | End: 2022-02-22

## 2022-02-14 RX ORDER — DOXEPIN HYDROCHLORIDE 10 MG/1
10 CAPSULE ORAL NIGHTLY
Qty: 90 CAPSULE | Refills: 0 | Status: SHIPPED | OUTPATIENT
Start: 2022-02-14

## 2022-02-14 RX ORDER — MELOXICAM 15 MG/1
15 TABLET ORAL DAILY
Qty: 90 TABLET | Refills: 0 | Status: SHIPPED | OUTPATIENT
Start: 2022-02-14

## 2022-02-21 ENCOUNTER — TELEPHONE (OUTPATIENT)
Dept: FAMILY MEDICINE CLINIC | Facility: CLINIC | Age: 42
End: 2022-02-21

## 2022-02-21 NOTE — TELEPHONE ENCOUNTER
Patient dropped off forms for MD to fill out. Patient would like forms to be faxed over once completed.

## 2022-02-21 NOTE — TELEPHONE ENCOUNTER
Patient states that she did not feel good after taking Cymbalta and would like a different medication,  Please advise. DULoxetine (CYMBALTA) 60 MG Oral Cap DR Particles, Take 1 capsule (60 mg total) by mouth daily. For mood. , Disp: 90 capsule, Rfl: 0

## 2022-02-22 NOTE — TELEPHONE ENCOUNTER
Language line #[phone] assisted with the phone call. The whole phone call took in excess of 30minutes. I   reviewed dr's message with the pt. She expressed great reluctance to start a new medication after the severity of to her reaction to the  previous prescription. States that her symptoms are much better now. I reviewed the the directions in detail for the new medication. Pt verbalized understanding. Because of the pt's reluctance to start, I advised that she could wait some more days before starting the new med. She agreed with this plan. I also advised that she should not start on a weekend so that she could contact our office if she has difficulty. I also recommended that she have someone home with her when she starts the new medicine. Lastly, the pt states that she still has a few Alprazolam 0.25mg left from 02/2020  and sometimes (but rarely needs these for anxiety) I advised not to take these until I've checked with the dr. Isaac Burt if it's ok to take the Alprazolam with these new medications.

## 2022-02-22 NOTE — TELEPHONE ENCOUNTER
Stop the Cymbalta. I sent in Viibryd 10 mg which she will take once daily for 7 days and I also sent in the 20 mg tablets that she will start taking daily after she is finished with the 7 days of the 10 mg dose. If this does not seem to help her or she gets side effects we will have to send her to psychiatry.

## 2022-02-23 NOTE — TELEPHONE ENCOUNTER
That is fine, she can go ahead and take the alprazolam if needed. I think she needs to see a psychiatrist and I am going to do a referral through Alex[facility] Gould. She is getting much too difficult to handle for primary care. I think many of her physical complaints have to do with her psychiatric issues.

## 2022-02-23 NOTE — TELEPHONE ENCOUNTER
Language line [phone] assisted with the phone call. Reviewed 's message with pt. She verbalized understanding and agreement with this plan.

## 2022-02-24 ENCOUNTER — TELEMEDICINE (OUTPATIENT)
Dept: PHYSICAL MEDICINE AND REHAB | Facility: CLINIC | Age: 42
End: 2022-02-24
Payer: COMMERCIAL

## 2022-02-24 DIAGNOSIS — G56.01 RIGHT CARPAL TUNNEL SYNDROME: ICD-10-CM

## 2022-02-24 DIAGNOSIS — M75.41 ROTATOR CUFF IMPINGEMENT SYNDROME OF RIGHT SHOULDER: ICD-10-CM

## 2022-02-24 DIAGNOSIS — G56.21 CUBITAL TUNNEL SYNDROME ON RIGHT: Primary | ICD-10-CM

## 2022-02-24 PROCEDURE — 99213 OFFICE O/P EST LOW 20 MIN: CPT | Performed by: PHYSICAL MEDICINE & REHABILITATION

## 2022-02-24 NOTE — PROGRESS NOTES
This visit is conducted using Telemedicine with live, interactive video and audio. Patient has been referred to the [facility] website at [email or web address] to review the yearly Consent to Treat document. Patient understands and accepts financial responsibility for any deductible, co-insurance and/or co-pays associated with this service. Progress note - virtual    C/C: right hand, arm pain    HPI: 38year old female presents for f/u. Primarily Upper sorbian speaking, MA interpreted during visit. Underwent right carpal tunnel steroid injection under US guidance on 12/2/21; no relief. Has had worsening numbness and tingling in the right hand, pain in the right hand radiating up to the elbow that worsens at night time. Reports weakness in the hand. EMG done in July, mild carpal tunnel syndrome, no cubital tunnel syndrome at that time, and no evidence of cervical radiculopathy. She has been using a night time wrist splint by her PCP, which seems to increase pain so she discontinued it. Pertinent allergies: sulfa ABX, cipro, APAP    Physical exam:  No vitals - virtual    No appreciable atrophy of the right hand musculare  Increased right hand and arm pain with while holding the elbow in full flexion; also increased right shoulder pain with forward flexion of the right shoulder to 90 degrees    Imaging: No new imaging to review    Assessment and plan  1) cubital tunnel syndrome, right  2) carpal tunnel syndrome, right  3) right subacromial impingement    Recommend physical therapy for forearm flexor stretching, HEP, scapular and cervical stabilization; she will apply either a towel roll or a night time elbow brace for the next 1-2 months as well. Message sent to patient with link to [employer] patient information on cubital tunnel syndrome to demonstrate how to make a towel roll. Follow up after PT.     Luana Edmondson DO  Physical Medicine and [facility] [address] [place] [address]

## 2022-02-25 ENCOUNTER — TELEPHONE (OUTPATIENT)
Dept: FAMILY MEDICINE CLINIC | Facility: CLINIC | Age: 42
End: 2022-02-25

## 2022-02-25 ENCOUNTER — OFFICE VISIT (OUTPATIENT)
Dept: SURGERY | Facility: CLINIC | Age: 42
End: 2022-02-25
Payer: COMMERCIAL

## 2022-02-25 VITALS
OXYGEN SATURATION: 99 % | BODY MASS INDEX: 34.27 KG/M2 | WEIGHT: 191 LBS | HEIGHT: 62.5 IN | SYSTOLIC BLOOD PRESSURE: 130 MMHG | DIASTOLIC BLOOD PRESSURE: 72 MMHG | HEART RATE: 100 BPM

## 2022-02-25 DIAGNOSIS — M54.50 CHRONIC LEFT-SIDED LOW BACK PAIN WITHOUT SCIATICA: ICD-10-CM

## 2022-02-25 DIAGNOSIS — E78.00 HYPERCHOLESTEREMIA: Primary | ICD-10-CM

## 2022-02-25 DIAGNOSIS — F41.9 ANXIETY: ICD-10-CM

## 2022-02-25 DIAGNOSIS — Z87.442 HISTORY OF NEPHROLITHIASIS: ICD-10-CM

## 2022-02-25 DIAGNOSIS — E66.9 OBESITY (BMI 30-39.9): ICD-10-CM

## 2022-02-25 DIAGNOSIS — M54.16 LUMBAR RADICULOPATHY: ICD-10-CM

## 2022-02-25 DIAGNOSIS — K76.0 HEPATIC STEATOSIS: ICD-10-CM

## 2022-02-25 DIAGNOSIS — F32.A DEPRESSIVE DISORDER: Primary | ICD-10-CM

## 2022-02-25 DIAGNOSIS — G89.29 CHRONIC LEFT-SIDED LOW BACK PAIN WITHOUT SCIATICA: ICD-10-CM

## 2022-02-25 DIAGNOSIS — G89.29 CHRONIC LEFT-SIDED THORACIC BACK PAIN: ICD-10-CM

## 2022-02-25 DIAGNOSIS — M54.6 CHRONIC LEFT-SIDED THORACIC BACK PAIN: ICD-10-CM

## 2022-02-25 DIAGNOSIS — E55.9 VITAMIN D DEFICIENCY: ICD-10-CM

## 2022-02-25 DIAGNOSIS — R74.8 ELEVATED LIVER ENZYMES: ICD-10-CM

## 2022-02-25 DIAGNOSIS — E53.8 VITAMIN B12 DEFICIENCY: ICD-10-CM

## 2022-02-25 PROCEDURE — 3078F DIAST BP <80 MM HG: CPT | Performed by: NURSE PRACTITIONER

## 2022-02-25 PROCEDURE — 3008F BODY MASS INDEX DOCD: CPT | Performed by: NURSE PRACTITIONER

## 2022-02-25 PROCEDURE — 99214 OFFICE O/P EST MOD 30 MIN: CPT | Performed by: NURSE PRACTITIONER

## 2022-02-25 PROCEDURE — 3075F SYST BP GE 130 - 139MM HG: CPT | Performed by: NURSE PRACTITIONER

## 2022-02-25 RX ORDER — LIRAGLUTIDE 6 MG/ML
3 INJECTION, SOLUTION SUBCUTANEOUS DAILY
Qty: 15 ML | Refills: 1 | Status: SHIPPED | OUTPATIENT
Start: 2022-02-25 | End: 2022-03-22

## 2022-02-25 RX ORDER — BUPROPION HYDROCHLORIDE 150 MG/1
TABLET ORAL
COMMUNITY
End: 2022-02-25

## 2022-02-25 RX ORDER — DICYCLOMINE HYDROCHLORIDE 10 MG/1
CAPSULE ORAL
COMMUNITY
Start: 2021-09-24

## 2022-02-25 RX ORDER — NORTRIPTYLINE HYDROCHLORIDE 10 MG/1
CAPSULE ORAL
COMMUNITY
End: 2022-03-22

## 2022-02-25 NOTE — TELEPHONE ENCOUNTER
Patient states she had her consult with the weight loss clinic today, patient wanted to get bypass surgery. However in order to qualify for surgery she would have to have 2 or more qualifying conditions and she only has sabrina cholesterol. patient states she also has arthritis that would qualify her to get surgery but is not listed on her problem list or diagnosed. Patient requesting to know if that can be updated. Please advise.

## 2022-02-25 NOTE — TELEPHONE ENCOUNTER
Inform her that the physical capacities assessment is done by our physical therapy department as this could take more than an hour for them to do to find out what she can and cannot do. I will put in the order for her. She needs to  this form and take it with her.

## 2022-02-25 NOTE — TELEPHONE ENCOUNTER
You can let her know I looked through her x-rays and CAT scans and MRIs and cannot find any mention of arthritis. Her back surgery was done not because of arthritis but because of pain.

## 2022-02-28 NOTE — TELEPHONE ENCOUNTER
I spoke with patient verified name and . Patient was informed she needs to start PT and they will fill out the forms she needs. Patient says she cannot do more PT as Dr. Dang Chan had advised patient she needed to stop them due to possibly causing more pain and not helping patient also said even the therapists told her she could not continue the sessions for the same reason. Patient says these forms have been filled out by MD before, I have checked under media but forms from [place] were different.

## 2022-02-28 NOTE — TELEPHONE ENCOUNTER
It is not for physical therapy. It is for a physical capacity evaluation that the physical therapist will do on her to see exactly what she can and cannot do with regard to range of motion and how much she can or cannot lift. It has nothing to do with physical therapy. The form asks specific questions and we do not have the equipment in our office to fill out that type of form.

## 2022-02-28 NOTE — TELEPHONE ENCOUNTER
I spoke with patient verified name and . Patient was informed of message below and patient has no further questions.

## 2022-03-01 ENCOUNTER — DOCUMENTATION ONLY (OUTPATIENT)
Dept: SURGERY | Facility: CLINIC | Age: 42
End: 2022-03-01

## 2022-03-08 NOTE — TELEPHONE ENCOUNTER
Second attempt. I left a message for patient to call back. See message below per Dr. Goodwin. --It is not for physical therapy. It is for a physical capacity evaluation that the physical therapist will do on her to see exactly what she can and cannot do with regard to range of motion and how much she can or cannot lift. It has nothing to do with physical therapy. The form asks specific questions and we do not have the equipment in our office to fill out that type of form.

## 2022-03-08 NOTE — TELEPHONE ENCOUNTER
Patient is calling to follow up on status of forms she dropped of and requested they be faxed once completed to the number on the forms noted. Patient is requesting a callback with confirmation.

## 2022-03-22 ENCOUNTER — OFFICE VISIT (OUTPATIENT)
Dept: FAMILY MEDICINE CLINIC | Facility: CLINIC | Age: 42
End: 2022-03-22
Payer: COMMERCIAL

## 2022-03-22 VITALS
SYSTOLIC BLOOD PRESSURE: 121 MMHG | BODY MASS INDEX: 34.16 KG/M2 | HEART RATE: 85 BPM | DIASTOLIC BLOOD PRESSURE: 82 MMHG | WEIGHT: 190.38 LBS | HEIGHT: 62.5 IN | TEMPERATURE: 97 F

## 2022-03-22 DIAGNOSIS — Z12.31 VISIT FOR SCREENING MAMMOGRAM: ICD-10-CM

## 2022-03-22 DIAGNOSIS — M48.061 FORAMINAL STENOSIS OF LUMBAR REGION: ICD-10-CM

## 2022-03-22 DIAGNOSIS — Z23 NEED FOR VACCINATION: ICD-10-CM

## 2022-03-22 DIAGNOSIS — Z00.00 ADULT GENERAL MEDICAL EXAM: Primary | ICD-10-CM

## 2022-03-22 DIAGNOSIS — M50.022 CERVICAL DISC DISORDER AT C5-C6 LEVEL WITH MYELOPATHY: ICD-10-CM

## 2022-03-22 DIAGNOSIS — E66.09 NON MORBID OBESITY DUE TO EXCESS CALORIES: ICD-10-CM

## 2022-03-22 DIAGNOSIS — M43.17 SPONDYLOLISTHESIS OF LUMBOSACRAL REGION: ICD-10-CM

## 2022-03-22 DIAGNOSIS — M51.36 DEGENERATION OF LUMBAR INTERVERTEBRAL DISC: ICD-10-CM

## 2022-03-22 PROCEDURE — 3079F DIAST BP 80-89 MM HG: CPT | Performed by: FAMILY MEDICINE

## 2022-03-22 PROCEDURE — 3008F BODY MASS INDEX DOCD: CPT | Performed by: FAMILY MEDICINE

## 2022-03-22 PROCEDURE — 99396 PREV VISIT EST AGE 40-64: CPT | Performed by: FAMILY MEDICINE

## 2022-03-22 PROCEDURE — 3074F SYST BP LT 130 MM HG: CPT | Performed by: FAMILY MEDICINE

## 2022-03-22 PROCEDURE — 99213 OFFICE O/P EST LOW 20 MIN: CPT | Performed by: FAMILY MEDICINE

## 2022-03-22 PROCEDURE — 90471 IMMUNIZATION ADMIN: CPT | Performed by: FAMILY MEDICINE

## 2022-03-22 PROCEDURE — 90715 TDAP VACCINE 7 YRS/> IM: CPT | Performed by: FAMILY MEDICINE

## 2022-03-24 ENCOUNTER — LAB ENCOUNTER (OUTPATIENT)
Dept: LAB | Age: 42
End: 2022-03-24
Attending: FAMILY MEDICINE
Payer: COMMERCIAL

## 2022-03-24 ENCOUNTER — TELEPHONE (OUTPATIENT)
Dept: FAMILY MEDICINE CLINIC | Facility: CLINIC | Age: 42
End: 2022-03-24

## 2022-03-24 DIAGNOSIS — Z00.00 ADULT GENERAL MEDICAL EXAM: ICD-10-CM

## 2022-03-24 LAB
ALBUMIN SERPL-MCNC: 3.8 G/DL (ref 3.4–5)
ALBUMIN/GLOB SERPL: 1.2 {RATIO} (ref 1–2)
ALP LIVER SERPL-CCNC: 83 U/L
ALT SERPL-CCNC: 72 U/L
ANION GAP SERPL CALC-SCNC: 8 MMOL/L (ref 0–18)
AST SERPL-CCNC: 38 U/L (ref 15–37)
BASOPHILS # BLD AUTO: 0.03 X10(3) UL (ref 0–0.2)
BASOPHILS NFR BLD AUTO: 0.4 %
BILIRUB SERPL-MCNC: 0.5 MG/DL (ref 0.1–2)
BUN BLD-MCNC: 8 MG/DL (ref 7–18)
BUN/CREAT SERPL: 11.1 (ref 10–20)
CALCIUM BLD-MCNC: 8.7 MG/DL (ref 8.5–10.1)
CHLORIDE SERPL-SCNC: 106 MMOL/L (ref 98–112)
CHOLEST SERPL-MCNC: 237 MG/DL (ref ?–200)
CO2 SERPL-SCNC: 26 MMOL/L (ref 21–32)
DEPRECATED RDW RBC AUTO: 42.3 FL (ref 35.1–46.3)
EOSINOPHIL NFR BLD AUTO: 1.3 %
ERYTHROCYTE [DISTWIDTH] IN BLOOD BY AUTOMATED COUNT: 12.5 % (ref 11–15)
FASTING PATIENT LIPID ANSWER: YES
FASTING STATUS PATIENT QL REPORTED: YES
GLOBULIN PLAS-MCNC: 3.1 G/DL (ref 2.8–4.4)
GLUCOSE BLD-MCNC: 97 MG/DL (ref 70–99)
HCT VFR BLD AUTO: 42 %
HDLC SERPL-MCNC: 49 MG/DL (ref 40–59)
HGB BLD-MCNC: 14 G/DL
IMM GRANULOCYTES # BLD AUTO: 0.02 X10(3) UL (ref 0–1)
IMM GRANULOCYTES NFR BLD: 0.2 %
LDLC SERPL CALC-MCNC: 170 MG/DL (ref ?–100)
LYMPHOCYTES # BLD AUTO: 2.48 X10(3) UL (ref 1–4)
LYMPHOCYTES NFR BLD AUTO: 30.2 %
MCH RBC QN AUTO: 30.5 PG (ref 26–34)
MCHC RBC AUTO-ENTMCNC: 33.3 G/DL (ref 31–37)
MCV RBC AUTO: 91.5 FL
MONOCYTES # BLD AUTO: 0.6 X10(3) UL (ref 0.1–1)
MONOCYTES NFR BLD AUTO: 7.3 %
NEUTROPHILS # BLD AUTO: 4.96 X10 (3) UL (ref 1.5–7.7)
NEUTROPHILS # BLD AUTO: 4.96 X10(3) UL (ref 1.5–7.7)
NEUTROPHILS NFR BLD AUTO: 60.6 %
NONHDLC SERPL-MCNC: 188 MG/DL (ref ?–130)
OSMOLALITY SERPL CALC.SUM OF ELEC: 288 MOSM/KG (ref 275–295)
PLATELET # BLD AUTO: 317 10(3)UL (ref 150–450)
POTASSIUM SERPL-SCNC: 3.8 MMOL/L (ref 3.5–5.1)
PROT SERPL-MCNC: 6.9 G/DL (ref 6.4–8.2)
RBC # BLD AUTO: 4.59 X10(6)UL
SODIUM SERPL-SCNC: 140 MMOL/L (ref 136–145)
TRIGL SERPL-MCNC: 103 MG/DL (ref 30–149)
VLDLC SERPL CALC-MCNC: 20 MG/DL (ref 0–30)
WBC # BLD AUTO: 8.2 X10(3) UL (ref 4–11)

## 2022-03-24 PROCEDURE — 80061 LIPID PANEL: CPT

## 2022-03-24 PROCEDURE — 80053 COMPREHEN METABOLIC PANEL: CPT

## 2022-03-24 PROCEDURE — 85025 COMPLETE CBC W/AUTO DIFF WBC: CPT

## 2022-03-24 PROCEDURE — 36415 COLL VENOUS BLD VENIPUNCTURE: CPT

## 2022-03-24 NOTE — TELEPHONE ENCOUNTER
Per pt she requires orders for MRI that were given to her last week to read with sedation. Per pt she cant not do an mri without sedation. Please advise.

## 2022-03-25 ENCOUNTER — TELEPHONE (OUTPATIENT)
Dept: FAMILY MEDICINE CLINIC | Facility: CLINIC | Age: 42
End: 2022-03-25

## 2022-03-25 DIAGNOSIS — R29.898 BILATERAL ARM WEAKNESS: ICD-10-CM

## 2022-03-25 DIAGNOSIS — M79.605 BILATERAL LEG PAIN: ICD-10-CM

## 2022-03-25 DIAGNOSIS — M50.022 CERVICAL DISC DISORDER AT C5-C6 LEVEL WITH MYELOPATHY: ICD-10-CM

## 2022-03-25 DIAGNOSIS — M79.604 BILATERAL LEG PAIN: ICD-10-CM

## 2022-03-25 DIAGNOSIS — M51.36 DEGENERATION OF LUMBAR INTERVERTEBRAL DISC: ICD-10-CM

## 2022-03-25 DIAGNOSIS — M48.061 FORAMINAL STENOSIS OF LUMBAR REGION: ICD-10-CM

## 2022-03-25 DIAGNOSIS — M43.17 SPONDYLOLISTHESIS OF LUMBOSACRAL REGION: Primary | ICD-10-CM

## 2022-03-25 NOTE — TELEPHONE ENCOUNTER
Both MRIs are now ordered with sedation. Please let her know next time she needs to let the physician know this beforehand.

## 2022-03-26 NOTE — TELEPHONE ENCOUNTER
Call placed with assistance of  id#: [id number]    Pt advised of provider response below. Patient verbalizes understanding and agrees with plan. Apologizes for not notifying provider in advance. Also inquires about lab results. Advised still awaiting lab result recommendations.  Verbalizes understanding

## 2022-03-29 ENCOUNTER — TELEPHONE (OUTPATIENT)
Dept: FAMILY MEDICINE CLINIC | Facility: CLINIC | Age: 42
End: 2022-03-29

## 2022-03-29 NOTE — TELEPHONE ENCOUNTER
Contacted patient and informed of results below,she verbalized understanding and had no questions at this time.

## 2022-04-05 ENCOUNTER — OFFICE VISIT (OUTPATIENT)
Dept: SURGERY | Facility: CLINIC | Age: 42
End: 2022-04-05
Payer: COMMERCIAL

## 2022-04-05 VITALS
OXYGEN SATURATION: 100 % | SYSTOLIC BLOOD PRESSURE: 128 MMHG | WEIGHT: 192.88 LBS | BODY MASS INDEX: 35.49 KG/M2 | HEIGHT: 62 IN | HEART RATE: 84 BPM | DIASTOLIC BLOOD PRESSURE: 73 MMHG

## 2022-04-05 DIAGNOSIS — E78.00 HYPERCHOLESTEREMIA: Primary | ICD-10-CM

## 2022-04-05 DIAGNOSIS — E66.9 OBESITY (BMI 30-39.9): ICD-10-CM

## 2022-04-05 DIAGNOSIS — M15.9 PRIMARY OSTEOARTHRITIS INVOLVING MULTIPLE JOINTS: ICD-10-CM

## 2022-04-05 DIAGNOSIS — K76.0 HEPATIC STEATOSIS: ICD-10-CM

## 2022-04-05 PROBLEM — M15.0 PRIMARY OSTEOARTHRITIS INVOLVING MULTIPLE JOINTS: Status: ACTIVE | Noted: 2022-04-05

## 2022-04-05 PROCEDURE — 3074F SYST BP LT 130 MM HG: CPT | Performed by: INTERNAL MEDICINE

## 2022-04-05 PROCEDURE — 3078F DIAST BP <80 MM HG: CPT | Performed by: INTERNAL MEDICINE

## 2022-04-05 PROCEDURE — 3008F BODY MASS INDEX DOCD: CPT | Performed by: INTERNAL MEDICINE

## 2022-04-05 PROCEDURE — 99213 OFFICE O/P EST LOW 20 MIN: CPT | Performed by: INTERNAL MEDICINE

## 2022-04-12 ENCOUNTER — HOSPITAL ENCOUNTER (OUTPATIENT)
Dept: MAMMOGRAPHY | Age: 42
Discharge: HOME | End: 2022-04-12
Attending: FAMILY MEDICINE
Payer: COMMERCIAL

## 2022-04-12 DIAGNOSIS — Z12.31 VISIT FOR SCREENING MAMMOGRAM: ICD-10-CM

## 2022-04-12 PROCEDURE — 77067 SCR MAMMO BI INCL CAD: CPT | Performed by: FAMILY MEDICINE

## 2022-04-12 PROCEDURE — 77063 BREAST TOMOSYNTHESIS BI: CPT | Performed by: FAMILY MEDICINE

## 2022-04-14 ENCOUNTER — TELEPHONE (OUTPATIENT)
Dept: PHYSICAL THERAPY | Facility: HOSPITAL | Age: 42
End: 2022-04-14

## 2022-04-19 ENCOUNTER — OFFICE VISIT (OUTPATIENT)
Dept: PHYSICAL THERAPY | Age: 42
End: 2022-04-19
Attending: PHYSICAL MEDICINE & REHABILITATION
Payer: COMMERCIAL

## 2022-04-19 DIAGNOSIS — G56.21 CUBITAL TUNNEL SYNDROME ON RIGHT: ICD-10-CM

## 2022-04-19 DIAGNOSIS — M75.41 ROTATOR CUFF IMPINGEMENT SYNDROME OF RIGHT SHOULDER: ICD-10-CM

## 2022-04-19 DIAGNOSIS — G56.01 RIGHT CARPAL TUNNEL SYNDROME: ICD-10-CM

## 2022-04-19 PROCEDURE — 97161 PT EVAL LOW COMPLEX 20 MIN: CPT | Performed by: PHYSICAL THERAPIST

## 2022-04-19 PROCEDURE — 97110 THERAPEUTIC EXERCISES: CPT | Performed by: PHYSICAL THERAPIST

## 2022-04-20 ENCOUNTER — ORDER TRANSCRIPTION (OUTPATIENT)
Dept: ADMINISTRATIVE | Facility: HOSPITAL | Age: 42
End: 2022-04-20

## 2022-04-21 ENCOUNTER — OFFICE VISIT (OUTPATIENT)
Dept: PHYSICAL THERAPY | Age: 42
End: 2022-04-21
Attending: PHYSICAL MEDICINE & REHABILITATION
Payer: COMMERCIAL

## 2022-04-21 PROCEDURE — 97110 THERAPEUTIC EXERCISES: CPT | Performed by: PHYSICAL THERAPIST

## 2022-04-21 PROCEDURE — 97140 MANUAL THERAPY 1/> REGIONS: CPT | Performed by: PHYSICAL THERAPIST

## 2022-04-26 ENCOUNTER — OFFICE VISIT (OUTPATIENT)
Dept: PHYSICAL THERAPY | Age: 42
End: 2022-04-26
Attending: PHYSICAL MEDICINE & REHABILITATION
Payer: COMMERCIAL

## 2022-04-26 PROCEDURE — 97140 MANUAL THERAPY 1/> REGIONS: CPT | Performed by: PHYSICAL THERAPIST

## 2022-04-26 PROCEDURE — 97110 THERAPEUTIC EXERCISES: CPT | Performed by: PHYSICAL THERAPIST

## 2022-04-27 ENCOUNTER — TELEPHONE (OUTPATIENT)
Dept: NEUROLOGY | Facility: CLINIC | Age: 42
End: 2022-04-27

## 2022-04-27 ENCOUNTER — HOSPITAL ENCOUNTER (OUTPATIENT)
Dept: ULTRASOUND IMAGING | Facility: HOSPITAL | Age: 42
Discharge: HOME | End: 2022-04-27
Attending: FAMILY MEDICINE
Payer: COMMERCIAL

## 2022-04-27 ENCOUNTER — HOSPITAL ENCOUNTER (OUTPATIENT)
Dept: MAMMOGRAPHY | Facility: HOSPITAL | Age: 42
Discharge: HOME | End: 2022-04-27
Attending: FAMILY MEDICINE
Payer: COMMERCIAL

## 2022-04-27 DIAGNOSIS — R92.8 ABNORMAL MAMMOGRAM: ICD-10-CM

## 2022-04-27 PROCEDURE — 77061 BREAST TOMOSYNTHESIS UNI: CPT | Performed by: FAMILY MEDICINE

## 2022-04-27 PROCEDURE — 77065 DX MAMMO INCL CAD UNI: CPT | Performed by: FAMILY MEDICINE

## 2022-04-27 PROCEDURE — 76642 ULTRASOUND BREAST LIMITED: CPT | Performed by: FAMILY MEDICINE

## 2022-04-27 NOTE — TELEPHONE ENCOUNTER
Patient  call to inform Shyann Bosch : that her therapist suggested to inform Rolf William of her right  arm pain and the  inflammation during pt . Pt has been having neck pain and headaches sense the first day of PT .     Please assist

## 2022-04-28 ENCOUNTER — APPOINTMENT (OUTPATIENT)
Dept: PHYSICAL THERAPY | Age: 42
End: 2022-04-28
Attending: PHYSICAL MEDICINE & REHABILITATION
Payer: COMMERCIAL

## 2022-04-28 ENCOUNTER — TELEPHONE (OUTPATIENT)
Dept: PHYSICAL THERAPY | Facility: HOSPITAL | Age: 42
End: 2022-04-28

## 2022-04-29 NOTE — TELEPHONE ENCOUNTER
Patient went to PT on 4/19 and has been for three sessions. She states that she gets pain on her neck and headaches. Right arm pain radiating to hand. Pain is usually a 5/10 and it is uncomfortable . It lasts the whole day. She would like a prescription for a medication or a modification on PT. She would not like to hurt herself vera chavisan she already is. Patient canceled her PT appt yesterday.

## 2022-05-03 ENCOUNTER — TELEPHONE (OUTPATIENT)
Dept: PHYSICAL THERAPY | Facility: HOSPITAL | Age: 42
End: 2022-05-03

## 2022-05-03 ENCOUNTER — APPOINTMENT (OUTPATIENT)
Dept: PHYSICAL THERAPY | Age: 42
End: 2022-05-03
Attending: PHYSICAL MEDICINE & REHABILITATION
Payer: COMMERCIAL

## 2022-05-04 NOTE — TELEPHONE ENCOUNTER
Routed to Dr Melyssa Calix per protocol as RN not able to refill NSAIDS products, pls advise, thanks.         Refill passed per [facility] protocol   Requested Prescriptions   Pending Prescriptions Disp Refills    MELOXICAM 15 MG Oral Tab [Pharmacy Med Name: MELOXICAM 15MG TAB] 90 tablet 1     Sig: TAKE 1 TABLET BY MOUTH EVERY DAY FOR FOR PAIN AND INFLAMMATION ** TAKE WITH FOOD **        Non-Narcotic Pain Medication Protocol Passed - 5/4/2022  7:32 AM        Passed - Appointment in past 6 or next 3 months           DOXEPIN 10 MG Oral Cap [Pharmacy Med Name: DOXEPIN HCL 10MG CAP] 90 capsule 1     Sig: TAKE 1 CAPSULE BY MOUTH NIGHTLY FOR SLEEP AND FOR PAIN        Psychiatric Non-Scheduled (Anti-Anxiety) Passed - 5/4/2022  7:32 AM        Passed - Appointment in last 6 or next 3 months

## 2022-05-05 ENCOUNTER — APPOINTMENT (OUTPATIENT)
Dept: PHYSICAL THERAPY | Age: 42
End: 2022-05-05
Attending: PHYSICAL MEDICINE & REHABILITATION
Payer: COMMERCIAL

## 2022-05-05 ENCOUNTER — TELEPHONE (OUTPATIENT)
Dept: PHYSICAL THERAPY | Facility: HOSPITAL | Age: 42
End: 2022-05-05

## 2022-05-05 RX ORDER — DOXEPIN HYDROCHLORIDE 10 MG/1
CAPSULE ORAL
Qty: 90 CAPSULE | Refills: 1 | Status: SHIPPED | OUTPATIENT
Start: 2022-05-05

## 2022-05-05 RX ORDER — MELOXICAM 15 MG/1
15 TABLET ORAL DAILY
Qty: 90 TABLET | Refills: 1 | Status: SHIPPED | OUTPATIENT
Start: 2022-05-05

## 2022-05-10 ENCOUNTER — HOSPITAL ENCOUNTER (OUTPATIENT)
Dept: MAMMOGRAPHY | Facility: HOSPITAL | Age: 42
Discharge: HOME | End: 2022-05-10
Attending: FAMILY MEDICINE
Payer: COMMERCIAL

## 2022-05-10 ENCOUNTER — HOSPITAL ENCOUNTER (OUTPATIENT)
Dept: ULTRASOUND IMAGING | Facility: HOSPITAL | Age: 42
Discharge: HOME | End: 2022-05-10
Attending: FAMILY MEDICINE
Payer: COMMERCIAL

## 2022-05-10 ENCOUNTER — APPOINTMENT (OUTPATIENT)
Dept: PHYSICAL THERAPY | Age: 42
End: 2022-05-10
Attending: PHYSICAL MEDICINE & REHABILITATION
Payer: COMMERCIAL

## 2022-05-10 DIAGNOSIS — N63.20 BREAST MASS, LEFT: ICD-10-CM

## 2022-05-10 DIAGNOSIS — R92.8 ABNORMAL MAMMOGRAM: ICD-10-CM

## 2022-05-10 PROCEDURE — 19081 BX BREAST 1ST LESION STRTCTC: CPT | Performed by: FAMILY MEDICINE

## 2022-05-10 PROCEDURE — 88305 TISSUE EXAM BY PATHOLOGIST: CPT | Performed by: FAMILY MEDICINE

## 2022-05-10 PROCEDURE — 76642 ULTRASOUND BREAST LIMITED: CPT | Performed by: FAMILY MEDICINE

## 2022-05-10 NOTE — PROCEDURES
[facility]  Procedure Note    Bevely Cable Patient Status:  Outpatient    1980 MRN [id number]   Location [address] Attending Jackie Thao,    Hosp Day # 0 PCP Kerry Delgado DO     Procedure: tomosynthesis guided biopsy of the right breast    Pre-Procedure Diagnosis:  Right breast UOQ mass    Post-Procedure Diagnosis: Right breast UOQ mass    Anesthesia:  Local    Findings:  Right breast UOQ mass    Specimens: 6    Blood Loss:  minimal    Tourniquet Time: none  Complications:  None  Drains:  None    Ahsan Rivera DO  5/10/2022

## 2022-05-11 ENCOUNTER — TELEPHONE (OUTPATIENT)
Dept: FAMILY MEDICINE CLINIC | Facility: CLINIC | Age: 42
End: 2022-05-11

## 2022-05-11 NOTE — TELEPHONE ENCOUNTER
Alfie Coffman- Preadmission Testing calling to request history and physical within 30 days of MRI scheduled for 5/20/2022

## 2022-05-11 NOTE — PAT NURSING NOTE
S/w Chastiy at Dr [facility] at  re: H&P needed within 30 days  before procedure on 5/20/22. She will fu with MD and will fax top PAT.

## 2022-05-12 ENCOUNTER — LAB ENCOUNTER (OUTPATIENT)
Dept: LAB | Age: 42
End: 2022-05-12
Attending: NURSE PRACTITIONER
Payer: COMMERCIAL

## 2022-05-12 ENCOUNTER — OFFICE VISIT (OUTPATIENT)
Dept: FAMILY MEDICINE CLINIC | Facility: CLINIC | Age: 42
End: 2022-05-12
Payer: COMMERCIAL

## 2022-05-12 ENCOUNTER — APPOINTMENT (OUTPATIENT)
Dept: PHYSICAL THERAPY | Age: 42
End: 2022-05-12
Attending: PHYSICAL MEDICINE & REHABILITATION
Payer: COMMERCIAL

## 2022-05-12 ENCOUNTER — TELEPHONE (OUTPATIENT)
Dept: ULTRASOUND IMAGING | Facility: HOSPITAL | Age: 42
End: 2022-05-12

## 2022-05-12 VITALS
HEART RATE: 90 BPM | SYSTOLIC BLOOD PRESSURE: 123 MMHG | BODY MASS INDEX: 35.88 KG/M2 | DIASTOLIC BLOOD PRESSURE: 74 MMHG | WEIGHT: 195 LBS | HEIGHT: 62 IN

## 2022-05-12 DIAGNOSIS — D24.2 BREAST FIBROADENOMA IN FEMALE, LEFT: Primary | ICD-10-CM

## 2022-05-12 DIAGNOSIS — R10.13 EPIGASTRIC ABDOMINAL PAIN: ICD-10-CM

## 2022-05-12 DIAGNOSIS — K21.9 GASTROESOPHAGEAL REFLUX DISEASE, UNSPECIFIED WHETHER ESOPHAGITIS PRESENT: ICD-10-CM

## 2022-05-12 DIAGNOSIS — R14.0 POSTPRANDIAL ABDOMINAL BLOATING: ICD-10-CM

## 2022-05-12 PROCEDURE — 99214 OFFICE O/P EST MOD 30 MIN: CPT | Performed by: NURSE PRACTITIONER

## 2022-05-12 PROCEDURE — 3074F SYST BP LT 130 MM HG: CPT | Performed by: NURSE PRACTITIONER

## 2022-05-12 PROCEDURE — 83013 H PYLORI (C-13) BREATH: CPT | Performed by: NURSE PRACTITIONER

## 2022-05-12 PROCEDURE — 3008F BODY MASS INDEX DOCD: CPT | Performed by: NURSE PRACTITIONER

## 2022-05-12 PROCEDURE — 3078F DIAST BP <80 MM HG: CPT | Performed by: NURSE PRACTITIONER

## 2022-05-12 RX ORDER — PANTOPRAZOLE SODIUM 40 MG/1
40 TABLET, DELAYED RELEASE ORAL
Qty: 90 TABLET | Refills: 1 | Status: SHIPPED | OUTPATIENT
Start: 2022-05-12

## 2022-05-13 NOTE — TELEPHONE ENCOUNTER
Pre-admission dept called again inquiring status of H&P. I informed Dr. Glenys Meyer of message per Claude Sung that most recent H&P are needed, MD informed this is needed before 5/20.

## 2022-05-14 LAB — H. PYLORI BREATH TEST: NEGATIVE

## 2022-05-16 ENCOUNTER — TELEPHONE (OUTPATIENT)
Dept: FAMILY MEDICINE CLINIC | Facility: CLINIC | Age: 42
End: 2022-05-16

## 2022-05-16 ENCOUNTER — TELEPHONE (OUTPATIENT)
Dept: NEUROLOGY | Facility: CLINIC | Age: 42
End: 2022-05-16

## 2022-05-16 NOTE — TELEPHONE ENCOUNTER
Per pre-registration department patient would need to be seen for a new office visit and new H&P. This would need to be done within the 30 days from date of procedure. Patients date of procedure is 5/20    Dr. Barney Terrazas faxed no change on H&P but that can not be accepted.

## 2022-05-16 NOTE — TELEPHONE ENCOUNTER
I spoke with patient verified name and . Patient is schedule for a office visit on  and we will fax H&P once completed.

## 2022-05-16 NOTE — PAT NURSING NOTE
H+P sent with update cannot be used, message left with DR. Barcenas 98 office  that H+P within 30 days needs to be completed and faxed to [facility] for MRI on 5/20.

## 2022-05-17 ENCOUNTER — LAB ENCOUNTER (OUTPATIENT)
Dept: LAB | Age: 42
End: 2022-05-17
Attending: FAMILY MEDICINE
Payer: COMMERCIAL

## 2022-05-17 ENCOUNTER — OFFICE VISIT (OUTPATIENT)
Dept: FAMILY MEDICINE CLINIC | Facility: CLINIC | Age: 42
End: 2022-05-17
Payer: COMMERCIAL

## 2022-05-17 VITALS
DIASTOLIC BLOOD PRESSURE: 84 MMHG | HEIGHT: 62 IN | WEIGHT: 195 LBS | BODY MASS INDEX: 35.88 KG/M2 | HEART RATE: 79 BPM | TEMPERATURE: 97 F | SYSTOLIC BLOOD PRESSURE: 126 MMHG

## 2022-05-17 DIAGNOSIS — M50.022 CERVICAL DISC DISORDER AT C5-C6 LEVEL WITH MYELOPATHY: Primary | ICD-10-CM

## 2022-05-17 DIAGNOSIS — M48.061 FORAMINAL STENOSIS OF LUMBAR REGION: ICD-10-CM

## 2022-05-17 DIAGNOSIS — M43.17 SPONDYLOLISTHESIS OF LUMBOSACRAL REGION: ICD-10-CM

## 2022-05-17 DIAGNOSIS — M51.36 DEGENERATION OF LUMBAR INTERVERTEBRAL DISC: ICD-10-CM

## 2022-05-17 DIAGNOSIS — Z11.59 ENCOUNTER FOR SCREENING FOR OTHER VIRAL DISEASES: ICD-10-CM

## 2022-05-17 DIAGNOSIS — R14.0 POSTPRANDIAL ABDOMINAL BLOATING: ICD-10-CM

## 2022-05-17 DIAGNOSIS — Z01.818 PRE-OP TESTING: ICD-10-CM

## 2022-05-17 DIAGNOSIS — K76.0 FATTY LIVER: ICD-10-CM

## 2022-05-17 DIAGNOSIS — M54.12 CERVICAL RADICULITIS: ICD-10-CM

## 2022-05-17 PROCEDURE — 3079F DIAST BP 80-89 MM HG: CPT | Performed by: FAMILY MEDICINE

## 2022-05-17 PROCEDURE — 3008F BODY MASS INDEX DOCD: CPT | Performed by: FAMILY MEDICINE

## 2022-05-17 PROCEDURE — 99214 OFFICE O/P EST MOD 30 MIN: CPT | Performed by: FAMILY MEDICINE

## 2022-05-17 PROCEDURE — 3074F SYST BP LT 130 MM HG: CPT | Performed by: FAMILY MEDICINE

## 2022-05-17 RX ORDER — DICYCLOMINE HCL 20 MG
20 TABLET ORAL
Qty: 40 TABLET | Refills: 1 | Status: SHIPPED | OUTPATIENT
Start: 2022-05-17

## 2022-05-17 RX ORDER — DULOXETIN HYDROCHLORIDE 60 MG/1
CAPSULE, DELAYED RELEASE ORAL
COMMUNITY
Start: 2022-04-18

## 2022-05-18 VITALS — HEIGHT: 62 IN | WEIGHT: 195 LBS | BODY MASS INDEX: 35.88 KG/M2

## 2022-05-18 LAB — SARS-COV-2 RNA RESP QL NAA+PROBE: NOT DETECTED

## 2022-05-18 RX ORDER — FAMOTIDINE 20 MG/1
20 TABLET, FILM COATED ORAL ONCE
OUTPATIENT
Start: 2022-05-18 | End: 2022-05-18

## 2022-05-18 RX ORDER — IBUPROFEN 200 MG
200 TABLET ORAL EVERY 6 HOURS PRN
COMMUNITY

## 2022-05-18 RX ORDER — METOCLOPRAMIDE 10 MG/1
10 TABLET ORAL ONCE
OUTPATIENT
Start: 2022-05-18 | End: 2022-05-18

## 2022-05-18 RX ORDER — GARLIC EXTRACT 500 MG
1 CAPSULE ORAL DAILY
COMMUNITY

## 2022-05-18 RX ORDER — SODIUM CHLORIDE, SODIUM LACTATE, POTASSIUM CHLORIDE, CALCIUM CHLORIDE 600; 310; 30; 20 MG/100ML; MG/100ML; MG/100ML; MG/100ML
INJECTION, SOLUTION INTRAVENOUS CONTINUOUS
OUTPATIENT
Start: 2022-05-18

## 2022-05-18 RX ORDER — ACETAMINOPHEN 500 MG
1000 TABLET ORAL ONCE
OUTPATIENT
Start: 2022-05-18 | End: 2022-05-18

## 2022-05-18 NOTE — PAT NURSING NOTE
Per pt, she is having sore throat and nasal congestion today . Tested negative for Covid yesterday . Instructed to monitor symptoms ; if still feeling bad tomorrow, instructed to call PCP for another test and MRI department to reschedule .  Verbalized understanding

## 2022-05-19 NOTE — TELEPHONE ENCOUNTER
I spoke with Kimberley Cobos from pre-admission. They received H&P but patient c/o sore throat and congestion, Covid test came back negative. Per Kimberley Cobos they need verbal clearance from Dr. Traci Martinez to proceed with MRI per MD armas to proceed and Kimberley Cobos was informed. No further action.

## 2022-05-20 ENCOUNTER — HOSPITAL ENCOUNTER (OUTPATIENT)
Dept: MRI IMAGING | Facility: HOSPITAL | Age: 42
Discharge: HOME | End: 2022-05-20
Attending: FAMILY MEDICINE
Payer: COMMERCIAL

## 2022-05-20 ENCOUNTER — HOSPITAL ENCOUNTER (OUTPATIENT)
Dept: MRI IMAGING | Facility: HOSPITAL | Age: 42
End: 2022-05-20
Attending: FAMILY MEDICINE
Payer: COMMERCIAL

## 2022-05-26 ENCOUNTER — OFFICE VISIT (OUTPATIENT)
Dept: SURGERY | Facility: CLINIC | Age: 42
End: 2022-05-26
Payer: COMMERCIAL

## 2022-05-26 VITALS — HEIGHT: 62 IN | WEIGHT: 195 LBS | BODY MASS INDEX: 35.88 KG/M2

## 2022-05-26 DIAGNOSIS — N63.21 MASS OF UPPER OUTER QUADRANT OF LEFT BREAST: Primary | ICD-10-CM

## 2022-05-31 ENCOUNTER — TELEPHONE (OUTPATIENT)
Dept: SURGERY | Facility: CLINIC | Age: 42
End: 2022-05-31

## 2022-05-31 NOTE — TELEPHONE ENCOUNTER
Armani Staff received a message from insurance that Bariatric surgery is not covered. She has a f/u appt with you on 7/20. Should she come in sooner or keep her appt to discuss options.

## 2022-06-03 RX ORDER — DIPHENHYDRAMINE HCL 25 MG/1
CAPSULE, LIQUID FILLED ORAL
COMMUNITY

## 2022-06-07 ENCOUNTER — LAB ENCOUNTER (OUTPATIENT)
Dept: LAB | Age: 42
End: 2022-06-07
Attending: FAMILY MEDICINE
Payer: COMMERCIAL

## 2022-06-07 DIAGNOSIS — Z11.59 ENCOUNTER FOR SCREENING FOR OTHER VIRAL DISEASES: ICD-10-CM

## 2022-06-07 DIAGNOSIS — Z01.818 PRE-PROCEDURAL EXAMINATION: ICD-10-CM

## 2022-06-08 ENCOUNTER — TELEPHONE (OUTPATIENT)
Dept: SURGERY | Facility: CLINIC | Age: 42
End: 2022-06-08

## 2022-06-08 LAB — SARS-COV-2 RNA RESP QL NAA+PROBE: NOT DETECTED

## 2022-06-09 ENCOUNTER — NURSE TRIAGE (OUTPATIENT)
Dept: FAMILY MEDICINE CLINIC | Facility: CLINIC | Age: 42
End: 2022-06-09

## 2022-06-09 NOTE — TELEPHONE ENCOUNTER
With  ID # [id number], Edith Sagastume    Patient advised of 's note below and verbalized understanding.

## 2022-06-10 ENCOUNTER — ANESTHESIA (OUTPATIENT)
Dept: MRI IMAGING | Facility: HOSPITAL | Age: 42
End: 2022-06-10
Payer: COMMERCIAL

## 2022-06-10 ENCOUNTER — TELEPHONE (OUTPATIENT)
Dept: FAMILY MEDICINE CLINIC | Facility: CLINIC | Age: 42
End: 2022-06-10

## 2022-06-10 ENCOUNTER — HOSPITAL ENCOUNTER (OUTPATIENT)
Dept: MRI IMAGING | Facility: HOSPITAL | Age: 42
Discharge: HOME | End: 2022-06-10
Attending: FAMILY MEDICINE
Payer: COMMERCIAL

## 2022-06-10 ENCOUNTER — ANESTHESIA EVENT (OUTPATIENT)
Dept: MRI IMAGING | Facility: HOSPITAL | Age: 42
End: 2022-06-10
Payer: COMMERCIAL

## 2022-06-10 VITALS
RESPIRATION RATE: 16 BRPM | SYSTOLIC BLOOD PRESSURE: 151 MMHG | OXYGEN SATURATION: 99 % | DIASTOLIC BLOOD PRESSURE: 89 MMHG | HEART RATE: 89 BPM

## 2022-06-10 VITALS
DIASTOLIC BLOOD PRESSURE: 74 MMHG | SYSTOLIC BLOOD PRESSURE: 136 MMHG | RESPIRATION RATE: 18 BRPM | WEIGHT: 196 LBS | HEIGHT: 62 IN | BODY MASS INDEX: 36.07 KG/M2 | HEART RATE: 96 BPM | OXYGEN SATURATION: 95 % | TEMPERATURE: 98 F

## 2022-06-10 DIAGNOSIS — R29.898 BILATERAL ARM WEAKNESS: ICD-10-CM

## 2022-06-10 DIAGNOSIS — M79.605 BILATERAL LEG PAIN: ICD-10-CM

## 2022-06-10 DIAGNOSIS — M51.36 DEGENERATION OF LUMBAR INTERVERTEBRAL DISC: ICD-10-CM

## 2022-06-10 DIAGNOSIS — M50.022 CERVICAL DISC DISORDER AT C5-C6 LEVEL WITH MYELOPATHY: ICD-10-CM

## 2022-06-10 DIAGNOSIS — M43.17 SPONDYLOLISTHESIS OF LUMBOSACRAL REGION: ICD-10-CM

## 2022-06-10 DIAGNOSIS — M79.604 BILATERAL LEG PAIN: ICD-10-CM

## 2022-06-10 DIAGNOSIS — M48.061 FORAMINAL STENOSIS OF LUMBAR REGION: ICD-10-CM

## 2022-06-10 PROCEDURE — 72141 MRI NECK SPINE W/O DYE: CPT | Performed by: FAMILY MEDICINE

## 2022-06-10 PROCEDURE — 72148 MRI LUMBAR SPINE W/O DYE: CPT | Performed by: FAMILY MEDICINE

## 2022-06-10 RX ORDER — MORPHINE SULFATE 4 MG/ML
2 INJECTION, SOLUTION INTRAMUSCULAR; INTRAVENOUS EVERY 10 MIN PRN
Status: DISCONTINUED | OUTPATIENT
Start: 2022-06-10 | End: 2022-06-12

## 2022-06-10 RX ORDER — MORPHINE SULFATE 4 MG/ML
4 INJECTION, SOLUTION INTRAMUSCULAR; INTRAVENOUS EVERY 10 MIN PRN
Status: DISCONTINUED | OUTPATIENT
Start: 2022-06-10 | End: 2022-06-12

## 2022-06-10 RX ORDER — HYDROMORPHONE HYDROCHLORIDE 1 MG/ML
0.4 INJECTION, SOLUTION INTRAMUSCULAR; INTRAVENOUS; SUBCUTANEOUS EVERY 5 MIN PRN
Status: DISCONTINUED | OUTPATIENT
Start: 2022-06-10 | End: 2022-06-12

## 2022-06-10 RX ORDER — HYDROMORPHONE HYDROCHLORIDE 1 MG/ML
0.2 INJECTION, SOLUTION INTRAMUSCULAR; INTRAVENOUS; SUBCUTANEOUS EVERY 5 MIN PRN
Status: DISCONTINUED | OUTPATIENT
Start: 2022-06-10 | End: 2022-06-12

## 2022-06-10 RX ORDER — FAMOTIDINE 20 MG/1
20 TABLET, FILM COATED ORAL ONCE
Status: COMPLETED | OUTPATIENT
Start: 2022-06-10 | End: 2022-06-10

## 2022-06-10 RX ORDER — SODIUM CHLORIDE, SODIUM LACTATE, POTASSIUM CHLORIDE, CALCIUM CHLORIDE 600; 310; 30; 20 MG/100ML; MG/100ML; MG/100ML; MG/100ML
INJECTION, SOLUTION INTRAVENOUS CONTINUOUS
Status: DISCONTINUED | OUTPATIENT
Start: 2022-06-10 | End: 2022-06-12

## 2022-06-10 RX ORDER — NALOXONE HYDROCHLORIDE 0.4 MG/ML
80 INJECTION, SOLUTION INTRAMUSCULAR; INTRAVENOUS; SUBCUTANEOUS AS NEEDED
Status: ACTIVE | OUTPATIENT
Start: 2022-06-10 | End: 2022-06-10

## 2022-06-10 RX ORDER — HYDROMORPHONE HYDROCHLORIDE 1 MG/ML
0.6 INJECTION, SOLUTION INTRAMUSCULAR; INTRAVENOUS; SUBCUTANEOUS EVERY 5 MIN PRN
Status: DISCONTINUED | OUTPATIENT
Start: 2022-06-10 | End: 2022-06-12

## 2022-06-10 RX ORDER — DEXAMETHASONE SODIUM PHOSPHATE 4 MG/ML
VIAL (ML) INJECTION AS NEEDED
Status: DISCONTINUED | OUTPATIENT
Start: 2022-06-10 | End: 2022-06-10 | Stop reason: SURG

## 2022-06-10 RX ORDER — ONDANSETRON 2 MG/ML
INJECTION INTRAMUSCULAR; INTRAVENOUS AS NEEDED
Status: DISCONTINUED | OUTPATIENT
Start: 2022-06-10 | End: 2022-06-10 | Stop reason: SURG

## 2022-06-10 RX ORDER — METOCLOPRAMIDE 10 MG/1
10 TABLET ORAL ONCE
Status: COMPLETED | OUTPATIENT
Start: 2022-06-10 | End: 2022-06-10

## 2022-06-10 RX ORDER — LIDOCAINE HYDROCHLORIDE 10 MG/ML
INJECTION, SOLUTION EPIDURAL; INFILTRATION; INTRACAUDAL; PERINEURAL AS NEEDED
Status: DISCONTINUED | OUTPATIENT
Start: 2022-06-10 | End: 2022-06-10 | Stop reason: SURG

## 2022-06-10 RX ORDER — MORPHINE SULFATE 10 MG/ML
6 INJECTION, SOLUTION INTRAMUSCULAR; INTRAVENOUS EVERY 10 MIN PRN
Status: DISCONTINUED | OUTPATIENT
Start: 2022-06-10 | End: 2022-06-12

## 2022-06-10 RX ORDER — ACETAMINOPHEN 500 MG
1000 TABLET ORAL ONCE
Status: DISCONTINUED | OUTPATIENT
Start: 2022-06-10 | End: 2022-06-12

## 2022-06-10 RX ADMIN — METOCLOPRAMIDE 10 MG: 10 TABLET ORAL at 12:35:00

## 2022-06-10 RX ADMIN — DEXAMETHASONE SODIUM PHOSPHATE 4 MG: 4 MG/ML VIAL (ML) INJECTION at 14:18:00

## 2022-06-10 RX ADMIN — SODIUM CHLORIDE, SODIUM LACTATE, POTASSIUM CHLORIDE, CALCIUM CHLORIDE: 600; 310; 30; 20 INJECTION, SOLUTION INTRAVENOUS at 12:35:00

## 2022-06-10 RX ADMIN — LIDOCAINE HYDROCHLORIDE 50 MG: 10 INJECTION, SOLUTION EPIDURAL; INFILTRATION; INTRACAUDAL; PERINEURAL at 14:18:00

## 2022-06-10 RX ADMIN — ONDANSETRON 4 MG: 2 INJECTION INTRAMUSCULAR; INTRAVENOUS at 14:18:00

## 2022-06-10 RX ADMIN — FAMOTIDINE 20 MG: 20 TABLET, FILM COATED ORAL at 12:35:00

## 2022-06-10 NOTE — ANESTHESIA POSTPROCEDURE EVALUATION
Patient: Luba Galloway    Procedure Summary     Date: 06/10/22 Room / Location: [facility] MRI    Anesthesia Start: 7829 Anesthesia Stop: 2046    Procedure: MRI SPINE LUMBAR (CPT=72148) Diagnosis:       Spondylolisthesis of lumbosacral region      Foraminal stenosis of lumbar region      Degeneration of lumbar intervertebral disc      Bilateral leg pain      (Low back pain; Neurologic deficit, non-traumatic; LE weakness; Increasing/persistent or sudden onset LE weakness; No known/automatically detected potential contraindications to imaging)    Scheduled Providers: Jose Rolle MD Anesthesiologist: Jose Rolle MD    Anesthesia Type: general ASA Status: 2          Anesthesia Type: general    Vitals Value Taken Time   /80 06/10/22 1523   Temp  06/10/22 1523   Pulse 117 06/10/22 1523   Resp 12 06/10/22 1523   SpO2 96 % 06/10/22 1523   Vitals shown include unvalidated device data.     [address] AN Post Evaluation:   Patient Evaluated in PACU  Patient Participation: complete - patient participated  Level of Consciousness: awake  Pain Management: adequate  Airway Patency:patent  Yes    Cardiovascular Status: acceptable  Respiratory Status: acceptable  Postoperative Hydration acceptable      Chema Hansen MD  6/10/2022 3:23 PM

## 2022-06-10 NOTE — TELEPHONE ENCOUNTER
Pt called informing that she has an appointment today for an MRI at 12:00 and was told yesterday not to take any of her current medication but she took some last night at around 9:30 and wants to know if she can continue with her appointment today  Irish Speaking

## 2022-06-10 NOTE — TELEPHONE ENCOUNTER
With MARIA EUGENIA Farris  Lo ID# [id number]. Identified patient's name and . Informed patient ok to proceed with MRI. Patient states understanding.

## 2022-06-11 DIAGNOSIS — E55.9 VITAMIN D DEFICIENCY: ICD-10-CM

## 2022-06-13 ENCOUNTER — TELEPHONE (OUTPATIENT)
Dept: FAMILY MEDICINE CLINIC | Facility: CLINIC | Age: 42
End: 2022-06-13

## 2022-06-13 RX ORDER — ERGOCALCIFEROL 1.25 MG/1
50000 CAPSULE ORAL WEEKLY
Qty: 12 CAPSULE | Refills: 0 | Status: SHIPPED | OUTPATIENT
Start: 2022-06-13

## 2022-06-13 NOTE — TELEPHONE ENCOUNTER
Language line Donavon Sparks ID # [id number], Identified patient's name and . Spoke to patient and asked her for fax number for Dr. Douglas Roque. The fax number is [phone]. Asked her to check with that office to make sure they received the MRI reports. She was agreeable. Faxed to number provided via Right[employer]ax. She mentioned that she had anesthesia for her MRI and now she has chest discomfort when she coughs and even when she isn't coughing. She denies any SOB or dyspnea. She feels a pressure in her chest. She wanted to know if that was to be expected. It started immediately after her procedure. Dr. Akbar Segura, please advise if her symptoms are to be expected.

## 2022-06-13 NOTE — TELEPHONE ENCOUNTER
Spoke to patient's son with permission from patient, advised of 's note below. Son verbalized understanding.

## 2022-06-21 ENCOUNTER — OFFICE VISIT (OUTPATIENT)
Dept: FAMILY MEDICINE CLINIC | Facility: CLINIC | Age: 42
End: 2022-06-21
Payer: COMMERCIAL

## 2022-06-21 ENCOUNTER — LAB ENCOUNTER (OUTPATIENT)
Dept: LAB | Age: 42
End: 2022-06-21
Attending: FAMILY MEDICINE
Payer: COMMERCIAL

## 2022-06-21 ENCOUNTER — OFFICE VISIT (OUTPATIENT)
Dept: OPHTHALMOLOGY | Facility: CLINIC | Age: 42
End: 2022-06-21
Payer: COMMERCIAL

## 2022-06-21 VITALS
TEMPERATURE: 98 F | DIASTOLIC BLOOD PRESSURE: 78 MMHG | BODY MASS INDEX: 36.8 KG/M2 | HEART RATE: 80 BPM | WEIGHT: 200 LBS | HEIGHT: 62 IN | SYSTOLIC BLOOD PRESSURE: 122 MMHG

## 2022-06-21 DIAGNOSIS — R53.83 LETHARGY: ICD-10-CM

## 2022-06-21 DIAGNOSIS — E55.9 VITAMIN D DEFICIENCY: ICD-10-CM

## 2022-06-21 DIAGNOSIS — M79.604 PAIN IN RIGHT LEG: ICD-10-CM

## 2022-06-21 DIAGNOSIS — H04.123 DRY EYE SYNDROME OF BOTH EYES: Primary | ICD-10-CM

## 2022-06-21 DIAGNOSIS — M50.20 HERNIATED NUCLEUS PULPOSUS, CERVICAL: ICD-10-CM

## 2022-06-21 DIAGNOSIS — R10.11 RUQ ABDOMINAL PAIN: ICD-10-CM

## 2022-06-21 DIAGNOSIS — H52.223 HYPEROPIA OF BOTH EYES WITH REGULAR ASTIGMATISM: ICD-10-CM

## 2022-06-21 DIAGNOSIS — M48.061 FORAMINAL STENOSIS OF LUMBAR REGION: Primary | ICD-10-CM

## 2022-06-21 DIAGNOSIS — H52.03 HYPEROPIA OF BOTH EYES WITH REGULAR ASTIGMATISM: ICD-10-CM

## 2022-06-21 LAB
ALBUMIN SERPL-MCNC: 3.3 G/DL (ref 3.4–5)
ALP LIVER SERPL-CCNC: 75 U/L
ALT SERPL-CCNC: 77 U/L
AST SERPL-CCNC: 37 U/L (ref 15–37)
BASOPHILS # BLD AUTO: 0.03 X10(3) UL (ref 0–0.2)
BASOPHILS NFR BLD AUTO: 0.4 %
BILIRUB DIRECT SERPL-MCNC: <0.1 MG/DL (ref 0–0.2)
BILIRUB SERPL-MCNC: 0.3 MG/DL (ref 0.1–2)
DEPRECATED RDW RBC AUTO: 43.6 FL (ref 35.1–46.3)
EOSINOPHIL # BLD AUTO: 0.14 X10(3) UL (ref 0–0.7)
EOSINOPHIL NFR BLD AUTO: 1.9 %
ERYTHROCYTE [DISTWIDTH] IN BLOOD BY AUTOMATED COUNT: 12.8 % (ref 11–15)
HCT VFR BLD AUTO: 40 %
HGB BLD-MCNC: 13.2 G/DL
IMM GRANULOCYTES # BLD AUTO: 0.03 X10(3) UL (ref 0–1)
IMM GRANULOCYTES NFR BLD: 0.4 %
LYMPHOCYTES # BLD AUTO: 2.09 X10(3) UL (ref 1–4)
LYMPHOCYTES NFR BLD AUTO: 28 %
MCH RBC QN AUTO: 30.8 PG (ref 26–34)
MCHC RBC AUTO-ENTMCNC: 33 G/DL (ref 31–37)
MCV RBC AUTO: 93.5 FL
MONOCYTES # BLD AUTO: 0.53 X10(3) UL (ref 0.1–1)
MONOCYTES NFR BLD AUTO: 7.1 %
NEUTROPHILS # BLD AUTO: 4.65 X10 (3) UL (ref 1.5–7.7)
NEUTROPHILS # BLD AUTO: 4.65 X10(3) UL (ref 1.5–7.7)
NEUTROPHILS NFR BLD AUTO: 62.2 %
PLATELET # BLD AUTO: 300 10(3)UL (ref 150–450)
PROT SERPL-MCNC: 7 G/DL (ref 6.4–8.2)
RBC # BLD AUTO: 4.28 X10(6)UL
TSI SER-ACNC: 4.88 MIU/ML (ref 0.36–3.74)
VIT D+METAB SERPL-MCNC: 41.5 NG/ML (ref 30–100)
WBC # BLD AUTO: 7.5 X10(3) UL (ref 4–11)

## 2022-06-21 PROCEDURE — 85025 COMPLETE CBC W/AUTO DIFF WBC: CPT

## 2022-06-21 PROCEDURE — 3008F BODY MASS INDEX DOCD: CPT | Performed by: FAMILY MEDICINE

## 2022-06-21 PROCEDURE — 92014 COMPRE OPH EXAM EST PT 1/>: CPT | Performed by: OPHTHALMOLOGY

## 2022-06-21 PROCEDURE — 84481 FREE ASSAY (FT-3): CPT | Performed by: FAMILY MEDICINE

## 2022-06-21 PROCEDURE — 84443 ASSAY THYROID STIM HORMONE: CPT

## 2022-06-21 PROCEDURE — 3074F SYST BP LT 130 MM HG: CPT | Performed by: FAMILY MEDICINE

## 2022-06-21 PROCEDURE — 82306 VITAMIN D 25 HYDROXY: CPT | Performed by: NURSE PRACTITIONER

## 2022-06-21 PROCEDURE — 92015 DETERMINE REFRACTIVE STATE: CPT | Performed by: OPHTHALMOLOGY

## 2022-06-21 PROCEDURE — 80076 HEPATIC FUNCTION PANEL: CPT

## 2022-06-21 PROCEDURE — 84439 ASSAY OF FREE THYROXINE: CPT | Performed by: FAMILY MEDICINE

## 2022-06-21 PROCEDURE — 3078F DIAST BP <80 MM HG: CPT | Performed by: FAMILY MEDICINE

## 2022-06-21 PROCEDURE — 36415 COLL VENOUS BLD VENIPUNCTURE: CPT

## 2022-06-21 PROCEDURE — 99215 OFFICE O/P EST HI 40 MIN: CPT | Performed by: FAMILY MEDICINE

## 2022-06-21 RX ORDER — ERGOCALCIFEROL 1.25 MG/1
50000 CAPSULE ORAL WEEKLY
Qty: 4 CAPSULE | Refills: 0 | Status: SHIPPED | OUTPATIENT
Start: 2022-06-21 | End: 2022-07-21

## 2022-06-21 NOTE — PATIENT INSTRUCTIONS
Dry eye syndrome of both eyes  Used Language Line as an  in [address]  's name Deborah  's [id number]  Schirmer's test today revealed dry eyes. Patient was instructed to use warm compresses to the eyelids twice a day everyday. Instructions for warm compress use:   Patient should place wash compresses on both eyelids for 5 minutes every morning and every night. After 5 minutes of holding the warm compresses on the eyelids, patient should gently rub the eyelashes and then rinse thoroughly with warm water. Patient should also use artificial tears (any over the counter brand is okay) up to 3-4  times per day as needed for dry eye symptoms. Hyperopia of both eyes with regular astigmatism  Recommend glasses to improve vision.     Reassured patient that eyes look very healthy and normal.

## 2022-06-21 NOTE — ASSESSMENT & PLAN NOTE
Used MARIA EUGENIA Farris as an  in [facility] [place] [place]  's name Deborah  's [id number]  Schirmer's test today revealed dry eyes. Patient was instructed to use warm compresses to the eyelids twice a day everyday. Instructions for warm compress use:   Patient should place wash compresses on both eyelids for 5 minutes every morning and every night. After 5 minutes of holding the warm compresses on the eyelids, patient should gently rub the eyelashes and then rinse thoroughly with warm water. Patient should also use artificial tears (any over the counter brand is okay) up to 3-4  times per day as needed for dry eye symptoms.

## 2022-06-23 ENCOUNTER — TELEPHONE (OUTPATIENT)
Dept: FAMILY MEDICINE CLINIC | Facility: CLINIC | Age: 42
End: 2022-06-23

## 2022-06-24 RX ORDER — GABAPENTIN 100 MG/1
100 CAPSULE ORAL 3 TIMES DAILY
COMMUNITY

## 2022-06-24 RX ORDER — ALPRAZOLAM 0.25 MG/1
0.25 TABLET ORAL NIGHTLY PRN
COMMUNITY

## 2022-06-25 ENCOUNTER — LAB ENCOUNTER (OUTPATIENT)
Dept: LAB | Age: 42
End: 2022-06-25
Attending: SURGERY
Payer: COMMERCIAL

## 2022-06-25 DIAGNOSIS — Z01.818 PREOP TESTING: ICD-10-CM

## 2022-06-26 LAB — SARS-COV-2 RNA RESP QL NAA+PROBE: NOT DETECTED

## 2022-06-28 ENCOUNTER — HOSPITAL ENCOUNTER (OUTPATIENT)
Facility: HOSPITAL | Age: 42
Setting detail: HOSPITAL OUTPATIENT SURGERY
Discharge: HOME | End: 2022-06-28
Attending: SURGERY | Admitting: SURGERY
Payer: COMMERCIAL

## 2022-06-28 ENCOUNTER — APPOINTMENT (OUTPATIENT)
Dept: MAMMOGRAPHY | Facility: HOSPITAL | Age: 42
End: 2022-06-28
Attending: SURGERY
Payer: COMMERCIAL

## 2022-06-28 ENCOUNTER — HOSPITAL ENCOUNTER (OUTPATIENT)
Dept: MAMMOGRAPHY | Facility: HOSPITAL | Age: 42
Discharge: HOME | End: 2022-06-28
Attending: SURGERY
Payer: COMMERCIAL

## 2022-06-28 ENCOUNTER — ANESTHESIA (OUTPATIENT)
Dept: SURGERY | Facility: HOSPITAL | Age: 42
End: 2022-06-28
Payer: COMMERCIAL

## 2022-06-28 ENCOUNTER — ANESTHESIA EVENT (OUTPATIENT)
Dept: SURGERY | Facility: HOSPITAL | Age: 42
End: 2022-06-28
Payer: COMMERCIAL

## 2022-06-28 VITALS
HEART RATE: 108 BPM | DIASTOLIC BLOOD PRESSURE: 74 MMHG | WEIGHT: 199.13 LBS | RESPIRATION RATE: 16 BRPM | OXYGEN SATURATION: 98 % | BODY MASS INDEX: 36.64 KG/M2 | TEMPERATURE: 98 F | SYSTOLIC BLOOD PRESSURE: 142 MMHG | HEIGHT: 62 IN

## 2022-06-28 DIAGNOSIS — Z01.818 PREOP TESTING: Primary | ICD-10-CM

## 2022-06-28 DIAGNOSIS — N63.21 MASS OF UPPER OUTER QUADRANT OF LEFT BREAST: ICD-10-CM

## 2022-06-28 PROCEDURE — 19125 EXCISION BREAST LESION: CPT | Performed by: SURGERY

## 2022-06-28 PROCEDURE — 19281 PERQ DEVICE BREAST 1ST IMAG: CPT | Performed by: SURGERY

## 2022-06-28 PROCEDURE — 76098 X-RAY EXAM SURGICAL SPECIMEN: CPT | Performed by: SURGERY

## 2022-06-28 PROCEDURE — 0HBU0ZZ EXCISION OF LEFT BREAST, OPEN APPROACH: ICD-10-PCS | Performed by: SURGERY

## 2022-06-28 RX ORDER — GLYCOPYRROLATE 0.2 MG/ML
INJECTION, SOLUTION INTRAMUSCULAR; INTRAVENOUS AS NEEDED
Status: DISCONTINUED | OUTPATIENT
Start: 2022-06-28 | End: 2022-06-28 | Stop reason: SURG

## 2022-06-28 RX ORDER — MORPHINE SULFATE 4 MG/ML
2 INJECTION, SOLUTION INTRAMUSCULAR; INTRAVENOUS EVERY 10 MIN PRN
Status: DISCONTINUED | OUTPATIENT
Start: 2022-06-28 | End: 2022-06-28

## 2022-06-28 RX ORDER — ACETAMINOPHEN 500 MG
1000 TABLET ORAL ONCE
Status: DISCONTINUED | OUTPATIENT
Start: 2022-06-28 | End: 2022-06-28

## 2022-06-28 RX ORDER — HYDROMORPHONE HYDROCHLORIDE 1 MG/ML
0.6 INJECTION, SOLUTION INTRAMUSCULAR; INTRAVENOUS; SUBCUTANEOUS EVERY 5 MIN PRN
Status: DISCONTINUED | OUTPATIENT
Start: 2022-06-28 | End: 2022-06-28

## 2022-06-28 RX ORDER — LIDOCAINE HYDROCHLORIDE 10 MG/ML
INJECTION, SOLUTION EPIDURAL; INFILTRATION; INTRACAUDAL; PERINEURAL AS NEEDED
Status: DISCONTINUED | OUTPATIENT
Start: 2022-06-28 | End: 2022-06-28 | Stop reason: SURG

## 2022-06-28 RX ORDER — FAMOTIDINE 20 MG/1
20 TABLET, FILM COATED ORAL ONCE
Status: COMPLETED | OUTPATIENT
Start: 2022-06-28 | End: 2022-06-28

## 2022-06-28 RX ORDER — METOCLOPRAMIDE 10 MG/1
10 TABLET ORAL ONCE
Status: COMPLETED | OUTPATIENT
Start: 2022-06-28 | End: 2022-06-28

## 2022-06-28 RX ORDER — KETOROLAC TROMETHAMINE 30 MG/ML
INJECTION, SOLUTION INTRAMUSCULAR; INTRAVENOUS AS NEEDED
Status: DISCONTINUED | OUTPATIENT
Start: 2022-06-28 | End: 2022-06-28 | Stop reason: SURG

## 2022-06-28 RX ORDER — MORPHINE SULFATE 4 MG/ML
4 INJECTION, SOLUTION INTRAMUSCULAR; INTRAVENOUS EVERY 10 MIN PRN
Status: DISCONTINUED | OUTPATIENT
Start: 2022-06-28 | End: 2022-06-28

## 2022-06-28 RX ORDER — IBUPROFEN 600 MG/1
600 TABLET ORAL EVERY 8 HOURS PRN
Qty: 30 TABLET | Refills: 0 | Status: SHIPPED | OUTPATIENT
Start: 2022-06-28

## 2022-06-28 RX ORDER — SODIUM CHLORIDE, SODIUM LACTATE, POTASSIUM CHLORIDE, CALCIUM CHLORIDE 600; 310; 30; 20 MG/100ML; MG/100ML; MG/100ML; MG/100ML
INJECTION, SOLUTION INTRAVENOUS CONTINUOUS
Status: DISCONTINUED | OUTPATIENT
Start: 2022-06-28 | End: 2022-06-28

## 2022-06-28 RX ORDER — HYDROMORPHONE HYDROCHLORIDE 1 MG/ML
0.4 INJECTION, SOLUTION INTRAMUSCULAR; INTRAVENOUS; SUBCUTANEOUS EVERY 5 MIN PRN
Status: DISCONTINUED | OUTPATIENT
Start: 2022-06-28 | End: 2022-06-28

## 2022-06-28 RX ORDER — HYDROMORPHONE HYDROCHLORIDE 1 MG/ML
0.2 INJECTION, SOLUTION INTRAMUSCULAR; INTRAVENOUS; SUBCUTANEOUS EVERY 5 MIN PRN
Status: DISCONTINUED | OUTPATIENT
Start: 2022-06-28 | End: 2022-06-28

## 2022-06-28 RX ORDER — IBUPROFEN 600 MG/1
600 TABLET ORAL ONCE
Status: COMPLETED | OUTPATIENT
Start: 2022-06-28 | End: 2022-06-28

## 2022-06-28 RX ORDER — ONDANSETRON 2 MG/ML
INJECTION INTRAMUSCULAR; INTRAVENOUS AS NEEDED
Status: DISCONTINUED | OUTPATIENT
Start: 2022-06-28 | End: 2022-06-28 | Stop reason: SURG

## 2022-06-28 RX ORDER — MIDAZOLAM HYDROCHLORIDE 1 MG/ML
INJECTION INTRAMUSCULAR; INTRAVENOUS AS NEEDED
Status: DISCONTINUED | OUTPATIENT
Start: 2022-06-28 | End: 2022-06-28 | Stop reason: SURG

## 2022-06-28 RX ORDER — NALOXONE HYDROCHLORIDE 0.4 MG/ML
80 INJECTION, SOLUTION INTRAMUSCULAR; INTRAVENOUS; SUBCUTANEOUS AS NEEDED
Status: DISCONTINUED | OUTPATIENT
Start: 2022-06-28 | End: 2022-06-28

## 2022-06-28 RX ORDER — BUPIVACAINE HYDROCHLORIDE AND EPINEPHRINE 2.5; 5 MG/ML; UG/ML
INJECTION, SOLUTION INFILTRATION; PERINEURAL AS NEEDED
Status: DISCONTINUED | OUTPATIENT
Start: 2022-06-28 | End: 2022-06-28 | Stop reason: HOSPADM

## 2022-06-28 RX ORDER — CEFAZOLIN SODIUM/WATER 2 G/20 ML
2 SYRINGE (ML) INTRAVENOUS ONCE
Status: COMPLETED | OUTPATIENT
Start: 2022-06-28 | End: 2022-06-28

## 2022-06-28 RX ORDER — MORPHINE SULFATE 10 MG/ML
6 INJECTION, SOLUTION INTRAMUSCULAR; INTRAVENOUS EVERY 10 MIN PRN
Status: DISCONTINUED | OUTPATIENT
Start: 2022-06-28 | End: 2022-06-28

## 2022-06-28 RX ORDER — DEXAMETHASONE SODIUM PHOSPHATE 4 MG/ML
VIAL (ML) INJECTION AS NEEDED
Status: DISCONTINUED | OUTPATIENT
Start: 2022-06-28 | End: 2022-06-28 | Stop reason: SURG

## 2022-06-28 RX ADMIN — SODIUM CHLORIDE, SODIUM LACTATE, POTASSIUM CHLORIDE, CALCIUM CHLORIDE: 600; 310; 30; 20 INJECTION, SOLUTION INTRAVENOUS at 10:54:00

## 2022-06-28 RX ADMIN — LIDOCAINE HYDROCHLORIDE 50 MG: 10 INJECTION, SOLUTION EPIDURAL; INFILTRATION; INTRACAUDAL; PERINEURAL at 10:54:00

## 2022-06-28 RX ADMIN — KETOROLAC TROMETHAMINE 30 MG: 30 INJECTION, SOLUTION INTRAMUSCULAR; INTRAVENOUS at 11:36:00

## 2022-06-28 RX ADMIN — DEXAMETHASONE SODIUM PHOSPHATE 4 MG: 4 MG/ML VIAL (ML) INJECTION at 11:00:00

## 2022-06-28 RX ADMIN — SODIUM CHLORIDE, SODIUM LACTATE, POTASSIUM CHLORIDE, CALCIUM CHLORIDE: 600; 310; 30; 20 INJECTION, SOLUTION INTRAVENOUS at 11:38:00

## 2022-06-28 RX ADMIN — ONDANSETRON 4 MG: 2 INJECTION INTRAMUSCULAR; INTRAVENOUS at 11:00:00

## 2022-06-28 RX ADMIN — CEFAZOLIN SODIUM/WATER 2 G: 2 G/20 ML SYRINGE (ML) INTRAVENOUS at 11:03:00

## 2022-06-28 RX ADMIN — MIDAZOLAM HYDROCHLORIDE 2 MG: 1 INJECTION INTRAMUSCULAR; INTRAVENOUS at 10:54:00

## 2022-06-28 RX ADMIN — GLYCOPYRROLATE 0.2 MG: 0.2 INJECTION, SOLUTION INTRAMUSCULAR; INTRAVENOUS at 10:54:00

## 2022-06-28 NOTE — OPERATIVE REPORT
Pre-Operative Diagnosis: Mass of upper outer quadrant of left breast [N63.21]     Post-Operative Diagnosis: Mass of upper outer quadrant of left breast [N63.21]      Procedure Performed:   Excision breast mass, Left breast following needle localization (biopsy proven fibroadenoma, upper outer quadrant, top hat clip)    Surgeon(s) and Role:     * Di Bob MD - Primary    Assistant(s):  Surgical Assistant.: Arcenio Perez surgical assistant was necessary for retraction, exposure, excision, hemostasis, closure. Surgical Findings: dense mass UOQ  Specimen: Breast mass     Estimated Blood Loss: 5mL    Anesthesia: General    Patient History:    Patient has had a core needle biopsy of the Left breast for an abnormality noted on mammogram.  Her pathology warrants excision. Discussed with Patient:  Potential treatment options, risks and benefits of surgery discussed with patient including but not limited to bleeding, infection, seroma/hematoma formation, wound complications, complications involving the flap including necrosis, complications related to the nipple-areolar complex including change in contour/appearance and/or necrosis, the possible need for additional surgery and/or adjuvant therapy including radiation therapy and/or chemotherapy pending final pathology results. The patient and her family have no further questions at this time and wish to proceed with surgery as discussed. The patient was marked in the pre-operative holding area and the marking was confirmed by the patient. Procedure: The patient was taken to the operating room and was placed on the OR table in the supine position. After the induction of general anesthesia, perioperative antibiotics were given. The patient was then prepped and draped in the usual sterile manner. The right breast curvilinear incision was made in  hidden fashion at the left breast superior areolar border.  The excisional biopsy was performed, excising an adequate mass of breast tissue around the needle localization. Electrocautery was used to assure hemostasis. An intraoperative specimen mammogram was obtained, the mammographic abnormality and clip were demonstrated. The wound was irrigated and then closed in a layered fashion using  3-0 and 4-0 Vicryl in subcuticular stitch. Marcaine was injected, dermabond applied, steri strips were placed. All sponge instrument and needle counts were correct at this point in the procedure.         Pathologic Specimen:  breast mass    Complications:   None    EBL:   Minimal    Jorge L Levi MD

## 2022-06-28 NOTE — PROCEDURES
[facility]  Procedure Note    Denae Coulter Patient Status:  Outpatient    1980 MRN [id number]   Location [address] [facility] Attending Peggy Trent MD   Hosp Day # 0 PCP Carol Harkins DO     Procedure: Left breast mammographic guided wire localization    Pre-Procedure Diagnosis:  Left breast mass    Post-Procedure Diagnosis: Left breast mass    Anesthesia:  Local    Findings:  Left breast mass marked with top hat shaped clip    Specimens: n/a    Blood Loss:  minimal    Tourniquet Time: none  Complications:  None  Drains:  none      Papa Anderson MD  2022

## 2022-06-28 NOTE — IMAGING NOTE
Pt  to mammography department scouts completed by  [facility] mammography technologist     [phone] Hx taken procedure explained questions answered. 1006 Consent signed and verified      1011 Dr Valeria Ramos here scanning completed Order verified and signed by all  procedural staff members    [phone] Time out taken       site marked LEFT Breast    1014 Chloro prep as skin prep to site. Lidocaine   1% 10 milligrams per ml given as anesthetic affect from kit  3ml total given. 1016 Ghiatas needle  20 gauge  X 9 cm   placed . Pt re  images procedure complete. 1022 BB marker to site wire secured to breast  strips. A 4x4 dsg secured  over wire with tape by nurse after images completed . 1035: PATIENT WHEELED TO PRE-OP BY THIS RN AND MARJAN [employer]. PER BEDSIDE RN, DR Ric Michel IS READY FOR PATIENT. HANDOFF GIVEN TO STAFF IN PRE-OP.

## 2022-06-28 NOTE — ANESTHESIA PROCEDURE NOTES
Airway  Date/Time: 6/28/2022 11:03 AM  Urgency: elective      General Information and Staff    Patient location during procedure: OR  Anesthesiologist: Raul Kitchen MD  Performed: anesthesiologist     Indications and Patient Condition  Indications for airway management: anesthesia  Sedation level: deep  Preoxygenated: yes  Patient position: sniffing  Mask difficulty assessment: 1 - vent by mask    Final Airway Details  Final airway type: supraglottic airway      Successful airway: classic  Size 4      Number of attempts at approach: 1    Additional Comments  Atraumatic, soft bite block

## 2022-06-28 NOTE — BRIEF OP NOTE
Pre-Operative Diagnosis: Mass of upper outer quadrant of left breast [N63.21]     Post-Operative Diagnosis: Mass of upper outer quadrant of left breast [N63.21]      Procedure Performed:   Excision breast mass, Left breast following needle localization (biopsy proven fibroadenoma, upper outer quadrant, top hat clip)    Surgeon(s) and Role:     * Jennifer Campos MD - Primary    Assistant(s):  Surgical Assistant.: Rigo Steinberg     Surgical Findings: dense mass UOQ  Specimen: Breast mass     Estimated Blood Loss: 5mL        Jen Kirk MD  6/28/2022  11:44 AM

## 2022-06-29 ENCOUNTER — TELEPHONE (OUTPATIENT)
Dept: SURGERY | Facility: CLINIC | Age: 42
End: 2022-06-29

## 2022-06-29 RX ORDER — HYDROCODONE BITARTRATE AND ACETAMINOPHEN 5; 325 MG/1; MG/1
1 TABLET ORAL EVERY 6 HOURS PRN
Qty: 5 TABLET | Refills: 0 | Status: SHIPPED | OUTPATIENT
Start: 2022-06-29

## 2022-06-29 NOTE — TELEPHONE ENCOUNTER
Called patient with  # [phone]. Patient unable to tolerate ibuprofen. She tried taking with food, and causes nausea. She is also unable to take acetaminophen due to fatty liver. I recommended ice pack, and she states that she has been using an ice pack. Discussed dressings. OK to shower, but do not submerge the incision. OK to remove outer tegaderm dressing in a few days, but leave steri strips intact. I informed her that I would forward request for pain medication to Dr. Jeannie Mcintosh, and confirmed [facility]. Patient verbalized understanding, and will continue using ice pack.

## 2022-06-29 NOTE — TELEPHONE ENCOUNTER
Patient states she was prescribed ibuprofen and her stomach does not tolerate it. Would like to change prescription. Also states she is in pain after surgery and her left arm hurts.  Please advise

## 2022-06-29 NOTE — TELEPHONE ENCOUNTER
Dr. Tonja Carrington sent prescription to [address] [facility]. OK for patient to take normal dose of acetaminophen per Dr. Tonja Carrington.

## 2022-07-05 ENCOUNTER — TELEPHONE (OUTPATIENT)
Dept: SURGERY | Facility: CLINIC | Age: 42
End: 2022-07-05

## 2022-07-05 NOTE — TELEPHONE ENCOUNTER
Pt calling states pain last night had surgery on 6/28 asking can she take off top part of bandages please advise       Asha/[id number]

## 2022-07-14 ENCOUNTER — OFFICE VISIT (OUTPATIENT)
Dept: SURGERY | Facility: CLINIC | Age: 42
End: 2022-07-14
Payer: COMMERCIAL

## 2022-07-14 VITALS — WEIGHT: 199 LBS | BODY MASS INDEX: 36.62 KG/M2 | HEIGHT: 62 IN

## 2022-07-14 DIAGNOSIS — N63.21 MASS OF UPPER OUTER QUADRANT OF LEFT BREAST: Primary | ICD-10-CM

## 2022-07-14 PROCEDURE — 3008F BODY MASS INDEX DOCD: CPT | Performed by: SURGERY

## 2022-07-14 PROCEDURE — 99024 POSTOP FOLLOW-UP VISIT: CPT | Performed by: SURGERY

## 2022-07-14 NOTE — PROGRESS NOTES
S:  Zehra Salas is a 38year old female sp   Excision breast mass, Left breast following needle localization (biopsy proven fibroadenoma, upper outer quadrant, top hat clip)    Doing well, no fevers, no chills, tolerating a general diet, generally normal bowel movements, no calf tenderness nor lower extremity edema, no shortness of breath, no chest pain. O:  LMP 03/22/2017   GEN:  No acute distress  L: nonlabored respirations, CTA  H: reg rate  Abd:  Soft, NT,ND. Skin: Incision C D I, no eryth  Extr: No edema, no calf tenderness, neg Marisa's sign    Path:  Reviewed w pt    Assessment   Mass of upper outer quadrant of left breast  (primary encounter diagnosis)    Doing well post op  Continue to keep incision clean and dry. Maintain a healthy diet. Maintain good hydration. F/u prn.          Alexia Schlatter, MD

## 2022-07-20 ENCOUNTER — OFFICE VISIT (OUTPATIENT)
Dept: SURGERY | Facility: CLINIC | Age: 42
End: 2022-07-20
Payer: COMMERCIAL

## 2022-07-20 VITALS
WEIGHT: 200.88 LBS | DIASTOLIC BLOOD PRESSURE: 84 MMHG | HEIGHT: 62 IN | HEART RATE: 81 BPM | SYSTOLIC BLOOD PRESSURE: 122 MMHG | OXYGEN SATURATION: 98 % | BODY MASS INDEX: 36.97 KG/M2

## 2022-07-20 DIAGNOSIS — K76.0 HEPATIC STEATOSIS: Primary | ICD-10-CM

## 2022-07-20 DIAGNOSIS — E78.00 HYPERCHOLESTEREMIA: ICD-10-CM

## 2022-07-20 DIAGNOSIS — E53.8 VITAMIN B12 DEFICIENCY: ICD-10-CM

## 2022-07-20 DIAGNOSIS — E66.9 OBESITY (BMI 30-39.9): ICD-10-CM

## 2022-07-20 PROCEDURE — 99214 OFFICE O/P EST MOD 30 MIN: CPT | Performed by: INTERNAL MEDICINE

## 2022-07-20 PROCEDURE — 3008F BODY MASS INDEX DOCD: CPT | Performed by: INTERNAL MEDICINE

## 2022-07-20 PROCEDURE — 3079F DIAST BP 80-89 MM HG: CPT | Performed by: INTERNAL MEDICINE

## 2022-07-20 PROCEDURE — 3074F SYST BP LT 130 MM HG: CPT | Performed by: INTERNAL MEDICINE

## 2022-07-21 ENCOUNTER — TELEPHONE (OUTPATIENT)
Dept: FAMILY MEDICINE CLINIC | Facility: CLINIC | Age: 42
End: 2022-07-21

## 2022-07-21 NOTE — TELEPHONE ENCOUNTER
Please assist patient with pre-op apt. If possible by the end of this month as Dr. Aura Ball will be out of the office from 8-8 -8/15.  Thank you

## 2022-07-26 ENCOUNTER — TELEPHONE (OUTPATIENT)
Dept: FAMILY MEDICINE CLINIC | Facility: CLINIC | Age: 42
End: 2022-07-26

## 2022-07-26 ENCOUNTER — TELEPHONE (OUTPATIENT)
Dept: SURGERY | Facility: CLINIC | Age: 42
End: 2022-07-26

## 2022-07-26 NOTE — TELEPHONE ENCOUNTER
, Patient called office to inform you that [address] sample is working for her. Asking when she may  more samples? Patient is aware that you are out of the office until next week. Please advise. Routed to

## 2022-07-26 NOTE — TELEPHONE ENCOUNTER
Pt on the phone stating Mahnaz Hardy faxed over some forms for Dr. Glenys Meyer to complete regarding the pt's disability. Forms are regarding pt's records from latest appointments with Dr. Glenys Meyer.      Disability phone # [phone]

## 2022-07-27 ENCOUNTER — TELEPHONE (OUTPATIENT)
Dept: FAMILY MEDICINE CLINIC | Facility: CLINIC | Age: 42
End: 2022-07-27

## 2022-07-27 NOTE — TELEPHONE ENCOUNTER
Patient contacted via [address]  Pepe Tidwell #[id number], patient verbalizes understanding to  medications    Patient also stating someone had previously spoken with her regarding stopping prescription vitamin D

## 2022-07-27 NOTE — TELEPHONE ENCOUNTER
Patient is requesting a refill for levothyroxine (LEVOXYL) 25 MCG Oral Tab. Per patient, pharmacy only gave her 30 tablets. Please advise. Patient is out of medication. Disp Refills Start End    levothyroxine (LEVOXYL) 25 MCG Oral Tab 90 tablet 0 6/22/2022     Sig - Route:  Take 1 tablet (25 mcg total) by mouth before breakfast. - Oral    Sent to pharmacy as: Levothyroxine Sodium 25 MCG Oral Tablet (Levoxyl)

## 2022-07-27 NOTE — TELEPHONE ENCOUNTER
Spoke with Miguel Allen at [facility], states they have been dispensing levothyroxine 25mcg 30 tablets at a time, and the prescription is ready.   Vitamin D also ready for pick-up

## 2022-07-28 NOTE — TELEPHONE ENCOUNTER
No answer, I left a message for patient to call back. We have not received disability forms, we tried calling the number provided below and was on hold for minutes.  Can the patient contact them and have the forms faxed to [phone]

## 2022-07-28 NOTE — TELEPHONE ENCOUNTER
1st attempt/left voice mail message for pt to call back. Please, see message below when the call is returned also please document pt was informed.

## 2022-07-28 NOTE — TELEPHONE ENCOUNTER
Patient returned call, and informed of message below. Patient will call and request form to be faxed to office, thanks.

## 2022-08-03 NOTE — TELEPHONE ENCOUNTER
, patient called to follow up on request for samples of Saxenda medication. Informed patient that we are out of samples at this moment. Patient stated that Monday 8/8/22 will be her last dose of current sample. Please advise. Routed to

## 2022-08-04 ENCOUNTER — OFFICE VISIT (OUTPATIENT)
Dept: FAMILY MEDICINE CLINIC | Facility: CLINIC | Age: 42
End: 2022-08-04
Payer: COMMERCIAL

## 2022-08-04 ENCOUNTER — LAB ENCOUNTER (OUTPATIENT)
Dept: LAB | Age: 42
End: 2022-08-04
Attending: FAMILY MEDICINE
Payer: COMMERCIAL

## 2022-08-04 VITALS
HEART RATE: 89 BPM | TEMPERATURE: 98 F | BODY MASS INDEX: 35.7 KG/M2 | WEIGHT: 194 LBS | HEIGHT: 62 IN | DIASTOLIC BLOOD PRESSURE: 68 MMHG | SYSTOLIC BLOOD PRESSURE: 101 MMHG

## 2022-08-04 DIAGNOSIS — E07.89 THYROID PAIN: ICD-10-CM

## 2022-08-04 DIAGNOSIS — E03.9 ACQUIRED HYPOTHYROIDISM: Primary | ICD-10-CM

## 2022-08-04 DIAGNOSIS — E03.9 ACQUIRED HYPOTHYROIDISM: ICD-10-CM

## 2022-08-04 DIAGNOSIS — R53.83 DECREASED ENERGY: ICD-10-CM

## 2022-08-04 DIAGNOSIS — M79.604 LUMBAR PAIN WITH RADIATION DOWN RIGHT LEG: ICD-10-CM

## 2022-08-04 DIAGNOSIS — M54.50 LUMBAR PAIN WITH RADIATION DOWN RIGHT LEG: ICD-10-CM

## 2022-08-04 LAB — TSI SER-ACNC: 1.27 MIU/ML (ref 0.36–3.74)

## 2022-08-04 PROCEDURE — 3078F DIAST BP <80 MM HG: CPT | Performed by: FAMILY MEDICINE

## 2022-08-04 PROCEDURE — 84443 ASSAY THYROID STIM HORMONE: CPT

## 2022-08-04 PROCEDURE — 3074F SYST BP LT 130 MM HG: CPT | Performed by: FAMILY MEDICINE

## 2022-08-04 PROCEDURE — 3008F BODY MASS INDEX DOCD: CPT | Performed by: FAMILY MEDICINE

## 2022-08-04 PROCEDURE — 36415 COLL VENOUS BLD VENIPUNCTURE: CPT

## 2022-08-04 PROCEDURE — 99214 OFFICE O/P EST MOD 30 MIN: CPT | Performed by: FAMILY MEDICINE

## 2022-08-11 ENCOUNTER — TELEPHONE (OUTPATIENT)
Dept: SURGERY | Facility: CLINIC | Age: 42
End: 2022-08-11

## 2022-08-11 NOTE — TELEPHONE ENCOUNTER
Per pt is experiencing breast pain, this is the first time she is experiencing pain after surgery and is worried. Please advise with .

## 2022-08-12 NOTE — TELEPHONE ENCOUNTER
Called patient and her son interpreted. C/O L BR pain since yesterday. Denies fever. Bruising and pain. I advised ibuprofen 600 mg (taken with food) alt with tylenol ES, and ice pack. Scheduled apt for follow up Monday 8/15/22 @ 3:30.

## 2022-08-12 NOTE — TELEPHONE ENCOUNTER
Pt called stating pain started yesterday 8-11-22. Pt declined to schedule an appointment. Pt wants a call back.   Please call

## 2022-08-30 ENCOUNTER — TELEPHONE (OUTPATIENT)
Dept: SURGERY | Facility: CLINIC | Age: 42
End: 2022-08-30

## 2022-08-30 NOTE — TELEPHONE ENCOUNTER
, Patient requesting sample of Saxenda. Our office submitted P.A and was denied due to this medication being a plan exclusion. Please advise if patient will be prescribed something different. Patient is requesting samples.  Routed to

## 2022-09-01 ENCOUNTER — TELEPHONE (OUTPATIENT)
Dept: SURGERY | Facility: CLINIC | Age: 42
End: 2022-09-01

## 2022-09-01 NOTE — TELEPHONE ENCOUNTER
Patient states she has been having pain on her left breast. Would like to know if its normal.  States she has surgery about a month ago.  Please advise

## 2022-09-07 ENCOUNTER — TELEPHONE (OUTPATIENT)
Dept: FAMILY MEDICINE CLINIC | Facility: CLINIC | Age: 42
End: 2022-09-07

## 2022-09-08 ENCOUNTER — HOSPITAL ENCOUNTER (OUTPATIENT)
Dept: ULTRASOUND IMAGING | Age: 42
Discharge: HOME | End: 2022-09-08
Attending: FAMILY MEDICINE
Payer: COMMERCIAL

## 2022-09-08 DIAGNOSIS — E07.89 THYROID PAIN: ICD-10-CM

## 2022-09-08 PROCEDURE — 76536 US EXAM OF HEAD AND NECK: CPT | Performed by: FAMILY MEDICINE

## 2022-09-12 ENCOUNTER — TELEPHONE (OUTPATIENT)
Dept: FAMILY MEDICINE CLINIC | Facility: CLINIC | Age: 42
End: 2022-09-12

## 2022-09-12 NOTE — TELEPHONE ENCOUNTER
Patient requesting pre-op appointment, surgery is scheduled for 9/28, no appointment available before 9/27.

## 2022-09-14 ENCOUNTER — OFFICE VISIT (OUTPATIENT)
Dept: FAMILY MEDICINE CLINIC | Facility: CLINIC | Age: 42
End: 2022-09-14
Payer: COMMERCIAL

## 2022-09-14 VITALS
DIASTOLIC BLOOD PRESSURE: 84 MMHG | BODY MASS INDEX: 36 KG/M2 | HEART RATE: 82 BPM | TEMPERATURE: 97 F | HEIGHT: 62 IN | SYSTOLIC BLOOD PRESSURE: 129 MMHG | WEIGHT: 195.63 LBS

## 2022-09-14 DIAGNOSIS — K76.0 HEPATIC STEATOSIS: ICD-10-CM

## 2022-09-14 DIAGNOSIS — E03.9 ACQUIRED HYPOTHYROIDISM: ICD-10-CM

## 2022-09-14 DIAGNOSIS — S16.1XXA STRAIN OF NECK MUSCLE, INITIAL ENCOUNTER: Primary | ICD-10-CM

## 2022-09-14 DIAGNOSIS — J30.1 SEASONAL ALLERGIC RHINITIS DUE TO POLLEN: ICD-10-CM

## 2022-09-14 DIAGNOSIS — R74.8 ELEVATED LIVER ENZYMES: ICD-10-CM

## 2022-09-14 PROCEDURE — 99214 OFFICE O/P EST MOD 30 MIN: CPT | Performed by: NURSE PRACTITIONER

## 2022-09-14 PROCEDURE — 3008F BODY MASS INDEX DOCD: CPT | Performed by: NURSE PRACTITIONER

## 2022-09-14 PROCEDURE — 3074F SYST BP LT 130 MM HG: CPT | Performed by: NURSE PRACTITIONER

## 2022-09-14 PROCEDURE — 3079F DIAST BP 80-89 MM HG: CPT | Performed by: NURSE PRACTITIONER

## 2022-09-14 RX ORDER — METAXALONE 800 MG/1
800 TABLET ORAL 3 TIMES DAILY
Qty: 30 TABLET | Refills: 1 | Status: SHIPPED | OUTPATIENT
Start: 2022-09-14 | End: 2022-10-04

## 2022-09-14 RX ORDER — FLUTICASONE PROPIONATE 50 MCG
2 SPRAY, SUSPENSION (ML) NASAL DAILY
Qty: 1 EACH | Refills: 3 | Status: SHIPPED | OUTPATIENT
Start: 2022-09-14 | End: 2023-09-09

## 2022-09-14 RX ORDER — LEVOCETIRIZINE DIHYDROCHLORIDE 5 MG/1
5 TABLET, FILM COATED ORAL EVERY EVENING
Qty: 90 TABLET | Refills: 1 | Status: SHIPPED | OUTPATIENT
Start: 2022-09-14

## 2022-09-14 RX ORDER — TRAMADOL HYDROCHLORIDE 50 MG/1
50 TABLET ORAL EVERY 6 HOURS PRN
Qty: 30 TABLET | Refills: 1 | Status: SHIPPED | OUTPATIENT
Start: 2022-09-14

## 2022-09-14 NOTE — ASSESSMENT & PLAN NOTE
Ice 20 min 4-6 times per day  Do not use heat on injury  Do not apply ice directly on skin, make certain a thin cloth is between skin and ice pack      Elevated liver enzymes so pt advised not to use tylenol  Is going for cervical sx in 2 week so she can't take nsaids    Will treat w ice, tramadol 40 mg three times per day as needed and skelaxin 800 mg three times per day as needed  Skelaxin can cause drowsiness so caution advised  Please call if symptoms worsen or are not resolving.

## 2022-09-15 ENCOUNTER — OFFICE VISIT (OUTPATIENT)
Dept: FAMILY MEDICINE CLINIC | Facility: CLINIC | Age: 42
End: 2022-09-15
Payer: COMMERCIAL

## 2022-09-15 VITALS
SYSTOLIC BLOOD PRESSURE: 127 MMHG | HEART RATE: 98 BPM | HEIGHT: 62 IN | DIASTOLIC BLOOD PRESSURE: 81 MMHG | BODY MASS INDEX: 35.88 KG/M2 | WEIGHT: 195 LBS

## 2022-09-15 DIAGNOSIS — M50.20 CERVICAL HERNIATED DISC: ICD-10-CM

## 2022-09-15 DIAGNOSIS — Z01.818 PREOPERATIVE EXAMINATION: Primary | ICD-10-CM

## 2022-09-15 DIAGNOSIS — E03.9 ACQUIRED HYPOTHYROIDISM: ICD-10-CM

## 2022-09-15 PROCEDURE — 3079F DIAST BP 80-89 MM HG: CPT | Performed by: NURSE PRACTITIONER

## 2022-09-15 PROCEDURE — 3074F SYST BP LT 130 MM HG: CPT | Performed by: NURSE PRACTITIONER

## 2022-09-15 PROCEDURE — 99214 OFFICE O/P EST MOD 30 MIN: CPT | Performed by: NURSE PRACTITIONER

## 2022-09-15 PROCEDURE — 3008F BODY MASS INDEX DOCD: CPT | Performed by: NURSE PRACTITIONER

## 2022-09-15 RX ORDER — LEVOTHYROXINE SODIUM 0.03 MG/1
25 TABLET ORAL
Qty: 90 TABLET | Refills: 1 | Status: SHIPPED | OUTPATIENT
Start: 2022-09-15 | End: 2022-12-02

## 2022-09-15 NOTE — TELEPHONE ENCOUNTER
Refill passed per [facility] protocol.       Requested Prescriptions   Pending Prescriptions Disp Refills    LEVOTHYROXINE 25 MCG Oral Tab [Pharmacy Med Name: LEVOTHYROXINE SODIUM 25MCG TAB] 90 tablet 1     Sig: TAKE 1 TABLET BY MOUTH EVERY DAY BEFORE BREAKFAST        Thyroid Medication Protocol Passed - 9/15/2022  6:37 AM        Passed - TSH in past 12 months        Passed - Last TSH value is normal     Lab Results   Component Value Date    TSH 1.270 08/04/2022    [facility] 1.56 03/15/2016                 Passed - In person appointment or virtual visit in the past 12 mos or appointment in next 3 mos       Recent Outpatient Visits              Yesterday Strain of neck muscle, initial encounter    [facility], Brittany, [address] [facility], APRN    Office Visit    1 month ago Acquired hypothyroidism    [place] [facility], Brittany, [address], [place], DO    Office Visit    1 month ago Hepatic steatosis    Shubham Hager MD    Office Visit    2 months ago Mass of upper outer quadrant of left breast    [facility] Arianna Guzman MD    Office Visit    2 months ago Dry eye syndrome of both eyes    [facility]hthal[facility] Mayur Stanton MD    Office Visit     Future Appointments         Provider Department Appt Notes    Today Jeane Bumpers, [employer], [place] pre-op     In 2 weeks Bran Brandon MD [facility], [address] [facility] [employer] and pap/discuss previous surgery    In 1 month Elisabeth Goodrich MD [address], [address] [facility],[address], [address] [place] [address]  PRE OP F/U    In 1 month 45 Javon More MD [facility], [place]  Epigastric abdominal pain  R14.0 (ICD-10-CM) - 787.3 (ICD-9-CM) - Postprandial abdominal bloating  K21.9 (ICD-10-CM) - 530.81 (ICD-9-CM) - Gastroesophageal reflux disease, unspecified whether esophagitis present - Pt. no longer wants to see ILA burton. Future Appointments         Provider Department Appt Notes    Today Sebastián Cruz, [address] [facility] pre-[facility]     In 2 weeks Kym Aleman MD [place] [facility] [place], [facility], [address] [facility] and pap/discuss previous surgery    In 1 month Ermias Jimenez  [facility], [address], [facility]  PRE OP F/U    In 1 month Temi Santiago Mitchell [place], [address], [address], [place]  Epigastric abdominal pain  R14.0 (ICD-10-CM) - 787.3 (ICD-9-CM) - Postprandial abdominal bloating  K21.9 (ICD-10-CM) - 530.81 (ICD-9-CM) - Gastroesophageal reflux disease, unspecified whether esophagitis present - Pt. no longer wants to see ILA bur[place].             Recent Outpatient Visits              Yesterday Strain of neck muscle, initial encounter    [address] APRN    Office Visit    1 month ago Acquired hypothyroidism    [place] [facility] [place], [facility], [address], [address], [place], DO    Office Visit    1 month ago Hepatic steatosis    Law Green MD    Office Visit    2 months ago Mass of upper outer quadrant of left breast    [facility] Peggy Trent MD    Office Visit    2 months ago Dry eye syndrome of both eyes    [facility]hthal[facility] Rachid Godoy MD    Office Visit

## 2022-09-19 PROBLEM — R10.31 RIGHT LOWER QUADRANT ABDOMINAL PAIN: Status: RESOLVED | Noted: 2019-11-13 | Resolved: 2022-09-19

## 2022-09-19 PROBLEM — R10.11 RUQ ABDOMINAL PAIN: Status: RESOLVED | Noted: 2022-06-21 | Resolved: 2022-09-19

## 2022-09-19 NOTE — ASSESSMENT & PLAN NOTE
Surgery scheduled C5-6 arthroplasty on 9/28/2022 by Dr Ángel Hood  Pre-op labs, ekg and chest xray ordered  Needs type and screening  Needs mrsa swab  Encourage to do labs 2 weeks prior to surgery.

## 2022-09-20 ENCOUNTER — TELEPHONE (OUTPATIENT)
Dept: FAMILY MEDICINE CLINIC | Facility: CLINIC | Age: 42
End: 2022-09-20

## 2022-09-20 DIAGNOSIS — S16.1XXA STRAIN OF NECK MUSCLE, INITIAL ENCOUNTER: Primary | ICD-10-CM

## 2022-09-20 DIAGNOSIS — G44.209 TENSION HEADACHE: ICD-10-CM

## 2022-09-20 NOTE — TELEPHONE ENCOUNTER
Patient asking if she can be referred to a chiropractor due to her head/neck pain. States she was seen by Jacki Segura NP, prescribed Gabapentin but she didn't feel well taking it. Had abdominal pain and felt chest pain when she was on medication. States pain is still there. Statement Selected

## 2022-09-22 ENCOUNTER — OFFICE VISIT (OUTPATIENT)
Dept: FAMILY MEDICINE CLINIC | Facility: CLINIC | Age: 42
End: 2022-09-22

## 2022-09-22 ENCOUNTER — HOSPITAL ENCOUNTER (OUTPATIENT)
Dept: GENERAL RADIOLOGY | Age: 42
Discharge: HOME | End: 2022-09-22
Attending: NURSE PRACTITIONER

## 2022-09-22 VITALS
SYSTOLIC BLOOD PRESSURE: 118 MMHG | HEIGHT: 62 IN | WEIGHT: 195 LBS | DIASTOLIC BLOOD PRESSURE: 84 MMHG | HEART RATE: 89 BPM | TEMPERATURE: 98 F | BODY MASS INDEX: 35.88 KG/M2

## 2022-09-22 DIAGNOSIS — K21.9 GASTROESOPHAGEAL REFLUX DISEASE, UNSPECIFIED WHETHER ESOPHAGITIS PRESENT: ICD-10-CM

## 2022-09-22 DIAGNOSIS — R07.89 ATYPICAL CHEST PAIN: ICD-10-CM

## 2022-09-22 DIAGNOSIS — S16.1XXA STRAIN OF NECK MUSCLE, INITIAL ENCOUNTER: ICD-10-CM

## 2022-09-22 DIAGNOSIS — G44.209 TENSION HEADACHE: Primary | ICD-10-CM

## 2022-09-22 DIAGNOSIS — F41.9 ANXIETY: ICD-10-CM

## 2022-09-22 DIAGNOSIS — F51.04 PSYCHOPHYSIOLOGICAL INSOMNIA: ICD-10-CM

## 2022-09-22 DIAGNOSIS — R42 DIZZINESS: ICD-10-CM

## 2022-09-22 DIAGNOSIS — M50.20 CERVICAL HERNIATED DISC: ICD-10-CM

## 2022-09-22 DIAGNOSIS — Z01.818 PREOPERATIVE EXAMINATION: ICD-10-CM

## 2022-09-22 DIAGNOSIS — R11.0 NAUSEA: ICD-10-CM

## 2022-09-22 PROCEDURE — 3079F DIAST BP 80-89 MM HG: CPT | Performed by: FAMILY MEDICINE

## 2022-09-22 PROCEDURE — 99215 OFFICE O/P EST HI 40 MIN: CPT | Performed by: FAMILY MEDICINE

## 2022-09-22 PROCEDURE — 3074F SYST BP LT 130 MM HG: CPT | Performed by: FAMILY MEDICINE

## 2022-09-22 PROCEDURE — 71046 X-RAY EXAM CHEST 2 VIEWS: CPT | Performed by: NURSE PRACTITIONER

## 2022-09-22 PROCEDURE — 3008F BODY MASS INDEX DOCD: CPT | Performed by: FAMILY MEDICINE

## 2022-09-22 RX ORDER — PANTOPRAZOLE SODIUM 40 MG/1
40 TABLET, DELAYED RELEASE ORAL
Qty: 90 TABLET | Refills: 1 | Status: SHIPPED | OUTPATIENT
Start: 2022-09-22 | End: 2023-09-22

## 2022-09-22 RX ORDER — GABAPENTIN 300 MG/1
300 CAPSULE ORAL NIGHTLY
Qty: 90 CAPSULE | Refills: 1 | Status: SHIPPED | OUTPATIENT
Start: 2022-09-22

## 2022-09-26 ENCOUNTER — LAB ENCOUNTER (OUTPATIENT)
Dept: LAB | Age: 42
End: 2022-09-26
Attending: NEUROLOGICAL SURGERY

## 2022-09-26 DIAGNOSIS — Z01.818 PREOP TESTING: ICD-10-CM

## 2022-09-26 DIAGNOSIS — Z01.818 PREOPERATIVE EXAMINATION: ICD-10-CM

## 2022-09-26 LAB
ALBUMIN SERPL-MCNC: 3.7 G/DL (ref 3.4–5)
ALBUMIN/GLOB SERPL: 1 {RATIO} (ref 1–2)
ALP LIVER SERPL-CCNC: 85 U/L
ALT SERPL-CCNC: 108 U/L
ANION GAP SERPL CALC-SCNC: 8 MMOL/L (ref 0–18)
ANTIBODY SCREEN: NEGATIVE
APTT PPP: 27.3 SECONDS (ref 23.3–35.6)
AST SERPL-CCNC: 59 U/L (ref 15–37)
BILIRUB SERPL-MCNC: 0.5 MG/DL (ref 0.1–2)
BILIRUB UR QL: NEGATIVE
BUN BLD-MCNC: 8 MG/DL (ref 7–18)
BUN/CREAT SERPL: 10.7 (ref 10–20)
CALCIUM BLD-MCNC: 8.7 MG/DL (ref 8.5–10.1)
CHLORIDE SERPL-SCNC: 107 MMOL/L (ref 98–112)
CLARITY UR: CLEAR
CO2 SERPL-SCNC: 25 MMOL/L (ref 21–32)
COLOR UR: YELLOW
CREAT BLD-MCNC: 0.75 MG/DL
DEPRECATED RDW RBC AUTO: 42.2 FL (ref 35.1–46.3)
ERYTHROCYTE [DISTWIDTH] IN BLOOD BY AUTOMATED COUNT: 12.3 % (ref 11–15)
FASTING STATUS PATIENT QL REPORTED: YES
GFR SERPLBLD BASED ON 1.73 SQ M-ARVRAT: 103 ML/MIN/1.73M2 (ref 60–?)
GLOBULIN PLAS-MCNC: 3.6 G/DL (ref 2.8–4.4)
GLUCOSE BLD-MCNC: 97 MG/DL (ref 70–99)
GLUCOSE UR-MCNC: NEGATIVE MG/DL
HCT VFR BLD AUTO: 41.9 %
HGB BLD-MCNC: 14.2 G/DL
HGB UR QL STRIP.AUTO: NEGATIVE
INR BLD: 0.99 (ref 0.85–1.16)
KETONES UR-MCNC: NEGATIVE MG/DL
LEUKOCYTE ESTERASE UR QL STRIP.AUTO: NEGATIVE
MCH RBC QN AUTO: 31.4 PG (ref 26–34)
MCHC RBC AUTO-ENTMCNC: 33.9 G/DL (ref 31–37)
MCV RBC AUTO: 92.7 FL
NITRITE UR QL STRIP.AUTO: NEGATIVE
OSMOLALITY SERPL CALC.SUM OF ELEC: 288 MOSM/KG (ref 275–295)
PH UR: 7 [PH] (ref 5–8)
PLATELET # BLD AUTO: 305 10(3)UL (ref 150–450)
POTASSIUM SERPL-SCNC: 3.7 MMOL/L (ref 3.5–5.1)
PROT SERPL-MCNC: 7.3 G/DL (ref 6.4–8.2)
PROT UR-MCNC: NEGATIVE MG/DL
PROTHROMBIN TIME: 13 SECONDS (ref 11.6–14.8)
RBC # BLD AUTO: 4.52 X10(6)UL
RH BLOOD TYPE: POSITIVE
SODIUM SERPL-SCNC: 140 MMOL/L (ref 136–145)
SP GR UR STRIP: 1.02 (ref 1–1.03)
UROBILINOGEN UR STRIP-ACNC: 0.2
WBC # BLD AUTO: 8.3 X10(3) UL (ref 4–11)

## 2022-09-26 PROCEDURE — 93005 ELECTROCARDIOGRAM TRACING: CPT

## 2022-09-26 PROCEDURE — 86901 BLOOD TYPING SEROLOGIC RH(D): CPT | Performed by: NURSE PRACTITIONER

## 2022-09-26 PROCEDURE — 85027 COMPLETE CBC AUTOMATED: CPT

## 2022-09-26 PROCEDURE — 87641 MR-STAPH DNA AMP PROBE: CPT

## 2022-09-26 PROCEDURE — 36415 COLL VENOUS BLD VENIPUNCTURE: CPT

## 2022-09-26 PROCEDURE — 86850 RBC ANTIBODY SCREEN: CPT | Performed by: NURSE PRACTITIONER

## 2022-09-26 PROCEDURE — 85730 THROMBOPLASTIN TIME PARTIAL: CPT

## 2022-09-26 PROCEDURE — 86900 BLOOD TYPING SEROLOGIC ABO: CPT | Performed by: NURSE PRACTITIONER

## 2022-09-26 PROCEDURE — 93010 ELECTROCARDIOGRAM REPORT: CPT | Performed by: NURSE PRACTITIONER

## 2022-09-26 PROCEDURE — 80053 COMPREHEN METABOLIC PANEL: CPT

## 2022-09-26 PROCEDURE — 85610 PROTHROMBIN TIME: CPT

## 2022-09-26 PROCEDURE — 81003 URINALYSIS AUTO W/O SCOPE: CPT | Performed by: NURSE PRACTITIONER

## 2022-09-27 ENCOUNTER — TELEPHONE (OUTPATIENT)
Dept: FAMILY MEDICINE CLINIC | Facility: CLINIC | Age: 42
End: 2022-09-27

## 2022-09-27 ENCOUNTER — NURSE TRIAGE (OUTPATIENT)
Dept: FAMILY MEDICINE CLINIC | Facility: CLINIC | Age: 42
End: 2022-09-27

## 2022-09-27 LAB
MRSA DNA SPEC QL NAA+PROBE: NEGATIVE
SARS-COV-2 RNA RESP QL NAA+PROBE: NOT DETECTED

## 2022-09-27 NOTE — TELEPHONE ENCOUNTER
April from Dr. Truong Maldonado office calling to request medical clearance for patient. Need note to be updated to state patient is cleared, to be faxed to [phone]. Surgery is on 9/28.      [address]. [phone]

## 2022-09-28 DIAGNOSIS — R74.8 ELEVATED LIVER ENZYMES: Primary | ICD-10-CM

## 2022-09-28 NOTE — TELEPHONE ENCOUNTER
Language line [place] ID # [id number], Identified patient's name and . Patient is calling back today requesting to only schedule with Dr. Corrine Ontiveros or Jh Vines. She does not want to go to ER. She does not feel that this is urgent and her pain is not severe. She canceled her surgery that was scheduled for today. She will get it rescheduled. She would like to come in for an office visit versus UC/ER. Dr. Corrine Ontiveros, please advise if patient can schedule an office appointment with you or Jh Vines. She did have a EKG on  and was cleared for surgery. Please see triage below.

## 2022-09-28 NOTE — TELEPHONE ENCOUNTER
With  Aleena Dunlap #[id number], Advised patient of Dr. Kylie Rivera note. Patient verbalized understanding. Appointment made for 10/3/2022 at 2:45pm with April Boyer in [address] [place] [address]. Advised patient that if chest pain returns-lasting longer than 5 minutes or symptoms worse to go to the ER. Patient agreed.

## 2022-10-04 ENCOUNTER — OFFICE VISIT (OUTPATIENT)
Dept: OBGYN CLINIC | Facility: CLINIC | Age: 42
End: 2022-10-04
Payer: COMMERCIAL

## 2022-10-04 VITALS
BODY MASS INDEX: 36.25 KG/M2 | HEIGHT: 62 IN | WEIGHT: 197 LBS | DIASTOLIC BLOOD PRESSURE: 72 MMHG | HEART RATE: 91 BPM | SYSTOLIC BLOOD PRESSURE: 109 MMHG

## 2022-10-04 DIAGNOSIS — Z01.419 ENCOUNTER FOR GYNECOLOGICAL EXAMINATION WITHOUT ABNORMAL FINDING: Primary | ICD-10-CM

## 2022-10-04 PROCEDURE — 99396 PREV VISIT EST AGE 40-64: CPT | Performed by: OBSTETRICS & GYNECOLOGY

## 2022-10-04 PROCEDURE — 3078F DIAST BP <80 MM HG: CPT | Performed by: OBSTETRICS & GYNECOLOGY

## 2022-10-04 PROCEDURE — 3008F BODY MASS INDEX DOCD: CPT | Performed by: OBSTETRICS & GYNECOLOGY

## 2022-10-04 PROCEDURE — 3074F SYST BP LT 130 MM HG: CPT | Performed by: OBSTETRICS & GYNECOLOGY

## 2022-10-05 ENCOUNTER — OFFICE VISIT (OUTPATIENT)
Dept: FAMILY MEDICINE CLINIC | Facility: CLINIC | Age: 42
End: 2022-10-05
Payer: COMMERCIAL

## 2022-10-05 VITALS
HEART RATE: 89 BPM | HEIGHT: 62 IN | WEIGHT: 196 LBS | SYSTOLIC BLOOD PRESSURE: 129 MMHG | BODY MASS INDEX: 36.07 KG/M2 | DIASTOLIC BLOOD PRESSURE: 77 MMHG

## 2022-10-05 DIAGNOSIS — Z98.890 S/P EXCISION OF FIBROADENOMA OF BREAST: ICD-10-CM

## 2022-10-05 DIAGNOSIS — Z86.018 S/P EXCISION OF FIBROADENOMA OF BREAST: ICD-10-CM

## 2022-10-05 DIAGNOSIS — N64.4 BREAST PAIN, LEFT: ICD-10-CM

## 2022-10-05 DIAGNOSIS — R74.8 ELEVATED LIVER ENZYMES: Primary | ICD-10-CM

## 2022-10-05 PROCEDURE — 3074F SYST BP LT 130 MM HG: CPT | Performed by: NURSE PRACTITIONER

## 2022-10-05 PROCEDURE — 99214 OFFICE O/P EST MOD 30 MIN: CPT | Performed by: NURSE PRACTITIONER

## 2022-10-05 PROCEDURE — 3008F BODY MASS INDEX DOCD: CPT | Performed by: NURSE PRACTITIONER

## 2022-10-05 PROCEDURE — 3078F DIAST BP <80 MM HG: CPT | Performed by: NURSE PRACTITIONER

## 2022-10-09 PROBLEM — N64.4 BREAST PAIN, LEFT: Status: ACTIVE | Noted: 2022-10-09

## 2022-10-09 PROBLEM — N64.4 BREAST PAIN: Status: ACTIVE | Noted: 2022-10-09

## 2022-10-09 NOTE — ASSESSMENT & PLAN NOTE
Please aim to eat a diet high in fresh fruits and vegetables, lean protein sources, complex carbohydrates and limited processed and fast foods. Try to get at least 150 minutes of exercise per week-a combination of weight resistance and cardio is preferred.     Will recheck liver enzymes in 3 months

## 2022-10-09 NOTE — ASSESSMENT & PLAN NOTE
Most likely post op tenderness  Has follow up a Dr Elizabeth Flores next month  Please call if symptoms worsen or are not resolving.

## 2022-10-12 ENCOUNTER — TELEPHONE (OUTPATIENT)
Dept: SURGERY | Facility: CLINIC | Age: 42
End: 2022-10-12

## 2022-10-27 ENCOUNTER — TELEPHONE (OUTPATIENT)
Dept: FAMILY MEDICINE CLINIC | Facility: CLINIC | Age: 42
End: 2022-10-27

## 2022-10-27 DIAGNOSIS — M54.2 NECK PAIN: Primary | ICD-10-CM

## 2022-10-27 DIAGNOSIS — S16.1XXA STRAIN OF NECK MUSCLE, INITIAL ENCOUNTER: ICD-10-CM

## 2022-10-27 NOTE — TELEPHONE ENCOUNTER
Spoke with pt via [id number] [place] ID # [id number],   verified. Pt stated she was seen by Chiropractor for neck pain and was advised to see Dr Rl Newman.  Pt was seen by Dr Rl Newman and was advised will need referral from PCP to see Rheuma for arthritis on her neck   Pt has an order for PT referral, still need to schedule. Pt takes gabapetin 300 mg per  [address] ordered but it doesn't help with headache and back pain. Also takes ibuprofen 200 mg one tablet BID for pain, ineffcetive. Do you want pt to set up f/u with PCP? Rheuma referral pended. pls advise, thanks.            Future Appointments   Date Time Provider Ankita Topete   10/31/2022  3:30 PM Zac Hendricks MD [facility]   2022 10:00 AM West Vizcaino MD [facility] ([facility] CARL ROBERTSON

## 2022-12-02 ENCOUNTER — LAB ENCOUNTER (OUTPATIENT)
Dept: LAB | Age: 42
End: 2022-12-02
Attending: FAMILY MEDICINE
Payer: COMMERCIAL

## 2022-12-02 ENCOUNTER — HOSPITAL ENCOUNTER (OUTPATIENT)
Dept: GENERAL RADIOLOGY | Age: 42
Discharge: HOME | End: 2022-12-02
Attending: FAMILY MEDICINE
Payer: COMMERCIAL

## 2022-12-02 ENCOUNTER — OFFICE VISIT (OUTPATIENT)
Dept: FAMILY MEDICINE CLINIC | Facility: CLINIC | Age: 42
End: 2022-12-02
Payer: COMMERCIAL

## 2022-12-02 VITALS
SYSTOLIC BLOOD PRESSURE: 112 MMHG | BODY MASS INDEX: 36.25 KG/M2 | WEIGHT: 197 LBS | HEART RATE: 91 BPM | HEIGHT: 62 IN | DIASTOLIC BLOOD PRESSURE: 80 MMHG | TEMPERATURE: 98 F

## 2022-12-02 DIAGNOSIS — S16.1XXA STRAIN OF NECK MUSCLE, INITIAL ENCOUNTER: ICD-10-CM

## 2022-12-02 DIAGNOSIS — M50.20 CERVICAL HERNIATED DISC: Primary | ICD-10-CM

## 2022-12-02 DIAGNOSIS — M54.2 NECK PAIN: ICD-10-CM

## 2022-12-02 DIAGNOSIS — M54.41 CHRONIC RIGHT-SIDED LOW BACK PAIN WITH RIGHT-SIDED SCIATICA: ICD-10-CM

## 2022-12-02 DIAGNOSIS — E03.9 ACQUIRED HYPOTHYROIDISM: ICD-10-CM

## 2022-12-02 DIAGNOSIS — G44.209 TENSION HEADACHE: ICD-10-CM

## 2022-12-02 DIAGNOSIS — M25.561 ACUTE PAIN OF RIGHT KNEE: ICD-10-CM

## 2022-12-02 DIAGNOSIS — G89.29 CHRONIC RIGHT-SIDED LOW BACK PAIN WITH RIGHT-SIDED SCIATICA: ICD-10-CM

## 2022-12-02 DIAGNOSIS — F51.04 PSYCHOPHYSIOLOGICAL INSOMNIA: ICD-10-CM

## 2022-12-02 DIAGNOSIS — R22.2 LOCALIZED SWELLING OF CHEST WALL: ICD-10-CM

## 2022-12-02 LAB — TSI SER-ACNC: 3.77 MIU/ML (ref 0.36–3.74)

## 2022-12-02 PROCEDURE — 3008F BODY MASS INDEX DOCD: CPT | Performed by: FAMILY MEDICINE

## 2022-12-02 PROCEDURE — 3079F DIAST BP 80-89 MM HG: CPT | Performed by: FAMILY MEDICINE

## 2022-12-02 PROCEDURE — 99215 OFFICE O/P EST HI 40 MIN: CPT | Performed by: FAMILY MEDICINE

## 2022-12-02 PROCEDURE — 73564 X-RAY EXAM KNEE 4 OR MORE: CPT | Performed by: FAMILY MEDICINE

## 2022-12-02 PROCEDURE — 3074F SYST BP LT 130 MM HG: CPT | Performed by: FAMILY MEDICINE

## 2022-12-02 PROCEDURE — 36415 COLL VENOUS BLD VENIPUNCTURE: CPT

## 2022-12-02 PROCEDURE — 84443 ASSAY THYROID STIM HORMONE: CPT

## 2022-12-02 RX ORDER — GABAPENTIN 300 MG/1
600 CAPSULE ORAL 3 TIMES DAILY
Qty: 540 CAPSULE | Refills: 0 | Status: SHIPPED | OUTPATIENT
Start: 2022-12-02 | End: 2023-03-02

## 2022-12-02 RX ORDER — LEVOTHYROXINE SODIUM 0.05 MG/1
50 TABLET ORAL
Qty: 90 TABLET | Refills: 1 | Status: SHIPPED | OUTPATIENT
Start: 2022-12-02

## 2022-12-28 ENCOUNTER — OFFICE VISIT (OUTPATIENT)
Dept: SURGERY | Facility: CLINIC | Age: 42
End: 2022-12-28
Payer: COMMERCIAL

## 2022-12-28 ENCOUNTER — LAB ENCOUNTER (OUTPATIENT)
Dept: LAB | Facility: HOSPITAL | Age: 42
End: 2022-12-28
Attending: INTERNAL MEDICINE
Payer: COMMERCIAL

## 2022-12-28 VITALS
OXYGEN SATURATION: 98 % | HEIGHT: 62 IN | WEIGHT: 197 LBS | DIASTOLIC BLOOD PRESSURE: 75 MMHG | SYSTOLIC BLOOD PRESSURE: 131 MMHG | HEART RATE: 83 BPM | BODY MASS INDEX: 36.25 KG/M2

## 2022-12-28 DIAGNOSIS — E53.8 VITAMIN B12 DEFICIENCY: ICD-10-CM

## 2022-12-28 DIAGNOSIS — R74.8 ELEVATED LIVER ENZYMES: ICD-10-CM

## 2022-12-28 DIAGNOSIS — E88.81 INSULIN RESISTANCE: Primary | ICD-10-CM

## 2022-12-28 DIAGNOSIS — E78.5 DYSLIPIDEMIA: ICD-10-CM

## 2022-12-28 DIAGNOSIS — K76.0 HEPATIC STEATOSIS: Primary | ICD-10-CM

## 2022-12-28 DIAGNOSIS — K76.0 HEPATIC STEATOSIS: ICD-10-CM

## 2022-12-28 DIAGNOSIS — E66.9 OBESITY (BMI 30-39.9): ICD-10-CM

## 2022-12-28 LAB
ALBUMIN SERPL-MCNC: 3.6 G/DL (ref 3.4–5)
ALBUMIN/GLOB SERPL: 0.9 {RATIO} (ref 1–2)
ALP LIVER SERPL-CCNC: 87 U/L
ALT SERPL-CCNC: 82 U/L
ANION GAP SERPL CALC-SCNC: 7 MMOL/L (ref 0–18)
AST SERPL-CCNC: 38 U/L (ref 15–37)
BILIRUB SERPL-MCNC: 0.4 MG/DL (ref 0.1–2)
BUN BLD-MCNC: 10 MG/DL (ref 7–18)
BUN/CREAT SERPL: 12.7 (ref 10–20)
CALCIUM BLD-MCNC: 9.2 MG/DL (ref 8.5–10.1)
CHLORIDE SERPL-SCNC: 108 MMOL/L (ref 98–112)
CO2 SERPL-SCNC: 25 MMOL/L (ref 21–32)
CREAT BLD-MCNC: 0.79 MG/DL
FASTING STATUS PATIENT QL REPORTED: NO
GFR SERPLBLD BASED ON 1.73 SQ M-ARVRAT: 97 ML/MIN/1.73M2 (ref 60–?)
GLOBULIN PLAS-MCNC: 4 G/DL (ref 2.8–4.4)
GLUCOSE BLD-MCNC: 104 MG/DL (ref 70–99)
INSULIN SERPL-ACNC: 41.9 MU/L (ref 3–25)
OSMOLALITY SERPL CALC.SUM OF ELEC: 289 MOSM/KG (ref 275–295)
POTASSIUM SERPL-SCNC: 3.8 MMOL/L (ref 3.5–5.1)
PROT SERPL-MCNC: 7.6 G/DL (ref 6.4–8.2)
SODIUM SERPL-SCNC: 140 MMOL/L (ref 136–145)

## 2022-12-28 PROCEDURE — 83525 ASSAY OF INSULIN: CPT

## 2022-12-28 PROCEDURE — 3078F DIAST BP <80 MM HG: CPT | Performed by: INTERNAL MEDICINE

## 2022-12-28 PROCEDURE — 80053 COMPREHEN METABOLIC PANEL: CPT

## 2022-12-28 PROCEDURE — 36415 COLL VENOUS BLD VENIPUNCTURE: CPT

## 2022-12-28 PROCEDURE — 82397 CHEMILUMINESCENT ASSAY: CPT

## 2022-12-28 PROCEDURE — 3008F BODY MASS INDEX DOCD: CPT | Performed by: INTERNAL MEDICINE

## 2022-12-28 PROCEDURE — 99214 OFFICE O/P EST MOD 30 MIN: CPT | Performed by: INTERNAL MEDICINE

## 2022-12-28 PROCEDURE — 3075F SYST BP GE 130 - 139MM HG: CPT | Performed by: INTERNAL MEDICINE

## 2022-12-28 RX ORDER — METFORMIN HYDROCHLORIDE 500 MG/1
500 TABLET, EXTENDED RELEASE ORAL DAILY
Qty: 30 TABLET | Refills: 2 | Status: SHIPPED | OUTPATIENT
Start: 2022-12-28

## 2022-12-28 RX ORDER — PHENTERMINE HYDROCHLORIDE 8 MG/1
1 TABLET ORAL DAILY
Qty: 30 TABLET | Refills: 2 | Status: SHIPPED | OUTPATIENT
Start: 2022-12-28 | End: 2023-01-27

## 2023-01-04 NOTE — TELEPHONE ENCOUNTER
Patient following up, seen in office visit 10/22/19, reports continues to have burning back pain to kidney area and nausea, pain feels to be getting worse. Pain with urination has improved.  Also had urinalysis completed and taking antibiotic, reports nause Pt left a VM stating that she has a cough that will not go away and it is not Covid  No other information given  Please, triage

## 2023-01-05 LAB — LEPTIN: 48.5 NG/ML

## 2023-01-09 ENCOUNTER — HOSPITAL ENCOUNTER (OUTPATIENT)
Dept: ULTRASOUND IMAGING | Age: 43
Discharge: HOME | End: 2023-01-09
Attending: FAMILY MEDICINE
Payer: COMMERCIAL

## 2023-01-09 DIAGNOSIS — R22.2 LOCALIZED SWELLING OF CHEST WALL: ICD-10-CM

## 2023-01-09 PROCEDURE — 76604 US EXAM CHEST: CPT | Performed by: FAMILY MEDICINE

## 2023-01-10 ENCOUNTER — OFFICE VISIT (OUTPATIENT)
Dept: FAMILY MEDICINE CLINIC | Facility: CLINIC | Age: 43
End: 2023-01-10
Payer: COMMERCIAL

## 2023-01-10 VITALS
BODY MASS INDEX: 35.55 KG/M2 | HEIGHT: 62 IN | WEIGHT: 193.19 LBS | SYSTOLIC BLOOD PRESSURE: 114 MMHG | DIASTOLIC BLOOD PRESSURE: 76 MMHG | HEART RATE: 90 BPM | TEMPERATURE: 97 F

## 2023-01-10 DIAGNOSIS — E66.01 SEVERE OBESITY (BMI 35.0-35.9 WITH COMORBIDITY) (HCC): ICD-10-CM

## 2023-01-10 DIAGNOSIS — K21.9 GASTROESOPHAGEAL REFLUX DISEASE, UNSPECIFIED WHETHER ESOPHAGITIS PRESENT: ICD-10-CM

## 2023-01-10 DIAGNOSIS — D17.1 LIPOMA OF CHEST WALL: ICD-10-CM

## 2023-01-10 DIAGNOSIS — R10.13 EPIGASTRIC ABDOMINAL PAIN: ICD-10-CM

## 2023-01-10 DIAGNOSIS — R19.5 LOOSE STOOLS: ICD-10-CM

## 2023-01-10 DIAGNOSIS — R10.32 LLQ ABDOMINAL PAIN: ICD-10-CM

## 2023-01-10 DIAGNOSIS — R22.2 MASS OF SKIN OF CHEST: ICD-10-CM

## 2023-01-10 DIAGNOSIS — R73.03 PREDIABETES: ICD-10-CM

## 2023-01-10 DIAGNOSIS — M50.20 CERVICAL HERNIATED DISC: Primary | ICD-10-CM

## 2023-01-10 DIAGNOSIS — R11.0 NAUSEA: ICD-10-CM

## 2023-01-10 DIAGNOSIS — F41.9 ANXIETY: ICD-10-CM

## 2023-01-10 DIAGNOSIS — E03.9 ACQUIRED HYPOTHYROIDISM: ICD-10-CM

## 2023-01-10 DIAGNOSIS — M54.12 CERVICAL RADICULITIS: ICD-10-CM

## 2023-01-10 PROCEDURE — 3074F SYST BP LT 130 MM HG: CPT | Performed by: FAMILY MEDICINE

## 2023-01-10 PROCEDURE — 3078F DIAST BP <80 MM HG: CPT | Performed by: FAMILY MEDICINE

## 2023-01-10 PROCEDURE — 3008F BODY MASS INDEX DOCD: CPT | Performed by: FAMILY MEDICINE

## 2023-01-10 PROCEDURE — 99215 OFFICE O/P EST HI 40 MIN: CPT | Performed by: FAMILY MEDICINE

## 2023-01-10 RX ORDER — PANTOPRAZOLE SODIUM 40 MG/1
40 TABLET, DELAYED RELEASE ORAL
Qty: 90 TABLET | Refills: 1 | Status: SHIPPED | OUTPATIENT
Start: 2023-01-10 | End: 2024-01-10

## 2023-01-10 RX ORDER — ALPRAZOLAM 0.25 MG/1
0.25 TABLET ORAL NIGHTLY PRN
Qty: 30 TABLET | Refills: 1 | Status: SHIPPED | OUTPATIENT
Start: 2023-01-10

## 2023-02-02 ENCOUNTER — ORDER TRANSCRIPTION (OUTPATIENT)
Dept: ADMINISTRATIVE | Facility: HOSPITAL | Age: 43
End: 2023-02-02

## 2023-02-02 ENCOUNTER — HOSPITAL ENCOUNTER (OUTPATIENT)
Dept: GENERAL RADIOLOGY | Age: 43
Discharge: HOME | End: 2023-02-02
Payer: COMMERCIAL

## 2023-02-02 DIAGNOSIS — Z01.812 ENCOUNTER FOR PREPROCEDURE SCREENING LABORATORY TESTING FOR COVID-19: Primary | ICD-10-CM

## 2023-02-02 DIAGNOSIS — Z20.822 ENCOUNTER FOR PREPROCEDURE SCREENING LABORATORY TESTING FOR COVID-19: Primary | ICD-10-CM

## 2023-02-02 DIAGNOSIS — M50.122 CERVICAL DISC DISORDER AT C5-C6 LEVEL WITH RADICULOPATHY: ICD-10-CM

## 2023-02-02 DIAGNOSIS — M54.2 NECK PAIN: ICD-10-CM

## 2023-02-02 PROCEDURE — 72050 X-RAY EXAM NECK SPINE 4/5VWS: CPT

## 2023-02-07 ENCOUNTER — TELEPHONE (OUTPATIENT)
Dept: SURGERY | Facility: CLINIC | Age: 43
End: 2023-02-07

## 2023-02-07 NOTE — TELEPHONE ENCOUNTER
Dr Rodo Jaime,   The patient has been receiving samples of Saxenda and is requesting more. Please advise. Thank you.

## 2023-02-08 NOTE — TELEPHONE ENCOUNTER
Dr Lui Stern,   Will she need to start taking it at the lowest dose then work her way up? She will pickup tomorrow. Also, she will need a new referral for bariatric surgery stating she now has the comorbidity pre-diabetes so the insurance will approve. Thank you.

## 2023-02-09 ENCOUNTER — OFFICE VISIT (OUTPATIENT)
Dept: SURGERY | Facility: CLINIC | Age: 43
End: 2023-02-09

## 2023-02-09 DIAGNOSIS — R22.2 MASS OF CHEST WALL, RIGHT: Primary | ICD-10-CM

## 2023-02-09 PROCEDURE — 99214 OFFICE O/P EST MOD 30 MIN: CPT | Performed by: SURGERY

## 2023-02-15 ENCOUNTER — TELEPHONE (OUTPATIENT)
Dept: FAMILY MEDICINE CLINIC | Facility: CLINIC | Age: 43
End: 2023-02-15

## 2023-02-15 NOTE — PAT NURSING NOTE
S/w Ramonita Pagan from Dr Muñoz Males of[facility] re: H&P within 30 days  needed before scheduled MRI with Anesthesia on 2/20/23.  She will call pt to schedule for appt with MD.

## 2023-02-15 NOTE — TELEPHONE ENCOUNTER
Spoke to Mj Negron from [employer] (name and  of patient verified). She reports patient has MRI with anesthesia scheduled for 23 and she needs an H&P within 30 days of procedure. Left message to call back and transfer to scheduling via [address] [place] [address]  Radha Farley ID #[id number]. [employer] message sent. CSS, please follow-up and assist with scheduling appointment. Thank you.

## 2023-02-16 NOTE — TELEPHONE ENCOUNTER
Spoke, with the patient and informed her of the message below. Pt states that she has another test scheduled tomorrow at 12:30 and would like to know if she can see Dr. Jordin Mejía tomorrow at 11:30. Per the patient she does not drive and this way the person that is giving her a ride does not have to wait too long. Pt can be reached at [phone].

## 2023-02-16 NOTE — TELEPHONE ENCOUNTER
Pt phoned back and would like to schedule an appointment with Dr. Corrine Ontiveros for tomorrow. There are no available appointments. Please, call pt at [phone].

## 2023-02-17 ENCOUNTER — LAB ENCOUNTER (OUTPATIENT)
Dept: LAB | Age: 43
End: 2023-02-17
Attending: FAMILY MEDICINE
Payer: COMMERCIAL

## 2023-02-17 ENCOUNTER — OFFICE VISIT (OUTPATIENT)
Dept: FAMILY MEDICINE CLINIC | Facility: CLINIC | Age: 43
End: 2023-02-17

## 2023-02-17 VITALS — HEIGHT: 62 IN | BODY MASS INDEX: 35.51 KG/M2 | WEIGHT: 193 LBS

## 2023-02-17 VITALS
WEIGHT: 193 LBS | HEART RATE: 81 BPM | DIASTOLIC BLOOD PRESSURE: 71 MMHG | BODY MASS INDEX: 35.51 KG/M2 | SYSTOLIC BLOOD PRESSURE: 111 MMHG | HEIGHT: 62 IN | TEMPERATURE: 97 F

## 2023-02-17 DIAGNOSIS — F41.9 ANXIETY: ICD-10-CM

## 2023-02-17 DIAGNOSIS — R74.8 ELEVATED LIVER ENZYMES: ICD-10-CM

## 2023-02-17 DIAGNOSIS — Z20.822 ENCOUNTER FOR PREPROCEDURE SCREENING LABORATORY TESTING FOR COVID-19: ICD-10-CM

## 2023-02-17 DIAGNOSIS — Z01.812 ENCOUNTER FOR PREPROCEDURE SCREENING LABORATORY TESTING FOR COVID-19: ICD-10-CM

## 2023-02-17 DIAGNOSIS — R73.9 HYPERGLYCEMIA: ICD-10-CM

## 2023-02-17 DIAGNOSIS — Z01.818 NEEDS PRE-ANESTHESIA ASSESSMENT: ICD-10-CM

## 2023-02-17 DIAGNOSIS — E03.9 ACQUIRED HYPOTHYROIDISM: ICD-10-CM

## 2023-02-17 DIAGNOSIS — M50.20 CERVICAL HERNIATED DISC: Primary | ICD-10-CM

## 2023-02-17 LAB
ALBUMIN SERPL-MCNC: 3.7 G/DL (ref 3.4–5)
ALBUMIN/GLOB SERPL: 1 {RATIO} (ref 1–2)
ALP LIVER SERPL-CCNC: 75 U/L
ALT SERPL-CCNC: 68 U/L
ANION GAP SERPL CALC-SCNC: 10 MMOL/L (ref 0–18)
AST SERPL-CCNC: 32 U/L (ref 15–37)
BILIRUB SERPL-MCNC: 0.5 MG/DL (ref 0.1–2)
BUN BLD-MCNC: 12 MG/DL (ref 7–18)
BUN/CREAT SERPL: 14.6 (ref 10–20)
CALCIUM BLD-MCNC: 9.2 MG/DL (ref 8.5–10.1)
CHLORIDE SERPL-SCNC: 107 MMOL/L (ref 98–112)
CO2 SERPL-SCNC: 23 MMOL/L (ref 21–32)
CREAT BLD-MCNC: 0.82 MG/DL
EST. AVERAGE GLUCOSE BLD GHB EST-MCNC: 100 MG/DL (ref 68–126)
FASTING STATUS PATIENT QL REPORTED: YES
GFR SERPLBLD BASED ON 1.73 SQ M-ARVRAT: 92 ML/MIN/1.73M2 (ref 60–?)
GLOBULIN PLAS-MCNC: 3.8 G/DL (ref 2.8–4.4)
GLUCOSE BLD-MCNC: 104 MG/DL (ref 70–99)
HBA1C MFR BLD: 5.1 % (ref ?–5.7)
OSMOLALITY SERPL CALC.SUM OF ELEC: 290 MOSM/KG (ref 275–295)
POTASSIUM SERPL-SCNC: 3.8 MMOL/L (ref 3.5–5.1)
PROT SERPL-MCNC: 7.5 G/DL (ref 6.4–8.2)
SODIUM SERPL-SCNC: 140 MMOL/L (ref 136–145)
TSI SER-ACNC: 1.22 MIU/ML (ref 0.36–3.74)

## 2023-02-17 PROCEDURE — 84443 ASSAY THYROID STIM HORMONE: CPT

## 2023-02-17 PROCEDURE — 36415 COLL VENOUS BLD VENIPUNCTURE: CPT

## 2023-02-17 PROCEDURE — 80053 COMPREHEN METABOLIC PANEL: CPT

## 2023-02-17 PROCEDURE — 83036 HEMOGLOBIN GLYCOSYLATED A1C: CPT

## 2023-02-17 RX ORDER — FAMOTIDINE 20 MG/1
20 TABLET, FILM COATED ORAL ONCE
OUTPATIENT
Start: 2023-02-17 | End: 2023-02-17

## 2023-02-17 RX ORDER — NICOTINE POLACRILEX 4 MG
15 LOZENGE BUCCAL
OUTPATIENT
Start: 2023-02-17

## 2023-02-17 RX ORDER — SODIUM CHLORIDE, SODIUM LACTATE, POTASSIUM CHLORIDE, CALCIUM CHLORIDE 600; 310; 30; 20 MG/100ML; MG/100ML; MG/100ML; MG/100ML
INJECTION, SOLUTION INTRAVENOUS CONTINUOUS
OUTPATIENT
Start: 2023-02-17

## 2023-02-17 RX ORDER — DEXTROSE MONOHYDRATE 25 G/50ML
50 INJECTION, SOLUTION INTRAVENOUS
OUTPATIENT
Start: 2023-02-17

## 2023-02-17 RX ORDER — NICOTINE POLACRILEX 4 MG
30 LOZENGE BUCCAL
OUTPATIENT
Start: 2023-02-17

## 2023-02-17 RX ORDER — METOCLOPRAMIDE 10 MG/1
10 TABLET ORAL ONCE
OUTPATIENT
Start: 2023-02-17 | End: 2023-02-17

## 2023-02-18 LAB — SARS-COV-2 RNA RESP QL NAA+PROBE: NOT DETECTED

## 2023-02-20 ENCOUNTER — HOSPITAL ENCOUNTER (OUTPATIENT)
Dept: MRI IMAGING | Facility: HOSPITAL | Age: 43
Discharge: HOME | End: 2023-02-20
Attending: FAMILY MEDICINE
Payer: COMMERCIAL

## 2023-02-20 ENCOUNTER — ANESTHESIA EVENT (OUTPATIENT)
Dept: MRI IMAGING | Facility: HOSPITAL | Age: 43
End: 2023-02-20
Payer: COMMERCIAL

## 2023-02-20 ENCOUNTER — ANESTHESIA (OUTPATIENT)
Dept: MRI IMAGING | Facility: HOSPITAL | Age: 43
End: 2023-02-20
Payer: COMMERCIAL

## 2023-02-20 VITALS — BODY MASS INDEX: 35.51 KG/M2 | WEIGHT: 193 LBS | HEIGHT: 62 IN

## 2023-02-21 ENCOUNTER — ORDER TRANSCRIPTION (OUTPATIENT)
Dept: ADMINISTRATIVE | Facility: HOSPITAL | Age: 43
End: 2023-02-21

## 2023-02-21 ENCOUNTER — HOSPITAL ENCOUNTER (OUTPATIENT)
Dept: MRI IMAGING | Facility: HOSPITAL | Age: 43
Discharge: HOME | End: 2023-02-21
Attending: FAMILY MEDICINE
Payer: COMMERCIAL

## 2023-02-21 DIAGNOSIS — Z01.818 PREOP EXAMINATION: Primary | ICD-10-CM

## 2023-02-27 ENCOUNTER — LAB ENCOUNTER (OUTPATIENT)
Dept: LAB | Age: 43
End: 2023-02-27
Attending: FAMILY MEDICINE
Payer: COMMERCIAL

## 2023-02-27 DIAGNOSIS — Z01.818 PREOP EXAMINATION: ICD-10-CM

## 2023-02-27 LAB — SARS-COV-2 RNA RESP QL NAA+PROBE: NOT DETECTED

## 2023-03-01 ENCOUNTER — HOSPITAL ENCOUNTER (OUTPATIENT)
Dept: MRI IMAGING | Facility: HOSPITAL | Age: 43
Discharge: HOME | End: 2023-03-01
Attending: FAMILY MEDICINE
Payer: COMMERCIAL

## 2023-03-01 VITALS
WEIGHT: 189 LBS | BODY MASS INDEX: 34.78 KG/M2 | RESPIRATION RATE: 16 BRPM | TEMPERATURE: 98 F | HEART RATE: 84 BPM | SYSTOLIC BLOOD PRESSURE: 121 MMHG | OXYGEN SATURATION: 98 % | HEIGHT: 62 IN | DIASTOLIC BLOOD PRESSURE: 84 MMHG

## 2023-03-01 DIAGNOSIS — M50.20 CERVICAL HERNIATED DISC: ICD-10-CM

## 2023-03-01 DIAGNOSIS — M54.12 CERVICAL RADICULITIS: ICD-10-CM

## 2023-03-01 LAB
GLUCOSE BLDC GLUCOMTR-MCNC: 85 MG/DL (ref 70–99)
GLUCOSE BLDC GLUCOMTR-MCNC: 99 MG/DL (ref 70–99)

## 2023-03-01 PROCEDURE — 82962 GLUCOSE BLOOD TEST: CPT

## 2023-03-01 PROCEDURE — 72141 MRI NECK SPINE W/O DYE: CPT | Performed by: FAMILY MEDICINE

## 2023-03-01 RX ORDER — NICOTINE POLACRILEX 4 MG
15 LOZENGE BUCCAL
Status: DISCONTINUED | OUTPATIENT
Start: 2023-03-01 | End: 2023-03-03

## 2023-03-01 RX ORDER — LIDOCAINE HYDROCHLORIDE 10 MG/ML
INJECTION, SOLUTION EPIDURAL; INFILTRATION; INTRACAUDAL; PERINEURAL AS NEEDED
Status: DISCONTINUED | OUTPATIENT
Start: 2023-03-01 | End: 2023-03-01 | Stop reason: SURG

## 2023-03-01 RX ORDER — HYDROMORPHONE HYDROCHLORIDE 1 MG/ML
0.4 INJECTION, SOLUTION INTRAMUSCULAR; INTRAVENOUS; SUBCUTANEOUS EVERY 5 MIN PRN
OUTPATIENT
Start: 2023-03-01

## 2023-03-01 RX ORDER — SODIUM CHLORIDE, SODIUM LACTATE, POTASSIUM CHLORIDE, CALCIUM CHLORIDE 600; 310; 30; 20 MG/100ML; MG/100ML; MG/100ML; MG/100ML
INJECTION, SOLUTION INTRAVENOUS CONTINUOUS
Status: DISCONTINUED | OUTPATIENT
Start: 2023-03-01 | End: 2023-03-03

## 2023-03-01 RX ORDER — DEXTROSE MONOHYDRATE 25 G/50ML
50 INJECTION, SOLUTION INTRAVENOUS
Status: DISCONTINUED | OUTPATIENT
Start: 2023-03-01 | End: 2023-03-03

## 2023-03-01 RX ORDER — HYDROMORPHONE HYDROCHLORIDE 1 MG/ML
0.2 INJECTION, SOLUTION INTRAMUSCULAR; INTRAVENOUS; SUBCUTANEOUS EVERY 5 MIN PRN
OUTPATIENT
Start: 2023-03-01

## 2023-03-01 RX ORDER — ONDANSETRON 2 MG/ML
INJECTION INTRAMUSCULAR; INTRAVENOUS AS NEEDED
Status: DISCONTINUED | OUTPATIENT
Start: 2023-03-01 | End: 2023-03-01 | Stop reason: SURG

## 2023-03-01 RX ORDER — NICOTINE POLACRILEX 4 MG
30 LOZENGE BUCCAL
Status: DISCONTINUED | OUTPATIENT
Start: 2023-03-01 | End: 2023-03-03

## 2023-03-01 RX ORDER — HYDROMORPHONE HYDROCHLORIDE 1 MG/ML
0.6 INJECTION, SOLUTION INTRAMUSCULAR; INTRAVENOUS; SUBCUTANEOUS EVERY 5 MIN PRN
OUTPATIENT
Start: 2023-03-01

## 2023-03-01 RX ORDER — MORPHINE SULFATE 4 MG/ML
4 INJECTION, SOLUTION INTRAMUSCULAR; INTRAVENOUS EVERY 10 MIN PRN
OUTPATIENT
Start: 2023-03-01

## 2023-03-01 RX ORDER — SODIUM CHLORIDE 9 MG/ML
INJECTION, SOLUTION INTRAVENOUS CONTINUOUS PRN
Status: DISCONTINUED | OUTPATIENT
Start: 2023-03-01 | End: 2023-03-01 | Stop reason: SURG

## 2023-03-01 RX ORDER — FAMOTIDINE 20 MG/1
20 TABLET, FILM COATED ORAL ONCE
Status: DISCONTINUED | OUTPATIENT
Start: 2023-03-01 | End: 2023-03-03

## 2023-03-01 RX ORDER — MORPHINE SULFATE 10 MG/ML
6 INJECTION, SOLUTION INTRAMUSCULAR; INTRAVENOUS EVERY 10 MIN PRN
OUTPATIENT
Start: 2023-03-01

## 2023-03-01 RX ORDER — NALOXONE HYDROCHLORIDE 0.4 MG/ML
80 INJECTION, SOLUTION INTRAMUSCULAR; INTRAVENOUS; SUBCUTANEOUS AS NEEDED
Status: ACTIVE | OUTPATIENT
Start: 2023-03-01 | End: 2023-03-01

## 2023-03-01 RX ORDER — MORPHINE SULFATE 4 MG/ML
2 INJECTION, SOLUTION INTRAMUSCULAR; INTRAVENOUS EVERY 10 MIN PRN
OUTPATIENT
Start: 2023-03-01

## 2023-03-01 RX ORDER — METOCLOPRAMIDE 10 MG/1
10 TABLET ORAL ONCE
Status: COMPLETED | OUTPATIENT
Start: 2023-03-01 | End: 2023-03-01

## 2023-03-01 RX ADMIN — ONDANSETRON 4 MG: 2 INJECTION INTRAMUSCULAR; INTRAVENOUS at 13:58:00

## 2023-03-01 RX ADMIN — METOCLOPRAMIDE 10 MG: 10 TABLET ORAL at 12:35:00

## 2023-03-01 RX ADMIN — LIDOCAINE HYDROCHLORIDE 50 MG: 10 INJECTION, SOLUTION EPIDURAL; INFILTRATION; INTRACAUDAL; PERINEURAL at 13:55:00

## 2023-03-01 RX ADMIN — SODIUM CHLORIDE, SODIUM LACTATE, POTASSIUM CHLORIDE, CALCIUM CHLORIDE: 600; 310; 30; 20 INJECTION, SOLUTION INTRAVENOUS at 12:42:00

## 2023-03-01 RX ADMIN — SODIUM CHLORIDE: 9 INJECTION, SOLUTION INTRAVENOUS at 13:55:00

## 2023-03-01 NOTE — ANESTHESIA PROCEDURE NOTES
Airway  Date/Time: 3/1/2023 1:58 PM  Urgency: Elective    Airway not difficult    General Information and Staff    Patient location during procedure: OR  Anesthesiologist: Jakub Melendez MD  Performed: anesthesiologist   Performed by: Jakub Melendez MD  Authorized by: Jakub Melendez MD      Indications and Patient Condition  Indications for airway management: anesthesia  Sedation level: deep  Preoxygenated: yes  Patient position: sniffing  Mask difficulty assessment: 1 - vent by mask    Final Airway Details  Final airway type: supraglottic airway      Successful airway: classic  Size 4       Number of attempts at approach: 1

## 2023-03-27 DIAGNOSIS — E88.81 INSULIN RESISTANCE: ICD-10-CM

## 2023-03-27 RX ORDER — METFORMIN HYDROCHLORIDE 500 MG/1
TABLET, EXTENDED RELEASE ORAL
Qty: 30 TABLET | Refills: 1 | Status: SHIPPED | OUTPATIENT
Start: 2023-03-27

## 2023-03-28 ENCOUNTER — OFFICE VISIT (OUTPATIENT)
Dept: SURGERY | Facility: CLINIC | Age: 43
End: 2023-03-28
Payer: COMMERCIAL

## 2023-03-28 VITALS
DIASTOLIC BLOOD PRESSURE: 80 MMHG | OXYGEN SATURATION: 100 % | SYSTOLIC BLOOD PRESSURE: 122 MMHG | BODY MASS INDEX: 34.96 KG/M2 | HEART RATE: 84 BPM | HEIGHT: 62 IN | WEIGHT: 190 LBS

## 2023-03-28 DIAGNOSIS — E66.9 OBESITY (BMI 30-39.9): ICD-10-CM

## 2023-03-28 DIAGNOSIS — E78.5 DYSLIPIDEMIA: Primary | ICD-10-CM

## 2023-03-28 DIAGNOSIS — K76.0 HEPATIC STEATOSIS: ICD-10-CM

## 2023-03-28 DIAGNOSIS — E88.81 INSULIN RESISTANCE: ICD-10-CM

## 2023-03-28 PROCEDURE — 99214 OFFICE O/P EST MOD 30 MIN: CPT | Performed by: INTERNAL MEDICINE

## 2023-03-28 PROCEDURE — 3079F DIAST BP 80-89 MM HG: CPT | Performed by: INTERNAL MEDICINE

## 2023-03-28 PROCEDURE — 3074F SYST BP LT 130 MM HG: CPT | Performed by: INTERNAL MEDICINE

## 2023-03-28 PROCEDURE — 3008F BODY MASS INDEX DOCD: CPT | Performed by: INTERNAL MEDICINE

## 2023-03-28 RX ORDER — DIETHYLPROPION HYDROCHLORIDE 25 MG/1
1 TABLET ORAL
Qty: 30 TABLET | Refills: 2 | Status: SHIPPED | OUTPATIENT
Start: 2023-03-28 | End: 2023-04-27

## 2023-03-28 RX ORDER — METFORMIN HYDROCHLORIDE 500 MG/1
500 TABLET, EXTENDED RELEASE ORAL DAILY
Qty: 90 TABLET | Refills: 1 | Status: SHIPPED | OUTPATIENT
Start: 2023-03-28 | End: 2023-06-26

## 2023-04-05 NOTE — ANESTHESIA PROCEDURE NOTES
Airway  Date/Time: 6/10/2022 2:25 PM  Urgency: Elective    Airway not difficult    General Information and Staff    Patient location during procedure: OR  Anesthesiologist: Josephine Nielsen MD  Performed: anesthesiologist     Indications and Patient Condition  Indications for airway management: anesthesia  Sedation level: deep  Preoxygenated: yes  Patient position: sniffing  Mask difficulty assessment: 1 - vent by mask    Final Airway Details  Final airway type: supraglottic airway      Successful airway: classic  Size 4      Number of attempts at approach: 1 Wheelchair/Stroller

## 2023-04-13 ENCOUNTER — OFFICE VISIT (OUTPATIENT)
Dept: FAMILY MEDICINE CLINIC | Facility: CLINIC | Age: 43
End: 2023-04-13

## 2023-04-13 VITALS
WEIGHT: 184 LBS | DIASTOLIC BLOOD PRESSURE: 82 MMHG | TEMPERATURE: 98 F | BODY MASS INDEX: 33.86 KG/M2 | HEIGHT: 62 IN | HEART RATE: 101 BPM | SYSTOLIC BLOOD PRESSURE: 139 MMHG

## 2023-04-13 DIAGNOSIS — F32.A DEPRESSIVE DISORDER: ICD-10-CM

## 2023-04-13 DIAGNOSIS — S81.851A DOG BITE OF RIGHT LOWER LEG, INITIAL ENCOUNTER: ICD-10-CM

## 2023-04-13 DIAGNOSIS — S81.831A PUNCTURE WOUND OF RIGHT LOWER LEG, INITIAL ENCOUNTER: ICD-10-CM

## 2023-04-13 DIAGNOSIS — M54.2 ANTERIOR NECK PAIN: Primary | ICD-10-CM

## 2023-04-13 DIAGNOSIS — J30.1 SEASONAL ALLERGIC RHINITIS DUE TO POLLEN: ICD-10-CM
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
## 2023-04-13 DIAGNOSIS — E03.9 ACQUIRED HYPOTHYROIDISM: ICD-10-CM

## 2023-04-13 DIAGNOSIS — J02.9 ACUTE PHARYNGITIS, UNSPECIFIED ETIOLOGY: ICD-10-CM

## 2023-04-13 DIAGNOSIS — F41.9 ANXIETY: ICD-10-CM

## 2023-04-13 PROCEDURE — 3008F BODY MASS INDEX DOCD: CPT | Performed by: FAMILY MEDICINE

## 2023-04-13 PROCEDURE — 99215 OFFICE O/P EST HI 40 MIN: CPT | Performed by: FAMILY MEDICINE

## 2023-04-13 PROCEDURE — 3079F DIAST BP 80-89 MM HG: CPT | Performed by: FAMILY MEDICINE

## 2023-04-13 PROCEDURE — 3075F SYST BP GE 130 - 139MM HG: CPT | Performed by: FAMILY MEDICINE

## 2023-04-13 RX ORDER — FLUTICASONE PROPIONATE 50 MCG
2 SPRAY, SUSPENSION (ML) NASAL DAILY
Qty: 3 EACH | Refills: 3 | Status: SHIPPED | OUTPATIENT
Start: 2023-04-13 | End: 2024-04-07

## 2023-04-13 RX ORDER — AMOXICILLIN AND CLAVULANATE POTASSIUM 875; 125 MG/1; MG/1
1 TABLET, FILM COATED ORAL 2 TIMES DAILY
Qty: 14 TABLET | Refills: 0 | Status: SHIPPED | OUTPATIENT
Start: 2023-04-13 | End: 2023-04-20

## 2023-04-13 RX ORDER — LEVOCETIRIZINE DIHYDROCHLORIDE 5 MG/1
5 TABLET, FILM COATED ORAL EVERY EVENING
Qty: 90 TABLET | Refills: 1 | Status: SHIPPED | OUTPATIENT
Start: 2023-04-13

## 2023-04-13 RX ORDER — GABAPENTIN 300 MG/1
300 CAPSULE ORAL 3 TIMES DAILY
COMMUNITY

## 2023-04-24 ENCOUNTER — TELEPHONE (OUTPATIENT)
Dept: SURGERY | Facility: CLINIC | Age: 43
End: 2023-04-24

## 2023-04-27 ENCOUNTER — TELEPHONE (OUTPATIENT)
Dept: FAMILY MEDICINE CLINIC | Facility: CLINIC | Age: 43
End: 2023-04-27

## 2023-04-27 DIAGNOSIS — Z12.31 VISIT FOR SCREENING MAMMOGRAM: Primary | ICD-10-CM

## 2023-05-15 ENCOUNTER — EKG ENCOUNTER (OUTPATIENT)
Dept: LAB | Age: 43
End: 2023-05-15
Attending: FAMILY MEDICINE
Payer: COMMERCIAL

## 2023-05-15 ENCOUNTER — LAB ENCOUNTER (OUTPATIENT)
Dept: LAB | Age: 43
End: 2023-05-15
Attending: FAMILY MEDICINE
Payer: COMMERCIAL

## 2023-05-15 ENCOUNTER — HOSPITAL ENCOUNTER (OUTPATIENT)
Dept: GENERAL RADIOLOGY | Age: 43
Discharge: HOME | End: 2023-05-15
Attending: FAMILY MEDICINE
Payer: COMMERCIAL

## 2023-05-15 ENCOUNTER — OFFICE VISIT (OUTPATIENT)
Dept: FAMILY MEDICINE CLINIC | Facility: CLINIC | Age: 43
End: 2023-05-15

## 2023-05-15 VITALS
TEMPERATURE: 97 F | HEIGHT: 62 IN | HEART RATE: 90 BPM | WEIGHT: 184 LBS | SYSTOLIC BLOOD PRESSURE: 128 MMHG | DIASTOLIC BLOOD PRESSURE: 83 MMHG | BODY MASS INDEX: 33.86 KG/M2

## 2023-05-15 DIAGNOSIS — Z01.818 PREOP EXAMINATION: ICD-10-CM

## 2023-05-15 DIAGNOSIS — M50.122 CERVICAL DISC DISORDER AT C5-C6 LEVEL WITH RADICULOPATHY: ICD-10-CM

## 2023-05-15 DIAGNOSIS — K21.9 GASTROESOPHAGEAL REFLUX DISEASE, UNSPECIFIED WHETHER ESOPHAGITIS PRESENT: ICD-10-CM

## 2023-05-15 DIAGNOSIS — R73.03 PREDIABETES: ICD-10-CM

## 2023-05-15 DIAGNOSIS — G44.209 TENSION HEADACHE: ICD-10-CM

## 2023-05-15 DIAGNOSIS — M47.812 SPONDYLOSIS OF CERVICAL SPINE: ICD-10-CM

## 2023-05-15 DIAGNOSIS — M54.12 CERVICAL RADICULITIS: ICD-10-CM

## 2023-05-15 DIAGNOSIS — M54.12 CERVICAL RADICULITIS: Primary | ICD-10-CM

## 2023-05-15 DIAGNOSIS — M54.2 CERVICAL MUSCLE PAIN: ICD-10-CM

## 2023-05-15 DIAGNOSIS — E03.9 ACQUIRED HYPOTHYROIDISM: ICD-10-CM

## 2023-05-15 LAB
ALBUMIN SERPL-MCNC: 3.8 G/DL (ref 3.4–5)
ALBUMIN/GLOB SERPL: 1 {RATIO} (ref 1–2)
ALP LIVER SERPL-CCNC: 81 U/L
ALT SERPL-CCNC: 75 U/L
ANION GAP SERPL CALC-SCNC: 7 MMOL/L (ref 0–18)
ANTIBODY SCREEN: NEGATIVE
APTT PPP: 26.6 SECONDS (ref 23.3–35.6)
AST SERPL-CCNC: 46 U/L (ref 15–37)
BASOPHILS # BLD AUTO: 0.02 X10(3) UL (ref 0–0.2)
BASOPHILS NFR BLD AUTO: 0.3 %
BILIRUB SERPL-MCNC: 0.3 MG/DL (ref 0.1–2)
BILIRUB UR QL: NEGATIVE
BUN BLD-MCNC: 8 MG/DL (ref 7–18)
BUN/CREAT SERPL: 10.8 (ref 10–20)
CALCIUM BLD-MCNC: 9.3 MG/DL (ref 8.5–10.1)
CHLORIDE SERPL-SCNC: 106 MMOL/L (ref 98–112)
CLARITY UR: CLEAR
CO2 SERPL-SCNC: 26 MMOL/L (ref 21–32)
COLOR UR: COLORLESS
CREAT BLD-MCNC: 0.74 MG/DL
DEPRECATED RDW RBC AUTO: 41 FL (ref 35.1–46.3)
EOSINOPHIL # BLD AUTO: 0.05 X10(3) UL (ref 0–0.7)
EOSINOPHIL NFR BLD AUTO: 0.8 %
ERYTHROCYTE [DISTWIDTH] IN BLOOD BY AUTOMATED COUNT: 12.4 % (ref 11–15)
EST. AVERAGE GLUCOSE BLD GHB EST-MCNC: 105 MG/DL (ref 68–126)
FASTING STATUS PATIENT QL REPORTED: NO
GFR SERPLBLD BASED ON 1.73 SQ M-ARVRAT: 104 ML/MIN/1.73M2 (ref 60–?)
GLOBULIN PLAS-MCNC: 3.9 G/DL (ref 2.8–4.4)
GLUCOSE BLD-MCNC: 95 MG/DL (ref 70–99)
GLUCOSE UR-MCNC: NORMAL MG/DL
HBA1C MFR BLD: 5.3 % (ref ?–5.7)
HCT VFR BLD AUTO: 42.1 %
HGB BLD-MCNC: 14.1 G/DL
HGB UR QL STRIP.AUTO: NEGATIVE
IMM GRANULOCYTES # BLD AUTO: 0.02 X10(3) UL (ref 0–1)
IMM GRANULOCYTES NFR BLD: 0.3 %
INR BLD: 0.91 (ref 0.85–1.16)
KETONES UR-MCNC: NEGATIVE MG/DL
LEUKOCYTE ESTERASE UR QL STRIP.AUTO: NEGATIVE
LYMPHOCYTES # BLD AUTO: 1.24 X10(3) UL (ref 1–4)
LYMPHOCYTES NFR BLD AUTO: 20.4 %
MCH RBC QN AUTO: 30.5 PG (ref 26–34)
MCHC RBC AUTO-ENTMCNC: 33.5 G/DL (ref 31–37)
MCV RBC AUTO: 91.1 FL
MONOCYTES # BLD AUTO: 0.36 X10(3) UL (ref 0.1–1)
MONOCYTES NFR BLD AUTO: 5.9 %
NEUTROPHILS # BLD AUTO: 4.38 X10 (3) UL (ref 1.5–7.7)
NEUTROPHILS # BLD AUTO: 4.38 X10(3) UL (ref 1.5–7.7)
NEUTROPHILS NFR BLD AUTO: 72.3 %
NITRITE UR QL STRIP.AUTO: NEGATIVE
OSMOLALITY SERPL CALC.SUM OF ELEC: 286 MOSM/KG (ref 275–295)
PH UR: 5.5 [PH] (ref 5–8)
PLATELET # BLD AUTO: 295 10(3)UL (ref 150–450)
POTASSIUM SERPL-SCNC: 3.7 MMOL/L (ref 3.5–5.1)
PROT SERPL-MCNC: 7.7 G/DL (ref 6.4–8.2)
PROT UR-MCNC: NEGATIVE MG/DL
PROTHROMBIN TIME: 12.3 SECONDS (ref 11.6–14.8)
RBC # BLD AUTO: 4.62 X10(6)UL
RH BLOOD TYPE: POSITIVE
SODIUM SERPL-SCNC: 139 MMOL/L (ref 136–145)
SP GR UR STRIP: <1.005 (ref 1–1.03)
UROBILINOGEN UR STRIP-ACNC: NORMAL
WBC # BLD AUTO: 6.1 X10(3) UL (ref 4–11)

## 2023-05-15 PROCEDURE — 85025 COMPLETE CBC W/AUTO DIFF WBC: CPT

## 2023-05-15 PROCEDURE — 86900 BLOOD TYPING SEROLOGIC ABO: CPT | Performed by: FAMILY MEDICINE

## 2023-05-15 PROCEDURE — 80053 COMPREHEN METABOLIC PANEL: CPT

## 2023-05-15 PROCEDURE — 85610 PROTHROMBIN TIME: CPT

## 2023-05-15 PROCEDURE — 93010 ELECTROCARDIOGRAM REPORT: CPT | Performed by: INTERNAL MEDICINE

## 2023-05-15 PROCEDURE — 86901 BLOOD TYPING SEROLOGIC RH(D): CPT | Performed by: FAMILY MEDICINE

## 2023-05-15 PROCEDURE — 87081 CULTURE SCREEN ONLY: CPT

## 2023-05-15 PROCEDURE — 36415 COLL VENOUS BLD VENIPUNCTURE: CPT | Performed by: FAMILY MEDICINE

## 2023-05-15 PROCEDURE — 86850 RBC ANTIBODY SCREEN: CPT | Performed by: FAMILY MEDICINE

## 2023-05-15 PROCEDURE — 85730 THROMBOPLASTIN TIME PARTIAL: CPT

## 2023-05-15 PROCEDURE — 93005 ELECTROCARDIOGRAM TRACING: CPT

## 2023-05-15 PROCEDURE — 83036 HEMOGLOBIN GLYCOSYLATED A1C: CPT

## 2023-05-15 RX ORDER — CYCLOBENZAPRINE HCL 10 MG
10 TABLET ORAL NIGHTLY
Qty: 30 TABLET | Refills: 0 | Status: SHIPPED | OUTPATIENT
Start: 2023-05-15 | End: 2023-06-14

## 2023-05-15 RX ORDER — PANTOPRAZOLE SODIUM 40 MG/1
40 TABLET, DELAYED RELEASE ORAL
Qty: 90 TABLET | Refills: 1 | Status: SHIPPED | OUTPATIENT
Start: 2023-05-15 | End: 2024-05-14

## 2023-05-15 RX ORDER — DIETHYLPROPION HYDROCHLORIDE 25 MG/1
TABLET ORAL
COMMUNITY
Start: 2023-05-08

## 2023-05-15 NOTE — PATIENT INSTRUCTIONS
STOP the diethylpropion 25 mg 1 week prior to surgery. Stop the ibuprofen 1 week prior to surgery. Stop the metformin the day before surgery also. You can restart all of these 1 or 2 days after your surgery.

## 2023-05-16 LAB
ATRIAL RATE: 75 BPM
P AXIS: 50 DEGREES
P-R INTERVAL: 140 MS
Q-T INTERVAL: 388 MS
QRS DURATION: 76 MS
QTC CALCULATION (BEZET): 433 MS
R AXIS: 57 DEGREES
T AXIS: 50 DEGREES
VENTRICULAR RATE: 75 BPM

## 2023-05-17 ENCOUNTER — TELEPHONE (OUTPATIENT)
Dept: FAMILY MEDICINE CLINIC | Facility: CLINIC | Age: 43
End: 2023-05-17

## 2023-05-17 NOTE — TELEPHONE ENCOUNTER
Patient states she received a call from [facility] and believes she spoke with someone by the name Juan Green or Rui Morse (cannot remember) and was told two things:   1: They do not have available appointments, but did not even offer her any dates at all, also that the other person does not take her insurance. 2: She was given a list of different places that she could go to instead and has called but got no answer    She is asking for your recommendation on what she should do at this point.

## 2023-05-18 NOTE — TELEPHONE ENCOUNTER
She needs to call her insurance and see if they can help facilitate finding her a [employer]-speaking therapist.

## 2023-05-19 ENCOUNTER — HOSPITAL ENCOUNTER (OUTPATIENT)
Dept: GENERAL RADIOLOGY | Age: 43
Discharge: HOME | End: 2023-05-19
Attending: FAMILY MEDICINE
Payer: COMMERCIAL

## 2023-05-19 PROCEDURE — 71046 X-RAY EXAM CHEST 2 VIEWS: CPT | Performed by: FAMILY MEDICINE

## 2023-05-30 ENCOUNTER — NURSE TRIAGE (OUTPATIENT)
Dept: FAMILY MEDICINE CLINIC | Facility: CLINIC | Age: 43
End: 2023-05-30

## 2023-05-30 PROBLEM — G56.01 CARPAL TUNNEL SYNDROME OF RIGHT WRIST: Status: ACTIVE | Noted: 2021-05-04

## 2023-05-31 ENCOUNTER — OFFICE VISIT (OUTPATIENT)
Dept: FAMILY MEDICINE CLINIC | Facility: CLINIC | Age: 43
End: 2023-05-31

## 2023-05-31 VITALS
HEART RATE: 83 BPM | HEIGHT: 62 IN | TEMPERATURE: 99 F | RESPIRATION RATE: 18 BRPM | BODY MASS INDEX: 33.49 KG/M2 | WEIGHT: 182 LBS | SYSTOLIC BLOOD PRESSURE: 128 MMHG | OXYGEN SATURATION: 97 % | DIASTOLIC BLOOD PRESSURE: 78 MMHG

## 2023-05-31 DIAGNOSIS — H66.91 RIGHT OTITIS MEDIA, UNSPECIFIED OTITIS MEDIA TYPE: Primary | ICD-10-CM

## 2023-05-31 DIAGNOSIS — J02.9 SORE THROAT: ICD-10-CM

## 2023-05-31 PROCEDURE — 3078F DIAST BP <80 MM HG: CPT

## 2023-05-31 PROCEDURE — 3008F BODY MASS INDEX DOCD: CPT

## 2023-05-31 PROCEDURE — 3074F SYST BP LT 130 MM HG: CPT

## 2023-05-31 PROCEDURE — 99213 OFFICE O/P EST LOW 20 MIN: CPT

## 2023-05-31 RX ORDER — AMOXICILLIN 500 MG/1
1000 CAPSULE ORAL 3 TIMES DAILY
Qty: 42 CAPSULE | Refills: 0 | Status: SHIPPED | OUTPATIENT
Start: 2023-05-31 | End: 2023-06-07

## 2023-06-01 RX ORDER — GABAPENTIN 300 MG/1
300 CAPSULE ORAL 3 TIMES DAILY
Qty: 270 CAPSULE | Refills: 1 | Status: SHIPPED | OUTPATIENT
Start: 2023-06-01

## 2023-06-06 ENCOUNTER — TELEPHONE (OUTPATIENT)
Dept: SURGERY | Facility: CLINIC | Age: 43
End: 2023-06-06

## 2023-06-06 ENCOUNTER — HOSPITAL ENCOUNTER (OUTPATIENT)
Dept: MAMMOGRAPHY | Age: 43
Discharge: HOME | End: 2023-06-06
Attending: FAMILY MEDICINE
Payer: COMMERCIAL

## 2023-06-06 ENCOUNTER — OFFICE VISIT (OUTPATIENT)
Dept: FAMILY MEDICINE CLINIC | Facility: CLINIC | Age: 43
End: 2023-06-06

## 2023-06-06 VITALS
DIASTOLIC BLOOD PRESSURE: 74 MMHG | WEIGHT: 182 LBS | TEMPERATURE: 98 F | HEIGHT: 62 IN | RESPIRATION RATE: 18 BRPM | SYSTOLIC BLOOD PRESSURE: 120 MMHG | BODY MASS INDEX: 33.49 KG/M2

## 2023-06-06 DIAGNOSIS — H60.501 ACUTE OTITIS EXTERNA OF RIGHT EAR, UNSPECIFIED TYPE: Primary | ICD-10-CM

## 2023-06-06 DIAGNOSIS — Z12.31 VISIT FOR SCREENING MAMMOGRAM: ICD-10-CM

## 2023-06-06 DIAGNOSIS — H66.91 RIGHT OTITIS MEDIA, UNSPECIFIED OTITIS MEDIA TYPE: ICD-10-CM

## 2023-06-06 PROCEDURE — 99213 OFFICE O/P EST LOW 20 MIN: CPT

## 2023-06-06 PROCEDURE — 77067 SCR MAMMO BI INCL CAD: CPT | Performed by: FAMILY MEDICINE

## 2023-06-06 PROCEDURE — 3074F SYST BP LT 130 MM HG: CPT

## 2023-06-06 PROCEDURE — 77063 BREAST TOMOSYNTHESIS BI: CPT | Performed by: FAMILY MEDICINE

## 2023-06-06 PROCEDURE — 3078F DIAST BP <80 MM HG: CPT

## 2023-06-06 PROCEDURE — 3008F BODY MASS INDEX DOCD: CPT

## 2023-06-13 ENCOUNTER — HOSPITAL ENCOUNTER (OUTPATIENT)
Dept: MAMMOGRAPHY | Facility: HOSPITAL | Age: 43
Discharge: HOME | End: 2023-06-13
Attending: FAMILY MEDICINE
Payer: COMMERCIAL

## 2023-06-13 DIAGNOSIS — R92.8 ABNORMAL MAMMOGRAM: ICD-10-CM

## 2023-06-13 PROCEDURE — 77065 DX MAMMO INCL CAD UNI: CPT | Performed by: FAMILY MEDICINE

## 2023-06-13 PROCEDURE — 77061 BREAST TOMOSYNTHESIS UNI: CPT | Performed by: FAMILY MEDICINE

## 2023-06-21 ENCOUNTER — TELEPHONE (OUTPATIENT)
Dept: FAMILY MEDICINE CLINIC | Facility: CLINIC | Age: 43
End: 2023-06-21

## 2023-06-21 DIAGNOSIS — E66.9 OBESITY (BMI 30-39.9): ICD-10-CM

## 2023-06-21 RX ORDER — DIETHYLPROPION HYDROCHLORIDE 25 MG/1
1 TABLET ORAL
Qty: 30 TABLET | Refills: 2 | Status: SHIPPED | OUTPATIENT
Start: 2023-06-21 | End: 2023-07-21

## 2023-06-21 NOTE — TELEPHONE ENCOUNTER
With [employer] [facility]  Arby Day   ID # [id number]    Patient calling ( identified name and  ) states LOV  for right  ear pain and reports remains with right ear pain  and now has sore throat   Has taken the Amoxicillin  and eardrops as directed     Denies chills, no fever,  no ear drainage, no hearing changes    Only wants to see [address]    Future Appointments   Date Time Provider [facility]   2023 11:00 AM Jagdish Mancia DO [facility]   2023  4:00 PM Kadi Duffy MD [facility]

## 2023-06-24 ENCOUNTER — OFFICE VISIT (OUTPATIENT)
Dept: FAMILY MEDICINE CLINIC | Facility: CLINIC | Age: 43
End: 2023-06-24

## 2023-06-24 VITALS
DIASTOLIC BLOOD PRESSURE: 77 MMHG | SYSTOLIC BLOOD PRESSURE: 123 MMHG | WEIGHT: 180 LBS | TEMPERATURE: 97 F | HEART RATE: 64 BPM | HEIGHT: 62 IN | BODY MASS INDEX: 33.13 KG/M2

## 2023-06-24 DIAGNOSIS — R51.9 HEADACHE, UNSPECIFIED HEADACHE TYPE: ICD-10-CM

## 2023-06-24 DIAGNOSIS — H92.01 RIGHT EAR PAIN: Primary | ICD-10-CM

## 2023-06-24 DIAGNOSIS — M26.629 TMJ PAIN DYSFUNCTION SYNDROME: ICD-10-CM

## 2023-06-24 DIAGNOSIS — H93.11 TINNITUS, RIGHT: ICD-10-CM

## 2023-06-24 DIAGNOSIS — L65.9 HAIR THINNING: ICD-10-CM

## 2023-06-24 DIAGNOSIS — M54.12 CERVICAL RADICULITIS: ICD-10-CM

## 2023-06-24 PROCEDURE — 3078F DIAST BP <80 MM HG: CPT | Performed by: FAMILY MEDICINE

## 2023-06-24 PROCEDURE — 99214 OFFICE O/P EST MOD 30 MIN: CPT | Performed by: FAMILY MEDICINE

## 2023-06-24 PROCEDURE — 3008F BODY MASS INDEX DOCD: CPT | Performed by: FAMILY MEDICINE

## 2023-06-24 PROCEDURE — 3074F SYST BP LT 130 MM HG: CPT | Performed by: FAMILY MEDICINE

## 2023-06-24 RX ORDER — NAPROXEN 500 MG/1
500 TABLET ORAL 2 TIMES DAILY WITH MEALS
Qty: 60 TABLET | Refills: 1 | Status: SHIPPED | OUTPATIENT
Start: 2023-06-24 | End: 2023-08-23

## 2023-06-24 NOTE — PATIENT INSTRUCTIONS
TEMPOROMANDIBULAR JOINT PAIN PLAN    You have bilateral TMJ dysfunction (temporomandibular joint dysfunction). Do warm compresses 2-3 times daily for 5 minutes each time and take medicine as directed. Avoid chewing tough meats, bagels, and gum excessively. May need to see dentist for night mouthguard if it does not improve especially if you grind your teeth. The brand that I wear is NTI but your dentist of course will decide which is best for you and if a mouth guard is needed.    ========================================================================    Once you find out the date of your neck surgery, make sure you stop the naproxen 7 days prior to the procedure.

## 2023-08-23 DIAGNOSIS — K21.9 GASTROESOPHAGEAL REFLUX DISEASE, UNSPECIFIED WHETHER ESOPHAGITIS PRESENT: ICD-10-CM

## 2023-08-24 RX ORDER — PANTOPRAZOLE SODIUM 40 MG/1
40 TABLET, DELAYED RELEASE ORAL
Qty: 90 TABLET | Refills: 3 | Status: SHIPPED | OUTPATIENT
Start: 2023-08-24 | End: 2024-08-23

## 2023-08-24 NOTE — TELEPHONE ENCOUNTER
Refill passed per [facility], [facility] protocol.   Requested Prescriptions   Pending Prescriptions Disp Refills    pantoprazole 40 MG Oral Tab EC 90 tablet 1     Sig: Take 1 tablet (40 mg total) by mouth every morning before breakfast. To help with stomach acid and stomach irritation/inflammation       Gastrointestional Medication Protocol Passed - 8/23/2023  6:13 AM        Passed - In person appointment or virtual visit in the past 12 mos or appointment in next 3 mos     Recent Outpatient Visits              2 months ago Right ear pain    [facility], [address], [address], [place], [place]    Office Visit    2 months ago Acute otitis externa of right ear, unspecified type    [facility], [facility]    Office Visit    2 months ago Right otitis media, unspecified otitis media type    [address], [place], APRN    Office Visit    3 months ago Cervical radiculitis    [address] [place] [address], [address], [place], [place]    Office Visit    4 months ago Anterior neck pain    [facility], [address], [address], [place], [place]    Office Visit          Future Appointments         Provider Department Appt Notes    In 4 days Madelin Blair MD [address], [address] [facility],[address], [place]                   Recent Outpatient Visits              2 months ago Right ear pain    [address], [address], [address], [place], [place]    Office Visit    2 months ago Acute otitis externa of right ear, unspecified type    [facility], [facility] Sarah Morales, APRN    Office Visit    2 months ago Right otitis media, unspecified otitis media type    [address] [facility], [place], [facility]    Office Visit    3 months ago Cervical radiculitis    [address], [address], [place], DO    Office Visit    4 months ago Anterior neck pain    [facility], [address], [address], [place], [place]    Office Visit          Future Appointments         Provider Department Appt Notes    In 4 days Thelma Valdez MD [address], [address] [facility],[address], [place] [address]

## 2023-08-25 ENCOUNTER — TELEPHONE (OUTPATIENT)
Dept: FAMILY MEDICINE CLINIC | Facility: CLINIC | Age: 43
End: 2023-08-25

## 2023-08-25 NOTE — TELEPHONE ENCOUNTER
With [employer] [facility]  Khadar Chong  ID # [id number]    Patient calling ( identified name and  )   States has been seeing a weight loss doctor   ( Claudene Boroughs) and was told she is pre-diabetic , has been taking weight loss medication Diethylpropione and Metformin    She is experiencing dry mouth , explained can be side effects of the medication     Last A1C:     Component  Ref Range & Units 5/15/23  4:50 PM   HgbA1C  <5.7 % 5.3     Advised to discuss the dry mouth with Dr. Blaine Jansen , has future appointment     Patient verbalizes understanding and agrees      Future Appointments   Date Time Provider Ankita Topete   2023  4:00 PM Theresa Mcfarlane MD [address] [place] [address]

## 2023-08-28 ENCOUNTER — OFFICE VISIT (OUTPATIENT)
Dept: SURGERY | Facility: CLINIC | Age: 43
End: 2023-08-28
Payer: COMMERCIAL

## 2023-08-28 VITALS
HEART RATE: 74 BPM | BODY MASS INDEX: 32.66 KG/M2 | HEIGHT: 62 IN | OXYGEN SATURATION: 97 % | DIASTOLIC BLOOD PRESSURE: 58 MMHG | WEIGHT: 177.5 LBS | SYSTOLIC BLOOD PRESSURE: 122 MMHG

## 2023-08-28 DIAGNOSIS — E88.81 INSULIN RESISTANCE: ICD-10-CM

## 2023-08-28 DIAGNOSIS — E78.5 DYSLIPIDEMIA: ICD-10-CM

## 2023-08-28 DIAGNOSIS — K76.0 HEPATIC STEATOSIS: Primary | ICD-10-CM

## 2023-08-28 DIAGNOSIS — E78.00 HYPERCHOLESTEREMIA: ICD-10-CM

## 2023-08-28 DIAGNOSIS — E66.9 OBESITY (BMI 30-39.9): ICD-10-CM

## 2023-08-28 PROBLEM — E88.819 INSULIN RESISTANCE: Status: ACTIVE | Noted: 2023-08-28

## 2023-08-28 RX ORDER — METFORMIN HYDROCHLORIDE 500 MG/1
500 TABLET, EXTENDED RELEASE ORAL DAILY
Qty: 90 TABLET | Refills: 1 | Status: SHIPPED | OUTPATIENT
Start: 2023-08-28 | End: 2023-11-26

## 2023-08-28 RX ORDER — DIETHYLPROPION HYDROCHLORIDE 25 MG/1
1 TABLET ORAL
Qty: 60 TABLET | Refills: 2 | Status: SHIPPED | OUTPATIENT
Start: 2023-08-28 | End: 2023-09-27

## 2023-09-05 ENCOUNTER — TELEPHONE (OUTPATIENT)
Dept: FAMILY MEDICINE CLINIC | Facility: CLINIC | Age: 43
End: 2023-09-05

## 2023-09-05 NOTE — TELEPHONE ENCOUNTER
Patient calling to schedule pre-operative visit, surgery is on 09/18/23, no appointment available until 09/26/23. Please advise.

## 2023-09-07 ENCOUNTER — TELEPHONE (OUTPATIENT)
Dept: FAMILY MEDICINE CLINIC | Facility: CLINIC | Age: 43
End: 2023-09-07

## 2023-09-07 ENCOUNTER — EKG ENCOUNTER (OUTPATIENT)
Dept: LAB | Age: 43
End: 2023-09-07
Attending: FAMILY MEDICINE
Payer: COMMERCIAL

## 2023-09-07 ENCOUNTER — LAB ENCOUNTER (OUTPATIENT)
Dept: LAB | Age: 43
End: 2023-09-07
Attending: FAMILY MEDICINE
Payer: COMMERCIAL

## 2023-09-07 ENCOUNTER — OFFICE VISIT (OUTPATIENT)
Dept: FAMILY MEDICINE CLINIC | Facility: CLINIC | Age: 43
End: 2023-09-07

## 2023-09-07 ENCOUNTER — HOSPITAL ENCOUNTER (OUTPATIENT)
Dept: GENERAL RADIOLOGY | Age: 43
Discharge: HOME | End: 2023-09-07
Attending: FAMILY MEDICINE
Payer: COMMERCIAL

## 2023-09-07 VITALS
HEIGHT: 62 IN | WEIGHT: 178 LBS | DIASTOLIC BLOOD PRESSURE: 74 MMHG | BODY MASS INDEX: 32.76 KG/M2 | HEART RATE: 83 BPM | TEMPERATURE: 97 F | SYSTOLIC BLOOD PRESSURE: 125 MMHG

## 2023-09-07 DIAGNOSIS — M50.122 CERVICAL DISC DISORDER AT C5-C6 LEVEL WITH RADICULOPATHY: ICD-10-CM

## 2023-09-07 DIAGNOSIS — M54.12 CERVICAL RADICULITIS: Primary | ICD-10-CM

## 2023-09-07 DIAGNOSIS — R73.9 HYPERGLYCEMIA: ICD-10-CM

## 2023-09-07 DIAGNOSIS — Z01.818 PREOP EXAMINATION: ICD-10-CM

## 2023-09-07 DIAGNOSIS — E03.9 ACQUIRED HYPOTHYROIDISM: ICD-10-CM

## 2023-09-07 DIAGNOSIS — M54.12 CERVICAL RADICULITIS: ICD-10-CM

## 2023-09-07 LAB
ALBUMIN SERPL-MCNC: 3.6 G/DL (ref 3.4–5)
ALBUMIN/GLOB SERPL: 1 {RATIO} (ref 1–2)
ALP LIVER SERPL-CCNC: 76 U/L
ALT SERPL-CCNC: 50 U/L
ANION GAP SERPL CALC-SCNC: 6 MMOL/L (ref 0–18)
APTT PPP: 27.9 SECONDS (ref 23.3–35.6)
AST SERPL-CCNC: 29 U/L (ref 15–37)
BASOPHILS # BLD AUTO: 0.03 X10(3) UL (ref 0–0.2)
BASOPHILS NFR BLD AUTO: 0.4 %
BILIRUB SERPL-MCNC: 0.4 MG/DL (ref 0.1–2)
BILIRUB UR QL STRIP.AUTO: NEGATIVE
BUN BLD-MCNC: 9 MG/DL (ref 7–18)
CALCIUM BLD-MCNC: 9 MG/DL (ref 8.5–10.1)
CHLORIDE SERPL-SCNC: 105 MMOL/L (ref 98–112)
CLARITY UR REFRACT.AUTO: CLEAR
CO2 SERPL-SCNC: 27 MMOL/L (ref 21–32)
COLOR UR AUTO: COLORLESS
CREAT BLD-MCNC: 0.72 MG/DL
EGFRCR SERPLBLD CKD-EPI 2021: 108 ML/MIN/1.73M2 (ref 60–?)
EOSINOPHIL # BLD AUTO: 0.12 X10(3) UL (ref 0–0.7)
EOSINOPHIL NFR BLD AUTO: 1.8 %
ERYTHROCYTE [DISTWIDTH] IN BLOOD BY AUTOMATED COUNT: 12.4 %
FASTING STATUS PATIENT QL REPORTED: YES
GLOBULIN PLAS-MCNC: 3.5 G/DL (ref 2.8–4.4)
GLUCOSE BLD-MCNC: 95 MG/DL (ref 70–99)
GLUCOSE UR STRIP.AUTO-MCNC: NORMAL MG/DL
HCT VFR BLD AUTO: 42.3 %
HGB BLD-MCNC: 14 G/DL
IMM GRANULOCYTES # BLD AUTO: 0.03 X10(3) UL (ref 0–1)
IMM GRANULOCYTES NFR BLD: 0.4 %
INR BLD: 0.93 (ref 0.85–1.16)
KETONES UR STRIP.AUTO-MCNC: NEGATIVE MG/DL
LEUKOCYTE ESTERASE UR QL STRIP.AUTO: NEGATIVE
LYMPHOCYTES # BLD AUTO: 1.57 X10(3) UL (ref 1–4)
LYMPHOCYTES NFR BLD AUTO: 23.3 %
MCH RBC QN AUTO: 30.5 PG (ref 26–34)
MCHC RBC AUTO-ENTMCNC: 33.1 G/DL (ref 31–37)
MCV RBC AUTO: 92.2 FL
MONOCYTES # BLD AUTO: 0.45 X10(3) UL (ref 0.1–1)
MONOCYTES NFR BLD AUTO: 6.7 %
NEUTROPHILS # BLD AUTO: 4.55 X10 (3) UL (ref 1.5–7.7)
NEUTROPHILS # BLD AUTO: 4.55 X10(3) UL (ref 1.5–7.7)
NEUTROPHILS NFR BLD AUTO: 67.4 %
NITRITE UR QL STRIP.AUTO: NEGATIVE
OSMOLALITY SERPL CALC.SUM OF ELEC: 284 MOSM/KG (ref 275–295)
PH UR STRIP.AUTO: 5.5 [PH] (ref 5–8)
PLATELET # BLD AUTO: 325 10(3)UL (ref 150–450)
POTASSIUM SERPL-SCNC: 4.1 MMOL/L (ref 3.5–5.1)
PROT SERPL-MCNC: 7.1 G/DL (ref 6.4–8.2)
PROT UR STRIP.AUTO-MCNC: NEGATIVE MG/DL
PROTHROMBIN TIME: 12.5 SECONDS (ref 11.6–14.8)
RBC # BLD AUTO: 4.59 X10(6)UL
RBC UR QL AUTO: NEGATIVE
SODIUM SERPL-SCNC: 138 MMOL/L (ref 136–145)
SP GR UR STRIP.AUTO: <1.005 (ref 1–1.03)
TSI SER-ACNC: 1.05 MIU/ML (ref 0.36–3.74)
UROBILINOGEN UR STRIP.AUTO-MCNC: NORMAL MG/DL
WBC # BLD AUTO: 6.8 X10(3) UL (ref 4–11)

## 2023-09-07 PROCEDURE — 85610 PROTHROMBIN TIME: CPT

## 2023-09-07 PROCEDURE — 93010 ELECTROCARDIOGRAM REPORT: CPT | Performed by: INTERNAL MEDICINE

## 2023-09-07 PROCEDURE — 3074F SYST BP LT 130 MM HG: CPT | Performed by: FAMILY MEDICINE

## 2023-09-07 PROCEDURE — 87081 CULTURE SCREEN ONLY: CPT

## 2023-09-07 PROCEDURE — 3008F BODY MASS INDEX DOCD: CPT | Performed by: FAMILY MEDICINE

## 2023-09-07 PROCEDURE — 84443 ASSAY THYROID STIM HORMONE: CPT

## 2023-09-07 PROCEDURE — 81003 URINALYSIS AUTO W/O SCOPE: CPT

## 2023-09-07 PROCEDURE — 85025 COMPLETE CBC W/AUTO DIFF WBC: CPT

## 2023-09-07 PROCEDURE — 80053 COMPREHEN METABOLIC PANEL: CPT

## 2023-09-07 PROCEDURE — 93005 ELECTROCARDIOGRAM TRACING: CPT

## 2023-09-07 PROCEDURE — 36415 COLL VENOUS BLD VENIPUNCTURE: CPT

## 2023-09-07 PROCEDURE — 3078F DIAST BP <80 MM HG: CPT | Performed by: FAMILY MEDICINE

## 2023-09-07 PROCEDURE — 99214 OFFICE O/P EST MOD 30 MIN: CPT | Performed by: FAMILY MEDICINE

## 2023-09-07 PROCEDURE — 85730 THROMBOPLASTIN TIME PARTIAL: CPT

## 2023-09-07 PROCEDURE — 71046 X-RAY EXAM CHEST 2 VIEWS: CPT | Performed by: FAMILY MEDICINE

## 2023-09-08 LAB
ATRIAL RATE: 68 BPM
P AXIS: 46 DEGREES
P-R INTERVAL: 146 MS
Q-T INTERVAL: 410 MS
QRS DURATION: 74 MS
QTC CALCULATION (BEZET): 435 MS
R AXIS: 53 DEGREES
T AXIS: 54 DEGREES
VENTRICULAR RATE: 68 BPM

## 2023-09-09 ENCOUNTER — TELEPHONE (OUTPATIENT)
Dept: FAMILY MEDICINE CLINIC | Facility: CLINIC | Age: 43
End: 2023-09-09

## 2023-09-09 NOTE — TELEPHONE ENCOUNTER
Samuel Strauss saw our mutual patient, Vaishnavi King. Her EKG and labs are normal.  She is medically optimized for her procedure and can proceed.     Keith Good, DO

## 2023-09-11 NOTE — TELEPHONE ENCOUNTER
Assessment form received. DONTA incomplete. FCR received. [employer] message sent to pt. Logged for processing.

## 2023-09-13 ENCOUNTER — LAB ENCOUNTER (OUTPATIENT)
Dept: LAB | Age: 43
End: 2023-09-13
Attending: NEUROLOGICAL SURGERY
Payer: COMMERCIAL

## 2023-09-13 DIAGNOSIS — R73.9 HYPERGLYCEMIA: ICD-10-CM

## 2023-09-13 DIAGNOSIS — Z01.818 PREOP TESTING: ICD-10-CM

## 2023-09-13 LAB
ANTIBODY SCREEN: NEGATIVE
EST. AVERAGE GLUCOSE BLD GHB EST-MCNC: 114 MG/DL (ref 68–126)
HBA1C MFR BLD: 5.6 % (ref ?–5.7)
RH BLOOD TYPE: POSITIVE

## 2023-09-13 PROCEDURE — 86900 BLOOD TYPING SEROLOGIC ABO: CPT

## 2023-09-13 PROCEDURE — 36415 COLL VENOUS BLD VENIPUNCTURE: CPT

## 2023-09-13 PROCEDURE — 86850 RBC ANTIBODY SCREEN: CPT

## 2023-09-13 PROCEDURE — 83036 HEMOGLOBIN GLYCOSYLATED A1C: CPT

## 2023-09-13 PROCEDURE — 86901 BLOOD TYPING SEROLOGIC RH(D): CPT

## 2023-09-16 ENCOUNTER — OFFICE VISIT (OUTPATIENT)
Dept: FAMILY MEDICINE CLINIC | Facility: CLINIC | Age: 43
End: 2023-09-16

## 2023-09-16 VITALS
TEMPERATURE: 98 F | HEART RATE: 75 BPM | DIASTOLIC BLOOD PRESSURE: 73 MMHG | SYSTOLIC BLOOD PRESSURE: 112 MMHG | WEIGHT: 179 LBS | BODY MASS INDEX: 32.94 KG/M2 | HEIGHT: 62 IN

## 2023-09-16 DIAGNOSIS — L91.8 INFLAMED SKIN TAG: Primary | ICD-10-CM

## 2023-09-16 DIAGNOSIS — L98.9 SKIN LESION OF NECK: ICD-10-CM

## 2023-09-16 DIAGNOSIS — L30.9 DERMATITIS: ICD-10-CM

## 2023-09-16 PROCEDURE — 3008F BODY MASS INDEX DOCD: CPT | Performed by: FAMILY MEDICINE

## 2023-09-16 PROCEDURE — 3078F DIAST BP <80 MM HG: CPT | Performed by: FAMILY MEDICINE

## 2023-09-16 PROCEDURE — 3074F SYST BP LT 130 MM HG: CPT | Performed by: FAMILY MEDICINE

## 2023-09-16 PROCEDURE — 99213 OFFICE O/P EST LOW 20 MIN: CPT | Performed by: FAMILY MEDICINE

## 2023-09-16 RX ORDER — TRIAMCINOLONE ACETONIDE 1 MG/G
CREAM TOPICAL 2 TIMES DAILY
Qty: 45 G | Refills: 0 | Status: ON HOLD | OUTPATIENT
Start: 2023-09-16

## 2023-09-18 ENCOUNTER — ANESTHESIA (OUTPATIENT)
Dept: SURGERY | Facility: HOSPITAL | Age: 43
End: 2023-09-18
Payer: COMMERCIAL

## 2023-09-18 ENCOUNTER — HOSPITAL ENCOUNTER (OUTPATIENT)
Facility: HOSPITAL | Age: 43
Discharge: HOME | End: 2023-09-20
Attending: NEUROLOGICAL SURGERY | Admitting: NEUROLOGICAL SURGERY
Payer: COMMERCIAL

## 2023-09-18 ENCOUNTER — ANESTHESIA EVENT (OUTPATIENT)
Dept: SURGERY | Facility: HOSPITAL | Age: 43
End: 2023-09-18
Payer: COMMERCIAL

## 2023-09-18 ENCOUNTER — APPOINTMENT (OUTPATIENT)
Dept: GENERAL RADIOLOGY | Facility: HOSPITAL | Age: 43
End: 2023-09-18
Attending: NEUROLOGICAL SURGERY
Payer: COMMERCIAL

## 2023-09-18 DIAGNOSIS — Z01.818 PREOP TESTING: Primary | ICD-10-CM

## 2023-09-18 PROBLEM — E03.9 HYPOTHYROIDISM: Status: ACTIVE | Noted: 2022-09-14

## 2023-09-18 LAB
BILIRUB UR QL: NEGATIVE
CLARITY UR: CLEAR
GLUCOSE BLDC GLUCOMTR-MCNC: 122 MG/DL (ref 70–99)
GLUCOSE BLDC GLUCOMTR-MCNC: 97 MG/DL (ref 70–99)
GLUCOSE BLDC GLUCOMTR-MCNC: 98 MG/DL (ref 70–99)
GLUCOSE UR-MCNC: NORMAL MG/DL
HGB UR QL STRIP.AUTO: NEGATIVE
KETONES UR-MCNC: NEGATIVE MG/DL
LEUKOCYTE ESTERASE UR QL STRIP.AUTO: NEGATIVE
NITRITE UR QL STRIP.AUTO: NEGATIVE
PH UR: 7 [PH] (ref 5–8)
PROT UR-MCNC: NEGATIVE MG/DL
SP GR UR STRIP: 1.01 (ref 1–1.03)
UROBILINOGEN UR STRIP-ACNC: NORMAL

## 2023-09-18 PROCEDURE — 82962 GLUCOSE BLOOD TEST: CPT

## 2023-09-18 PROCEDURE — 76000 FLUOROSCOPY <1 HR PHYS/QHP: CPT | Performed by: NEUROLOGICAL SURGERY

## 2023-09-18 PROCEDURE — 0RT30ZZ RESECTION OF CERVICAL VERTEBRAL DISC, OPEN APPROACH: ICD-10-PCS | Performed by: NEUROLOGICAL SURGERY

## 2023-09-18 PROCEDURE — 0RR30JZ REPLACEMENT OF CERVICAL VERTEBRAL DISC WITH SYNTHETIC SUBSTITUTE, OPEN APPROACH: ICD-10-PCS | Performed by: NEUROLOGICAL SURGERY

## 2023-09-18 RX ORDER — MORPHINE SULFATE 10 MG/ML
6 INJECTION, SOLUTION INTRAMUSCULAR; INTRAVENOUS EVERY 10 MIN PRN
Status: DISCONTINUED | OUTPATIENT
Start: 2023-09-18 | End: 2023-09-18 | Stop reason: HOSPADM

## 2023-09-18 RX ORDER — DIETHYLPROPION HYDROCHLORIDE 25 MG/1
1 TABLET ORAL
Status: DISCONTINUED | OUTPATIENT
Start: 2023-09-19 | End: 2023-09-18

## 2023-09-18 RX ORDER — HYDROMORPHONE HYDROCHLORIDE 1 MG/ML
0.2 INJECTION, SOLUTION INTRAMUSCULAR; INTRAVENOUS; SUBCUTANEOUS EVERY 5 MIN PRN
Status: DISCONTINUED | OUTPATIENT
Start: 2023-09-18 | End: 2023-09-18 | Stop reason: HOSPADM

## 2023-09-18 RX ORDER — FAMOTIDINE 20 MG/1
20 TABLET, FILM COATED ORAL ONCE
Status: DISCONTINUED | OUTPATIENT
Start: 2023-09-18 | End: 2023-09-18 | Stop reason: HOSPADM

## 2023-09-18 RX ORDER — HYDROCODONE BITARTRATE AND ACETAMINOPHEN 5; 325 MG/1; MG/1
1 TABLET ORAL ONCE AS NEEDED
Status: COMPLETED | OUTPATIENT
Start: 2023-09-18 | End: 2023-09-18

## 2023-09-18 RX ORDER — LEVOTHYROXINE SODIUM 0.05 MG/1
50 TABLET ORAL
Status: DISCONTINUED | OUTPATIENT
Start: 2023-09-19 | End: 2023-09-20

## 2023-09-18 RX ORDER — PHENYLEPHRINE HCL 10 MG/ML
VIAL (ML) INJECTION AS NEEDED
Status: DISCONTINUED | OUTPATIENT
Start: 2023-09-18 | End: 2023-09-18 | Stop reason: SURG

## 2023-09-18 RX ORDER — SODIUM CHLORIDE, SODIUM LACTATE, POTASSIUM CHLORIDE, CALCIUM CHLORIDE 600; 310; 30; 20 MG/100ML; MG/100ML; MG/100ML; MG/100ML
INJECTION, SOLUTION INTRAVENOUS CONTINUOUS
Status: DISCONTINUED | OUTPATIENT
Start: 2023-09-18 | End: 2023-09-20

## 2023-09-18 RX ORDER — MIDAZOLAM HYDROCHLORIDE 1 MG/ML
INJECTION INTRAMUSCULAR; INTRAVENOUS AS NEEDED
Status: DISCONTINUED | OUTPATIENT
Start: 2023-09-18 | End: 2023-09-18 | Stop reason: SURG

## 2023-09-18 RX ORDER — SODIUM CHLORIDE 9 MG/ML
INJECTION, SOLUTION INTRAVENOUS CONTINUOUS PRN
Status: DISCONTINUED | OUTPATIENT
Start: 2023-09-18 | End: 2023-09-18 | Stop reason: SURG

## 2023-09-18 RX ORDER — ONDANSETRON 2 MG/ML
INJECTION INTRAMUSCULAR; INTRAVENOUS AS NEEDED
Status: DISCONTINUED | OUTPATIENT
Start: 2023-09-18 | End: 2023-09-18 | Stop reason: SURG

## 2023-09-18 RX ORDER — GABAPENTIN 300 MG/1
300 CAPSULE ORAL 3 TIMES DAILY
Status: DISCONTINUED | OUTPATIENT
Start: 2023-09-18 | End: 2023-09-20

## 2023-09-18 RX ORDER — ONDANSETRON 2 MG/ML
4 INJECTION INTRAMUSCULAR; INTRAVENOUS EVERY 6 HOURS PRN
Status: DISCONTINUED | OUTPATIENT
Start: 2023-09-18 | End: 2023-09-18 | Stop reason: HOSPADM

## 2023-09-18 RX ORDER — BUPIVACAINE HYDROCHLORIDE 2.5 MG/ML
INJECTION, SOLUTION EPIDURAL; INFILTRATION; INTRACAUDAL AS NEEDED
Status: DISCONTINUED | OUTPATIENT
Start: 2023-09-18 | End: 2023-09-18 | Stop reason: HOSPADM

## 2023-09-18 RX ORDER — PROCHLORPERAZINE EDISYLATE 5 MG/ML
5 INJECTION INTRAMUSCULAR; INTRAVENOUS EVERY 8 HOURS PRN
Status: DISCONTINUED | OUTPATIENT
Start: 2023-09-18 | End: 2023-09-18 | Stop reason: HOSPADM

## 2023-09-18 RX ORDER — PANTOPRAZOLE SODIUM 40 MG/1
40 TABLET, DELAYED RELEASE ORAL
Status: DISCONTINUED | OUTPATIENT
Start: 2023-09-19 | End: 2023-09-20

## 2023-09-18 RX ORDER — NALOXONE HYDROCHLORIDE 0.4 MG/ML
80 INJECTION, SOLUTION INTRAMUSCULAR; INTRAVENOUS; SUBCUTANEOUS AS NEEDED
Status: DISCONTINUED | OUTPATIENT
Start: 2023-09-18 | End: 2023-09-18 | Stop reason: HOSPADM

## 2023-09-18 RX ORDER — MEPERIDINE HYDROCHLORIDE 25 MG/ML
12.5 INJECTION INTRAMUSCULAR; INTRAVENOUS; SUBCUTANEOUS AS NEEDED
Status: DISCONTINUED | OUTPATIENT
Start: 2023-09-18 | End: 2023-09-18 | Stop reason: HOSPADM

## 2023-09-18 RX ORDER — DEXTROSE MONOHYDRATE 25 G/50ML
50 INJECTION, SOLUTION INTRAVENOUS
Status: DISCONTINUED | OUTPATIENT
Start: 2023-09-18 | End: 2023-09-18 | Stop reason: HOSPADM

## 2023-09-18 RX ORDER — HYDROMORPHONE HYDROCHLORIDE 1 MG/ML
0.6 INJECTION, SOLUTION INTRAMUSCULAR; INTRAVENOUS; SUBCUTANEOUS EVERY 5 MIN PRN
Status: DISCONTINUED | OUTPATIENT
Start: 2023-09-18 | End: 2023-09-18 | Stop reason: HOSPADM

## 2023-09-18 RX ORDER — PHENAZOPYRIDINE HYDROCHLORIDE 95 MG/1
95 TABLET ORAL 3 TIMES DAILY PRN
COMMUNITY

## 2023-09-18 RX ORDER — HYDROCODONE BITARTRATE AND ACETAMINOPHEN 5; 325 MG/1; MG/1
1 TABLET ORAL EVERY 6 HOURS PRN
Status: DISCONTINUED | OUTPATIENT
Start: 2023-09-18 | End: 2023-09-19

## 2023-09-18 RX ORDER — METOCLOPRAMIDE 10 MG/1
10 TABLET ORAL ONCE
Status: COMPLETED | OUTPATIENT
Start: 2023-09-18 | End: 2023-09-18

## 2023-09-18 RX ORDER — ACETAMINOPHEN 500 MG
1000 TABLET ORAL ONCE
Status: DISCONTINUED | OUTPATIENT
Start: 2023-09-18 | End: 2023-09-18 | Stop reason: HOSPADM

## 2023-09-18 RX ORDER — NICOTINE POLACRILEX 4 MG
15 LOZENGE BUCCAL
Status: DISCONTINUED | OUTPATIENT
Start: 2023-09-18 | End: 2023-09-18 | Stop reason: HOSPADM

## 2023-09-18 RX ORDER — CEFAZOLIN SODIUM/WATER 2 G/20 ML
2 SYRINGE (ML) INTRAVENOUS ONCE
Status: COMPLETED | OUTPATIENT
Start: 2023-09-18 | End: 2023-09-18

## 2023-09-18 RX ORDER — HYDROCODONE BITARTRATE AND ACETAMINOPHEN 5; 325 MG/1; MG/1
1 TABLET ORAL EVERY 6 HOURS PRN
Qty: 30 TABLET | Refills: 0 | Status: SHIPPED | OUTPATIENT
Start: 2023-09-18

## 2023-09-18 RX ORDER — NICOTINE POLACRILEX 4 MG
30 LOZENGE BUCCAL
Status: DISCONTINUED | OUTPATIENT
Start: 2023-09-18 | End: 2023-09-18 | Stop reason: HOSPADM

## 2023-09-18 RX ORDER — ALPRAZOLAM 0.25 MG/1
0.25 TABLET ORAL NIGHTLY PRN
Status: DISCONTINUED | OUTPATIENT
Start: 2023-09-18 | End: 2023-09-20

## 2023-09-18 RX ORDER — MORPHINE SULFATE 4 MG/ML
2 INJECTION, SOLUTION INTRAMUSCULAR; INTRAVENOUS EVERY 10 MIN PRN
Status: DISCONTINUED | OUTPATIENT
Start: 2023-09-18 | End: 2023-09-18 | Stop reason: HOSPADM

## 2023-09-18 RX ORDER — LIDOCAINE HYDROCHLORIDE 10 MG/ML
INJECTION, SOLUTION EPIDURAL; INFILTRATION; INTRACAUDAL; PERINEURAL AS NEEDED
Status: DISCONTINUED | OUTPATIENT
Start: 2023-09-18 | End: 2023-09-18 | Stop reason: SURG

## 2023-09-18 RX ORDER — HYDROMORPHONE HYDROCHLORIDE 1 MG/ML
0.4 INJECTION, SOLUTION INTRAMUSCULAR; INTRAVENOUS; SUBCUTANEOUS EVERY 5 MIN PRN
Status: DISCONTINUED | OUTPATIENT
Start: 2023-09-18 | End: 2023-09-18 | Stop reason: HOSPADM

## 2023-09-18 RX ORDER — DEXAMETHASONE SODIUM PHOSPHATE 4 MG/ML
VIAL (ML) INJECTION AS NEEDED
Status: DISCONTINUED | OUTPATIENT
Start: 2023-09-18 | End: 2023-09-18 | Stop reason: SURG

## 2023-09-18 RX ORDER — MORPHINE SULFATE 4 MG/ML
4 INJECTION, SOLUTION INTRAMUSCULAR; INTRAVENOUS EVERY 10 MIN PRN
Status: DISCONTINUED | OUTPATIENT
Start: 2023-09-18 | End: 2023-09-18 | Stop reason: HOSPADM

## 2023-09-18 RX ADMIN — CEFAZOLIN SODIUM/WATER 2 G: 2 G/20 ML SYRINGE (ML) INTRAVENOUS at 13:46:00

## 2023-09-18 RX ADMIN — DEXAMETHASONE SODIUM PHOSPHATE 4 MG: 4 MG/ML VIAL (ML) INJECTION at 14:13:00

## 2023-09-18 RX ADMIN — ONDANSETRON 4 MG: 2 INJECTION INTRAMUSCULAR; INTRAVENOUS at 16:26:00

## 2023-09-18 RX ADMIN — SODIUM CHLORIDE: 9 INJECTION, SOLUTION INTRAVENOUS at 14:05:00

## 2023-09-18 RX ADMIN — SODIUM CHLORIDE: 9 INJECTION, SOLUTION INTRAVENOUS at 16:33:00

## 2023-09-18 RX ADMIN — MIDAZOLAM HYDROCHLORIDE 2 MG: 1 INJECTION INTRAMUSCULAR; INTRAVENOUS at 13:48:00

## 2023-09-18 RX ADMIN — PHENYLEPHRINE HCL 100 MCG: 10 MG/ML VIAL (ML) INJECTION at 14:01:00

## 2023-09-18 RX ADMIN — SODIUM CHLORIDE, SODIUM LACTATE, POTASSIUM CHLORIDE, CALCIUM CHLORIDE: 600; 310; 30; 20 INJECTION, SOLUTION INTRAVENOUS at 13:46:00

## 2023-09-18 RX ADMIN — PHENYLEPHRINE HCL 50 MCG: 10 MG/ML VIAL (ML) INJECTION at 14:31:00

## 2023-09-18 RX ADMIN — LIDOCAINE HYDROCHLORIDE 50 MG: 10 INJECTION, SOLUTION EPIDURAL; INFILTRATION; INTRACAUDAL; PERINEURAL at 13:53:00

## 2023-09-18 NOTE — INTERVAL H&P NOTE
Pre-op Diagnosis: C5-6 radiculopathy    The above referenced H&P was reviewed by Marcial Dickson MD on 9/18/2023, the patient was examined and no significant changes have occurred in the patient's condition since the H&P was performed. I discussed with the patient and/or legal representative the potential benefits, risks and side effects of this procedure; the likelihood of the patient achieving goals; and potential problems that might occur during recuperation. I discussed reasonable alternatives to the procedure, including risks, benefits and side effects related to the alternatives and risks related to not receiving this procedure. I told her and her  that there is no guarantee that the surgery will take care of her symptoms but the probability is around 85%. She had some urinary discomfort take yesterday and took medicine for it therefore we will send a urinalysis today and it was negative therefore we will proceed with the surgery as planned. We will do the C5-C6 arthroplasty. I told her that the surgery will reduce the probability of adjacent segment disease. Likely, she will be able to go home today. We will proceed with procedure as planned.

## 2023-09-18 NOTE — DISCHARGE SUMMARY
Ms. Bryce Villalta is status post a C5-6 arthroplasty. She is doing well. She is able to move all extremities. She was placed in [place] collar. She should wear her collar at all times. Medications have been sent to the pharmacy electronically. Discharge instructions were given to patient and her  prior to surgery and then given again to her  after surgery. From a neurosurgical standpoint patient is ready for discharge once discharge criteria has been met. Patient will follow-up with our office in 2 weeks postop. In the meantime patient can contact us at any time if she has any questions or concerns.     Lisbeth Carlson PA-C  Neurosurgery

## 2023-09-18 NOTE — BRIEF OP NOTE
Pre-Operative Diagnosis: C5-6 radiculopathy     Post-Operative Diagnosis: C5-6 radiculopathy      Procedure Performed:   C5-6 arthroplasty    Surgeon(s) and Role:     Umesh Zhang MD - Primary    Assistant(s):  PA: Camilo Ruiz PA-C     Surgical Findings: C5-6 disc space identified using c-arm and metal marker. Incision made of the anterior cervical to the left. Vertebrae were palpated and visualized. C5-6 disc space was identified using c-arm and spinal needle. C5-6 disc space was removed. C5-6 trial was placed and visualized using c-arm. C5-6 arthroplasty was completed. Incision was closed with multilayer sutures. Incision was covered with steristrips, telfa and tegaderm. Patient was placed in aspen collar and brought to PACU in stable condition.       Specimen: None     Estimated Blood Loss: Blood Output: 15 mL (9/18/2023  4:27 PM)        Anitra Da Silva PA-C  9/18/2023  4:58 PM

## 2023-09-19 LAB
GLUCOSE BLDC GLUCOMTR-MCNC: 100 MG/DL (ref 70–99)
GLUCOSE BLDC GLUCOMTR-MCNC: 161 MG/DL (ref 70–99)
GLUCOSE BLDC GLUCOMTR-MCNC: 90 MG/DL (ref 70–99)
GLUCOSE BLDC GLUCOMTR-MCNC: 90 MG/DL (ref 70–99)

## 2023-09-19 PROCEDURE — 97162 PT EVAL MOD COMPLEX 30 MIN: CPT

## 2023-09-19 PROCEDURE — 97166 OT EVAL MOD COMPLEX 45 MIN: CPT

## 2023-09-19 PROCEDURE — 97535 SELF CARE MNGMENT TRAINING: CPT

## 2023-09-19 PROCEDURE — 97530 THERAPEUTIC ACTIVITIES: CPT

## 2023-09-19 PROCEDURE — 97116 GAIT TRAINING THERAPY: CPT

## 2023-09-19 PROCEDURE — 82962 GLUCOSE BLOOD TEST: CPT

## 2023-09-19 PROCEDURE — 97112 NEUROMUSCULAR REEDUCATION: CPT

## 2023-09-19 RX ORDER — CYCLOBENZAPRINE HCL 5 MG
5 TABLET ORAL 3 TIMES DAILY PRN
Status: DISCONTINUED | OUTPATIENT
Start: 2023-09-19 | End: 2023-09-20

## 2023-09-19 RX ORDER — FLUTICASONE PROPIONATE 50 MCG
1 SPRAY, SUSPENSION (ML) NASAL DAILY
Status: DISCONTINUED | OUTPATIENT
Start: 2023-09-19 | End: 2023-09-20

## 2023-09-19 RX ORDER — CYCLOBENZAPRINE HCL 5 MG
5 TABLET ORAL 3 TIMES DAILY PRN
Qty: 20 TABLET | Refills: 0 | Status: SHIPPED | OUTPATIENT
Start: 2023-09-19

## 2023-09-19 RX ORDER — HYDROCODONE BITARTRATE AND ACETAMINOPHEN 5; 325 MG/1; MG/1
1-2 TABLET ORAL EVERY 6 HOURS PRN
Status: DISCONTINUED | OUTPATIENT
Start: 2023-09-19 | End: 2023-09-20

## 2023-09-19 RX ORDER — MINERAL OIL AND PETROLATUM 150; 830 MG/G; MG/G
OINTMENT OPHTHALMIC 3 TIMES DAILY
Status: DISCONTINUED | OUTPATIENT
Start: 2023-09-19 | End: 2023-09-20

## 2023-09-19 NOTE — PHYSICAL THERAPY NOTE
PHYSICAL THERAPY EVALUATION - INPATIENT     Room Number: Room 2/Room 2-A  Evaluation Date: 9/19/2023  Type of Evaluation: Initial   Physician Order: PT Eval and Treat    Presenting Problem: C5-C6 arthroplasty  Reason for Therapy: Mobility Dysfunction and Discharge Planning    PHYSICAL THERAPY ASSESSMENT   Orders received and chart reviewed. LAVINIA Cordova approved participation in physical therapy. Session coordinated with OT, gait belt donned. PPE worn by therapist: mask and gloves. Patient was not wearing a mask during session. Patient is a 41year old female admitted 9/18/2023 for Presenting Problem: C5-C6 arthroplasty. Patient presented in bed with 6/10 pain. Education provided on Spine precautions, Physical therapy plan of care, and physiological benefits of out of bed mobility. Patient with good carryover. Patient on room air, oxygen sat 98% and heart rate 71, blood pressure 137/71. Patient currently with min A x 1 to mod A x 1 for mobility during the session with rolling walker. Patient with right leg pain, history of back surgery three years ago and right leg seems to be an issue but worse since cervical surgery. Patient with decreased sensation on right lateral leg. Patient educated in spinal precautions and log roll technique for bed mobility. Patient agreeable to using rolling walker. Patient and her  plan to discuss D/C to acute rehab. Patient is currently functioning below baseline with bed mobility, transfers, gait, and stair negotiation as a result of the following impairments: decreased functional strength and pain. Next session anticipate to progress bed mobility, transfers, gait, and stair negotiation. Patient collar to be worn at all times, donned for session. Patient history and/or personal factors that may impact the plan of care include home accessibility concerns and longstanding history of pain.  Based on the physical therapy examination of the noted systems and functional activity/participation limitations, the patient presentation is evolving given the patient presents with surgical precautions/limitations, demonstrates worsening of previously stable condition, and demonstrates worsening of co-morbidities. Patient would benefit from continued skilled physical therapy interventions to maximize patient safety and functional independence. Updated nurse on results of session, patient left in bedside chair with family present, all lines intact, all needs met with call light in reach. Bed mobility: Mod assist  Transfers: Min assist  Gait Assistance: Minimum assistance  Distance (ft): 80ft  Assistive Device: Rolling walker             Patient was left in bedside chair at end of session with all needs in reach. Patient's current functional deficits include bed mobility, transfers, gait and stair navigation. Patient does not have the physical skills to return to prior living environment upon D/C from the hospital. Anticipate patient will benefit from continued skilled physical therapy while in the hospital and upon D/C from the hospital at an acute rehab facility. The patient's Approx Degree of Impairment: 50.57% has been calculated based on documentation in the [facility] '6 clicks' Inpatient Basic Mobility Short Form. Research supports that patients with this level of impairment may benefit from Acute Rehab. If patient and her family do not decide to go to acute rehab, patient will need day rehab. RN aware of patient status post session. Patient will benefit from continued IP PT services to address these deficits in preparation for discharge. DISCHARGE RECOMMENDATIONS  PT Discharge Recommendations: Acute rehabilitation    PLAN  PT Treatment Plan: Bed mobility; Body mechanics; Patient education;Gait training;Stair training;Transfer training;Balance training  Rehab Potential : Good  Frequency (Obs): Daily       PHYSICAL THERAPY MEDICAL/SOCIAL HISTORY   Problem List  Principal Problem: Cervical radiculitis  Active Problems:    Hypothyroidism    Past Medical History  Past Medical History:   Diagnosis Date    Anxiety state     Back problem     Calculus of kidney     Depression     Disorder of liver     FATTY LIVER     Disorder of thyroid     hypothyroid    Endometriosis     Esophageal reflux     High cholesterol     History of Papanicolaou smear of cervix 2015    Hyperlipidemia     IBS (irritable bowel syndrome)     Migraines     Per [facility] - Migraine headaches    Osteoarthritis     Ovarian cyst     Postpartum depression     Prediabetes     Pregnancy 2002, 2005, 2008        Spondylolisthesis of lumbosacral region 2017    Visual impairment     WEARS GLASSES    Vitamin B12 deficiency     Vitamin D deficiency      Past Surgical History  Past Surgical History:   Procedure Laterality Date    CHOLECYSTECTOMY  2012    COLONOSCOPY  2004    CYST REMOVAL  , 2014    laporoscopic ovarian cytectomy    HYSTERECTOMY      LAPAROSCOPY,DIAGNOSTIC  2016    laparoscopy, filshie clip sterilization of left fallopian tube, ablation of endometriosis, retrieval and removal of displaced filshie clip    LIPOMA REMOVAL Left 2018    RUSSELL BIOPSY STEREO NODULE 1 SITE LEFT (CPT=19081)  05/10/2022    top hat    RUSSELL LOCALIZATION WIRE 1 SITE LEFT (CPT=19281)  2022    OTHER  2019    low back surgery with metal implant    TOTAL ABDOMINAL HYSTERECTOMY N/A 2017    Procedure: ABDOMINAL HYSTERECTOMY;  Surgeon: Margarita Brady MD;  Location: [facility] MAIN OR     HOME SITUATION  Type of Home: House   Home Layout: One level  Stairs to Enter : 1  Railing: Yes  Stairs to Bedroom: 0  Railing: No  Lives With: Spouse; Family  Drives: Yes  Patient Owned Equipment: Rolling walker (was not using assistive device at baseline)  Patient Regularly Uses: None    Prior Level of [place]: Patient reports being independent in ADL's and ambulation with no assistive device prior to admission to the hospital. SUBJECTIVE  \"I have pain in my neck and head\"    PHYSICAL THERAPY EXAMINATION     OBJECTIVE  Precautions: Cervical brace;Spine  Fall Risk: High fall risk    WEIGHT BEARING RESTRICTION  Weight Bearing Restriction: None                PAIN ASSESSMENT  Ratin  Location: neck and head  Management Techniques: Body mechanics; Activity promotion;Repositioning    COGNITION  Overall Cognitive Status:  WFL - within functional limits    RANGE OF MOTION AND STRENGTH ASSESSMENT    Lower extremity ROM is within functional limits  LLE WNL    Lower extremity strength is within functional limits  LLE WNL    BALANCE  Static Sitting: Good  Dynamic Sitting: Fair +  Static Standing: Fair -  Dynamic Standing: Poor +    AM-PAC '6-Clicks' INPATIENT SHORT FORM - BASIC MOBILITY  How much difficulty does the patient currently have. .. Patient Difficulty: Turning over in bed (including adjusting bedclothes, sheets and blankets)?: A Little   Patient Difficulty: Sitting down on and standing up from a chair with arms (e.g., wheelchair, bedside commode, etc.): A Little   Patient Difficulty: Moving from lying on back to sitting on the side of the bed?: A Little   How much help from another person does the patient currently need. .. Help from Another: Moving to and from a bed to a chair (including a wheelchair)?: A Little   Help from Another: Need to walk in hospital room?: A Little   Help from Another: Climbing 3-5 steps with a railing?: A Lot     AM-PAC Score:  Raw Score: 17   Approx Degree of Impairment: 50.57%   Standardized Score (AM-PAC Scale): 42.13   CMS Modifier (G-Code): CK    Exercise/Education Provided:  Bed mobility  Body mechanics  Gait training  Transfer training    Patient End of Session: Up in chair;Needs met;Call light within reach;RN aware of session/findings; All patient questions and concerns addressed; Family present    CURRENT GOALS  Goals to be met by: 23  Patient Goal Patient's self-stated goal is: to go home Goal #1 Patient is able to demonstrate supine - sit EOB @ level: supervision     Goal #1   Current Status    Goal #2 Patient is able to demonstrate transfers Sit to/from Stand at assistance level: supervision with walker - rolling     Goal #2  Current Status    Goal #3 Patient is able to ambulate 100 feet with assist device: walker - rolling at assistance level: supervision   Goal #3   Current Status    Goal #4 Patient will negotiate 1 stairs/one curb w/ assistive device and supervision   Goal #4   Current Status    Goal #5 Patient to demonstrate independence with home activity/exercise instructions provided to patient in preparation for discharge.    Goal #5   Current Status    Goal #6    Goal #6  Current Status     Patient Evaluation Complexity Level:  History Moderate - 1 or 2 personal factors and/or co-morbidities   Examination of body systems Moderate - addressing a total of 3 or more elements   Clinical Presentation Moderate - Evolving   Clinical Decision Making Moderate Complexity     Gait Training: 15 minutes  Therapeutic Activity: 31 minutes

## 2023-09-19 NOTE — OPERATIVE REPORT
[facility]    PATIENT'S NAME: Jonathan Alvarado   ATTENDING PHYSICIAN: Benny Hennessy MD   OPERATING PHYSICIAN: Francisco J. Bartlett Nyhan, MD   PATIENT ACCOUNT#:   [id number]    LOCATION:   Room 2 A [facility]  MEDICAL RECORD #:   [id number]       YOB: 1980  ADMISSION DATE:       09/18/2023      OPERATION DATE:  09/18/2023    OPERATIVE REPORT      PREOPERATIVE DIAGNOSIS:  Anterior cervical disc herniation and kyphosis of the cervical spine and spinal cord compression, and the operation proposed:  Anterior cervical discectomy and arthroplasty using the NuVasive arthroplasty system. The procedure to be done under EMG and somatosensory and motor evoked potentials. POSTOPERATIVE DIAGNOSIS:  Herniated disc with spinal cord compression at C5-6 and kyphotic deformity at that level. PROCEDURE:  Operation performed under general endotracheal anesthesia in the supine position with the use of EMG monitoring and somatosensory evoked responses, anterior cervical discectomy, spinal cord decompression and arthroplasty using artificial disc measuring 5 mm in height by 12 x 15 mm. The artificial disc was placed under C-arm fluoroscopy. The total estimated blood loss was approximately 15 mL. There were no incidents and no complications. The patient tolerated well the procedure. ASSISTANT:  Sho Dempsey PA-C.    INDICATIONS:  The patient is a 53-year-old woman who had been having severe neck pain that was investigated with an MRI scan of the cervical spine that showed a herniated disc at C5-6 with indentation of the spinal cord and kyphotic deformity. It was felt that, that was the source of her severe neck pain. Therefore, she was recommended to undergo an arthroplasty at that level. The benefits and risks of surgery were explained to her, and she agreed to proceed with the recommended operation. All of her questions were answered.    POSTOPERATIVE DIAGNOSIS:  Under general endotracheal anesthesia in the supine position, the anterior part of the neck was prepped and draped in the usual fashion to allow firstly a time-out, during which time we identified the patient, the procedure to be done, and we all agreed and surgery began. We started with a transverse incision about 1 to 2 fingerbreadths above the clavicle. It was made on the left side of midline from the midline to the left. It measured about 5 cm. It was made with a #10 scalpel blade, and then with electrocautery, we cauterized through the subcutaneous tissue down to exposure of the platysma muscle. We dissected the subcutaneous tissue, so we could see the muscle vertically and were able to divide the muscle at the level of the medial border of the sternocleidomastoid muscle. Then, the dissection was carried out through the plane between the trachea and the esophagus medially and the carotid sheath laterally. We arrived at the spine and then carried out the x-rays. The initial x-ray was taken at C6-7. Then, we moved up 1 level and the x-ray was precisely at C5-C6. We then detached the longus colli muscles anteriorly and then placed the Shadow-Line retractors with 2 blades horizontally and 2 blades vertically, so we were able to isolate the C5-6 level. Then, we did the annulotomy with a #15 blade, removed the disc and prepared the endplates for the arthroplasty at C5-6. Once the disc was removed and the posterior longitudinal ligament was opened, so we could remove the herniated disc. We were able to see it and we removed it without any problems. The posterior longitudinal ligament was defective and that there was a hole through which the disc herniation occurred. We fully decompressed the spinal cord and then we did a motor-evoked response and it was within normal limits and at baseline. Then, we trialed the cages. The 4 mm trial was too small.   Then, the trial arthroplasty was the 5 mm and that was the perfect size for the C5-6 level. We then did the channels with the osteotome and the channels for the arthroplasty fins were precisely in the midline. We then put the 5 mm height cervical artificial disc in place. This is the simplified disc. It measured 5 mm in height by 12 x 15 mm in width. We placed it USP and then push it with the tamp. It went into the perfect space. There were no incidents and no complications, and then we removed the retractors, did the x-rays and looked fine. The implant was precisely in the midline and the lateral view looked very good. We then closed the platysma muscle and there was hardly any bleeding, so there was no need to put a drain, so we were able to close the platysma with 2-0 Vicryl using a simple running stitch. Then, the subcutaneous tissue was closed with 2-0 Vicryl using inverted separate stitches, and finally the skin was closed with the subcuticular 4-0 Monocryl. There were no incidents and no complications, and the total estimated blood loss was approximately 15 mL. The final instrument, needle count, and gauze counts were correct. Dictated By Nayana Hennessy MD  d: 09/18/2023 16:56:12  t: 09/18/2023 22:38:09  Jamie Hair [id number]  FJE/

## 2023-09-19 NOTE — CM/SW NOTE
LOKI followed up on DC planning. LOKI performed chart review and found that therapy recommended acute rehab prior to a DC home    Message sent to MD asking for PMR consult - MD agreeaable    Referrals sent tentatively    Would need ins auth if acute rehab is the choice    PLAN: TBD, pending PMR consult, acute rehab, vs home    KATHIE Zheng, MSW ext.  [phone]

## 2023-09-19 NOTE — OR PREOP
Report given to [facility] on [facility]. Patient alert and oriented in stable condition upon transfer.

## 2023-09-19 NOTE — DISCHARGE SUMMARY
[facility]    Discharge Summary    Luba Galloway Patient Status:  Observation    1980 MRN [id number]   Location [facility] Attending Melinda Cruz MD   Hosp Day # 0 PCP Eileen Guzman DO     Date of Admission: 2023 Disposition: Home or Self Care     Date of Discharge: 2023    Admitting Diagnosis: C5-6 radiculopathy  Preop testing    Discharge Diagnosis: Patient Active Problem List:     Paresthesias     Right arm pain     Paresthesia of arm     Right wrist pain     Right elbow pain     Cervical radiculitis     Vitamin D deficiency     Vitamin B12 deficiency     Postcoital bleeding     Pelvic pain in female     Uterine leiomyoma     Endometriosis     Spondylolisthesis of lumbosacral region     Anxiety     Depressive disorder     Psychophysiological insomnia     Postprandial abdominal bloating     Chronic bilateral thoracic back pain     Mass of right axilla     Hip mass, left     Lipoma of left lower extremity     Sacroiliac joint dysfunction of left side     Spondylolysis, lumbar region     Mechanical low back pain     Herniated nucleus pulposus, lumbar     Foraminal stenosis of lumbar region     Lumbar radiculopathy     Excessive anger     Tension headache     Pain in right axilla     Abnormal taste in mouth     Hypokalemia     Family history of diabetes mellitus     History of nephrolithiasis     Degenerative disc disease, lumbar     Pelvic floor dysfunction in female     Dyspareunia due to medical condition in female     Right leg weakness     Dry skin     Preoperative examination     Elevated liver enzymes     Elevated glucose     Hepatic steatosis     Obesity (BMI 30-39. 9)     Weight gain     Hypercholesteremia     Gastroesophageal reflux disease     Chronic left-sided low back pain without sciatica     Left-sided chest wall pain     Plantar fasciitis, bilateral     Poor sleep hygiene     Urgency of urination     History of COVID-19     Primary osteoarthritis involving multiple joints     Dry eye syndrome of both eyes     Hyperopia of both eyes with regular astigmatism     Cervical herniated disc     Pain in right leg     Cervical strain     Seasonal allergic rhinitis due to pollen     Hypothyroidism     S/P excision of fibroadenoma of breast     Breast pain, left     Dyslipidemia     Spondylosis of cervical spine     Cervical disc disorder at C5-C6 level with radiculopathy     Abdominal pain     Carpal tunnel syndrome of right wrist     Cervicalgia     Cervicitis and endocervicitis     Cystitis     Disturbance of skin sensation     Dyspareunia     Dysuria     Excessive or frequent menstruation     Lump or mass in breast     Nonspecific finding on examination of urine     Mittelschmerz     Other malaise and fatigue     Ovarian cyst     Slow transit constipation     Urinary tract infection     Right otitis media     Acute otitis externa of right ear     Insulin resistance     Preop testing      Reason for Admission: C5-6 arthroplasty    Physical Exam:     5 out of 5 strength of upper and lower extremities. Some antalgic weakness of right upper extremity however with encouragement is able to have full strength    Negative Aurelia's bilaterally  2+ out of 4 plus of biceps and brachioradialis reflexes    Sensation is diminished in the right lower extremity however that has been present since after surgery of her lumbar spine and is not different than prior to surgery. Otherwise reports sensation is intact and symmetric of bilateral upper extremities besides some increase in station of the C8 dermatome. Incision is clean dry and intact with just dressing. No drainage appreciated on incision. Mild tenderness to palpation surrounding incision however no erythema noted. [place] [id number] collar. Normal fields of confrontation  Extraocular movements intact  Normal sensation of all trigeminal nerve distributions. Pupils equal and reactive.         Hospital Course: Patient is postoperative day #1 status post a C5-6 arthroplasty. Patient reports that she is doing okay this morning. She does continue to have neck pain and bilateral shoulder pain radiating to her chest.  She denies any shortness of breath. She denies any difficulty with breathing. She denies pain that radiates from her chest anywhere else. The pain is primarily localized to her neck and her bilateral shoulders. She does report that her right shoulder is worse than her left shoulder. She also reports that she has some numbness and tingling sensation of the right upper extremity which she had prior to surgery but reports that is more persistent. She rates her pain currently a 7 out of 10 however reports that she is due for medication. She says with medication that her pain is improved and goes to about a 5 out of 10. She reports some difficulty with swallowing however has been able to swallow liquids as well as soft food. She does report some left eye irritation this morning however no conjunctivitis present. When pointing she points to area laterally to the eye where tape was present from surgery. Denies any blurriness. Does report that she wears glasses regularly. Glasses are not present at bedside. Patient was also seen by Dr. Sofi Canales today. Discharge Condition: Good    Discharge Medications:      Discharge Medications        START taking these medications        Instructions Prescription details   cyclobenzaprine 5 MG Tabs  Commonly known as: Flexeril      Take 1 tablet (5 mg total) by mouth 3 (three) times daily as needed for Muscle spasms. Quantity: 20 tablet  Refills: 0     HYDROcodone-acetaminophen 5-325 MG Tabs  Commonly known as: Norco      Take 1 tablet by mouth every 6 (six) hours as needed for Pain.    Quantity: 30 tablet  Refills: 0            CONTINUE taking these medications        Instructions Prescription details   ALPRAZolam 0.25 MG Tabs  Commonly known as: Xanax      Take 1 tablet (0.25 mg total) by mouth nightly as needed for Anxiety. Quantity: 30 tablet  Refills: 1     Diethylpropion HCl 25 MG Tabs      Take 1 tablet by mouth 2 (two) times daily before meals. Stop taking on: September 27, 2023  Quantity: 60 tablet  Refills: 2     fluticasone propionate 50 MCG/ACT Susp  Commonly known as: Flonase      2 sprays by Each Nare route daily for 360 doses. Quantity: 3 each  Refills: 3     gabapentin 300 MG Caps  Commonly known as: Neurontin      Take 1 capsule (300 mg total) by mouth 3 (three) times daily. 2 tabs at night 1 in the morning   Quantity: 270 capsule  Refills: 1     levocetirizine 5 MG Tabs  Commonly known as: Xyzal      Take 1 tablet (5 mg total) by mouth every evening. Quantity: 90 tablet  Refills: 1     levothyroxine 50 MCG Tabs  Commonly known as: Synthroid      Take 1 tablet (50 mcg total) by mouth before breakfast.   Quantity: 90 tablet  Refills: 3     metFORMIN  MG Tb24  Commonly known as: Glucophage XR      Take 1 tablet (500 mg total) by mouth daily. Stop taking on: November 26, 2023  Quantity: 90 tablet  Refills: 1     pantoprazole 40 MG Tbec  Commonly known as: Protonix      Take 1 tablet (40 mg total) by mouth every morning before breakfast. To help with stomach acid and stomach irritation/inflammation   Quantity: 90 tablet  Refills: 3     phenazopyridine HCl 95 MG Tabs  Commonly known as: AZO      Take 1 tablet (95 mg total) by mouth 3 (three) times daily as needed for Pain. Refills: 0     triamcinolone 0.1 % Crea  Commonly known as: Kenalog      Apply topically 2 (two) times daily. Quantity: 45 g  Refills: 0               Where to Get Your Medications        These medications were sent to [address] [facility] [address] [phone], [address], [address]      Phone: [phone]   cyclobenzaprine 5 MG Tabs  HYDROcodone-acetaminophen 5-325 MG Tabs         Follow up Visits:  Follow-up with Dr. Amelia Moses Jovani Garcia PA-C in 2 weeks    Other Discharge Instructions: Patient is doing well this morning.  is at bedside. Patient does have pain of her bilateral shoulders as well as her neck. Her pain is well controlled with oral medications. We will continue pain management as prescribed however have added a muscle relaxant to her pain management. In addition she should be seen by physical therapy and Occupational Therapy today for a safety assessment. She reports urinating without any burning sensation. She does not have a bowel movement since surgery however does report that she is passing gas. Surgical standpoint patient is ready for discharge once discharge criteria has been met and she has been seen by physical therapy and Occupational Therapy. Libertad Scott PA-C  9/19/2023  7:52 AM    Of note,  was used while communicating with patient today.  #[id number] Josi Kemp) was used from [employer].

## 2023-09-19 NOTE — PROGRESS NOTES
Attempted to walk to the bathroom with 2 assist. Still very weak and states she feels dizzy.  Returned to bed and bedpan given to her

## 2023-09-20 VITALS
DIASTOLIC BLOOD PRESSURE: 67 MMHG | OXYGEN SATURATION: 99 % | HEART RATE: 64 BPM | SYSTOLIC BLOOD PRESSURE: 131 MMHG | HEIGHT: 62 IN | BODY MASS INDEX: 32.76 KG/M2 | WEIGHT: 178 LBS | RESPIRATION RATE: 16 BRPM | TEMPERATURE: 98 F

## 2023-09-20 LAB — GLUCOSE BLDC GLUCOMTR-MCNC: 91 MG/DL (ref 70–99)

## 2023-09-20 PROCEDURE — 97530 THERAPEUTIC ACTIVITIES: CPT

## 2023-09-20 PROCEDURE — 82962 GLUCOSE BLOOD TEST: CPT

## 2023-09-20 NOTE — PLAN OF CARE
Patient is alert and oriented. Dressing in place. Aspen collar to be worn at all times. Saline locked. Norco given for pain management. Ambulating in room and tolerating well. Voiding.  at bedside. Call light within reach. Frequent rounding done.      Problem: Patient Centered Care  Goal: Patient preferences are identified and integrated in the patient's plan of care  Description: Interventions:  - What would you like us to know as we care for you?   - Provide timely, complete, and accurate information to patient/family  - Incorporate patient and family knowledge, values, beliefs, and cultural backgrounds into the planning and delivery of care  - Encourage patient/family to participate in care and decision-making at the level they choose  - Honor patient and family perspectives and choices  Outcome: Progressing     Problem: Diabetes/Glucose Control  Goal: Glucose maintained within prescribed range  Description: INTERVENTIONS:  - Monitor Blood Glucose as ordered  - Assess for signs and symptoms of hyperglycemia and hypoglycemia  - Administer ordered medications to maintain glucose within target range  - Assess barriers to adequate nutritional intake and initiate nutrition consult as needed  - Instruct patient on self management of diabetes  Outcome: Progressing

## 2023-09-20 NOTE — PHYSICAL THERAPY NOTE
PHYSICAL THERAPY TREATMENT NOTE - INPATIENT     Room Number: 436/436-A       Presenting Problem: C5-C6 arthroplasty    Problem List  Principal Problem:    Cervical radiculitis  Active Problems:    Hypothyroidism      PHYSICAL THERAPY ASSESSMENT   Chart reviewed. RN approved participation in physical therapy. Patient presented in  [place] ambulating ad re with RW  with 2/10 pain. Patient with good  progress towards goals during this session. Education provided on Spine precautions, Physical therapy plan of care, physiological benefits of out of bed mobility, and posture during ambulation and slow paced turns with RW . Patient with good carryover. Bed mobility: Independent  Transfers: Independent  Gait Assistance: Modified independent  Distance (ft): 300  Assistive Device: Rolling walker             Patient was left in bedside chair at end of session with all needs in reach. The patient's Approx Degree of Impairment: 28.97% has been calculated based on documentation in the [facility] '6 clicks' Inpatient Basic Mobility Short Form. Research supports that patients with this level of impairment may benefit from Home with no services. RN aware of patient status post session. DISCHARGE RECOMMENDATIONS  PT Discharge Recommendations: Home;Outpatient PT (when MD allow)     PLAN  PT Treatment Plan: Bed mobility; Body mechanics; Patient education;Gait training;Stair training;Transfer training;Balance training    SUBJECTIVE      OBJECTIVE  Precautions: Cervical brace;Spine    WEIGHT BEARING RESTRICTION  Weight Bearing Restriction: None                PAIN ASSESSMENT   Ratin  Location: neck and head  Management Techniques: Body mechanics; Activity promotion;Repositioning    BALANCE                                                                                                                       Static Sitting: Good  Dynamic Sitting: Good           Static Standing: Fair -  Dynamic Standing: Fair -    ACTIVITY TOLERANCE O2 WALK       AM-PAC '6-Clicks' INPATIENT SHORT FORM - BASIC MOBILITY  How much difficulty does the patient currently have. .. Patient Difficulty: Turning over in bed (including adjusting bedclothes, sheets and blankets)?: None   Patient Difficulty: Sitting down on and standing up from a chair with arms (e.g., wheelchair, bedside commode, etc.): None   Patient Difficulty: Moving from lying on back to sitting on the side of the bed?: None   How much help from another person does the patient currently need. .. Help from Another: Moving to and from a bed to a chair (including a wheelchair)?: A Little   Help from Another: Need to walk in hospital room?: A Little   Help from Another: Climbing 3-5 steps with a railing?: A Little     AM-PAC Score:  Raw Score: 21   Approx Degree of Impairment: 28.97%   Standardized Score (AM-PAC Scale): 50.25   CMS Modifier (G-Code): CJ        Patient End of Session: Up in chair    CURRENT GOALS     Exercise/Education Provided:  Bed mobility  Body mechanics  Gait training  Transfer training    Patient End of Session: Up in chair;Needs met;Call light within reach;RN aware of session/findings; All patient questions and concerns addressed; Family present    CURRENT GOALS  Goals to be met by: 9/30/23  Patient Goal Patient's self-stated goal is: to go home   Goal #1 Patient is able to demonstrate supine - sit EOB @ level: supervision     Goal #1   Current Status met   Goal #2 Patient is able to demonstrate transfers Sit to/from Stand at assistance level: supervision with walker - rolling     Goal #2  Current Status met   Goal #3 Patient is able to ambulate 100 feet with assist device: walker - rolling at assistance level: supervision   Goal #3   Current Status met   Goal #4 Patient will negotiate 1 stairs/one curb w/ assistive device and supervision   Goal #4   Current Status NT   Goal #5 Patient to demonstrate independence with home activity/exercise instructions provided to patient in preparation for discharge.    Goal #5   Current Status    Goal #6    Goal #6  Current Status        Gait Trainin minutes

## 2023-09-20 NOTE — CM/SW NOTE
09/20/23 0900   Discharge disposition   Expected discharge disposition Home or Self   Patient Declines Recommended Services Yes   Discharge transportation Private car     Pt discussed with nursing. Pt is ambulating well and does not want to go to [facility] on dc. Per nursing she feels a language barrier might have contributed to [facility] recommendation. [address] liaison notified via secure chat that PMR consult is not needed. Plan: Home    / to remain available for support and/or discharge planning. [place] .  Jett Kidney RN, BSN  Nurse   [phone]

## 2023-09-20 NOTE — PLAN OF CARE
Patient has been ambulating frequently w/ walker and stand by assistance. Pain is being controlled w/ Norco. Is tolerating general diet. Is voiding using the bathroom independently. Has SCDs while in bed for dvt prophylaxis. Has Aspen collar in place at all times. Plan is for pt to dc home today w/ spouse and family. Problem: Patient Centered Care  Goal: Patient preferences are identified and integrated in the patient's plan of care  Description: Interventions:  - What would you like us to know as we care for you?  I am feeling much better today   - Provide timely, complete, and accurate information to patient/family  - Incorporate patient and family knowledge, values, beliefs, and cultural backgrounds into the planning and delivery of care  - Encourage patient/family to participate in care and decision-making at the level they choose  - Honor patient and family perspectives and choices  Outcome: Adequate for Discharge     Problem: Patient/Family Goals  Goal: Patient/Family Long Term Goal  Description: Patient's Long Term Goal: To be able to ambulate w/ no assistive devices    Interventions:  - Pain medications  - Aspen collar   - See additional Care Plan goals for specific interventions  Outcome: Adequate for Discharge  Goal: Patient/Family Short Term Goal  Description: Patient's Short Term Goal: To go home     Interventions:   - Follow plan of care  - See additional Care Plan goals for specific interventions  Outcome: Adequate for Discharge     Problem: Diabetes/Glucose Control  Goal: Glucose maintained within prescribed range  Description: INTERVENTIONS:  - Monitor Blood Glucose as ordered  - Assess for signs and symptoms of hyperglycemia and hypoglycemia  - Administer ordered medications to maintain glucose within target range  - Assess barriers to adequate nutritional intake and initiate nutrition consult as needed  - Instruct patient on self management of diabetes  Outcome: Adequate for Discharge Problem: PAIN - ADULT  Goal: Verbalizes/displays adequate comfort level or patient's stated pain goal  Description: INTERVENTIONS:  - Encourage pt to monitor pain and request assistance  - Assess pain using appropriate pain scale  - Administer analgesics based on type and severity of pain and evaluate response  - Implement non-pharmacological measures as appropriate and evaluate response  - Consider cultural and social influences on pain and pain management  - Manage/alleviate anxiety  - Utilize distraction and/or relaxation techniques  - Monitor for opioid side effects  - Notify MD/LIP if interventions unsuccessful or patient reports new pain  - Anticipate increased pain with activity and pre-medicate as appropriate  Outcome: Adequate for Discharge     Problem: RISK FOR INFECTION - ADULT  Goal: Absence of fever/infection during anticipated neutropenic period  Description: INTERVENTIONS  - Monitor WBC  - Administer growth factors as ordered  - Implement neutropenic guidelines  Outcome: Adequate for Discharge     Problem: SAFETY ADULT - FALL  Goal: Free from fall injury  Description: INTERVENTIONS:  - Assess pt frequently for physical needs  - Identify cognitive and physical deficits and behaviors that affect risk of falls.   - [place] fall precautions as indicated by assessment.  - Educate pt/family on patient safety including physical limitations  - Instruct pt to call for assistance with activity based on assessment  - Modify environment to reduce risk of injury  - Provide assistive devices as appropriate  - Consider OT/PT consult to assist with strengthening/mobility  - Encourage toileting schedule  Outcome: Adequate for Discharge     Problem: DISCHARGE PLANNING  Goal: Discharge to home or other facility with appropriate resources  Description: INTERVENTIONS:  - Identify barriers to discharge w/pt and caregiver  - Include patient/family/discharge partner in discharge planning  - Arrange for needed discharge resources and transportation as appropriate  - Identify discharge learning needs (meds, wound care, etc)  - Arrange for interpreters to assist at discharge as needed  - Consider post-discharge preferences of patient/family/discharge partner  - Complete POLST form as appropriate  - Assess patient's ability to be responsible for managing their own health  - Refer to Case Management Department for coordinating discharge planning if the patient needs post-hospital services based on physician/LIP order or complex needs related to functional status, cognitive ability or social support system  Outcome: Adequate for Discharge

## 2023-09-21 ENCOUNTER — PATIENT OUTREACH (OUTPATIENT)
Dept: CASE MANAGEMENT | Age: 43
End: 2023-09-21

## 2023-09-21 DIAGNOSIS — Z02.9 ENCOUNTERS FOR UNSPECIFIED ADMINISTRATIVE PURPOSE: Primary | ICD-10-CM

## 2023-09-21 NOTE — TELEPHONE ENCOUNTER
Dr Josi Hays,    Pt is seeking Long term disability due to lumbar surgery that has now resulted in cervical radiculitis. Last day worked was 2/2020.  Do you support the request?    Thanks,    Greg Ga

## 2023-09-25 NOTE — TELEPHONE ENCOUNTER
Dr. Rodrigo Castillo     Please sign off on form if you agree to: Long term disability due to cervical radiculitis   (Please place your signature on the first page only)    -From your Inbasket, Highlight the patient and click Chart   -Double click the 06/34/1526 Forms Completion telephone encounter  -Scroll down to the Media section   -Click the blue Hyperlink: Disab Dr Rodrigo Castillo [birth year]   -Click Acknowledge located at the bottom right corner (if you do not see acknowledge, try maximizing your window)   -Drag the mouse into the blank space of the document and a + sign will appear. Left click to   electronically sign the document.      Thank you,    Lisa Freeman

## 2023-09-26 ENCOUNTER — OFFICE VISIT (OUTPATIENT)
Dept: INTERNAL MEDICINE CLINIC | Facility: CLINIC | Age: 43
End: 2023-09-26

## 2023-09-26 ENCOUNTER — NURSE TRIAGE (OUTPATIENT)
Dept: FAMILY MEDICINE CLINIC | Facility: CLINIC | Age: 43
End: 2023-09-26

## 2023-09-26 VITALS
HEIGHT: 62 IN | WEIGHT: 176 LBS | DIASTOLIC BLOOD PRESSURE: 78 MMHG | HEART RATE: 101 BPM | BODY MASS INDEX: 32.39 KG/M2 | SYSTOLIC BLOOD PRESSURE: 128 MMHG

## 2023-09-26 DIAGNOSIS — J30.1 SEASONAL ALLERGIC RHINITIS DUE TO POLLEN: ICD-10-CM

## 2023-09-26 DIAGNOSIS — R21 RASH AND NONSPECIFIC SKIN ERUPTION: Primary | ICD-10-CM

## 2023-09-26 DIAGNOSIS — H57.89 REDNESS OF LEFT EYE: ICD-10-CM

## 2023-09-26 PROCEDURE — 99214 OFFICE O/P EST MOD 30 MIN: CPT | Performed by: STUDENT IN AN ORGANIZED HEALTH CARE EDUCATION/TRAINING PROGRAM

## 2023-09-26 PROCEDURE — 3008F BODY MASS INDEX DOCD: CPT | Performed by: STUDENT IN AN ORGANIZED HEALTH CARE EDUCATION/TRAINING PROGRAM

## 2023-09-26 PROCEDURE — 3078F DIAST BP <80 MM HG: CPT | Performed by: STUDENT IN AN ORGANIZED HEALTH CARE EDUCATION/TRAINING PROGRAM

## 2023-09-26 PROCEDURE — 3074F SYST BP LT 130 MM HG: CPT | Performed by: STUDENT IN AN ORGANIZED HEALTH CARE EDUCATION/TRAINING PROGRAM

## 2023-09-26 RX ORDER — LEVOCETIRIZINE DIHYDROCHLORIDE 5 MG/1
5 TABLET, FILM COATED ORAL 2 TIMES DAILY
Qty: 60 TABLET | Refills: 0 | Status: SHIPPED | OUTPATIENT
Start: 2023-09-26 | End: 2023-10-26

## 2023-09-26 RX ORDER — TRIAMCINOLONE ACETONIDE 5 MG/G
1 CREAM TOPICAL 2 TIMES DAILY
Qty: 1 EACH | Refills: 0 | Status: SHIPPED | OUTPATIENT
Start: 2023-09-26 | End: 2023-09-26

## 2023-09-26 RX ORDER — TRIAMCINOLONE ACETONIDE 5 MG/G
1 CREAM TOPICAL 2 TIMES DAILY
Qty: 1 EACH | Refills: 0 | Status: SHIPPED | OUTPATIENT
Start: 2023-09-26 | End: 2023-10-06

## 2023-09-26 NOTE — TELEPHONE ENCOUNTER
Disab forms faxed to [facility] att: Claims Dept [phone]. Confirmation received, mychart message sent.

## 2023-09-26 NOTE — TELEPHONE ENCOUNTER
Using language line : Nathan Jessica number [id number]  Action Requested: Summary for Provider     []  Critical Lab, Recommendations Needed  [] Need Additional Advice  []   FYI    []   Need Orders  [] Need Medications Sent to Pharmacy  []  Other     SUMMARY: Per protocol disposition advised office visit today, no available FM appointments in time patient can make it to office. Future Appointments   Date Time Provider Ankita Solisi   9/26/2023  3:30 PM Brisa Fierro MD [facility] ADO   10/2/2023 11:00 AM Casandra Cyr [facility] ADO   1/23/2024  4:30 PM Dahiana Gore MD [address]     Reason for call: Rash  Onset: 5 days     Per patient she had cervical neck surgery on 9/18/23. Discharged from hospital on 9/20/23. Rash started a day after discharge and has spread left arm, back, bottom, back of bilateral legs and small amount around neck. Rash is itchy but not painful (patient states she has pain where she has itched too much). In some areas she has noticed swelling, redness and very small amount of drainage but drainage was clear. Afebrile. Per patient she has no new detergents/soaps or foods. Patient was prescribed Norco and cyclobenzaprine after hospitalization but is not taking either medication at this time. Per patient she started taking benadryl 3 days ago but this has not been very effective. Denies other symptoms marked no under protocol.      Reason for Disposition   SEVERE itching    Protocols used: Rash or Redness - Widespread-A-[place]

## 2023-09-27 ENCOUNTER — TELEPHONE (OUTPATIENT)
Dept: OPHTHALMOLOGY | Facility: CLINIC | Age: 43
End: 2023-09-27

## 2023-09-27 NOTE — TELEPHONE ENCOUNTER
Spoke to pt and pt states she had cervical neck surgery on 9/18/23 and after being discharged, she developed a \"rash\" on the left side of her body and is now experiencing swelling and blurry vision in the left eye. Pt was prescribed Triamcinolone 0.5 % cream and has been doing warm compresses BID and using artificial tears BID as well but is not seeing any improvement. Scheduled pt tomorrow @ 9am for an OV with KORINA.

## 2023-09-28 ENCOUNTER — OFFICE VISIT (OUTPATIENT)
Dept: OPHTHALMOLOGY | Facility: CLINIC | Age: 43
End: 2023-09-28

## 2023-09-28 DIAGNOSIS — Z87.828 HISTORY OF CORNEAL ABRASION: Primary | ICD-10-CM

## 2023-09-28 PROCEDURE — 99213 OFFICE O/P EST LOW 20 MIN: CPT | Performed by: OPHTHALMOLOGY

## 2023-09-28 NOTE — PATIENT INSTRUCTIONS
History of corneal abrasion  Patient had surgery of her neck, complained of pain and burning upon waking up from surgery. Probable corneal abrasion OS which has resolved. Recommend artificial tears in the left eye 4-5 times a day    Will see patient as needed.

## 2023-09-28 NOTE — PROGRESS NOTES
Keshav Orosco is a 41year old female. HPI:     HPI    Pt in today for a swelling and blurry vision evaluation in the left eye. Pt states she had cervical neck surgery on 23 and after being /discharged, she developed a \"rash\" on majority of the left side of her body and is now experiencing swelling and blurry vision in the left eye. Pt was seen by Dr. Kim Owens on 23 and was prescribed   Triamcinolone 0.5 % cream to be used BID x10 days on her body and was given Soothe eye ointment for her left eye. Pt has been using the eye ointment and also doing warm compresses BID and using artificial tears BID but is not seeing any improvement. Consult: per Dr. Kim Owens     Last edited by Layne Reid OT on 2023  9:32 AM.        Patient History:  Past Medical History:   Diagnosis Date    Anxiety state     Back problem     Calculus of kidney     Depression     Disorder of liver     FATTY LIVER     Disorder of thyroid     hypothyroid    Endometriosis     Esophageal reflux     High cholesterol     History of Papanicolaou smear of cervix 2015    Hyperlipidemia     IBS (irritable bowel syndrome)     Migraines     Per [facility] - Migraine headaches    Osteoarthritis     Ovarian cyst     Postpartum depression     Prediabetes     Pregnancy 2002, 2005, 2008        Spondylolisthesis of lumbosacral region 2017    Visual impairment     WEARS GLASSES    Vitamin B12 deficiency     Vitamin D deficiency        Surgical History: Keshav Orosco has a past surgical history that includes colonoscopy ();  Total abdominal hysterectomy (N/A, 2017) (Procedure: ABDOMINAL HYSTERECTOMY;  Surgeon: Pily Banks MD;  Location: [facility]); cholecystectomy (); cyst removal (, ) (laporoscopic ovarian cytectomy); lipoma removal (Left, 2018); laparoscopy,diagnostic (2016) (laparoscopy, filshie clip sterilization of left fallopian tube, ablation of endometriosis, retrieval and removal of displaced filshie clip); hysterectomy; other (2019) (low back surgery with metal implant); zachary biopsy stereo nodule 1 site left (cpt=19081) (05/10/2022) (top hat); zachary localization wire 1 site left (cpt=19281) (06/28/2022); and cervical spine surgery (09/18/2023) (C5-C6 Arthroplasty ). Family History   Problem Relation Age of Onset    Diabetes Father         Type 2    Hypertension Mother     Lipids Mother         Hyperlipidemia    Diabetes Mother         Type 2    Diabetes Sister     Hypertension Sister     Lipids Brother     Glaucoma Neg     Macular degeneration Neg        Social History:   Social History     Socioeconomic History    Marital status:     Number of children: 3   Tobacco Use    Smoking status: Former     Types: Cigarettes    Smokeless tobacco: Never   Vaping Use    Vaping Use: Never used   Substance and Sexual Activity    Alcohol use: Not Currently    Drug use: No    Sexual activity: Yes     Birth control/protection: Hysterectomy   Other Topics Concern    Caffeine Concern No     Comment: 1 cup coffee daily    Exercise No   Social History Narrative    The patient does not use an assistive device. .      The patient does live in a home with stairs. Social Determinants of Health  Financial Resource Strain: Low Risk  (9/21/2023)      Financial Resource Strain          Difficulty of Paying Living Expenses: Not hard at all          Med Affordability: No  Transportation Needs: No Transportation Needs (9/21/2023)      Transportation Needs          Lack of Transportation: No    Medications:  Current Outpatient Medications   Medication Sig Dispense Refill    levocetirizine 5 MG Oral Tab Take 1 tablet (5 mg total) by mouth in the morning and 1 tablet (5 mg total) before bedtime. 60 tablet 0    triamcinolone 0.5 % External Cream Apply 1 Application topically 2 (two) times daily for 10 days. Apply twice daily for 7-10 days. Do NOT use on face or in folds of skin.  1 each 0    cyclobenzaprine 5 MG Oral Tab Take 1 tablet (5 mg total) by mouth 3 (three) times daily as needed for Muscle spasms. 20 tablet 0    phenazopyridine HCl 95 MG Oral Tab Take 1 tablet (95 mg total) by mouth 3 (three) times daily as needed for Pain. HYDROcodone-acetaminophen 5-325 MG Oral Tab Take 1 tablet by mouth every 6 (six) hours as needed for Pain. 30 tablet 0    triamcinolone 0.1 % External Cream Apply topically 2 (two) times daily. (Patient not taking: Reported on 9/21/2023) 45 g 0    metFORMIN  MG Oral Tablet 24 Hr Take 1 tablet (500 mg total) by mouth daily. 90 tablet 1    pantoprazole 40 MG Oral Tab EC Take 1 tablet (40 mg total) by mouth every morning before breakfast. To help with stomach acid and stomach irritation/inflammation 90 tablet 3    levothyroxine 50 MCG Oral Tab Take 1 tablet (50 mcg total) by mouth before breakfast. 90 tablet 3    gabapentin 300 MG Oral Cap Take 1 capsule (300 mg total) by mouth 3 (three) times daily. 2 tabs at night 1 in the morning 270 capsule 1    fluticasone propionate 50 MCG/ACT Nasal Suspension 2 sprays by Each Nare route daily for 360 doses. 3 each 3    ALPRAZolam 0.25 MG Oral Tab Take 1 tablet (0.25 mg total) by mouth nightly as needed for Anxiety.  30 tablet 1       Allergies:    Cymbalta [Duloxetin*    SHORTNESS OF BREATH, ANXIETY  Other                   SHORTNESS OF BREATH    Comment:SOME TOPICAL NUMBING SPRAY USED WHEN STARTING AN             I.V. - cough and shortness of breath  Sulfa Antibiotics       HIVES, RASH  Tramadol                ITCHING    Comment:THROAT ITCHING AND FEELS LIKE IT WAS CLOSING  Ciprofloxacin           RASH  Acetaminophen           OTHER (SEE COMMENTS)    Comment:Fatty Liver    ROS:     ROS    Positive for: Eyes  Negative for: Constitutional, Gastrointestinal, Neurological, Skin, Genitourinary, Musculoskeletal, HENT, Endocrine, Cardiovascular, Respiratory, Psychiatric, Allergic/Imm, Heme/Lymph  Last edited by [facility], Asa Back, OT on 9/28/2023  9:16 AM.          PHYSICAL EXAM:     Base Eye Exam       Visual Acuity (Snellen - Linear)         Right Left    Dist cc 20/20 -1 20/20 -2      Correction: Glasses              Pupils         Pupils    Right PERRL    Left PERRL                  Additional Tests       Amsler         Right Left     Normal Normal                  Slit Lamp and Fundus Exam       Slit Lamp Exam         Right Left    Lids/Lashes Dermatochalasis, Meibomian gland dysfunction, Permanent eyeliner upper lid Dermatochalasis, Meibomian gland dysfunction, Permanent eyeliner, Permanent eyeliner upper lid    Conjunctiva/Sclera Nasal pinguecula, non-injected Nasal pinguecula, non-injected    Cornea Clear, no fluorescein stain Clear, no fluorescein stain    Anterior Chamber Deep and quiet Deep and quiet    Iris Normal Normal              Fundus Exam         Right Left    Disc Good rim, Temporal crescent Good rim, Temporal crescent    C/D Ratio 0.4 0.4                  Refraction       Wearing Rx         Sphere Cylinder Axis    Right +0.50 +1.00 100    Left +0.50 +0.75 080      Type: Single vision                     ASSESSMENT/PLAN:     Diagnoses and Plan:     History of corneal abrasion  Patient had surgery of her neck, complained of pain and burning upon waking up from surgery. Probable corneal abrasion OS which has resolved. Recommend artificial tears in the left eye 4-5 times a day    Will see patient as needed. No orders of the defined types were placed in this encounter. Meds This Visit:  Requested Prescriptions      No prescriptions requested or ordered in this encounter        Follow up instructions:  Return if symptoms worsen or fail to improve.     9/28/2023  Scribed by: Gilberto Ewing MD

## 2023-09-28 NOTE — ASSESSMENT & PLAN NOTE
Patient had surgery of her neck, complained of pain and burning upon waking up from surgery. Probable corneal abrasion OS which has resolved. Recommend artificial tears in the left eye 4-5 times a day    Will see patient as needed.

## 2023-09-29 DIAGNOSIS — M47.812 SPONDYLOSIS OF CERVICAL SPINE: Primary | ICD-10-CM

## 2023-09-30 RX ORDER — GABAPENTIN 300 MG/1
300 CAPSULE ORAL 3 TIMES DAILY
Qty: 270 CAPSULE | Refills: 3 | Status: SHIPPED | OUTPATIENT
Start: 2023-09-30

## 2023-09-30 NOTE — TELEPHONE ENCOUNTER
Refill passed per [place] [employer], [facility] protocol    Requested Prescriptions   Pending Prescriptions Disp Refills    gabapentin 300 MG Oral Cap 270 capsule 1     Sig: Take 1 capsule (300 mg total) by mouth 3 (three) times daily.  2 tabs at night 1 in the morning       Neurology Medications Passed - 9/29/2023  7:51 AM        Passed - In person appointment or virtual visit in the past 6 mos or appointment in next 3 mos     Recent Outpatient Visits              2 days ago [facility]    [facility] Visit    4 days ago Rash and nonspecific skin eruption    [facility] [address] [facility], Tunde Adame MD    Office Visit    2 weeks ago Inflamed skin tag    [address], [place]anna Schilder, Alvenia Epps, MD    Office Visit    3 weeks ago Cervical radiculitis    [facility], [address], [address], [place],     Office Visit    1 month ago Hepatic steatosis    Jenna Garcia MD    Office Visit          Future Appointments         Provider Department Appt Notes    In 2 days Jagdish Mancia, [address], [address], [address]    In 3 days Karina Hylton PA-C [address], [place] rash    In 3 months Kadi Duffy MD [address], [address], [address]  NON SX F/U                         Future Appointments         Provider Department Appt Notes    In 2 days Jagdish Mancia, [address], [address], [address]    In 3 days Karina Hylton PA-C [address], [place]anna Schilder, [place] rash    In 3 months Kadi Duffy MD [address], [address], [address]  NON SX F/U            Recent Outpatient Visits              2 days ago History of corneal abrasion    [facility], Michelle Lei MD    Office Visit    4 days ago Rash and nonspecific skin eruption    Du Menjivar MD    Office Visit    2 weeks ago Inflamed skin tag    Kaleb Lawson MD    Office Visit    3 weeks ago Cervical radiculitis    [address] [place] [address] Magali Sheldon DO    Office Visit    1 month ago Hepatic steatosis    [address] [place] [address] Matteo Tatum MD    Office Visit

## 2023-10-04 ENCOUNTER — LAB ENCOUNTER (OUTPATIENT)
Dept: LAB | Age: 43
End: 2023-10-04
Attending: STUDENT IN AN ORGANIZED HEALTH CARE EDUCATION/TRAINING PROGRAM
Payer: COMMERCIAL

## 2023-10-04 DIAGNOSIS — R21 RASH AND NONSPECIFIC SKIN ERUPTION: ICD-10-CM

## 2023-10-04 LAB
BASOPHILS # BLD AUTO: 0.04 X10(3) UL (ref 0–0.2)
BASOPHILS NFR BLD AUTO: 0.5 %
EOSINOPHIL # BLD AUTO: 0.16 X10(3) UL (ref 0–0.7)
EOSINOPHIL NFR BLD AUTO: 2.2 %
ERYTHROCYTE [DISTWIDTH] IN BLOOD BY AUTOMATED COUNT: 12.1 %
HCT VFR BLD AUTO: 39.1 %
HGB BLD-MCNC: 13.3 G/DL
IMM GRANULOCYTES # BLD AUTO: 0.02 X10(3) UL (ref 0–1)
IMM GRANULOCYTES NFR BLD: 0.3 %
LYMPHOCYTES # BLD AUTO: 1.91 X10(3) UL (ref 1–4)
LYMPHOCYTES NFR BLD AUTO: 25.8 %
MCH RBC QN AUTO: 30.2 PG (ref 26–34)
MCHC RBC AUTO-ENTMCNC: 34 G/DL (ref 31–37)
MCV RBC AUTO: 88.9 FL
MONOCYTES # BLD AUTO: 0.46 X10(3) UL (ref 0.1–1)
MONOCYTES NFR BLD AUTO: 6.2 %
NEUTROPHILS # BLD AUTO: 4.8 X10 (3) UL (ref 1.5–7.7)
NEUTROPHILS # BLD AUTO: 4.8 X10(3) UL (ref 1.5–7.7)
NEUTROPHILS NFR BLD AUTO: 65 %
PLATELET # BLD AUTO: 402 10(3)UL (ref 150–450)
RBC # BLD AUTO: 4.4 X10(6)UL
WBC # BLD AUTO: 7.4 X10(3) UL (ref 4–11)

## 2023-10-04 PROCEDURE — 85025 COMPLETE CBC W/AUTO DIFF WBC: CPT

## 2023-10-04 PROCEDURE — 36415 COLL VENOUS BLD VENIPUNCTURE: CPT

## 2023-10-05 NOTE — PROGRESS NOTES
Pt with post operative diffuse rash, cbc unremarkable for infection or eosinophilia.  Patient is [place] speaking, please relay

## 2023-10-12 ENCOUNTER — OFFICE VISIT (OUTPATIENT)
Dept: FAMILY MEDICINE CLINIC | Facility: CLINIC | Age: 43
End: 2023-10-12

## 2023-10-12 VITALS
HEIGHT: 62 IN | SYSTOLIC BLOOD PRESSURE: 118 MMHG | DIASTOLIC BLOOD PRESSURE: 80 MMHG | HEART RATE: 78 BPM | TEMPERATURE: 97 F | WEIGHT: 177.38 LBS | BODY MASS INDEX: 32.64 KG/M2

## 2023-10-12 DIAGNOSIS — R51.9 HEADACHE, UNSPECIFIED HEADACHE TYPE: ICD-10-CM

## 2023-10-12 DIAGNOSIS — R29.898 RIGHT ARM WEAKNESS: ICD-10-CM

## 2023-10-12 DIAGNOSIS — M50.122 CERVICAL DISC DISORDER AT C5-C6 LEVEL WITH RADICULOPATHY: Primary | ICD-10-CM

## 2023-10-12 DIAGNOSIS — S05.02XD ABRASION OF LEFT CORNEA, SUBSEQUENT ENCOUNTER: ICD-10-CM

## 2023-10-12 DIAGNOSIS — F41.9 ANXIETY: ICD-10-CM

## 2023-10-12 DIAGNOSIS — M54.2 ANTERIOR NECK PAIN: ICD-10-CM

## 2023-10-12 DIAGNOSIS — R29.898 RIGHT LEG WEAKNESS: ICD-10-CM

## 2023-10-12 PROCEDURE — 99215 OFFICE O/P EST HI 40 MIN: CPT | Performed by: FAMILY MEDICINE

## 2023-10-12 PROCEDURE — 3008F BODY MASS INDEX DOCD: CPT | Performed by: FAMILY MEDICINE

## 2023-10-12 PROCEDURE — 3079F DIAST BP 80-89 MM HG: CPT | Performed by: FAMILY MEDICINE

## 2023-10-12 PROCEDURE — 3074F SYST BP LT 130 MM HG: CPT | Performed by: FAMILY MEDICINE

## 2023-10-27 ENCOUNTER — HOSPITAL ENCOUNTER (OUTPATIENT)
Dept: GENERAL RADIOLOGY | Age: 43
Discharge: HOME | End: 2023-10-27
Attending: PHYSICIAN ASSISTANT

## 2023-10-27 DIAGNOSIS — M50.122 CERVICAL DISC DISORDER AT C5-C6 LEVEL WITH RADICULOPATHY: ICD-10-CM

## 2023-10-27 DIAGNOSIS — Z98.890 S/P CERVICAL DISC REPLACEMENT: ICD-10-CM

## 2023-10-27 DIAGNOSIS — M47.22 CERVICAL SPONDYLOSIS WITH RADICULOPATHY: ICD-10-CM

## 2023-10-27 PROCEDURE — 72050 X-RAY EXAM NECK SPINE 4/5VWS: CPT | Performed by: PHYSICIAN ASSISTANT

## 2023-10-30 ENCOUNTER — TELEPHONE (OUTPATIENT)
Dept: SURGERY | Facility: CLINIC | Age: 43
End: 2023-10-30

## 2023-10-30 RX ORDER — DIETHYLPROPION HYDROCHLORIDE 25 MG/1
1 TABLET ORAL
Qty: 60 TABLET | Refills: 2 | Status: SHIPPED | OUTPATIENT
Start: 2023-10-30 | End: 2023-11-29

## 2023-11-16 ENCOUNTER — HOSPITAL ENCOUNTER (OUTPATIENT)
Dept: GENERAL RADIOLOGY | Age: 43
Discharge: HOME | End: 2023-11-16
Attending: FAMILY MEDICINE
Payer: COMMERCIAL

## 2023-11-16 ENCOUNTER — APPOINTMENT (OUTPATIENT)
Dept: URBAN - METROPOLITAN AREA CLINIC 248 | Age: 43
Setting detail: DERMATOLOGY
End: 2023-11-17

## 2023-11-16 ENCOUNTER — OFFICE VISIT (OUTPATIENT)
Dept: FAMILY MEDICINE CLINIC | Facility: CLINIC | Age: 43
End: 2023-11-16

## 2023-11-16 VITALS
HEART RATE: 71 BPM | TEMPERATURE: 98 F | BODY MASS INDEX: 32.39 KG/M2 | DIASTOLIC BLOOD PRESSURE: 85 MMHG | HEIGHT: 62 IN | WEIGHT: 176 LBS | SYSTOLIC BLOOD PRESSURE: 143 MMHG

## 2023-11-16 DIAGNOSIS — M25.541 PAIN INVOLVING JOINT OF FINGER OF RIGHT HAND: ICD-10-CM

## 2023-11-16 DIAGNOSIS — R29.898 SHOULDER WEAKNESS: ICD-10-CM

## 2023-11-16 DIAGNOSIS — H53.8 BLURRED VISION, LEFT EYE: ICD-10-CM

## 2023-11-16 DIAGNOSIS — Z41.1 ENCOUNTER FOR COSMETIC SURGERY: ICD-10-CM

## 2023-11-16 DIAGNOSIS — G89.29 CHRONIC PAIN OF BOTH SHOULDERS: Primary | ICD-10-CM

## 2023-11-16 DIAGNOSIS — M25.511 CHRONIC PAIN OF BOTH SHOULDERS: Primary | ICD-10-CM

## 2023-11-16 DIAGNOSIS — M25.512 CHRONIC PAIN OF BOTH SHOULDERS: Primary | ICD-10-CM

## 2023-11-16 DIAGNOSIS — H57.12 LEFT EYE PAIN: ICD-10-CM

## 2023-11-16 DIAGNOSIS — R41.3 MEMORY DEFICITS: ICD-10-CM

## 2023-11-16 DIAGNOSIS — S16.1XXD STRAIN OF NECK MUSCLE, SUBSEQUENT ENCOUNTER: ICD-10-CM

## 2023-11-16 DIAGNOSIS — G44.209 TENSION HEADACHE: ICD-10-CM

## 2023-11-16 PROCEDURE — 99215 OFFICE O/P EST HI 40 MIN: CPT | Performed by: FAMILY MEDICINE

## 2023-11-16 PROCEDURE — 3079F DIAST BP 80-89 MM HG: CPT | Performed by: FAMILY MEDICINE

## 2023-11-16 PROCEDURE — 73140 X-RAY EXAM OF FINGER(S): CPT | Performed by: FAMILY MEDICINE

## 2023-11-16 PROCEDURE — 3077F SYST BP >= 140 MM HG: CPT | Performed by: FAMILY MEDICINE

## 2023-11-16 PROCEDURE — 3008F BODY MASS INDEX DOCD: CPT | Performed by: FAMILY MEDICINE

## 2023-11-16 PROCEDURE — OTHER CONSULTATION - ABDOMINOPLASTY: OTHER

## 2023-11-16 RX ORDER — THIAMINE HCL 100 MG
TABLET ORAL
COMMUNITY

## 2023-11-16 RX ORDER — TIZANIDINE 4 MG/1
4 TABLET ORAL 3 TIMES DAILY
Qty: 90 TABLET | Refills: 1 | Status: SHIPPED | OUTPATIENT
Start: 2023-11-16

## 2023-11-16 NOTE — PROCEDURE: CONSULTATION - ABDOMINOPLASTY
Send Procedure Quote As Charge: No
Detail Level: Detailed
Consultation Charge $ (Use Numbers Only, No Text Please.): 150

## 2023-11-17 ENCOUNTER — TELEPHONE (OUTPATIENT)
Dept: FAMILY MEDICINE CLINIC | Facility: CLINIC | Age: 43
End: 2023-11-17

## 2023-11-17 NOTE — TELEPHONE ENCOUNTER
With [facility]  Irma   ID # [id number]    Patient calling ( identified name and  ) states someone called her , not sure why       Patient informed of results and recommendations as per Dr. Farzaneh Mallory result note. Patient voiced understanding and agrees. XR FINGER(S) (MIN 2 VIEWS), RIGHT 5TH (CPT=73140): Patient Communication     Edit Comments   Seen    Finger x-ray is normal.  No fracture or distal occasion.   If it continues to bother you in 2 to 3 weeks then we will need to send you to a hand specialist.   Written by Lisbeth Pritchett DO on 2023  8:06 PM CST  Seen by patient Gabriel Hinton on 2023 10:45 PM

## 2023-11-20 ENCOUNTER — TELEPHONE (OUTPATIENT)
Dept: FAMILY MEDICINE CLINIC | Facility: CLINIC | Age: 43
End: 2023-11-20

## 2023-11-20 DIAGNOSIS — R41.3 MEMORY DEFICITS: ICD-10-CM

## 2023-11-20 DIAGNOSIS — G44.209 TENSION HEADACHE: Primary | ICD-10-CM

## 2023-11-20 NOTE — TELEPHONE ENCOUNTER
Pt was referred to see Neurology but they have no openings and the other provider is not accepting new patients. Patient asking what should she do next? Patient also was referred for therapy but they also have no availability nor someone who speaks Indonesian.

## 2023-11-20 NOTE — TELEPHONE ENCOUNTER
Please see message below, can you refer patient to some else? Also, did you want pt to do PT?  Not noted on recent OV or referral issued for PT please advice

## 2023-11-21 NOTE — TELEPHONE ENCOUNTER
Called [facility] [employer] CZ#[id number]  Disconnected with Arun. Called again with Sondra Perez RI#[phone]  Left message for patient informing her of this.

## 2023-11-21 NOTE — TELEPHONE ENCOUNTER
Inform her that physical therapy should be coming from Dr. Lisa Wilks who did her neck surgery. I did do a referral to Dr. Bela Gambino who is a different neurologist in a different group.

## 2023-12-07 ENCOUNTER — OFFICE VISIT (OUTPATIENT)
Dept: FAMILY MEDICINE CLINIC | Facility: CLINIC | Age: 43
End: 2023-12-07

## 2023-12-07 VITALS
HEART RATE: 77 BPM | SYSTOLIC BLOOD PRESSURE: 130 MMHG | WEIGHT: 175 LBS | DIASTOLIC BLOOD PRESSURE: 81 MMHG | BODY MASS INDEX: 32.2 KG/M2 | TEMPERATURE: 97 F | HEIGHT: 62 IN

## 2023-12-07 DIAGNOSIS — F32.A DEPRESSIVE DISORDER: ICD-10-CM

## 2023-12-07 DIAGNOSIS — R09.81 NASAL CONGESTION: ICD-10-CM

## 2023-12-07 DIAGNOSIS — M25.50 ARTHRALGIA, UNSPECIFIED JOINT: ICD-10-CM

## 2023-12-07 DIAGNOSIS — F41.9 ANXIETY: ICD-10-CM

## 2023-12-07 DIAGNOSIS — R09.89 THROAT CLEARING: ICD-10-CM

## 2023-12-07 DIAGNOSIS — M79.644 PAIN IN FINGER OF RIGHT HAND: Primary | ICD-10-CM

## 2023-12-07 DIAGNOSIS — R41.3 MEMORY DEFICITS: ICD-10-CM

## 2023-12-07 DIAGNOSIS — G44.209 TENSION HEADACHE: ICD-10-CM

## 2023-12-07 DIAGNOSIS — E03.9 ACQUIRED HYPOTHYROIDISM: ICD-10-CM

## 2023-12-07 DIAGNOSIS — J30.1 SEASONAL ALLERGIC RHINITIS DUE TO POLLEN: ICD-10-CM

## 2023-12-07 PROCEDURE — 3075F SYST BP GE 130 - 139MM HG: CPT | Performed by: FAMILY MEDICINE

## 2023-12-07 PROCEDURE — 99214 OFFICE O/P EST MOD 30 MIN: CPT | Performed by: FAMILY MEDICINE

## 2023-12-07 PROCEDURE — 3079F DIAST BP 80-89 MM HG: CPT | Performed by: FAMILY MEDICINE

## 2023-12-07 PROCEDURE — 3008F BODY MASS INDEX DOCD: CPT | Performed by: FAMILY MEDICINE

## 2023-12-07 RX ORDER — LEVOCETIRIZINE DIHYDROCHLORIDE 5 MG/1
5 TABLET, FILM COATED ORAL EVERY MORNING
Qty: 90 TABLET | Refills: 0 | Status: SHIPPED | OUTPATIENT
Start: 2023-12-07

## 2023-12-07 RX ORDER — FLUTICASONE PROPIONATE 50 MCG
2 SPRAY, SUSPENSION (ML) NASAL DAILY
Qty: 3 EACH | Refills: 3 | Status: SHIPPED | OUTPATIENT
Start: 2023-12-07 | End: 2024-12-01

## 2023-12-07 RX ORDER — MONTELUKAST SODIUM 10 MG/1
10 TABLET ORAL NIGHTLY
Qty: 90 TABLET | Refills: 1 | Status: SHIPPED | OUTPATIENT
Start: 2023-12-07 | End: 2024-06-04

## 2023-12-08 ENCOUNTER — HOSPITAL ENCOUNTER (OUTPATIENT)
Dept: CT IMAGING | Age: 43
Discharge: HOME | End: 2023-12-08
Attending: NEUROLOGICAL SURGERY
Payer: COMMERCIAL

## 2023-12-08 DIAGNOSIS — M79.18 MYOFASCIAL PAIN SYNDROME: ICD-10-CM

## 2023-12-08 DIAGNOSIS — M43.16 SPONDYLOLISTHESIS OF LUMBAR REGION: ICD-10-CM

## 2023-12-08 PROCEDURE — 72131 CT LUMBAR SPINE W/O DYE: CPT | Performed by: NEUROLOGICAL SURGERY

## 2024-01-10 ENCOUNTER — OFFICE VISIT (OUTPATIENT)
Dept: RHEUMATOLOGY | Facility: CLINIC | Age: 44
End: 2024-01-10

## 2024-01-10 VITALS
WEIGHT: 171 LBS | SYSTOLIC BLOOD PRESSURE: 125 MMHG | HEART RATE: 66 BPM | HEIGHT: 62 IN | DIASTOLIC BLOOD PRESSURE: 84 MMHG | BODY MASS INDEX: 31.47 KG/M2

## 2024-01-10 DIAGNOSIS — R76.8 ANA POSITIVE: ICD-10-CM

## 2024-01-10 DIAGNOSIS — M25.50 POLYARTHRALGIA: Primary | ICD-10-CM

## 2024-01-10 DIAGNOSIS — R76.8 ANTI-TPO ANTIBODIES PRESENT: ICD-10-CM

## 2024-01-10 DIAGNOSIS — M20.031 SWAN-NECK DEFORMITY OF FINGER OF RIGHT HAND: ICD-10-CM

## 2024-01-10 PROCEDURE — 99214 OFFICE O/P EST MOD 30 MIN: CPT | Performed by: INTERNAL MEDICINE

## 2024-01-10 PROCEDURE — 3074F SYST BP LT 130 MM HG: CPT | Performed by: INTERNAL MEDICINE

## 2024-01-10 PROCEDURE — 3079F DIAST BP 80-89 MM HG: CPT | Performed by: INTERNAL MEDICINE

## 2024-01-10 PROCEDURE — 3008F BODY MASS INDEX DOCD: CPT | Performed by: INTERNAL MEDICINE

## 2024-01-10 RX ORDER — METFORMIN HYDROCHLORIDE 500 MG/1
500 TABLET, EXTENDED RELEASE ORAL
COMMUNITY
Start: 2024-01-09

## 2024-01-10 RX ORDER — DIETHYLPROPION HYDROCHLORIDE 25 MG/1
25 TABLET ORAL 2 TIMES DAILY
COMMUNITY
Start: 2023-12-11

## 2024-01-10 NOTE — PROGRESS NOTES
RHEUMATOLOGY CLINIC- NEW PATIENT    Mirtha Silver is a 43 year old female.    ASSESSMENT/PLAN:       ICD-10-CM    1. Polyarthralgia  M25.50 XR HAND (MIN 3 VIEWS), LEFT (CPT=73130)     XR HAND (MIN 3 VIEWS), RIGHT (CPT=73130)     Hepatitis Panel, Acute (4)     Quantiferon TB Plus     C-Reactive Protein     Sed Rate, Westergren (Automated)     Anti-Nuclear Antibody (SAMMY) by IFA, Reflex Titer + Specific Antibodies     Anti-dsDNA Antibody, IgG     Complement C3, Serum     Cyclic Citrullinate Pep. IGG     Complement C4, Serum     HLA B 27 Disease Association     Rheumatoid Arthritis Factor     CBC With Differential With Platelet     Comp Metabolic Panel (14)     TSH and Free T4      2. [place]-neck deformity of finger of right hand  M20.031 XR HAND (MIN 3 VIEWS), LEFT (CPT=73130)     XR HAND (MIN 3 VIEWS), RIGHT (CPT=73130)     Hepatitis Panel, Acute (4)     Quantiferon TB Plus     C-Reactive Protein     Sed Rate, W[place]gren (Automated)     Anti-Nuclear Antibody (SAMMY) by IFA, Reflex Titer + Specific Antibodies     Anti-dsDNA Antibody, IgG     Complement C3, Serum     Cyclic Citrullinate Pep. IGG     Complement C4, Serum     HLA B 27 Disease Association     Rheumatoid Arthritis Factor     CBC With Differential With Platelet     Comp Metabolic Panel (14)     TSH and Free T4      3. SAMMY positive  R76.8 XR HAND (MIN 3 VIEWS), LEFT (CPT=73130)     XR HAND (MIN 3 VIEWS), RIGHT (CPT=73130)     Hepatitis Panel, Acute (4)     Quantiferon TB Plus     C-Reactive Protein     Sed Rate, W[place]gren (Automated)     Anti-Nuclear Antibody (SAMMY) by IFA, Reflex Titer + Specific Antibodies     Anti-dsDNA Antibody, IgG     Complement C3, Serum     Cyclic Citrullinate Pep. IGG     Complement C4, Serum     HLA B 27 Disease Association     Rheumatoid Arthritis Factor     CBC With Differential With Platelet     Comp Metabolic Panel (14)     TSH and Free T4      4. Anti-TPO antibodies present  R76.8         DISCUSSION: Patient presents as a new  outpatient referral from her PCP for evaluation of polyarthralgias but patient most notably concerned about her right hand pain.  On exam, she does not have synovitis but her right hand does appear to have swan-neck deformities.  Of note, patient has a known history of cervical and lumbar DDD complicated by neuropathies for which he follows with neurosurgery.  Additionally, patient has known thyroid disease with positive TPO antibodies that may cross-react with autoimmune testing.  Will check further serologies, as above.  Would avoid NSAIDs given patient's history of gastritis/GERD.    PLAN:  -Patient hoping to avoid NSAIDs given history of gastritis/GERD  - Patient to obtain the above nonfasting lab work  - Prior finger x-ray images reviewed and with no erosive or inflammatory changes.  Will check bilateral hand XRs today for independent review.  - Consult/evaluation communicated with referring physician/provider.    Pt will f/u in about 5 weeks with workup completed prior.    Anna Peng DO  1/10/2024   3:01 PM    HPI:     Chief Complaint   Patient presents with    New Patient     :[id number]     I had the pleasure of seeing Mirtha Silver on 1/10/2024 as a new outpatient consultation for arthralgias. The patient was originally referred by her PCP, Dr. Manoj Weaver.       43 year old female w/ PMH HLD, vitamin D deficiency, vitamin B-12 distress today, hypothyroidism (+TPO), lumbar DDD, cervical DDD, anxiety/depression, GERD, elevated LFTs, endometriosis who presents to clinic today.  Patient reporting \" bones hurting\" from her right hand especially her fifth digit and is concerned given asymmetry compared to her other fingers.  States symptoms started about 2 months prior and associated with difficulty gripping things and stiffness.  She also reports fatigue, headaches, and paresthesias to her bilateral upper extremities.  On review of EMR, it appears patient has been seen by ophthalmology  with concerns of post-procedural eye dryness, however, noting that it would be unusual for the last months after the procedure.  Patient also states she has had worsening dry mouth since starting a new medication for weight loss.  No prior history of dental caries.  Family history notable for a mom with \"arthritis \"but no known autoimmune disorders such as gout, lupus, RA, psoriasis.      Current Medications:  N/A    Medication History:  N/A    Interval History:   See Above    HISTORY:  Past Medical History:   Diagnosis Date    Anxiety state     Back problem     Calculus of kidney     Depression     Disorder of liver     FATTY LIVER     Disorder of thyroid     hypothyroid    Endometriosis     Esophageal reflux     High cholesterol     History of Papanicolaou smear of cervix 2015    Hyperlipidemia     IBS (irritable bowel syndrome)     Migraines     Per [facility] - Migraine headaches    Osteoarthritis     Ovarian cyst     Postpartum depression     Prediabetes     Pregnancy , ,         Spondylolisthesis of lumbosacral region 2017    Visual impairment     WEARS GLASSES    Vitamin B12 deficiency     Vitamin D deficiency       Social Hx Reviewed   Family Hx Reviewed     Medications (Active prior to today's visit):  Current Outpatient Medications   Medication Sig Dispense Refill    metFORMIN  MG Oral Tablet 24 Hr Take 1 tablet (500 mg total) by mouth daily with breakfast.      Diethylpropion HCl 25 MG Oral Tab Take 25 mg by mouth in the morning and 25 mg before bedtime.      fluticasone propionate 50 MCG/ACT Nasal Suspension 2 sprays by Each Nare route daily for 360 doses. 3 each 3    montelukast (SINGULAIR) 10 MG Oral Tab Take 1 tablet (10 mg total) by mouth nightly. 90 tablet 1    levocetirizine 5 MG Oral Tab Take 1 tablet (5 mg total) by mouth every morning. 90 tablet 0    Magnesium 500 MG Oral Tab Take by mouth.      tiZANidine 4 MG Oral Tab Take 1 tablet (4 mg total) by mouth 3  (three) times daily. For pain and muscle relaxation. 90 tablet 1    gabapentin 300 MG Oral Cap Take 1 capsule (300 mg total) by mouth 3 (three) times daily. 2 tabs at night 1 in the morning 270 capsule 3    pantoprazole 40 MG Oral Tab EC Take 1 tablet (40 mg total) by mouth every morning before breakfast. To help with stomach acid and stomach irritation/inflammation 90 tablet 3    levothyroxine 50 MCG Oral Tab Take 1 tablet (50 mcg total) by mouth before breakfast. 90 tablet 3    ALPRAZolam 0.25 MG Oral Tab Take 1 tablet (0.25 mg total) by mouth nightly as needed for Anxiety. 30 tablet 1       Allergies:  Allergies   Allergen Reactions    Cymbalta [Duloxetine Hcl] SHORTNESS OF BREATH and ANXIETY    Other SHORTNESS OF BREATH     SOME TOPICAL NUMBING SPRAY USED WHEN STARTING AN I.V. - cough and shortness of breath      Sulfa Antibiotics HIVES and RASH    Tramadol ITCHING     THROAT ITCHING AND FEELS LIKE IT WAS CLOSING     Ciprofloxacin RASH    Acetaminophen OTHER (SEE COMMENTS)     Fatty Liver         ROS:   Review of Systems   Constitutional:  Negative for chills and fever.   HENT:  Negative for congestion, hearing loss, mouth sores, nosebleeds and trouble swallowing.    Eyes:  Negative for photophobia, pain, redness and visual disturbance.        Eye dryness   Respiratory:  Negative for cough and shortness of breath.    Cardiovascular:  Negative for chest pain, palpitations and leg swelling.   Gastrointestinal:  Negative for abdominal pain, blood in stool, diarrhea and nausea.   Endocrine: Negative for cold intolerance and heat intolerance.   Genitourinary:  Negative for dysuria, frequency and hematuria.   Musculoskeletal:  Positive for arthralgias, myalgias and neck pain. Negative for back pain, gait problem, joint swelling and neck stiffness.   Skin:  Negative for color change and rash.   Neurological:  Positive for numbness and headaches. Negative for dizziness and weakness.   Psychiatric/Behavioral:  Negative  for confusion and dysphoric mood.         PHYSICAL EXAM:     Constitutional:  Well developed, Well nourished, No acute distress  HENT:  Normocephalic, Atraumatic, Bilateral external ears normal, Oropharynx moist, No oral exudates.  Neck: Normal range of motion, No tenderness, Supple, No stridor.  Eyes:  PERRL, EOMI, Conjunctiva normal, No discharge.  Respiratory:  Normal breath sounds, No respiratory distress, No wheezing.  Cardiovascular:  Normal heart rate, Normal rhythm, No murmurs, No rubs, No gallops.  GI:  Bowel sounds normal, Soft, No tenderness, No masses, No pulsatile masses.  : No CVA tenderness.  Musculoskeletal:  A comprehensive 28 count joint exam was done and was negative for swelling or tenderness except as noted. Inspections for misalignment, asymmetry, crepitation, defects, tenderness, masses, nodules, effusions, range of motion, and stability in the upper and lower extremities bilaterally are all normal unless noted.           Integument:  Warm, Dry, No erythema, No rash.  Lymphatic:  No lymphadenopathy noted.  Neurologic:  Alert & oriented x 3, Normal motor function, Normal sensory function, No focal deficits noted.  Psychiatric:  Affect normal, Judgment normal, Mood normal.    LABS:     Prior lab work reviewed and notable for:    10/4/2023 CBC without cytopenias nor leukocytosis    2023 UA without proteinuria nor hematuria      2023:  Hemoglobin A1c 5.6    2023 TSH 1.050 WNL    3/15/2018  TPO 42.30 (high)  dsDNA less than 10  SCL 70 negative  Smith antibody negative    2018:  SAMMY screen by IFA positive, 1: 160 (homogenous)    RF less than 5  HLA-B27 negative  SSA and SSB negative  CK 94 WNL  Imagin/8/2023 lumbar spine CT:  Impression   CONCLUSION:       1. Soft tissue opacity within the right neural foramen measuring approximately 11 x 10 mm with bony osteophytes that may cause mass effect on the exiting L5 nerve root.  This may represent area of granulation tissue  recurrent disc herniation.  Further  evaluation using MRI with gadolinium would be  recommended.     2. Mild degenerative disc disease at L3-4.          11/16/2023 right fifth finger XR:    Impression   CONCLUSION: Normal          10/27/2023 cervical spine XR:    Impression   CONCLUSION: Straightening of normal cervical lordosis which could be due to patient position or muscle spasm.  Interval placement of disc prosthesis at C5-6.  Normal range of motion with flexion and extension without subluxation.  No acute osseous abnormality of the cervical spine.

## 2024-01-11 ENCOUNTER — TELEPHONE (OUTPATIENT)
Dept: RHEUMATOLOGY | Facility: CLINIC | Age: 44
End: 2024-01-11

## 2024-01-11 NOTE — TELEPHONE ENCOUNTER
Patient called to state that she was seen yesterday 01/10/2024 and she was asked if she has any rashes on her body. Patient states she said no because she was not sure what part of her body the Dr was referring to. Per patient she has a rash on her right arm and right leg. She states that she gets rashes during the summer time and she tends to become more itchy during that season.

## 2024-01-13 ENCOUNTER — HOSPITAL ENCOUNTER (OUTPATIENT)
Dept: GENERAL RADIOLOGY | Age: 44
Discharge: HOME | End: 2024-01-13
Payer: COMMERCIAL

## 2024-01-13 ENCOUNTER — LAB ENCOUNTER (OUTPATIENT)
Dept: LAB | Age: 44
End: 2024-01-13
Payer: COMMERCIAL

## 2024-01-13 DIAGNOSIS — M20.031 SWAN-NECK DEFORMITY OF FINGER OF RIGHT HAND: ICD-10-CM

## 2024-01-13 DIAGNOSIS — M25.50 POLYARTHRALGIA: ICD-10-CM

## 2024-01-13 DIAGNOSIS — R76.8 ANA POSITIVE: ICD-10-CM

## 2024-01-13 LAB
ALBUMIN SERPL-MCNC: 4.5 G/DL (ref 3.2–4.8)
ALBUMIN/GLOB SERPL: 1.7 {RATIO} (ref 1–2)
ALP LIVER SERPL-CCNC: 75 U/L
ALT SERPL-CCNC: 28 U/L
ANION GAP SERPL CALC-SCNC: 6 MMOL/L (ref 0–18)
AST SERPL-CCNC: 23 U/L (ref ?–34)
BASOPHILS # BLD AUTO: 0.03 X10(3) UL (ref 0–0.2)
BASOPHILS NFR BLD AUTO: 0.3 %
BILIRUB SERPL-MCNC: 0.5 MG/DL (ref 0.3–1.2)
BUN BLD-MCNC: 8 MG/DL (ref 9–23)
BUN/CREAT SERPL: 11.4 (ref 10–20)
C3 SERPL-MCNC: 140.4 MG/DL (ref 90–170)
C4 SERPL-MCNC: 31 MG/DL (ref 12–36)
CALCIUM BLD-MCNC: 9.4 MG/DL (ref 8.7–10.4)
CHLORIDE SERPL-SCNC: 107 MMOL/L (ref 98–112)
CO2 SERPL-SCNC: 26 MMOL/L (ref 21–32)
CREAT BLD-MCNC: 0.7 MG/DL
CRP SERPL-MCNC: <0.4 MG/DL (ref ?–1)
DEPRECATED RDW RBC AUTO: 41.2 FL (ref 35.1–46.3)
EGFRCR SERPLBLD CKD-EPI 2021: 111 ML/MIN/1.73M2 (ref 60–?)
EOSINOPHIL # BLD AUTO: 0.1 X10(3) UL (ref 0–0.7)
EOSINOPHIL NFR BLD AUTO: 1.2 %
ERYTHROCYTE [DISTWIDTH] IN BLOOD BY AUTOMATED COUNT: 12.6 % (ref 11–15)
ERYTHROCYTE [SEDIMENTATION RATE] IN BLOOD: 4 MM/HR
FASTING STATUS PATIENT QL REPORTED: NO
GLOBULIN PLAS-MCNC: 2.7 G/DL (ref 2.8–4.4)
GLUCOSE BLD-MCNC: 111 MG/DL (ref 70–99)
HAV IGM SER QL: NONREACTIVE
HBV CORE IGM SER QL: NONREACTIVE
HBV SURFACE AG SERPL QL IA: NONREACTIVE
HCT VFR BLD AUTO: 42.3 %
HCV AB SERPL QL IA: NONREACTIVE
HGB BLD-MCNC: 14 G/DL
IMM GRANULOCYTES # BLD AUTO: 0.03 X10(3) UL (ref 0–1)
IMM GRANULOCYTES NFR BLD: 0.3 %
LYMPHOCYTES # BLD AUTO: 1.7 X10(3) UL (ref 1–4)
LYMPHOCYTES NFR BLD AUTO: 19.6 %
MCH RBC QN AUTO: 29.9 PG (ref 26–34)
MCHC RBC AUTO-ENTMCNC: 33.1 G/DL (ref 31–37)
MCV RBC AUTO: 90.4 FL
MONOCYTES # BLD AUTO: 0.45 X10(3) UL (ref 0.1–1)
MONOCYTES NFR BLD AUTO: 5.2 %
NEUTROPHILS # BLD AUTO: 6.38 X10 (3) UL (ref 1.5–7.7)
NEUTROPHILS # BLD AUTO: 6.38 X10(3) UL (ref 1.5–7.7)
NEUTROPHILS NFR BLD AUTO: 73.4 %
OSMOLALITY SERPL CALC.SUM OF ELEC: 287 MOSM/KG (ref 275–295)
PLATELET # BLD AUTO: 304 10(3)UL (ref 150–450)
POTASSIUM SERPL-SCNC: 3.8 MMOL/L (ref 3.5–5.1)
PROT SERPL-MCNC: 7.2 G/DL (ref 5.7–8.2)
RBC # BLD AUTO: 4.68 X10(6)UL
RHEUMATOID FACT SERPL-ACNC: <10 IU/ML (ref ?–14)
SODIUM SERPL-SCNC: 139 MMOL/L (ref 136–145)
T4 FREE SERPL-MCNC: 1.3 NG/DL (ref 0.8–1.7)
TSI SER-ACNC: 0.98 MIU/ML (ref 0.55–4.78)
WBC # BLD AUTO: 8.7 X10(3) UL (ref 4–11)

## 2024-01-13 PROCEDURE — 85652 RBC SED RATE AUTOMATED: CPT

## 2024-01-13 PROCEDURE — 86225 DNA ANTIBODY NATIVE: CPT

## 2024-01-13 PROCEDURE — 73130 X-RAY EXAM OF HAND: CPT | Performed by: INTERNAL MEDICINE

## 2024-01-13 PROCEDURE — 86431 RHEUMATOID FACTOR QUANT: CPT | Performed by: INTERNAL MEDICINE

## 2024-01-13 PROCEDURE — 80053 COMPREHEN METABOLIC PANEL: CPT

## 2024-01-13 PROCEDURE — 86160 COMPLEMENT ANTIGEN: CPT

## 2024-01-13 PROCEDURE — 80074 ACUTE HEPATITIS PANEL: CPT

## 2024-01-13 PROCEDURE — 86200 CCP ANTIBODY: CPT | Performed by: INTERNAL MEDICINE

## 2024-01-13 PROCEDURE — 86480 TB TEST CELL IMMUN MEASURE: CPT

## 2024-01-13 PROCEDURE — 86140 C-REACTIVE PROTEIN: CPT

## 2024-01-13 PROCEDURE — 85025 COMPLETE CBC W/AUTO DIFF WBC: CPT

## 2024-01-13 PROCEDURE — 86039 ANTINUCLEAR ANTIBODIES (ANA): CPT | Performed by: INTERNAL MEDICINE

## 2024-01-13 PROCEDURE — 86225 DNA ANTIBODY NATIVE: CPT | Performed by: INTERNAL MEDICINE

## 2024-01-13 PROCEDURE — 84443 ASSAY THYROID STIM HORMONE: CPT

## 2024-01-13 PROCEDURE — 86038 ANTINUCLEAR ANTIBODIES: CPT | Performed by: INTERNAL MEDICINE

## 2024-01-13 PROCEDURE — 81374 HLA I TYPING 1 ANTIGEN LR: CPT

## 2024-01-13 PROCEDURE — 36415 COLL VENOUS BLD VENIPUNCTURE: CPT

## 2024-01-13 PROCEDURE — 84439 ASSAY OF FREE THYROXINE: CPT

## 2024-01-13 PROCEDURE — 86235 NUCLEAR ANTIGEN ANTIBODY: CPT | Performed by: INTERNAL MEDICINE

## 2024-01-15 LAB
M TB IFN-G CD4+ T-CELLS BLD-ACNC: 0.03 IU/ML
M TB TUBERC IFN-G BLD QL: NEGATIVE
M TB TUBERC IGNF/MITOGEN IGNF CONTROL: >10 IU/ML
NUCLEAR IGG TITR SER IF: POSITIVE {TITER}
QFT TB1 AG MINUS NIL: 0.01 IU/ML
QFT TB2 AG MINUS NIL: 0.03 IU/ML

## 2024-01-16 LAB
ANA NUCLEOLAR TITR SER IF: 160 {TITER}
DSDNA AB TITR SER: <10 {TITER}

## 2024-01-17 LAB
CCP IGG SERPL-ACNC: 0.6 U/ML (ref 0–6.9)
DSDNA IGG SERPL IA-ACNC: 1.1 IU/ML

## 2024-01-18 LAB
CENTROMERE IGG SER-ACNC: <0.4 U/ML
ENA JO1 AB SER IA-ACNC: <0.3 U/ML
ENA RNP IGG SER IA-ACNC: 0.4 U/ML
ENA SCL70 IGG SER IA-ACNC: 0.6 U/ML
ENA SM IGG SER IA-ACNC: <0.7 U/ML
ENA SS-A IGG SER IA-ACNC: <0.4 U/ML
ENA SS-B IGG SER IA-ACNC: <0.4 U/ML
U1 SNRNP IGG SER IA-ACNC: 1.5 U/ML

## 2024-01-19 ENCOUNTER — TELEPHONE (OUTPATIENT)
Dept: RHEUMATOLOGY | Facility: CLINIC | Age: 44
End: 2024-01-19

## 2024-01-19 NOTE — TELEPHONE ENCOUNTER
/Danish: id# [id number]/Gwyn Navas asking Dr Peng to call her to explain her rheum test results.  [phone]   Ok to leave a voice message

## 2024-01-19 NOTE — TELEPHONE ENCOUNTER
Please see below and advise. HLA B 27 still pending at this time.       Component      Latest Ref Rng 1/13/2024   Albumin      3.2 - 4.8 g/dL 4.5    Globulin      2.8 - 4.4 g/dL 2.7 (L)    A/G Ratio      1.0 - 2.0  1.7    Patient Fasting for CMP? No    Anti-SSA Antibody, IGG      <7 U/mL <0.4    Anti-SSB Antibody, IGG      <7 U/mL <0.4    Anti-Smith Antibody, IGG      <7 U/mL <0.7    Anti-U1RNP Antibody, IGG      <5 U/mL 1.5    Anti-RNP70 Antibody, IGG      <7 U/mL 0.4    Anti-Centromere Antibody, IGG      <7 U/mL <0.4    Anti-SCL70 Antibody, IGG      <7 U/mL 0.6    Anti-Janey-1 Antibody, IGG      <7 U/mL <0.3    Quantiferon TB NIL      IU/mL 0.03    Quantiferon-TB1 Minus NIL      IU/mL 0.01    Quantiferon-TB2 Minus NIL      IU/mL 0.03    Quantiferon TB Mitogen minus NIL      IU/mL >10.00    Quantiferon TB Result      Negative  Negative    HEPATITIS A AB, IGM      Nonreactive   Nonreactive    HBc IgM AB      Nonreactive   Nonreactive    HBSAg Screen      Nonreactive   Nonreactive    HCV AB      Nonreactive   Nonreactive    T4,Free (Direct)      0.8 - 1.7 ng/dL 1.3    TSH      0.550 - 4.780 mIU/mL 0.983    SAMMY Titer/Pattern      <80  160 !    Reviewed By: Jonathon Patricio M.D.    SAMMY SCREEN WITH REFLEX (S)      Negative  Positive !    C-Citrullinated Peptide IgG AB      0.0 - 6.9 U/mL 0.6    RHEUMATOID FACTOR      <14 IU/mL <10    C-REACTIVE PROTEIN      <1.00 mg/dL <0.40    SED RATE      0 - 20 mm/Hr 4    Anti-dsDNA antibody      <10 IU/mL 1.1    COMPLEMENT C3      90.0 - 170.0 mg/dL 140.4    COMPLEMENT C4      12.0 - 36.0 mg/dL 31.0    Anti Double Strand DNA      <10  <10       Legend:  (L) Low  ! Abnormal

## 2024-01-19 NOTE — TELEPHONE ENCOUNTER
[facility] #[id number]    Returned patient's call 1/19 during the afternoon to discuss recent lab results. Awaiting HLA B27 but otherwise only notable for slightly positive SAMMY that could reflect cross-reactivity to her known thyroid disease. Patient to follow-up in office.

## 2024-01-22 ENCOUNTER — OFFICE VISIT (OUTPATIENT)
Dept: NEUROLOGY | Facility: CLINIC | Age: 44
End: 2024-01-22
Payer: COMMERCIAL

## 2024-01-22 VITALS
DIASTOLIC BLOOD PRESSURE: 80 MMHG | HEART RATE: 85 BPM | SYSTOLIC BLOOD PRESSURE: 112 MMHG | HEIGHT: 62 IN | RESPIRATION RATE: 16 BRPM | BODY MASS INDEX: 31.47 KG/M2 | WEIGHT: 171 LBS

## 2024-01-22 DIAGNOSIS — R68.89 SPELLS OF DECREASED ATTENTIVENESS: Primary | ICD-10-CM

## 2024-01-22 DIAGNOSIS — R41.89 COGNITIVE CHANGES: ICD-10-CM

## 2024-01-22 LAB — HLA-B27: NEGATIVE

## 2024-01-22 PROCEDURE — 3008F BODY MASS INDEX DOCD: CPT | Performed by: OTHER

## 2024-01-22 PROCEDURE — 3074F SYST BP LT 130 MM HG: CPT | Performed by: OTHER

## 2024-01-22 PROCEDURE — 99204 OFFICE O/P NEW MOD 45 MIN: CPT | Performed by: OTHER

## 2024-01-22 PROCEDURE — 3079F DIAST BP 80-89 MM HG: CPT | Performed by: OTHER

## 2024-01-22 NOTE — H&P
[facility] New Patient / Consult Visit    Mirtha Silver is a 43 year old female.                         Referring MD: Manoj Weaver DO      Chief Complaint   Patient presents with    Neurologic Problem     Referred by PCP, C/O memory changes, notes forgetfulness/recall challenges       HPI:    Mirtha Silver is a 43 year old, who presents for evaluation of memory loss.   She states she has been forgetting things for \"a while\" and states she tries to remember but cannot remember and sometimes goes into the kitchen and is not able to recall why why she was going to the kitchen but recalls this later.  She is not able to explain how long these occur or how long she had these symptoms in general; denies auras of odd tastes, smells or sounds.    She notes her  and kids have noted she asks questions several times. She denies episodes of spontaneous speech arrest; denies numbness/tingling on one side of her body aside from numbness in legs R more than L due to lumbar surgery; has cervical surgery as well and tlngling in both arms as well; sometimes misplaces objects at home; notes more anxiety in the past year;     She has history of anxiety and been on medications for ~15 yrs but been taking less frequently only taking as needed.        Headache: endorses some headaches which she notes are related to stress; no associated nausea but some light sensitivity.       Otherwise, patient denies any recent weight change, fevers, chills, nausea, double vision/ blurry vision / loss of vision, chest pain, palpitations, shortness of breath, rashes, joint pains, bowel / bladder incontinence or mood issues.     Past Medical History:   Diagnosis Date    Anxiety state     Back problem     Calculus of kidney     Depression     Disorder of liver     FATTY LIVER     Disorder of thyroid     hypothyroid    Endometriosis     Esophageal reflux     High cholesterol     History of Papanicolaou smear of cervix  2015    Hyperlipidemia 2013    IBS (irritable bowel syndrome)     Migraines     Per [facility] - Migraine headaches    Osteoarthritis     Ovarian cyst     Postpartum depression     Prediabetes     Pregnancy 2002, 2005, 2008        Spondylolisthesis of lumbosacral region 2017    Visual impairment     WEARS GLASSES    Vitamin B12 deficiency     Vitamin D deficiency      Past Surgical History:   Procedure Laterality Date    CERVICAL SPINE SURGERY  2023    C5-C6 Arthroplasty     CHOLECYSTECTOMY  2012    COLONOSCOPY  2004    CYST REMOVAL  ,     laporoscopic ovarian cytectomy    HYSTERECTOMY      LAPAROSCOPY,DIAGNOSTIC  2016    laparoscopy, filshie clip sterilization of left fallopian tube, ablation of endometriosis, retrieval and removal of displaced filshie clip    LIPOMA REMOVAL Left 2018    RUSSELL BIOPSY STEREO NODULE 1 SITE LEFT (CPT=19081)  05/10/2022    top hat    RUSSELL LOCALIZATION WIRE 1 SITE LEFT (CPT=19281)  2022    OTHER      low back surgery with metal implant    TOTAL ABDOMINAL HYSTERECTOMY N/A 2017    Procedure: ABDOMINAL HYSTERECTOMY;  Surgeon: Juvencio Decker MD;  Location: [facility] MAIN OR     Social History     Socioeconomic History    Marital status:     Number of children: 3   Tobacco Use    Smoking status: Former     Types: Cigarettes    Smokeless tobacco: Never   Vaping Use    Vaping Use: Never used   Substance and Sexual Activity    Alcohol use: Not Currently    Drug use: No    Sexual activity: Yes     Birth control/protection: Hysterectomy   Other Topics Concern    Caffeine Concern No     Comment: 1 cup coffee daily    Exercise No   Social History Narrative    The patient does not use an assistive device..      The patient does live in a home with stairs.     Social Determinants of Health     Financial Resource Strain: Low Risk  (2023)    Financial Resource Strain     Difficulty of Paying Living Expenses: Not hard at all     Med  Affordability: No   Transportation Needs: No Transportation Needs (9/21/2023)    Transportation Needs     Lack of Transportation: No     Family History   Problem Relation Age of Onset    Diabetes Father         Type 2    Hypertension Mother     Lipids Mother         Hyperlipidemia    Diabetes Mother         Type 2    Diabetes Sister     Hypertension Sister     Lipids Brother     Glaucoma Neg     Macular degeneration Neg        Allergies:  Allergies   Allergen Reactions    Cymbalta [Duloxetine Hcl] SHORTNESS OF BREATH and ANXIETY    Other SHORTNESS OF BREATH     SOME TOPICAL NUMBING SPRAY USED WHEN STARTING AN I.V. - cough and shortness of breath      Sulfa Antibiotics HIVES and RASH    Tramadol ITCHING     THROAT ITCHING AND FEELS LIKE IT WAS CLOSING     Ciprofloxacin RASH    Acetaminophen OTHER (SEE COMMENTS)     Fatty Liver      Current Meds:  Current Outpatient Medications   Medication Sig Dispense Refill    metFORMIN  MG Oral Tablet 24 Hr Take 1 tablet (500 mg total) by mouth daily with breakfast.      fluticasone propionate 50 MCG/ACT Nasal Suspension 2 sprays by Each Nare route daily for 360 doses. 3 each 3    montelukast (SINGULAIR) 10 MG Oral Tab Take 1 tablet (10 mg total) by mouth nightly. 90 tablet 1    levocetirizine 5 MG Oral Tab Take 1 tablet (5 mg total) by mouth every morning. 90 tablet 0    tiZANidine 4 MG Oral Tab Take 1 tablet (4 mg total) by mouth 3 (three) times daily. For pain and muscle relaxation. 90 tablet 1    gabapentin 300 MG Oral Cap Take 1 capsule (300 mg total) by mouth 3 (three) times daily. 2 tabs at night 1 in the morning 270 capsule 3    pantoprazole 40 MG Oral Tab EC Take 1 tablet (40 mg total) by mouth every morning before breakfast. To help with stomach acid and stomach irritation/inflammation 90 tablet 3    levothyroxine 50 MCG Oral Tab Take 1 tablet (50 mcg total) by mouth before breakfast. 90 tablet 3    ALPRAZolam 0.25 MG Oral Tab Take 1 tablet (0.25 mg total) by mouth  nightly as needed for Anxiety. 30 tablet 1    Tirzepatide-Weight Management (ZEPBOUND) 2.5 MG/0.5ML Subcutaneous Solution Auto-injector Inject 2.5 mg into the skin once a week. 3 mL 2    Diethylpropion HCl 25 MG Oral Tab Take 25 mg by mouth in the morning and 25 mg before bedtime. 120 tablet 3          ROS:   A comprehensive 10 point review of systems was completed.  Pertinent positives and negatives noted in the the HPI.      PHYSICAL EXAM:   /80 (BP Location: Left arm, Patient Position: Sitting, Cuff Size: adult)   Pulse 85   Resp 16   Ht 62\"   Wt 171 lb (77.6 kg)   LMP 03/22/2017   BMI 31.28 kg/m²   Estimated body mass index is 31.28 kg/m² as calculated from the following:    Height as of this encounter: 62\".    Weight as of this encounter: 171 lb (77.6 kg).    GENERAL: well developed, well nourished, in no apparent distress  SKIN: no rashes  EYES: sclera anicteric, conjunctiva normal  HEENT: normocephalic  CARDIOVASCULAR: S1, S2 normal, RRR  LUNGS: clear to auscultation bilaterally  EXTREMITIES: no cyanosis, peripheral pulses intact    Neck: Supple; full range of motion; no carotid bruits    Mental status:  Alert and oriented to time, place, person, and situation  Speech: fluent  Language: see detailed testing as below  Memory: see detailed testing as below  Attention/concentration: see detailed testing as below    MOCA: 26/30  Visuospatial/executive: 4/5 (did not understand trails test and unable to do even with cues - may be limited by language)   Naming: 3/3  Atttention: 4/6 ( serial 7s [phone])  Language: 3/3  Abstraction: 2/2  Delayed recall: 4/5   Orientaton: 6/6    Fundoscopic Exam: optic discs sharp bilaterally    Cranial Nerves: II through XII  Optic:    Pupils: equally round and reactive to light with direct and consensual responses, normal accomodation   Visual acuity: Normal              Visual fields: Normal  Oculomotor/Trochlear/Abducens:    Eye Movements: EOMI without  nystagmus  Trigeminal:   Facial sensation:intact to light touch bilaterally  Facial:   Smile symmetric, eyebrow raise symmetric  Vestibulocochlear:   Hearing: normal bilaterally  Glossopharyngeal/Vagus:   Palate elevates symmetrically with midline uvula  Spinal accessory:   Shoulder Shrug: normal bilaterally   Lateral head turn: normal bilaterally  Hypoglossal:   Tongue movement: protrusion is midline with normal lateral movements    Motor System:  Strength: 5/5 throughout  Tone: normal    Sensory:  Pin is normal  Vibration is normal  Proprioception is normal  Romberg is absent    Coordination:  Finger to nose normal bilaterally  Rapid alternating movements normal bilaterally  Heel to shin is normal bilaterally    DTRs:   2+, symmetric, throughout, toes downgoing bilaterally; no clonus            Gait:  Normal casual, heel, toe and tandem gait    TEST RESULTS/DATA REVIEWED:       IMPRESSION AND PLAN:   Mirtha Silver is a 43 year old female with who presents for evaluation of memory loss.   She states she has been forgetting things for \"a while\" and states she tries to remember but cannot remember and sometimes goes into the kitchen and is not able to recall why why she was going to the kitchen but recalls this later.  She is not able to explain how long these occur or how long she had these symptoms in general; denies auras of odd tastes, smells or sounds.    She notes her  and kids have noted she asks questions several times. She denies episodes of spontaneous speech arrest; denies numbness/tingling on one side of her body aside from numbness in legs R more than L due to lumbar surgery; has cervical surgery as well and tlngling in both arms as well; sometimes misplaces objects at home; notes more anxiety in the past year;     She has history of anxiety and been on medications for ~15 yrs but been taking less frequently only taking as needed.          In terms of exam, she has a normal non-focal  neurologic examination; cognitive evaluation within normal range with MOCA screening score 26/30 (visuospatial/executive function 4/5, naming 3/3, attention 4/6, language 3/3, abstraction 2/2, delayed recall 4/5, orientation 6/6), with most notable deficits in attention / concentration.  Overall, symptoms seem more related to anxiety than dementia but could have mild cognitive impairment; in addition, will check for reversible causes of memory loss with B12 level.     For workup of her cognitive changes, headache and episodes of decreased attentiveness, will check MRI brain to evaluate for secondary pathology and/or subclinical strokes and will check EEG to rule out subclinical seizures.      Given the mild findings on exam at this time, it was discussed with patient that she did not need any medication at this time. She was advised that we would wait until further workup is obtained and was advised of the benefits of cardiovascular health as well as cognitive exercise and the importance of maintaining social engagements in reducing the risk of dementia. We will continue to monitor with serial exams, and may consider additional detailed neuropsychological testing as indicated, pending results of these investigations.      1. Spells of decreased attentiveness  As noted above   - Vitamin B12; Future  - MRI BRAIN (W+WO) (CPT=70553) [[id number]]; Future  - EEG (At [facility]); Future    2. Cognitive changes  As noted above   - Vitamin B12; Future  - MRI BRAIN (W+WO) (CPT=70553) [[id number]]; Future  - EEG (At [facility]); Future    Return in about 3 months (around 4/22/2024).    Copy of note was sent to referring physician.      Brad Gillis MD, Neurology  [facility]  Pager [phone]  1/22/2024

## 2024-01-23 ENCOUNTER — OFFICE VISIT (OUTPATIENT)
Dept: SURGERY | Facility: CLINIC | Age: 44
End: 2024-01-23
Payer: COMMERCIAL

## 2024-01-23 VITALS
OXYGEN SATURATION: 96 % | WEIGHT: 178.63 LBS | BODY MASS INDEX: 32.87 KG/M2 | HEART RATE: 97 BPM | SYSTOLIC BLOOD PRESSURE: 122 MMHG | HEIGHT: 62 IN | DIASTOLIC BLOOD PRESSURE: 80 MMHG

## 2024-01-23 DIAGNOSIS — E66.9 OBESITY (BMI 30-39.9): ICD-10-CM

## 2024-01-23 DIAGNOSIS — E78.00 HYPERCHOLESTEREMIA: ICD-10-CM

## 2024-01-23 DIAGNOSIS — K76.0 HEPATIC STEATOSIS: ICD-10-CM

## 2024-01-23 DIAGNOSIS — E88.819 INSULIN RESISTANCE: Primary | ICD-10-CM

## 2024-01-23 PROCEDURE — 3074F SYST BP LT 130 MM HG: CPT | Performed by: INTERNAL MEDICINE

## 2024-01-23 PROCEDURE — 99214 OFFICE O/P EST MOD 30 MIN: CPT | Performed by: INTERNAL MEDICINE

## 2024-01-23 PROCEDURE — 3079F DIAST BP 80-89 MM HG: CPT | Performed by: INTERNAL MEDICINE

## 2024-01-23 PROCEDURE — 3008F BODY MASS INDEX DOCD: CPT | Performed by: INTERNAL MEDICINE

## 2024-01-23 RX ORDER — DIETHYLPROPION HYDROCHLORIDE 25 MG/1
25 TABLET ORAL 2 TIMES DAILY
Qty: 120 TABLET | Refills: 3 | Status: SHIPPED | OUTPATIENT
Start: 2024-01-23

## 2024-01-23 RX ORDER — TIRZEPATIDE 2.5 MG/.5ML
2.5 INJECTION, SOLUTION SUBCUTANEOUS WEEKLY
Qty: 3 ML | Refills: 2 | Status: SHIPPED | OUTPATIENT
Start: 2024-01-23

## 2024-01-23 NOTE — PROGRESS NOTES
[facility] / [facility], [facility], [place]  [address]  [facility] [address]  Dept: [phone]       Patient:  Mirtha Silver  :      1980  MRN:      [id number]    Chief Complaint:    Chief Complaint   Patient presents with    Follow - Up    Weight Management     Weight check        SUBJECTIVE     History of Present Illness:  Mirtha is being seen today for a follow-up for medically supported weight loss.            Initial HPI: 3/22/2021  42 yo female.   Patient first noted weight gain one year ago after low back surgery.     Recently started on gabapentin for pain, she typically takes 0-2 capsules/day.      Patient denies any history of eating disorder(s).     Patient is unemployed.  Patient lives with spouse, 3 children.     Patient's goal weight: 150 lbs  Has improved diet recently  Heaviest weight ever: 196 lbs   Previous use of medical weight loss medications: Phentermine (side effect irritability, financial concerns)      Physical activity: Limited due to back pain  Walking      Sleep: 5-6 hours/night      Past Medical History:   Past Medical History:   Diagnosis Date    Anxiety state     Back problem     Calculus of kidney     Depression     Disorder of liver     FATTY LIVER     Disorder of thyroid     hypothyroid    Endometriosis     Esophageal reflux     High cholesterol     History of Papanicolaou smear of cervix 2015    Hyperlipidemia 2013    IBS (irritable bowel syndrome)     Migraines     Per [facility] - Migraine headaches    Osteoarthritis     Ovarian cyst     Postpartum depression     Prediabetes     Pregnancy , 2005, 2008        Spondylolisthesis of lumbosacral region 2017    Visual impairment     WEARS GLASSES    Vitamin B12 deficiency     Vitamin D deficiency         Comorbidities:  Hyperlipidemia-Improvement?  no    OBJECTIVE     Vitals: /80   Pulse 97   Ht 5' 2\" (1.575 m)   Wt 178 lb 9.6 oz (81 kg)   LMP  03/22/2017   SpO2 96%   BMI 32.67 kg/m²     Initial weight loss: -13   Total weight loss:-13   Start weight: 191    Wt Readings from Last 6 Encounters:   01/23/24 178 lb 9.6 oz (81 kg)   01/22/24 171 lb (77.6 kg)   01/10/24 171 lb (77.6 kg)   12/07/23 175 lb (79.4 kg)   11/16/23 176 lb (79.8 kg)   10/12/23 177 lb 6.4 oz (80.5 kg)       Patient Medications:    Current Outpatient Medications   Medication Sig Dispense Refill    metFORMIN  MG Oral Tablet 24 Hr Take 1 tablet (500 mg total) by mouth daily with breakfast.      Diethylpropion HCl 25 MG Oral Tab Take 25 mg by mouth in the morning and 25 mg before bedtime.      fluticasone propionate 50 MCG/ACT Nasal Suspension 2 sprays by Each Nare route daily for 360 doses. 3 each 3    montelukast (SINGULAIR) 10 MG Oral Tab Take 1 tablet (10 mg total) by mouth nightly. 90 tablet 1    levocetirizine 5 MG Oral Tab Take 1 tablet (5 mg total) by mouth every morning. 90 tablet 0    tiZANidine 4 MG Oral Tab Take 1 tablet (4 mg total) by mouth 3 (three) times daily. For pain and muscle relaxation. 90 tablet 1    gabapentin 300 MG Oral Cap Take 1 capsule (300 mg total) by mouth 3 (three) times daily. 2 tabs at night 1 in the morning 270 capsule 3    pantoprazole 40 MG Oral Tab EC Take 1 tablet (40 mg total) by mouth every morning before breakfast. To help with stomach acid and stomach irritation/inflammation 90 tablet 3    levothyroxine 50 MCG Oral Tab Take 1 tablet (50 mcg total) by mouth before breakfast. 90 tablet 3    ALPRAZolam 0.25 MG Oral Tab Take 1 tablet (0.25 mg total) by mouth nightly as needed for Anxiety. 30 tablet 1     Allergies:  Cymbalta [duloxetine hcl], Other, Sulfa antibiotics, Tramadol, Ciprofloxacin, and Acetaminophen     Social History:    Social History     Socioeconomic History    Marital status:      Spouse name: Not on file    Number of children: 3    Years of education: Not on file    Highest education level: Not on file   Occupational  History    Not on file   Tobacco Use    Smoking status: Former     Types: Cigarettes    Smokeless tobacco: Never   Vaping Use    Vaping Use: Never used   Substance and Sexual Activity    Alcohol use: Not Currently    Drug use: No    Sexual activity: Yes     Birth control/protection: Hysterectomy   Other Topics Concern     Service Not Asked    Blood Transfusions Not Asked    Caffeine Concern No     Comment: 1 cup coffee daily    Occupational Exposure Not Asked    Hobby Hazards Not Asked    Sleep Concern Not Asked    Stress Concern Not Asked    Weight Concern Not Asked    Special Diet Not Asked    Back Care Not Asked    Exercise No    Bike Helmet Not Asked    Seat Belt Not Asked    Self-Exams Not Asked   Social History Narrative    The patient does not use an assistive device..      The patient does live in a home with stairs.     Social Determinants of Health     Financial Resource Strain: Low Risk  (9/21/2023)    Financial Resource Strain     Difficulty of Paying Living Expenses: Not hard at all     Med Affordability: No   Food Insecurity: Not on file   Transportation Needs: No Transportation Needs (9/21/2023)    Transportation Needs     Lack of Transportation: No   Physical Activity: Not on file   Stress: Not on file   Social Connections: Not on file   Housing Stability: Not on file     Surgical History:    Past Surgical History:   Procedure Laterality Date    CERVICAL SPINE SURGERY  09/18/2023    C5-C6 Arthroplasty     CHOLECYSTECTOMY  2012    COLONOSCOPY  2004    CYST REMOVAL  2007, 2014    laporoscopic ovarian cytectomy    HYSTERECTOMY      LAPAROSCOPY,DIAGNOSTIC  06/08/2016    laparoscopy, filshie clip sterilization of left fallopian tube, ablation of endometriosis, retrieval and removal of displaced filshie clip    LIPOMA REMOVAL Left 12/29/2018    RUSSELL BIOPSY STEREO NODULE 1 SITE LEFT (CPT=19081)  05/10/2022    top hat    RUSSELL LOCALIZATION WIRE 1 SITE LEFT (CPT=19281)  06/28/2022    OTHER   2019    low back surgery with metal implant    TOTAL ABDOMINAL HYSTERECTOMY N/A 04/05/2017    Procedure: ABDOMINAL HYSTERECTOMY;  Surgeon: Juvencio Decker MD;  Location: [facility] MAIN OR     Family History:    Family History   Problem Relation Age of Onset    Diabetes Father         Type 2    Hypertension Mother     Lipids Mother         Hyperlipidemia    Diabetes Mother         Type 2    Diabetes Sister     Hypertension Sister     Lipids Brother     Glaucoma Neg     Macular degeneration Neg      Initial Intake:  After dinner behavior: -  Night eating: -  Portion sizes:   Binge:   Emotional: +  Depression: Total PHQ Score: 20  Grazing:   Sweet tooth: +  Crunchy/salty: -  Etoh: None   Soda Drinker: Yes                 If yes, how much?: Occasional    Sports Drinks:  No                  Juice:  No        Food Journal  Reviewed and Discussed:       Patient has a Food Journal?: no more mindful since meeting with RD   Patient is reading nutrition labels?  yes  Average Caloric Intake:     Average CHO Intake:   Is patient exercising? yes Minimal   Type of exercise? Other:  Good days: aims to walk 3 days/week, 30 minutes depending on pain level; Walks outdoors weather dependent    Bike pedaling  Limited due to neck and back pain     Eating Habits  Patient states the following:  Eats 2-3 meal(s) per day  Length of time it takes to consume a meal:    # of snacks per day:    Type of snacks:  Fruit   Amount of soda consumption per day:  Occasional coke zero, 2 days/week   Amount of water (in ounces) per day:  Adequate   Toughest challenge:    Sleep: poor     Fruits:    B: Green juice/shake- powder  L: Oatmeal, berrries   D: Chicken/beef/seafood, spinach, lettuce, cucumbers     Nutritional Goals  Calorie-controlled diet:  per RD , Limit carbohydrates to per RD  gms per day, Eat 100-200 calories within 1 hour of waking  and Eat 3-4 cups of fresh fruits or vegetables daily    Behavior Modifications Reviewed and Discussed  Eat  breakfast, Eat 3 meals per day, Plan meals in advance, Read nutrition labels, Drink 64 oz of water per day, Maintain a daily food journal, Utlize portion control strategies to reduce calorie intake, Identify triggers for eating and manage cues and Eat slowly and take 20 to 30 minutes to complete each meal    Exercise Goals Reviewed and Discussed    Walk for  30 minute walks- 3 days/week     ROS:    Constitutional: negative  Respiratory: negative  Cardiovascular: negative  Gastrointestinal: negative  Integument/breast: negative  Hematologic/lymphatic: negative  Musculoskeletal:negative  Neurological: negative  Behavioral/Psych: negative  Endocrine: negative  All other systems were reviewed and are negative    Physical Exam:   General appearance: alert, appears stated age and cooperative mildly obese   Head: Normocephalic, without obvious abnormality, atraumatic  Neck: no adenopathy, no carotid bruit, no JVD, supple, symmetrical, trachea midline and thyroid not enlarged, symmetric, no tenderness/mass/nodules  Lungs: clear to auscultation bilaterally  Heart: S1, S2 normal, no murmur, click, rub or gallop, regular rate and rhythm  Abdomen: soft, non-tender; bowel sounds normal; no masses,  no organomegaly obese  Extremities: extremities normal, atraumatic, no cyanosis or edema  Pulses: 2+ and symmetric  Skin: Skin color, texture, turgor normal. No rashes or lesions  Neurologic: Grossly normal    ASSESSMENT       Encounter Diagnosis(ses):   Encounter Diagnoses   Name Primary?    Insulin resistance Yes    Hepatic steatosis     Hypercholesteremia     Obesity (BMI 30-39.9)        PLAN     Patient is not interested in bariatric surgery. Patient desires to pursue traditional weight loss at this time.      Diagnoses and all orders for this visit:    Insulin resistance    Hepatic steatosis    Hypercholesteremia    Obesity (BMI 30-39.9)      DYSLIPIDEMIA: Recommend dietary changes and lifestyle modifications as discussed below.  Monitor.     Lab Results   Component Value Date/Time    CHOLEST 237 (H) 03/24/2022 11:32 AM     (H) 03/24/2022 11:32 AM    HDL 49 03/24/2022 11:32 AM    TRIG 103 03/24/2022 11:32 AM    VLDL 20 03/24/2022 11:32 AM     OBESITY/WEIGHT GAIN:  ELEVATED LFTs:  Fatty liver noted          Reviewed lifestyle modifications: Whole Food/Plant Strong/Low Glycemic Index diet, moderate alcohol consumption, reduced sodium intake to no more than 2,400 mg/day, and at least 150 minutes of moderate physical activity per week.   Reviewed the importance of whole food, plant based, minimally to non-processed diet rich in fruits, vegetables, whole grains and healthy fats, and clean meats/seafood.      Avoid processed, poor quality carbohydrates, refined grains, flour, sugar.     Goals for next month:  1. Keep a food log, aim for 100 grams carbs/day. Meet with RD.   2. Drink 64 ounces of non-caloric beverages per day. No fruit juices or regular soda.  3. Aim for 150 minutes moderate exercise per week.    4. Increase fruit and vegetable servings to 5-6 per day.    5. Improve sleep and stress.          Discussed medication options for weight loss in detail with patient.      Insurance currently does not cover bariatric surgery    PHENTERMINE: did not tolerate   Diethylpropion 25 mg BID    Will start Zepbound    Recently diagnosed with hypothyroidism  Should take thyroid med daily       Exercise as tolerated.     Bayron Ramirez MD

## 2024-01-24 ENCOUNTER — TELEPHONE (OUTPATIENT)
Dept: SURGERY | Facility: CLINIC | Age: 44
End: 2024-01-24

## 2024-01-31 ENCOUNTER — LAB ENCOUNTER (OUTPATIENT)
Dept: LAB | Facility: REFERENCE LAB | Age: 44
End: 2024-01-31
Attending: Other
Payer: COMMERCIAL

## 2024-01-31 DIAGNOSIS — R68.89 SPELLS OF DECREASED ATTENTIVENESS: ICD-10-CM

## 2024-01-31 DIAGNOSIS — R41.89 COGNITIVE CHANGES: ICD-10-CM

## 2024-01-31 LAB — VIT B12 SERPL-MCNC: 526 PG/ML (ref 211–911)

## 2024-01-31 PROCEDURE — 36415 COLL VENOUS BLD VENIPUNCTURE: CPT

## 2024-01-31 PROCEDURE — 82607 VITAMIN B-12: CPT

## 2024-02-07 ENCOUNTER — OFFICE VISIT (OUTPATIENT)
Dept: RHEUMATOLOGY | Facility: CLINIC | Age: 44
End: 2024-02-07

## 2024-02-07 ENCOUNTER — TELEPHONE (OUTPATIENT)
Dept: NEUROLOGY | Facility: CLINIC | Age: 44
End: 2024-02-07

## 2024-02-07 VITALS — HEIGHT: 62 IN | BODY MASS INDEX: 32.76 KG/M2 | WEIGHT: 178 LBS

## 2024-02-07 DIAGNOSIS — M20.031 SWAN-NECK DEFORMITY OF FINGER OF RIGHT HAND: ICD-10-CM

## 2024-02-07 DIAGNOSIS — R76.8 ANA POSITIVE: ICD-10-CM

## 2024-02-07 DIAGNOSIS — M25.50 POLYARTHRALGIA: ICD-10-CM

## 2024-02-07 DIAGNOSIS — Z79.899 ENCOUNTER FOR LONG-TERM (CURRENT) USE OF HIGH-RISK MEDICATION: Primary | ICD-10-CM

## 2024-02-07 DIAGNOSIS — R76.8 ANTI-TPO ANTIBODIES PRESENT: ICD-10-CM

## 2024-02-07 PROCEDURE — 99215 OFFICE O/P EST HI 40 MIN: CPT | Performed by: INTERNAL MEDICINE

## 2024-02-07 RX ORDER — PREDNISONE 5 MG/1
TABLET ORAL
Qty: 30 TABLET | Refills: 0 | Status: SHIPPED | OUTPATIENT
Start: 2024-02-07 | End: 2024-02-18

## 2024-02-07 RX ORDER — HYDROXYCHLOROQUINE SULFATE 200 MG/1
400 TABLET, FILM COATED ORAL DAILY
Qty: 60 TABLET | Refills: 2 | Status: SHIPPED | OUTPATIENT
Start: 2024-02-07

## 2024-02-07 NOTE — PROGRESS NOTES
RHEUMATOLOGY CLINIC- FOLLOW UP    Mirtha Silver is a 43 year old female.    ASSESSMENT/PLAN:       ICD-10-CM    1. Polyarthralgia  M25.50 XR HAND (MIN 3 VIEWS), LEFT (CPT=73130)     XR HAND (MIN 3 VIEWS), RIGHT (CPT=73130)     Hepatitis Panel, Acute (4)     Quantiferon TB Plus     C-Reactive Protein     Sed Rate, W[place]gren (Automated)     Anti-Nuclear Antibody (SAMMY) by IFA, Reflex Titer + Specific Antibodies     Anti-dsDNA Antibody, IgG     Complement C3, Serum     Cyclic Citrullinate Pep. IGG     Complement C4, Serum     HLA B 27 Disease Association     Rheumatoid Arthritis Factor     CBC With Differential With Platelet     Comp Metabolic Panel (14)     TSH and Free T4      2. [place]-neck deformity of finger of right hand  M20.031 XR HAND (MIN 3 VIEWS), LEFT (CPT=73130)     XR HAND (MIN 3 VIEWS), RIGHT (CPT=73130)     Hepatitis Panel, Acute (4)     Quantiferon TB Plus     C-Reactive Protein     Sed Rate, Westergren (Automated)     Anti-Nuclear Antibody (SAMMY) by IFA, Reflex Titer + Specific Antibodies     Anti-dsDNA Antibody, IgG     Complement C3, Serum     Cyclic Citrullinate Pep. IGG     Complement C4, Serum     HLA B 27 Disease Association     Rheumatoid Arthritis Factor     CBC With Differential With Platelet     Comp Metabolic Panel (14)     TSH and Free T4      3. SAMMY positive  R76.8 XR HAND (MIN 3 VIEWS), LEFT (CPT=73130)     XR HAND (MIN 3 VIEWS), RIGHT (CPT=73130)     Hepatitis Panel, Acute (4)     Quantiferon TB Plus     C-Reactive Protein     Sed Rate, [place]gren (Automated)     Anti-Nuclear Antibody (SAMMY) by IFA, Reflex Titer + Specific Antibodies     Anti-dsDNA Antibody, IgG     Complement C3, Serum     Cyclic Citrullinate Pep. IGG     Complement C4, Serum     HLA B 27 Disease Association     Rheumatoid Arthritis Factor     CBC With Differential With Platelet     Comp Metabolic Panel (14)     TSH and Free T4      4. Anti-TPO antibodies present  R76.8         DISCUSSION:   Patient presenting for  follow-up after originally presenting for evaluation of polyarthralgias in the setting of swan-neck deformities and known thyroid disease (positive TPO).  Subsequent autoimmune serologies notable for low positive SAMMY which may reflect cross-reactivity to her thyroid disease.  Patient also does not have overt synovitis on exam.    At this time, it is hard to differentiate whether the patient has degenerative, osteoarthritic arthralgias versus an inflammatory arthritis in the setting of her swan-neck deformities and low positive SAMMY.  I think it would be reasonable to trial hydroxychloroquine with a prednisone burst to see how she responds and if unresponsive, this would more definitively served to rule out an underlying autoimmune etiology.  Discussed risk/benefits of hydroxychloroquine and prednisone with patient to which the patient is agreeable.    PLAN:   -Start Hydroxychloroquine 400 mg every day (<5 mg/kg).        -Discussed with patient side effects of Plaquenil which include but are not limited to retinopathy skin changes, blood abnormalities, hepatotoxicity, cardiotoxicity, neuro changes such as dizziness, fatigue, irritability, myopathy.  Pending patient response, patient will require annual Plaquenil eye exams if continued on chronic Plaquenil.   -P.o. prednisone taper starting at 20 mg every morning for total of 12 days.  -Patient hoping to avoid NSAIDs given history of gastritis/GERD  - Consult/evaluation communicated with referring physician/provider.    Pt will f/u in about 3 months w/ repeat workup completed prior.     Anna Peng DO  2/7/2024   5:06 PM    Discussed with patient that they are on chronic treatment with a high-risk medication that requires close lab monitoring for possible drug toxicity.  Additionally, discussed with patient give the possible immunosuppressive effects of their medication as well as their underlying hyper-inflammatory state, the importance of keeping vaccinations and  age-appropriate screenings up-to-date, given increased risk of complications and malignancies seen in these patient populations.  Patient to f/u with repeat labs drawn prior (standing labs ordered).  Last QuantiFERON TB testin24  Last Hepatitis testin24      SUBJECTIVE:     24 Follow-Up:     : [id number]    Continues to have hand pain  \"Stabbing pain\"   Moving aggravates symptoms   No AM Stiffness     Current Medications:  N/A    Medication History:  N/A    Interval History:     1/10/24 Initial Consult:     :[id number]     I had the pleasure of seeing Mirtha Silver on 1/10/2024 as a new outpatient consultation for arthralgias. The patient was originally referred by her PCP, Dr. Manoj Weaver.     43 year old female w/ PMH HLD, vitamin D deficiency, vitamin B-12 distress today, hypothyroidism (+TPO), lumbar DDD, cervical DDD, anxiety/depression, GERD, elevated LFTs, endometriosis who presents to clinic today.  Patient reporting \" bones hurting\" from her right hand especially her fifth digit and is concerned given asymmetry compared to her other fingers.  States symptoms started about 2 months prior and associated with difficulty gripping things and stiffness.  She also reports fatigue, headaches, and paresthesias to her bilateral upper extremities.  On review of EMR, it appears patient has been seen by ophthalmology with concerns of post-procedural eye dryness, however, noting that it would be unusual for the last months after the procedure.  Patient also states she has had worsening dry mouth since starting a new medication for weight loss.  No prior history of dental caries.  Family history notable for a mom with \"arthritis \"but no known autoimmune disorders such as gout, lupus, RA, psoriasis.      HISTORY:  Past Medical History:   Diagnosis Date    Anxiety state     Back problem     Calculus of kidney     Depression     Disorder of liver     FATTY LIVER      Disorder of thyroid     hypothyroid    Endometriosis     Esophageal reflux     High cholesterol     History of Papanicolaou smear of cervix 2015    Hyperlipidemia 2013    IBS (irritable bowel syndrome)     Migraines     Per [facility] - Migraine headaches    Osteoarthritis     Ovarian cyst     Postpartum depression     Prediabetes     Pregnancy , ,         Spondylolisthesis of lumbosacral region 2017    Visual impairment     WEARS GLASSES    Vitamin B12 deficiency     Vitamin D deficiency       Social Hx Reviewed   Family Hx Reviewed     Medications (Active prior to today's visit):  Current Outpatient Medications   Medication Sig Dispense Refill    Tirzepatide-Weight Management (ZEPBOUND) 2.5 MG/0.5ML Subcutaneous Solution Auto-injector Inject 2.5 mg into the skin once a week. 3 mL 2    Diethylpropion HCl 25 MG Oral Tab Take 25 mg by mouth in the morning and 25 mg before bedtime. 120 tablet 3    metFORMIN  MG Oral Tablet 24 Hr Take 1 tablet (500 mg total) by mouth daily with breakfast.      fluticasone propionate 50 MCG/ACT Nasal Suspension 2 sprays by Each Nare route daily for 360 doses. 3 each 3    montelukast (SINGULAIR) 10 MG Oral Tab Take 1 tablet (10 mg total) by mouth nightly. 90 tablet 1    levocetirizine 5 MG Oral Tab Take 1 tablet (5 mg total) by mouth every morning. 90 tablet 0    tiZANidine 4 MG Oral Tab Take 1 tablet (4 mg total) by mouth 3 (three) times daily. For pain and muscle relaxation. 90 tablet 1    gabapentin 300 MG Oral Cap Take 1 capsule (300 mg total) by mouth 3 (three) times daily. 2 tabs at night 1 in the morning 270 capsule 3    pantoprazole 40 MG Oral Tab EC Take 1 tablet (40 mg total) by mouth every morning before breakfast. To help with stomach acid and stomach irritation/inflammation 90 tablet 3    levothyroxine 50 MCG Oral Tab Take 1 tablet (50 mcg total) by mouth before breakfast. 90 tablet 3    ALPRAZolam 0.25 MG Oral Tab Take 1 tablet (0.25 mg total) by  mouth nightly as needed for Anxiety. 30 tablet 1       Allergies:  Allergies   Allergen Reactions    Cymbalta [Duloxetine Hcl] SHORTNESS OF BREATH and ANXIETY    Other SHORTNESS OF BREATH     SOME TOPICAL NUMBING SPRAY USED WHEN STARTING AN I.V. - cough and shortness of breath      Sulfa Antibiotics HIVES and RASH    Tramadol ITCHING     THROAT ITCHING AND FEELS LIKE IT WAS CLOSING     Ciprofloxacin RASH    Acetaminophen OTHER (SEE COMMENTS)     Fatty Liver         ROS:   Review of Systems   Constitutional:  Negative for chills and fever.   HENT:  Negative for congestion, hearing loss, mouth sores, nosebleeds and trouble swallowing.    Eyes:  Negative for photophobia, pain, redness and visual disturbance.        Eye dryness   Respiratory:  Negative for cough and shortness of breath.    Cardiovascular:  Negative for chest pain, palpitations and leg swelling.   Gastrointestinal:  Negative for abdominal pain, blood in stool, diarrhea and nausea.   Endocrine: Negative for cold intolerance and heat intolerance.   Genitourinary:  Negative for dysuria, frequency and hematuria.   Musculoskeletal:  Positive for arthralgias and neck pain. Negative for back pain, gait problem, joint swelling, myalgias and neck stiffness.   Skin:  Negative for color change and rash.   Neurological:  Negative for dizziness, weakness, numbness and headaches.   Psychiatric/Behavioral:  Negative for confusion and dysphoric mood.         PHYSICAL EXAM:     Constitutional:  Well developed, Well nourished, No acute distress  HENT:  Normocephalic, Atraumatic, Bilateral external ears normal, Oropharynx moist, No oral exudates.  Neck: Normal range of motion, No tenderness, Supple, No stridor.  Eyes:  PERRL, EOMI, Conjunctiva normal, No discharge.  Respiratory:  Normal breath sounds, No respiratory distress, No wheezing.  Cardiovascular:  Normal heart rate, Normal rhythm, No murmurs, No rubs, No gallops.  GI:  Bowel sounds normal, Soft, No tenderness, No  masses, No pulsatile masses.  : No CVA tenderness.  Musculoskeletal: [place]-neck deformities of the bilateral hands.  A comprehensive 28 count joint exam was done and was negative for swelling or tenderness except as noted. Inspections for misalignment, asymmetry, crepitation, defects, tenderness, masses, nodules, effusions, range of motion, and stability in the upper and lower extremities bilaterally are all normal unless noted.           Integument:  Warm, Dry, No erythema, No rash.  Lymphatic:  No lymphadenopathy noted.  Neurologic:  Alert & oriented x 3, Normal motor function, Normal sensory function, No focal deficits noted.  Psychiatric:  Affect normal, Judgment normal, Mood normal.    LABS:     Prior lab work reviewed and notable for:    2024:    SAMMY +1: 160 homogenous with reflex antibodies negative  dsDNA 1.1 WNL, by IFA less than 10 WNL  C3 140.4 WNL, C4 31 WNL  HLA-B27 negative  CCP 0.6 WNL, RF less than 10 WNL    CRP less than 0.4 WNL, ESR 4 WNL    Acute hepatitis panel nonreactive, TB testing negative    CBC without cytopenias nor leukocytosis  CMP with normal renal and hepatic function, no gamma gap noted  TSH 0.93 WNL, free T41.3 WNL    10/4/2023 CBC without cytopenias nor leukocytosis    2023 UA without proteinuria nor hematuria      2023:  Hemoglobin A1c 5.6    2023 TSH 1.050 WNL    3/15/2018  TPO 42.30 (high)  dsDNA less than 10  SCL 70 negative  Smith antibody negative    2018:  SAMMY screen by IFA positive, 1: 160 (homogenous)    RF less than 5  HLA-B27 negative  SSA and SSB negative  CK 94 WNL  Imagin24 L Hand XR:   FINDINGS:  BONES: A corticated osseous density along the radial capsular margin base of the long finger at the MCP joint.  No acute fracture or significant arthropathy.  No suspicious bone lesion.  SOFT TISSUES: Negative. No visible soft tissue swelling.  EFFUSION: None visible.  OTHER: Negative.               Impression   CONCLUSION:  1. Corticated  osseous density along radial capsular margin base of long finger at the 3rd MCP joint may be related to remote trauma.  Otherwise negative.     1/13/24 R Hand XR:     FINDINGS:  BONES: Normal. No significant arthropathy, fracture or acute abnormality.  SOFT TISSUES: Negative. No visible soft tissue swelling.  EFFUSION: None visible.  OTHER: Negative.               Impression   CONCLUSION: Normal examination.         12/8/2023 lumbar spine CT:  Impression   CONCLUSION:       1. Soft tissue opacity within the right neural foramen measuring approximately 11 x 10 mm with bony osteophytes that may cause mass effect on the exiting L5 nerve root.  This may represent area of granulation tissue recurrent disc herniation.  Further  evaluation using MRI with gadolinium would be  recommended.     2. Mild degenerative disc disease at L3-4.          11/16/2023 right fifth finger XR:    Impression   CONCLUSION: Normal          10/27/2023 cervical spine XR:    Impression   CONCLUSION: Straightening of normal cervical lordosis which could be due to patient position or muscle spasm.  Interval placement of disc prosthesis at C5-6.  Normal range of motion with flexion and extension without subluxation.  No acute osseous abnormality of the cervical spine.

## 2024-02-09 NOTE — TELEPHONE ENCOUNTER
Contacted patient via language line #[phone] Rosie.  Informed patient of normal B12 with no need for additional supplementation.  Verbalized understanding.

## 2024-02-16 ENCOUNTER — LAB ENCOUNTER (OUTPATIENT)
Dept: LAB | Facility: HOSPITAL | Age: 44
End: 2024-02-16
Attending: Other
Payer: COMMERCIAL

## 2024-02-16 ENCOUNTER — PROCEDURE VISIT (OUTPATIENT)
Dept: NEUROLOGY | Facility: CLINIC | Age: 44
End: 2024-02-16

## 2024-02-16 ENCOUNTER — HOSPITAL ENCOUNTER (OUTPATIENT)
Dept: ELECTROPHYSIOLOGY | Facility: HOSPITAL | Age: 44
Discharge: HOME | End: 2024-02-16
Attending: Other
Payer: COMMERCIAL

## 2024-02-16 DIAGNOSIS — R68.89 SPELLS OF DECREASED ATTENTIVENESS: Primary | ICD-10-CM

## 2024-02-16 DIAGNOSIS — R41.89 COGNITIVE CHANGES: ICD-10-CM

## 2024-02-16 DIAGNOSIS — H04.001 DACRYOADENITIS OF RIGHT LACRIMAL GLAND: Primary | ICD-10-CM

## 2024-02-16 DIAGNOSIS — R68.89 SPELLS OF DECREASED ATTENTIVENESS: ICD-10-CM

## 2024-02-16 PROCEDURE — 36415 COLL VENOUS BLD VENIPUNCTURE: CPT

## 2024-02-16 PROCEDURE — 95816 EEG AWAKE AND DROWSY: CPT | Performed by: OTHER

## 2024-02-16 PROCEDURE — 95816 EEG AWAKE AND DROWSY: CPT

## 2024-02-16 PROCEDURE — 87389 HIV-1 AG W/HIV-1&-2 AB AG IA: CPT

## 2024-02-19 ENCOUNTER — OFFICE VISIT (OUTPATIENT)
Dept: FAMILY MEDICINE CLINIC | Facility: CLINIC | Age: 44
End: 2024-02-19

## 2024-02-19 VITALS
HEIGHT: 62 IN | SYSTOLIC BLOOD PRESSURE: 130 MMHG | TEMPERATURE: 97 F | HEART RATE: 83 BPM | BODY MASS INDEX: 33.31 KG/M2 | WEIGHT: 181 LBS | DIASTOLIC BLOOD PRESSURE: 85 MMHG

## 2024-02-19 DIAGNOSIS — F32.A DEPRESSIVE DISORDER: ICD-10-CM

## 2024-02-19 DIAGNOSIS — M25.50 POLYARTHRALGIA: ICD-10-CM

## 2024-02-19 DIAGNOSIS — M79.604 PAIN IN RIGHT LEG: ICD-10-CM

## 2024-02-19 DIAGNOSIS — G44.209 TENSION HEADACHE: Primary | ICD-10-CM

## 2024-02-19 DIAGNOSIS — M50.122 CERVICAL DISC DISORDER AT C5-C6 LEVEL WITH RADICULOPATHY: ICD-10-CM

## 2024-02-19 DIAGNOSIS — R53.83 LETHARGY: ICD-10-CM

## 2024-02-19 DIAGNOSIS — M47.812 SPONDYLOSIS OF CERVICAL SPINE: ICD-10-CM

## 2024-02-19 DIAGNOSIS — F41.9 ANXIETY: ICD-10-CM

## 2024-02-19 DIAGNOSIS — R29.898 RIGHT LEG WEAKNESS: ICD-10-CM

## 2024-02-19 PROCEDURE — 99215 OFFICE O/P EST HI 40 MIN: CPT | Performed by: FAMILY MEDICINE

## 2024-02-19 RX ORDER — FLUOROMETHOLONE 0.1 %
1 SUSPENSION, DROPS(FINAL DOSAGE FORM)(ML) OPHTHALMIC (EYE) 4 TIMES DAILY
COMMUNITY
Start: 2024-02-10

## 2024-02-19 RX ORDER — TIZANIDINE 4 MG/1
4 TABLET ORAL 3 TIMES DAILY
Qty: 90 TABLET | Refills: 1 | Status: SHIPPED | OUTPATIENT
Start: 2024-02-19

## 2024-02-19 RX ORDER — GABAPENTIN 300 MG/1
300 CAPSULE ORAL 3 TIMES DAILY
Qty: 270 CAPSULE | Refills: 3 | Status: SHIPPED | OUTPATIENT
Start: 2024-02-19

## 2024-02-19 NOTE — PROGRESS NOTES
Patient ID: Mirtha Silver is a 44 year old female.    Headache   Associated symptoms include back pain.     Chief Complaint   Patient presents with    Fatigue    Headache    Low Back Pain     Last seen by me on 12/07/2023.    Translated in Indonesian by Eliel on Language Line with ID# [id number].     Pt c/o headaches and fatigue. She has seen me before for these symptoms. States that she feels tired some days more than others. States that her head feels heavy and states the pain starts at her neck and radiates to her head and temples.     She also c/o chronic back pain. States that her whole back hurts. I reviewed her CT lumbar spine scan from December 2023; I read the neurosurgery note from December 2023. She has seen Dr. Collier, neurosurgeon about this. SHx cervical spine surgery. Pt was told that that another back surgery will have a low success rate. Pt still has leg pain; her right leg is worse than her left but they bot feel weak. Pt cannot sleep on her back because of the back pain. States that she had 3 injections before her back surgery and they did not help her pain. I provided a referral to physiatry as she continues to complain of pain.     I reviewed her labs from January 2024 which were normal. She saw rheumatology recently; I read the rheumatology note. She is taking tizanidine 4 mg TID and gabapentin 300 mg TID for her pain. Pt just started seeing therapist last week to help with her stress. She has not seen a psychiatrist. Hx post partum depression years ago which is when she did see a psychiatrist.  She wanted to know if her thyroid and sugar were recently checked and I told her they were..       Wt Readings from Last 6 Encounters:   02/19/24 181 lb   02/07/24 178 lb   01/23/24 178 lb 9.6 oz   01/22/24 171 lb   01/10/24 171 lb   12/07/23 175 lb       BMI Readings from Last 6 Encounters:   02/19/24 33.11 kg/m²   02/07/24 32.56 kg/m²   01/23/24 32.67 kg/m²   01/22/24 31.28 kg/m²   01/10/24 31.28  kg/m²   23 32.01 kg/m²       BP Readings from Last 6 Encounters:   24 130/85   24 122/80   24 112/80   01/10/24 125/84   23 130/81   23 143/85         Review of Systems   Constitutional:  Positive for fatigue.   Respiratory:  Negative for shortness of breath.    Cardiovascular:  Negative for chest pain.   Musculoskeletal:  Positive for back pain.   Neurological:  Positive for headaches.           Medical History:      Past Medical History:   Diagnosis Date    Anxiety state     Back problem     Calculus of kidney     Depression     Disorder of liver     FATTY LIVER     Disorder of thyroid     hypothyroid    Endometriosis     Esophageal reflux     High cholesterol     History of Papanicolaou smear of cervix 2015    Hyperlipidemia 2013    IBS (irritable bowel syndrome)     Migraines     Per [facility] - Migraine headaches    Osteoarthritis     Ovarian cyst     Postpartum depression     Prediabetes     Pregnancy , ,         Spondylolisthesis of lumbosacral region 2017    Visual impairment     WEARS GLASSES    Vitamin B12 deficiency     Vitamin D deficiency        Past Surgical History:   Procedure Laterality Date    CERVICAL SPINE SURGERY  2023    C5-C6 Arthroplasty     CHOLECYSTECTOMY      COLONOSCOPY  2004    CYST REMOVAL  , 2014    laporoscopic ovarian cytectomy    HYSTERECTOMY      LAPAROSCOPY,DIAGNOSTIC  2016    laparoscopy, filshie clip sterilization of left fallopian tube, ablation of endometriosis, retrieval and removal of displaced filshie clip    LIPOMA REMOVAL Left 2018    RUSSELL BIOPSY STEREO NODULE 1 SITE LEFT (CPT=19081)  05/10/2022    top hat    RUSSELL LOCALIZATION WIRE 1 SITE LEFT (CPT=19281)  2022    OTHER  2019    low back surgery with metal implant    TOTAL ABDOMINAL HYSTERECTOMY N/A 2017    Procedure: ABDOMINAL HYSTERECTOMY;  Surgeon: Juvencio Decker MD;  Location: [facility] MAIN OR          Current  Outpatient Medications   Medication Sig Dispense Refill    fluorometholone 0.1 % Ophthalmic Suspension Place 1 drop into both eyes 4 (four) times daily.      hydroxychloroquine 200 MG Oral Tab Take 2 tablets (400 mg total) by mouth daily. 60 tablet 2    Tirzepatide-Weight Management (ZEPBOUND) 2.5 MG/0.5ML Subcutaneous Solution Auto-injector Inject 2.5 mg into the skin once a week. 3 mL 2    Diethylpropion HCl 25 MG Oral Tab Take 25 mg by mouth in the morning and 25 mg before bedtime. 120 tablet 3    metFORMIN  MG Oral Tablet 24 Hr Take 1 tablet (500 mg total) by mouth daily with breakfast.      fluticasone propionate 50 MCG/ACT Nasal Suspension 2 sprays by Each Nare route daily for 360 doses. 3 each 3    montelukast (SINGULAIR) 10 MG Oral Tab Take 1 tablet (10 mg total) by mouth nightly. 90 tablet 1    levocetirizine 5 MG Oral Tab Take 1 tablet (5 mg total) by mouth every morning. 90 tablet 0    tiZANidine 4 MG Oral Tab Take 1 tablet (4 mg total) by mouth 3 (three) times daily. For pain and muscle relaxation. 90 tablet 1    gabapentin 300 MG Oral Cap Take 1 capsule (300 mg total) by mouth 3 (three) times daily. 2 tabs at night 1 in the morning 270 capsule 3    pantoprazole 40 MG Oral Tab EC Take 1 tablet (40 mg total) by mouth every morning before breakfast. To help with stomach acid and stomach irritation/inflammation 90 tablet 3    levothyroxine 50 MCG Oral Tab Take 1 tablet (50 mcg total) by mouth before breakfast. 90 tablet 3    ALPRAZolam 0.25 MG Oral Tab Take 1 tablet (0.25 mg total) by mouth nightly as needed for Anxiety. 30 tablet 1     Allergies:  Allergies   Allergen Reactions    Cymbalta [Duloxetine Hcl] SHORTNESS OF BREATH and ANXIETY    Other SHORTNESS OF BREATH     SOME TOPICAL NUMBING SPRAY USED WHEN STARTING AN I.V. - cough and shortness of breath      Sulfa Antibiotics HIVES and RASH    Tramadol ITCHING     THROAT ITCHING AND FEELS LIKE IT WAS CLOSING     Ciprofloxacin RASH    Acetaminophen  OTHER (SEE COMMENTS)     Fatty Liver        Physical Exam:       Physical Exam  Blood pressure 130/85, pulse 83, temperature 97.3 °F (36.3 °C), temperature source Temporal, height 5' 2\" (1.575 m), weight 181 lb, last menstrual period 03/22/2017, not currently breastfeeding.      Physical Exam   Constitutional: Patient is oriented to person, place, and time. Patient appears well-developed and well-nourished. No distress.   Head: Normocephalic.   Right Ear: Tympanic membrane, external ear and ear canal normal.   Left Ear: Tympanic membrane, external ear and ear canal normal.   Nose: No mucosal edema or rhinorrhea.   Throat: Oropharynx is clear without exudate   Eyes: Conjunctivae and EOM are normal.   Neck: Normal range of motion. No thyromegaly present. Has tenderness bilateral  trapezius up into cervical perispinal muscles and into scalp. does have increased tone.  Cardiovascular: Normal rate, regular rhythm and normal heart sounds.    Pulmonary/Chest: Effort normal and breath sounds normal. No respiratory distress.   Lymphadenopathy: Patient has no cervical adenopathy.  Neurological: Patient is alert and oriented to person, place, and time. DTR 2/4 BUE. SLR negative ISIDRA although it does cause some discomfort in the back with straight leg raise on the right.  Otherwise gait is normal..   Skin: Skin is warm.   Psychiatry: Depressed and anxious affect.    Vitals reviewed.           Assessment/Plan:      Diagnoses and all orders for this visit:    Tension headache  -     tiZANidine 4 MG Oral Tab; Take 1 tablet (4 mg total) by mouth 3 (three) times daily. For pain and muscle relaxation.  Not really sure what to do for her as she does not really seem to improve much with different treatments.  She did have 1 session with her [place]-speaking therapist and I told her when she sees them again to ask if it would be worthwhile for her to see a [place]-speaking psychiatrist that they are close with.  Lethargy  Reviewed her  labs.  I really think most of this is going to be emotional and psychological in nature.  Pain in right leg  -     PHYSIATRY - INTERNAL  -     gabapentin 300 MG Oral Cap; Take 1 capsule (300 mg total) by mouth 3 (three) times daily. 2 tabs at night 1 in the morning  Continue the gabapentin and tizanidine.  Right leg weakness  -     PHYSIATRY - INTERNAL  -     gabapentin 300 MG Oral Cap; Take 1 capsule (300 mg total) by mouth 3 (three) times daily. 2 tabs at night 1 in the morning  No weakness on exam today but I think it would be a good idea for her to see a physiatrist  Spondylosis of cervical spine  -     PHYSIATRY - INTERNAL  -     gabapentin 300 MG Oral Cap; Take 1 capsule (300 mg total) by mouth 3 (three) times daily. 2 tabs at night 1 in the morning    Cervical disc disorder at C5-C6 level with radiculopathy  -     PHYSIATRY - INTERNAL  -     gabapentin 300 MG Oral Cap; Take 1 capsule (300 mg total) by mouth 3 (three) times daily. 2 tabs at night 1 in the morning    Anxiety  Continue the alprazolam and continue seeing therapy  Depressive disorder  As above  Polyarthralgia  Just recently saw a rheumatology who placed her on hydrochloric when.  I reviewed that note.      Referrals (if applicable)  Orders Placed This Encounter   Procedures    PHYSIATRY - INTERNAL     Physical medicine and rehabilitation doctor.    Can also see his partners:   Dr. Jerod Mims, Dr Alex Behar, Dr Leah Campo, Dr Reed Bosch, and Asha THORPE.     Referral Priority:   Routine     Referral Type:   OFFICE VISIT     Referred to Provider:   Hema Das DO     Requested Specialty:   PHYSIATRY     Number of Visits Requested:   3       Follow up if symptoms persist.  Take medicine (if given) as prescribed.  Approach to treatment discussed and patient/family member understands and agrees to plan.     No follow-ups on file.    There are no Patient Instructions on file for this visit.    Wilmer Hurtado    2/19/2024    By signing  my name below, I, Wilmer Hurtado,  attest that this documentation has been prepared under the direction and in the presence of Manoj Weaver DO.   Electronically Signed: Wilmer Hurtado, 2/19/2024, 2:40 PM.    I, Manoj Weaver DO,  personally performed the services described in this documentation. All medical record entries made by the scribe were at my direction and in my presence.  I have reviewed the chart and discharge instructions (if applicable) and agree that the record reflects my personal performance and is accurate and complete.  Manoj Weaver DO, 2/19/2024, 3:35 PM

## 2024-02-20 NOTE — PROCEDURES
ELECTROENCEPHALOGRAM REPORT      Patient Name: Mirtha Silver     Chart ID: [id number]  Ordering Physician: Dr Gillis     Date of Test:  2/16/2024  Patient Diagnosis:   Encounter Diagnosis   Name Primary?    Spells of decreased attentiveness Yes     DX: SPELLS OF DECREASED ATTENTIVENESS  HX: 43 YO FEMALE PRESENTS FOR SPELLS OF MEMORY LOSS AND/OR DECREASED ATTENTIVENESS.  PMH: MIGRAINES, ANXIETY, DEPRESSION  MEDS: GABAPENTIN    TECHNICAL SUMMARY  1. 10-20 International System.  2.  Bipolar and monopolar recording.  3.  Routine recording (awake and asleep).  4.  Portable - C.E.S.  5.  Impedance - 10 kilohms or less.    A routine EEG was obtained.  No sedation was given.    BACKGROUND:   Awake: During wakefulness a well developed, reactive, posterior dominant rhythm consisting of 8-9 hertz potentials of low to medium amplitude is seen symmetrically.  Low voltage beta activity is seen with a frontal predominance.      Sleep:   Not demonstrated      EPILEPTIFORM ABNORMALITIES:  There is no epileptiform activity seen in this recording      ACTIVATION PROCEDURES:   Photic stimulation was non- contributing      IMPRESSION:   This is a normal EEG study.   No epileptiform abnormalities seen in this study.  This however does not exclude a seizure disorder. Clinical correlation is advised.      Thank you for allowing us to participate in your patient's care.      Mario Pickering MD  [facility]

## 2024-03-04 NOTE — TELEPHONE ENCOUNTER
*Late entry* please see result note.
Pt returning results call. Pls call thank you. Faroese speaker.
no

## 2024-03-12 ENCOUNTER — OFFICE VISIT (OUTPATIENT)
Dept: PHYSICAL MEDICINE AND REHAB | Facility: CLINIC | Age: 44
End: 2024-03-12
Payer: COMMERCIAL

## 2024-03-12 VITALS — HEART RATE: 92 BPM | WEIGHT: 181 LBS | HEIGHT: 62 IN | BODY MASS INDEX: 33.31 KG/M2 | OXYGEN SATURATION: 97 %

## 2024-03-12 DIAGNOSIS — M54.16 RIGHT LUMBAR RADICULOPATHY: Primary | ICD-10-CM

## 2024-03-12 PROCEDURE — 99214 OFFICE O/P EST MOD 30 MIN: CPT | Performed by: PHYSICAL MEDICINE & REHABILITATION

## 2024-03-12 RX ORDER — PREGABALIN 75 MG/1
75 CAPSULE ORAL 2 TIMES DAILY
Qty: 60 CAPSULE | Refills: 0 | Status: SHIPPED | OUTPATIENT
Start: 2024-03-12

## 2024-03-12 NOTE — PATIENT INSTRUCTIONS
Take the pregabalin at the times when you would normally take the gabapentin. If no significant side effects take it for at least 1 month.

## 2024-03-14 ENCOUNTER — LAB ENCOUNTER (OUTPATIENT)
Dept: LAB | Age: 44
End: 2024-03-14
Attending: FAMILY MEDICINE
Payer: COMMERCIAL

## 2024-03-14 ENCOUNTER — OFFICE VISIT (OUTPATIENT)
Dept: FAMILY MEDICINE CLINIC | Facility: CLINIC | Age: 44
End: 2024-03-14

## 2024-03-14 VITALS
BODY MASS INDEX: 32.57 KG/M2 | TEMPERATURE: 97 F | SYSTOLIC BLOOD PRESSURE: 118 MMHG | HEIGHT: 62 IN | DIASTOLIC BLOOD PRESSURE: 82 MMHG | HEART RATE: 82 BPM | WEIGHT: 177 LBS

## 2024-03-14 DIAGNOSIS — F32.A DEPRESSIVE DISORDER: ICD-10-CM

## 2024-03-14 DIAGNOSIS — R23.2 HOT FLASHES: ICD-10-CM

## 2024-03-14 DIAGNOSIS — R63.5 WEIGHT GAIN: ICD-10-CM

## 2024-03-14 DIAGNOSIS — R45.4 IRRITABLE BEHAVIOR: ICD-10-CM

## 2024-03-14 DIAGNOSIS — R61 SWEATING INCREASE: ICD-10-CM

## 2024-03-14 DIAGNOSIS — M54.2 ANTERIOR NECK PAIN: ICD-10-CM

## 2024-03-14 DIAGNOSIS — L65.9 THINNING HAIR: ICD-10-CM

## 2024-03-14 DIAGNOSIS — F41.9 ANXIETY: ICD-10-CM

## 2024-03-14 DIAGNOSIS — E66.09 NON MORBID OBESITY DUE TO EXCESS CALORIES: ICD-10-CM

## 2024-03-14 DIAGNOSIS — R06.00 DYSPNEA, UNSPECIFIED TYPE: ICD-10-CM

## 2024-03-14 DIAGNOSIS — L65.9 THINNING HAIR: Primary | ICD-10-CM

## 2024-03-14 DIAGNOSIS — E03.9 ACQUIRED HYPOTHYROIDISM: ICD-10-CM

## 2024-03-14 LAB
ESTRADIOL SERPL-MCNC: 27.4 PG/ML
FSH SERPL-ACNC: 5.3 MIU/ML
LH SERPL-ACNC: 1.4 MIU/ML

## 2024-03-14 PROCEDURE — 83002 ASSAY OF GONADOTROPIN (LH): CPT

## 2024-03-14 PROCEDURE — 36415 COLL VENOUS BLD VENIPUNCTURE: CPT

## 2024-03-14 PROCEDURE — 99215 OFFICE O/P EST HI 40 MIN: CPT | Performed by: FAMILY MEDICINE

## 2024-03-14 PROCEDURE — 83001 ASSAY OF GONADOTROPIN (FSH): CPT

## 2024-03-14 PROCEDURE — 82670 ASSAY OF TOTAL ESTRADIOL: CPT

## 2024-03-14 RX ORDER — LEVOTHYROXINE SODIUM 0.05 MG/1
50 TABLET ORAL
Qty: 90 TABLET | Refills: 3 | Status: SHIPPED | OUTPATIENT
Start: 2024-03-14

## 2024-03-14 RX ORDER — VENLAFAXINE HYDROCHLORIDE 75 MG/1
75 CAPSULE, EXTENDED RELEASE ORAL DAILY
Qty: 90 CAPSULE | Refills: 1 | Status: SHIPPED | OUTPATIENT
Start: 2024-03-14

## 2024-03-14 NOTE — PROGRESS NOTES
Patient ID: Mirtha Silver is a 44 year old female.    HPI  Chief Complaint   Patient presents with    Other     Hair loss, mood irritability, weight gain     Lymph Node     Last seen by me on 2/19/2024.    Pt lost 4 lbs since 3/12/2024. She completed lab work per her rheumatologist which I reviewed and discussed with her. She had a normal TSH level in January 2024. Pt is compliant with her medications. She experiences hot flashes and sweats. SHx hysterectomy but has her ovaries. Pt regularly sees her gynecologist. I also reviewed her Vitamin B12 level which was normal. Pt is concerned about her hair loss, irritability, and weight gain; I discussed this with her. Pt stopped taking Cymbalta as it didn't help. She sometimes feels fatigued and anxious. Pt saw a neurologist and will be completing MRI Brain on 4/1/2024.  She does feel depressed and states she has had anxiety for many years.  No thoughts of hurting herself.    Pt hasn't seen an ENT about her left, anterior neck pain which began quite some time ago. I provided a referral to Dr. Batista, ENT.  She feels that something is present that is causing pain on the left side of her thyroid anteriorly.  No trouble swallowing.    Wt Readings from Last 6 Encounters:   03/14/24 177 lb   03/12/24 181 lb   02/19/24 181 lb   02/07/24 178 lb   01/23/24 178 lb 9.6 oz   01/22/24 171 lb       BMI Readings from Last 6 Encounters:   03/14/24 32.37 kg/m²   03/12/24 33.11 kg/m²   02/19/24 33.11 kg/m²   02/07/24 32.56 kg/m²   01/23/24 32.67 kg/m²   01/22/24 31.28 kg/m²       BP Readings from Last 6 Encounters:   03/14/24 118/82   02/19/24 130/85   01/23/24 122/80   01/22/24 112/80   01/10/24 125/84   12/07/23 130/81         Review of Systems   Constitutional:  Positive for fatigue.   Respiratory:  Negative for shortness of breath.    Cardiovascular:  Negative for chest pain.   Psychiatric/Behavioral:  The patient is nervous/anxious.            Medical History:      Past Medical  History:   Diagnosis Date    Anxiety state     Back problem     Calculus of kidney     Depression     Disorder of liver     FATTY LIVER     Disorder of thyroid     hypothyroid    Endometriosis     Esophageal reflux     High cholesterol     History of Papanicolaou smear of cervix 2015    Hyperlipidemia 2013    IBS (irritable bowel syndrome)     Migraines     Per [facility] - Migraine headaches    Osteoarthritis     Ovarian cyst     Postpartum depression     Prediabetes     Pregnancy (HCC) , , 2008        Spondylolisthesis of lumbosacral region 2017    Visual impairment     WEARS GLASSES    Vitamin B12 deficiency     Vitamin D deficiency        Past Surgical History:   Procedure Laterality Date    CERVICAL SPINE SURGERY  2023    C5-C6 Arthroplasty     CHOLECYSTECTOMY  2012    COLONOSCOPY  2004    CYST REMOVAL  , 2014    laporoscopic ovarian cytectomy    HYSTERECTOMY      LAPAROSCOPY,DIAGNOSTIC  2016    laparoscopy, filshie clip sterilization of left fallopian tube, ablation of endometriosis, retrieval and removal of displaced filshie clip    LIPOMA REMOVAL Left 2018    RUSSELL BIOPSY STEREO NODULE 1 SITE LEFT (CPT=19081)  05/10/2022    top hat    RUSSELL LOCALIZATION WIRE 1 SITE LEFT (CPT=19281)  2022    OTHER  2019    low back surgery with metal implant    TOTAL ABDOMINAL HYSTERECTOMY N/A 2017    Procedure: ABDOMINAL HYSTERECTOMY;  Surgeon: Juvencio Decker MD;  Location: [facility] MAIN OR          Current Outpatient Medications   Medication Sig Dispense Refill    pregabalin 75 MG Oral Cap Take 1 capsule (75 mg total) by mouth 2 (two) times daily. 60 capsule 0    fluorometholone 0.1 % Ophthalmic Suspension Place 1 drop into both eyes 4 (four) times daily.      tiZANidine 4 MG Oral Tab Take 1 tablet (4 mg total) by mouth 3 (three) times daily. For pain and muscle relaxation. 90 tablet 1    hydroxychloroquine 200 MG Oral Tab Take 2 tablets (400 mg total) by mouth  daily. 60 tablet 2    Diethylpropion HCl 25 MG Oral Tab Take 25 mg by mouth in the morning and 25 mg before bedtime. 120 tablet 3    metFORMIN  MG Oral Tablet 24 Hr Take 1 tablet (500 mg total) by mouth daily with breakfast.      fluticasone propionate 50 MCG/ACT Nasal Suspension 2 sprays by Each Nare route daily for 360 doses. 3 each 3    montelukast (SINGULAIR) 10 MG Oral Tab Take 1 tablet (10 mg total) by mouth nightly. 90 tablet 1    levocetirizine 5 MG Oral Tab Take 1 tablet (5 mg total) by mouth every morning. 90 tablet 0    pantoprazole 40 MG Oral Tab EC Take 1 tablet (40 mg total) by mouth every morning before breakfast. To help with stomach acid and stomach irritation/inflammation 90 tablet 3    levothyroxine 50 MCG Oral Tab Take 1 tablet (50 mcg total) by mouth before breakfast. 90 tablet 3    ALPRAZolam 0.25 MG Oral Tab Take 1 tablet (0.25 mg total) by mouth nightly as needed for Anxiety. 30 tablet 1    Tirzepatide-Weight Management (ZEPBOUND) 2.5 MG/0.5ML Subcutaneous Solution Auto-injector Inject 2.5 mg into the skin once a week. (Patient not taking: Reported on 3/14/2024) 3 mL 2     Allergies:  Allergies   Allergen Reactions    Cymbalta [Duloxetine Hcl] SHORTNESS OF BREATH and ANXIETY    Other SHORTNESS OF BREATH     SOME TOPICAL NUMBING SPRAY USED WHEN STARTING AN I.V. - cough and shortness of breath      Sulfa Antibiotics HIVES and RASH    Tramadol ITCHING     THROAT ITCHING AND FEELS LIKE IT WAS CLOSING     Ciprofloxacin RASH    Acetaminophen OTHER (SEE COMMENTS)     Fatty Liver        Physical Exam:       Physical Exam  Blood pressure 118/82, pulse 82, temperature 97 °F (36.1 °C), height 5' 2\" (1.575 m), weight 177 lb, last menstrual period 03/22/2017, not currently breastfeeding.      Physical Exam   Constitutional: Patient is oriented to person, place, and time. Patient appears well-developed and well-nourished. No distress.   Head: Normocephalic.   Eyes: Conjunctivae and EOM are normal.    Neck: Normal range of motion. No thyromegaly present.   Cardiovascular: Normal rate, regular rhythm and normal heart sounds.    Pulmonary/Chest: Effort normal and breath sounds normal. No respiratory distress.   Lymphadenopathy: Patient has no cervical adenopathy. She has tenderness on the left side of anterior neck, just left of her thyroid and the tissue does feel a bit more firm but nothing discrete.  Thyroid moves normally up and down..   Neurological: Patient is alert and oriented to person, place, and time.   Skin: Skin is warm.  No hair loss that I can see.  Psychiatry: Mildly anxious    Vitals reviewed.           Assessment/Plan:      Diagnoses and all orders for this visit:    Thinning hair  -     FSH [E]; Future  -     LH (Luteinizing Hormone) [E]; Future  -     Estradiol [E]; Future  She would like her hormones checked because of the sweating, hot flashes and weight gain.  I went ahead and did this.  Irritable behavior  -     venlafaxine ER 75 MG Oral Capsule SR 24 Hr; Take 1 capsule (75 mg total) by mouth daily. To help with mood and energy.  Take in the morning .  I think she would really benefit from venlafaxine which could help with the hot flashes as well  Weight gain  -     FSH [E]; Future  -     LH (Luteinizing Hormone) [E]; Future  -     Estradiol [E]; Future  -     venlafaxine ER 75 MG Oral Capsule SR 24 Hr; Take 1 capsule (75 mg total) by mouth daily. To help with mood and energy.  Take in the morning .  Continue working on weight.  She has lost some weight voluntarily.  The Effexor may help  Non morbid obesity due to excess calories  -     venlafaxine ER 75 MG Oral Capsule SR 24 Hr; Take 1 capsule (75 mg total) by mouth daily. To help with mood and energy.  Take in the morning .    Acquired hypothyroidism  -     levothyroxine 50 MCG Oral Tab; Take 1 tablet (50 mcg total) by mouth before breakfast.  TSH was therapeutic.  Continue levothyroxine 50 mcg daily.  Hot flashes  -     FSH [E];  Future  -     LH (Luteinizing Hormone) [E]; Future  -     Estradiol [E]; Future  -     venlafaxine ER 75 MG Oral Capsule SR 24 Hr; Take 1 capsule (75 mg total) by mouth daily. To help with mood and energy.  Take in the morning .    Sweating increase  -     FSH [E]; Future  -     LH (Luteinizing Hormone) [E]; Future  -     Estradiol [E]; Future  -     venlafaxine ER 75 MG Oral Capsule SR 24 Hr; Take 1 capsule (75 mg total) by mouth daily. To help with mood and energy.  Take in the morning .    Depressive disorder  -     venlafaxine ER 75 MG Oral Capsule SR 24 Hr; Take 1 capsule (75 mg total) by mouth daily. To help with mood and energy.  Take in the morning .    Anxiety  -     venlafaxine ER 75 MG Oral Capsule SR 24 Hr; Take 1 capsule (75 mg total) by mouth daily. To help with mood and energy.  Take in the morning .    Anterior neck pain  -     ENT - INTERNAL  Must see ENT.  Dyspnea, unspecified type  She states sometimes she will feel dyspnea but I reviewed her test that were done and let her know her x-rays and EKG have been normal.  I really think this is more stress related.  I reassured her that her lungs and heart sound normal today.      Referrals (if applicable)  Orders Placed This Encounter   Procedures    ENT - INTERNAL     At the [place] office and at the [place] for [facility].    Pain just left of her thyroid on the anterior neck.  She is quite tender in this area and it does feel a bit more firm than the right side.  Can you please evaluate?     Referral Priority:   Routine     Referral Type:   OFFICE VISIT     Referred to Provider:   Migue Batista MD     Requested Specialty:   OTOLARYNGOLOGY     Number of Visits Requested:   3       Follow up if symptoms persist.  Take medicine (if given) as prescribed.  Approach to treatment discussed and patient/family member understands and agrees to plan.     Return in about 6 weeks (around 4/25/2024) for Physical Exam, Mood Medicine Followup.    There are no Patient  Instructions on file for this visit.    Ramiro Navarro    3/14/2024    By signing my name below, I, Ramiro Navarro,  attest that this documentation has been prepared under the direction and in the presence of Manoj Weaver DO.   Electronically Signed: Ramiro Navarro, 3/14/2024, 2:49 PM.      I, Manoj Weaver DO,  personally performed the services described in this documentation. All medical record entries made by the scribe were at my direction and in my presence.  I have reviewed the chart and discharge instructions (if applicable) and agree that the record reflects my personal performance and is accurate and complete.  Manoj Weaver DO, 3/14/2024, 4:06 PM

## 2024-03-17 NOTE — PROGRESS NOTES
Progress note    C/C:   Chief Complaint   Patient presents with    Follow - Up     LOV 12/2/2 pt is here for low back pain. CT 12/08/23. Pain radiates to b/l anterior thighs. Weakness and burning in b/l legs when walking. Pain 7/10. No PT. Gabapentin daily.       HPI: 44 year old female presents with worsening lower back pain radiating into the bilateral thighs, further into the right leg with numbness into the lateral aspect of the lower leg. Worsens with walking. She has a history of L5-S1 interbody fusion in 2020; despite the surgery she has had persistent back and leg pain. The worst of the pain is distal to the right knee in the lateral aspect of the lower leg. Pain worsens with walking, and worsens with sitting to a lesser degree. She has seen Dr. Collier for follow up and was recommended surgery to remove the hardware and do a right L5-S1 foraminotomy, but she is concerned that she might undergo a surgical procedure that may not help her.     Pertinent allergies:   Allergies   Allergen Reactions    Cymbalta [Duloxetine Hcl] SHORTNESS OF BREATH and ANXIETY    Other SHORTNESS OF BREATH     SOME TOPICAL NUMBING SPRAY USED WHEN STARTING AN I.V. - cough and shortness of breath      Sulfa Antibiotics HIVES and RASH    Tramadol ITCHING     THROAT ITCHING AND FEELS LIKE IT WAS CLOSING     Ciprofloxacin RASH    Acetaminophen OTHER (SEE COMMENTS)     Fatty Liver        Physical exam:  Pulse 92   Ht 62\"   Wt 181 lb (82.1 kg)   LMP 03/22/2017   SpO2 97%   BMI 33.11 kg/m²      Lumbar spine exam:    No skin rash lumbar/upper sacral region  + pain with lumbar flexion. + pain with lumbar extension. Extension more painful than flexion.  + tenderness to palpation bilateral lumbar paraspinals. + ttp bilateral PSIS.  Gives way to strength testing right lower extremity  Decreased sensation to touch right lateral lower leg  2/4 quad, gastrocs reflexes b/l  Facet loading negative b/l  Seated slump test negative  SLR +  right    Imaging: CT lumbar spine dated 12/8/23 independently reviewed, as was report. She appears to have osteophytic formation into the right L5-S2 foramen. See report below:    FINDINGS:    PARASPINAL AREA:  Normal with no visible mass.    BONES:  No fracture, pars defect, or osseous lesion.     LUMBAR DISC LEVELS:  L1-L2:  No significant disc/facet abnormality, spinal stenosis, or foraminal stenosis.  L2-L3:  No significant disc/facet abnormality, spinal stenosis, or foraminal stenosis.  L3-L4:  There is mild anterior degenerative disc bulge small endplate osteophytes.  There is no significant central or foraminal stenosis.  L4-L5:  No significant disc/facet abnormality, spinal stenosis, or foraminal stenosis.  L5-S1:  Patient is status post posterior fusion with intrapedicular screws and interbody disc spacer.  Within the neural foramen on the right there is soft tissue density measuring 11 x 10 mm as well as bony osteophytes which may significantly narrow the   right neural foramen and could have mass effect on the right L5 nerve root.  Mild to moderate facet joint degenerative changes bilaterally.  This could represent either recurrent disc herniation or granulation tissue from prior surgery.  Further  evaluation using MRI with gadolinium would be recommended for further characterization.                   Impression   CONCLUSION:       1. Soft tissue opacity within the right neural foramen measuring approximately 11 x 10 mm with bony osteophytes that may cause mass effect on the exiting L5 nerve root.  This may represent area of granulation tissue recurrent disc herniation.  Further  evaluation using MRI with gadolinium would be  recommended.     2. Mild degenerative disc disease at L3-4.     Assessment and plan  Right lumbar radiculopathy  History of lumbar fusion    Recommend stop gabapentin, trial lyrica 75mg at bedtime x1 week, then BID if no side effects and no benefit. We discussed physical therapy but  she states she cannot afford the copays. We will continue with either medical management or consider epidural steroid injection depending on how she is doing with the pregabalin.     Follow up in 1 month.     Hema Das DO  Physical Medicine and Rehabilitation  [facility]

## 2024-03-18 ENCOUNTER — TELEPHONE (OUTPATIENT)
Dept: FAMILY MEDICINE CLINIC | Facility: CLINIC | Age: 44
End: 2024-03-18

## 2024-03-18 NOTE — TELEPHONE ENCOUNTER
Patient calling to state needs test for medical clearance for MRI, as will be put under anesthesia, stated saw Dr. Weaver on 03/14/24, declined to schedule pre-op, has MRI on 04/01/24.

## 2024-03-18 NOTE — TELEPHONE ENCOUNTER
Dr. Weaver: please advise if you are able to clear patient for MRI under anesthesia based on visit she had 3/14/24 or if new visit is needed?     Future Appointments   Date Time Provider Department Center   4/1/2024 12:30 PM [facility] MRI RM1 (1.5T WIDE) [facility] MRI EM [place]

## 2024-03-22 ENCOUNTER — TELEPHONE (OUTPATIENT)
Dept: FAMILY MEDICINE CLINIC | Facility: CLINIC | Age: 44
End: 2024-03-22

## 2024-03-22 NOTE — TELEPHONE ENCOUNTER
With  ID #[id number]    Patient advised of 's notes and verbalized understanding.      Patient states she can not read the result note in English.           Seen Back to Top    See the gynecologist so they can go over your hormones.  Your FSH and LH are on the low side along with your estrogen.

## 2024-03-28 ENCOUNTER — EKG ENCOUNTER (OUTPATIENT)
Dept: LAB | Age: 44
End: 2024-03-28
Attending: NURSE PRACTITIONER
Payer: COMMERCIAL

## 2024-03-28 ENCOUNTER — OFFICE VISIT (OUTPATIENT)
Dept: OTOLARYNGOLOGY | Facility: CLINIC | Age: 44
End: 2024-03-28

## 2024-03-28 ENCOUNTER — APPOINTMENT (OUTPATIENT)
Dept: URBAN - METROPOLITAN AREA CLINIC 248 | Age: 44
Setting detail: DERMATOLOGY
End: 2024-03-28

## 2024-03-28 VITALS — BODY MASS INDEX: 32.57 KG/M2 | HEIGHT: 62 IN | WEIGHT: 177 LBS

## 2024-03-28 DIAGNOSIS — Z41.9 ENCOUNTER FOR PROCEDURE FOR PURPOSES OTHER THAN REMEDYING HEALTH STATE, UNSPECIFIED: ICD-10-CM

## 2024-03-28 DIAGNOSIS — Z01.818 PREOP TESTING: ICD-10-CM

## 2024-03-28 DIAGNOSIS — M54.2 NECK PAIN: Primary | ICD-10-CM

## 2024-03-28 PROCEDURE — 93005 ELECTROCARDIOGRAM TRACING: CPT

## 2024-03-28 PROCEDURE — 93010 ELECTROCARDIOGRAM REPORT: CPT | Performed by: STUDENT IN AN ORGANIZED HEALTH CARE EDUCATION/TRAINING PROGRAM

## 2024-03-28 PROCEDURE — 99204 OFFICE O/P NEW MOD 45 MIN: CPT | Performed by: STUDENT IN AN ORGANIZED HEALTH CARE EDUCATION/TRAINING PROGRAM

## 2024-03-28 PROCEDURE — 31575 DIAGNOSTIC LARYNGOSCOPY: CPT | Performed by: STUDENT IN AN ORGANIZED HEALTH CARE EDUCATION/TRAINING PROGRAM

## 2024-03-28 PROCEDURE — OTHER BOTOX: OTHER

## 2024-03-28 RX ORDER — PREDNISONE 20 MG/1
20 TABLET ORAL DAILY
Qty: 10 TABLET | Refills: 0 | Status: SHIPPED | OUTPATIENT
Start: 2024-03-28 | End: 2024-04-07

## 2024-03-28 NOTE — PROCEDURE: BOTOX
Additional Area 1 Location: forehead glabella crows
Postcare Instructions: Patient instructed to not lie down for 4 hours and limit physical activity for 24 hours. Patient instructed not to travel by airplane for 48 hours.
Forehead Units: 0
Reconstitution Date: 03/28/2024
Show Inventory Tab: Show
Use Map Statement For Sites (Optional): No
Map Statment: See attached map for complete details
Detail Level: Detailed
Dilution (U/0.1 Cc): 4
Additional Area 1 Units: 40
Bill Summary Price Listed Below, Or Bill Total Of Units X Price Per Unit?: Bill Summary Price Below
Consent: Written consent was obtained prior to the procedure. Risks, benefits, expectations and alternatives were discussed including, but not limited to, infection, bleeding, lid/brow ptosis, bruising, swelling, diplopia, temporary effects, incomplete chemical denervation and dissatisfaction with the cosmetic outcome. No guarantee or warranty was given or implied regarding longevity of results.

## 2024-03-28 NOTE — PROGRESS NOTES
Mirtha Silver is a 44 year old female.   Chief Complaint   Patient presents with    Consult     Pt referred by pcp for  Anterior neck pain.  Pain just left of her thyroid on the anterior neck.  She is quite tender in this area and it does feel a bit more firm than the right side.       ASSESSMENT AND PLAN:   1. Neck pain  44-year-old presents with left anterior neck pain.  She is unsure how long this has been going on for but says at least a few weeks.  She says that this has happened in the past and she thinks that it was related to her thyroid gland.  She says that she cannot take ibuprofen due to side effects.  She says that when she swallows she feels a sensation in her throat that she cannot swallow it down and that it is stuck.  She denies significant reflux although occasionally does have reflux symptoms.    On exam she was tender just lateral to the cricoid there is no mass or lesion.  The thyroid was not enlarged    He represents inflammation of the thyroid cartilage or thyroid gland or a lymphadenitis or a muscle strain or reflux.  She cannot take ibuprofen due to side effects.  Will begin a low-dose steroid for 10 days and discussed side effects of this and also obtain an ultrasound of her thyroid to see if there is any inflammation present to indicate thyroiditis.  Will follow-up on imaging results and response to medication. Consult from Dr Weaver regarding neck pain    - US THYROID (CPT=76536); Future      The patient indicates understanding of these issues and agrees to the plan.      EXAM:   Ht 5' 2\" (1.575 m)   Wt 177 lb (80.3 kg)   LMP 03/22/2017   BMI 32.37 kg/m²     Pertinent exam findings may also be noted above in assessment and plan     System Details   Skin Inspection - Normal.   Constitutional Overall appearance - Normal.   Head/Face Symmetric, TMJ tenderness not present    Eyes EOMI, PERRL   Right ear:  Canal clear, TM intact, no JENNIFER   Left ear:  Canal clear, TM intact, no JENNIFER    Nose: Septum midline, inferior turbinates not enlarged, nasal valves without collapse    Oral cavity/Oropharynx: No lesions or masses on inspection or palpation, tonsils symmetric    Neck: Soft without LAD, thyroid not enlarged  Voice clear/ no stridor   Other:      Scopes and Procedures:       Flexible Laryngoscopy Procedure Note (75235)    Due to inability for adequate examination of the larynx and need for magnification to perform the examination, endoscopy was performed.  Risks and benefits were discussed with patient/family and they have given verbal consent to proceed.    Pre-operative Diagnosis:   1. Neck pain        Post-operative Diagnosis: Same    Procedure: Diagnostic flexible fiberoptic laryngoscopy    Anesthesia: Topical anesthetic [place]     Surgeon Migue Batista MD    EBL: 0cc    Procedure Detail & Findings:     After placement of topical anesthetic intranasally the flexible laryngoscope was inserted into the nare and driven through the nasal cavity with no significant abnormal findings noted in the nasal cavities or nasopharynx. The oropharynx, hypopharynx and larynx were subsequently examined and the following findings were noted:        Base of Tongue: Normal        Valeculla: Normal        Arytenoids: Normal/Erythemous: Erythmous  Slight erythema and interarytenoid edema        Introitus of the esophagus: Normal        Epiglottis: Normal        False vocal cords: Normal        True vocal cords: Normal        Subglottic space: Normal        Mobility of True vocal cords: Normal    Condition: Stable    Complications: Patient tolerated the procedure well with no immediate complication.    Migue Batista MD        Current Outpatient Medications   Medication Sig Dispense Refill    predniSONE 20 MG Oral Tab Take 1 tablet (20 mg total) by mouth daily for 10 days. 10 tablet 0    hydroxychloroquine 200 MG Oral Tab Take 1 tablet (200 mg total) by mouth 2 (two) times daily.      gabapentin 300 MG Oral Cap Take  1 capsule (300 mg total) by mouth 3 (three) times daily.      Naproxen Sodium (ALEVE OR) Take 1 tablet by mouth daily as needed.      levothyroxine 50 MCG Oral Tab Take 1 tablet (50 mcg total) by mouth before breakfast. 90 tablet 3    venlafaxine ER 75 MG Oral Capsule SR 24 Hr Take 1 capsule (75 mg total) by mouth daily. To help with mood and energy.  Take in the morning . 90 capsule 1    fluorometholone 0.1 % Ophthalmic Suspension Place 1 drop into both eyes 4 (four) times daily.      Diethylpropion HCl 25 MG Oral Tab Take 25 mg by mouth in the morning and 25 mg before bedtime. 120 tablet 3    metFORMIN  MG Oral Tablet 24 Hr Take 1 tablet (500 mg total) by mouth daily with breakfast.      fluticasone propionate 50 MCG/ACT Nasal Suspension 2 sprays by Each Nare route daily for 360 doses. 3 each 3    montelukast (SINGULAIR) 10 MG Oral Tab Take 1 tablet (10 mg total) by mouth nightly. 90 tablet 1    levocetirizine 5 MG Oral Tab Take 1 tablet (5 mg total) by mouth every morning. 90 tablet 0    pantoprazole 40 MG Oral Tab EC Take 1 tablet (40 mg total) by mouth every morning before breakfast. To help with stomach acid and stomach irritation/inflammation 90 tablet 3    ALPRAZolam 0.25 MG Oral Tab Take 1 tablet (0.25 mg total) by mouth nightly as needed for Anxiety. 30 tablet 1      Past Medical History:   Diagnosis Date    Anxiety state     Back problem     Calculus of kidney     Depression     Disorder of liver     FATTY LIVER     Disorder of thyroid     hypothyroid    Endometriosis     Esophageal reflux     High cholesterol     History of Papanicolaou smear of cervix 2015    Hyperlipidemia 2013    IBS (irritable bowel syndrome)     Migraines     Per [facility] - Migraine headaches    Osteoarthritis     Ovarian cyst     Postpartum depression     Prediabetes     Pregnancy (HCC) 2002, 2005, 2008        Spondylolisthesis of lumbosacral region 2017    Visual impairment     WEARS GLASSES    Vitamin B12  deficiency     Vitamin D deficiency       Social History:  Social History     Socioeconomic History    Marital status:     Number of children: 3   Tobacco Use    Smoking status: Former     Types: Cigarettes    Smokeless tobacco: Never   Vaping Use    Vaping Use: Never used   Substance and Sexual Activity    Alcohol use: Not Currently     Comment: socially    Drug use: No    Sexual activity: Yes     Birth control/protection: Hysterectomy   Other Topics Concern    Caffeine Concern No     Comment: 1 cup coffee daily    Exercise No   Social History Narrative    The patient does not use an assistive device..      The patient does live in a home with stairs.     Social Determinants of Health     Financial Resource Strain: Low Risk  (9/21/2023)    Financial Resource Strain     Difficulty of Paying Living Expenses: Not hard at all     Med Affordability: No   Transportation Needs: No Transportation Needs (9/21/2023)    Transportation Needs     Lack of Transportation: No          Migue Batista MD  3/28/2024  1:49 PM

## 2024-03-29 LAB
ATRIAL RATE: 70 BPM
P AXIS: 49 DEGREES
P-R INTERVAL: 154 MS
Q-T INTERVAL: 416 MS
QRS DURATION: 80 MS
QTC CALCULATION (BEZET): 449 MS
R AXIS: 61 DEGREES
T AXIS: 49 DEGREES
VENTRICULAR RATE: 70 BPM

## 2024-04-06 ENCOUNTER — HOSPITAL ENCOUNTER (OUTPATIENT)
Dept: ULTRASOUND IMAGING | Age: 44
Discharge: HOME | End: 2024-04-06
Attending: STUDENT IN AN ORGANIZED HEALTH CARE EDUCATION/TRAINING PROGRAM
Payer: COMMERCIAL

## 2024-04-06 DIAGNOSIS — M54.2 NECK PAIN: ICD-10-CM

## 2024-04-06 PROCEDURE — 76536 US EXAM OF HEAD AND NECK: CPT | Performed by: STUDENT IN AN ORGANIZED HEALTH CARE EDUCATION/TRAINING PROGRAM

## 2024-04-10 ENCOUNTER — TELEPHONE (OUTPATIENT)
Dept: SURGERY | Facility: CLINIC | Age: 44
End: 2024-04-10

## 2024-04-10 DIAGNOSIS — F41.9 ANXIETY: ICD-10-CM

## 2024-04-10 DIAGNOSIS — E66.9 OBESITY (BMI 30-39.9): ICD-10-CM

## 2024-04-10 RX ORDER — METFORMIN HYDROCHLORIDE 500 MG/1
500 TABLET, EXTENDED RELEASE ORAL DAILY
Qty: 90 TABLET | Refills: 4 | Status: SHIPPED | OUTPATIENT
Start: 2024-04-10

## 2024-04-10 RX ORDER — METFORMIN HYDROCHLORIDE 500 MG/1
500 TABLET, EXTENDED RELEASE ORAL
Refills: 0 | OUTPATIENT
Start: 2024-04-10

## 2024-04-10 NOTE — TELEPHONE ENCOUNTER
Please review. Protocol failed or has no protocol.    Requested Prescriptions   Pending Prescriptions Disp Refills    ALPRAZolam 0.25 MG Oral Tab 30 tablet 1     Sig: Take 1 tablet (0.25 mg total) by mouth nightly as needed for Anxiety.       Controlled Substance Medication Failed - 4/10/2024  7:30 AM        Failed - This medication is a controlled substance - forward to provider to refill             Recent Outpatient Visits              1 week ago Neck pain    [facility] Migue Batista MD    Office Visit    3 weeks ago Thinning hair    [facility] Manoj Weaver,     Office Visit    4 weeks ago Right lumbar radiculopathy    [facility] Hema Das DO    Office Visit    1 month ago Tension headache    [facility] Manoj Weaver,     Office Visit    2 months ago Encounter for long-term (current) use of high-risk medication    [facility] Anna Peng,     Office Visit            Future Appointments         Provider Department Appt Notes    In 1 week Gale Bonilla MD [facility] hair loss    In 1 week Brad Gillis MD [facility] 3 mo f/u    In 2 weeks Manoj Weaver DO [facility] 6 WKS FU    In 3 weeks Juvencio Decker MD [facility] [address], [place] - OB/GYN Annual    In 4 weeks Anna Peng DO [facility] 3 mos follow up  Policy advised    In 3 months Bayron Ramirez MD [facility]

## 2024-04-10 NOTE — TELEPHONE ENCOUNTER
, Patient would like to get prescription for Wegovy sent to [facility]. Patient stated that they have it in stock. Please advise.Routed to .

## 2024-04-11 RX ORDER — ALPRAZOLAM 0.25 MG/1
0.25 TABLET ORAL NIGHTLY PRN
Qty: 30 TABLET | Refills: 0 | Status: SHIPPED | OUTPATIENT
Start: 2024-04-11

## 2024-04-11 RX ORDER — DIETHYLPROPION HYDROCHLORIDE 25 MG/1
25 TABLET ORAL 2 TIMES DAILY
Qty: 120 TABLET | Refills: 3 | Status: SHIPPED | OUTPATIENT
Start: 2024-04-11

## 2024-04-12 ENCOUNTER — TELEPHONE (OUTPATIENT)
Dept: OTOLARYNGOLOGY | Facility: CLINIC | Age: 44
End: 2024-04-12

## 2024-04-18 ENCOUNTER — TELEPHONE (OUTPATIENT)
Dept: SURGERY | Facility: CLINIC | Age: 44
End: 2024-04-18

## 2024-04-18 ENCOUNTER — OFFICE VISIT (OUTPATIENT)
Dept: PHYSICAL MEDICINE AND REHAB | Facility: CLINIC | Age: 44
End: 2024-04-18
Payer: COMMERCIAL

## 2024-04-18 VITALS — HEART RATE: 79 BPM | BODY MASS INDEX: 32 KG/M2 | OXYGEN SATURATION: 98 % | WEIGHT: 175 LBS

## 2024-04-18 DIAGNOSIS — M54.16 RIGHT LUMBAR RADICULOPATHY: Primary | ICD-10-CM

## 2024-04-18 PROCEDURE — 99214 OFFICE O/P EST MOD 30 MIN: CPT | Performed by: PHYSICAL MEDICINE & REHABILITATION

## 2024-04-18 NOTE — PROGRESS NOTES
Progress note    C/C:   Chief Complaint   Patient presents with    Follow - Up     03/12/24 LOV. She discontinued lyrica since she was feeling depressed and anxious while taking it. T/n in BLE (R>L). Pain 7/10. Gabapentin 300mg AM and 600mg pm. Advil or aleve prn.       HPI: 44-year-old female presents for follow-up.  Since last time I saw her she unfortunately had side effects from the pregabalin - caused mood disorder -and she stopped it after taking the medication for about 10 days.  She continues to have lower back pain rating into the bilateral thighs, and further into the right leg with associated numbness into the lateral aspect of the right lower leg that worsens with walking.    Pertinent allergies:   Allergies   Allergen Reactions    Cymbalta [Duloxetine Hcl] SHORTNESS OF BREATH and ANXIETY    Other SHORTNESS OF BREATH     SOME TOPICAL NUMBING SPRAY USED WHEN STARTING AN I.V. - cough and shortness of breath      Sulfa Antibiotics HIVES and RASH    Tramadol ITCHING     THROAT ITCHING AND FEELS LIKE IT WAS CLOSING     Ciprofloxacin RASH    Acetaminophen OTHER (SEE COMMENTS)     Fatty Liver    Lyrica [Pregabalin] RASH        Physical exam:  Pulse 79   Wt 175 lb (79.4 kg)   LMP 03/22/2017   SpO2 98%   BMI 32.01 kg/m²      5/5 BLE strength  2/4 BLE reflexes  SLR right reproduces right leg pain    Imaging: No new imaging to review    Assessment and plan  Right lumbar radiculopathy  Status post lumbar fusion    Continues to have primarily a right lumbar radiculopathy, possibly related to granulation tissue or recurrent disc herniation.  No improvement with the medication.  Elected not to move forward with physical therapy.  The only other options that I have for her are to do a right L5 transforaminal epidural steroid injection under fluoroscopy with or without IV conscious sedation, or for her to follow-up with her surgeon to discuss her surgical options.  She decided to think about her options and will  contact the clinic if she would like to move forward with injection. We discussed the risks of procedure, including bleeding, infection, nerve injury, GARCIA.    Hema Das DO  Physical Medicine and Rehabilitation  [facility]

## 2024-04-23 ENCOUNTER — TELEPHONE (OUTPATIENT)
Dept: NEUROLOGY | Facility: CLINIC | Age: 44
End: 2024-04-23

## 2024-04-24 NOTE — TELEPHONE ENCOUNTER
Received fax from [employer]   Approval received for patient use of Ubrelvy   Approval granted: through 4/23/25        Pt notified

## 2024-04-25 PROBLEM — H57.12 LEFT EYE PAIN: Status: ACTIVE | Noted: 2024-04-25

## 2024-04-25 PROBLEM — E34.8 ESTRADIOL DEFICIENCY: Status: ACTIVE | Noted: 2024-04-25

## 2024-04-29 ENCOUNTER — LAB ENCOUNTER (OUTPATIENT)
Dept: LAB | Age: 44
End: 2024-04-29
Attending: INTERNAL MEDICINE
Payer: COMMERCIAL

## 2024-04-29 DIAGNOSIS — Z79.899 ENCOUNTER FOR LONG-TERM (CURRENT) USE OF HIGH-RISK MEDICATION: ICD-10-CM

## 2024-04-29 LAB
ALBUMIN SERPL-MCNC: 4.3 G/DL (ref 3.2–4.8)
ALBUMIN/GLOB SERPL: 1.6 {RATIO} (ref 1–2)
ALP LIVER SERPL-CCNC: 68 U/L
ALT SERPL-CCNC: 24 U/L
ANION GAP SERPL CALC-SCNC: 6 MMOL/L (ref 0–18)
AST SERPL-CCNC: 21 U/L (ref ?–34)
BASOPHILS # BLD AUTO: 0.03 X10(3) UL (ref 0–0.2)
BASOPHILS NFR BLD AUTO: 0.4 %
BILIRUB SERPL-MCNC: 0.4 MG/DL (ref 0.3–1.2)
BUN BLD-MCNC: 8 MG/DL (ref 9–23)
BUN/CREAT SERPL: 10.7 (ref 10–20)
CALCIUM BLD-MCNC: 9.3 MG/DL (ref 8.7–10.4)
CHLORIDE SERPL-SCNC: 105 MMOL/L (ref 98–112)
CO2 SERPL-SCNC: 26 MMOL/L (ref 21–32)
CREAT BLD-MCNC: 0.75 MG/DL
CRP SERPL-MCNC: <0.4 MG/DL (ref ?–1)
DEPRECATED RDW RBC AUTO: 40.8 FL (ref 35.1–46.3)
EGFRCR SERPLBLD CKD-EPI 2021: 102 ML/MIN/1.73M2 (ref 60–?)
EOSINOPHIL # BLD AUTO: 0.13 X10(3) UL (ref 0–0.7)
EOSINOPHIL NFR BLD AUTO: 1.7 %
ERYTHROCYTE [DISTWIDTH] IN BLOOD BY AUTOMATED COUNT: 12.4 % (ref 11–15)
ERYTHROCYTE [SEDIMENTATION RATE] IN BLOOD: 4 MM/HR
FASTING STATUS PATIENT QL REPORTED: YES
GLOBULIN PLAS-MCNC: 2.7 G/DL (ref 2.8–4.4)
GLUCOSE BLD-MCNC: 97 MG/DL (ref 70–99)
HCT VFR BLD AUTO: 39.2 %
HGB BLD-MCNC: 13.4 G/DL
IMM GRANULOCYTES # BLD AUTO: 0.03 X10(3) UL (ref 0–1)
IMM GRANULOCYTES NFR BLD: 0.4 %
LYMPHOCYTES # BLD AUTO: 1.85 X10(3) UL (ref 1–4)
LYMPHOCYTES NFR BLD AUTO: 24.3 %
MCH RBC QN AUTO: 30.7 PG (ref 26–34)
MCHC RBC AUTO-ENTMCNC: 34.2 G/DL (ref 31–37)
MCV RBC AUTO: 89.7 FL
MONOCYTES # BLD AUTO: 0.57 X10(3) UL (ref 0.1–1)
MONOCYTES NFR BLD AUTO: 7.5 %
NEUTROPHILS # BLD AUTO: 4.99 X10 (3) UL (ref 1.5–7.7)
NEUTROPHILS # BLD AUTO: 4.99 X10(3) UL (ref 1.5–7.7)
NEUTROPHILS NFR BLD AUTO: 65.7 %
OSMOLALITY SERPL CALC.SUM OF ELEC: 282 MOSM/KG (ref 275–295)
PLATELET # BLD AUTO: 275 10(3)UL (ref 150–450)
POTASSIUM SERPL-SCNC: 4 MMOL/L (ref 3.5–5.1)
PROT SERPL-MCNC: 7 G/DL (ref 5.7–8.2)
RBC # BLD AUTO: 4.37 X10(6)UL
SODIUM SERPL-SCNC: 137 MMOL/L (ref 136–145)
WBC # BLD AUTO: 7.6 X10(3) UL (ref 4–11)

## 2024-04-29 PROCEDURE — 86140 C-REACTIVE PROTEIN: CPT

## 2024-04-29 PROCEDURE — 85652 RBC SED RATE AUTOMATED: CPT

## 2024-04-29 PROCEDURE — 36415 COLL VENOUS BLD VENIPUNCTURE: CPT

## 2024-04-29 PROCEDURE — 85025 COMPLETE CBC W/AUTO DIFF WBC: CPT

## 2024-04-29 PROCEDURE — 80053 COMPREHEN METABOLIC PANEL: CPT

## 2024-05-07 ENCOUNTER — OFFICE VISIT (OUTPATIENT)
Dept: OBGYN CLINIC | Facility: CLINIC | Age: 44
End: 2024-05-07

## 2024-05-07 VITALS
WEIGHT: 180 LBS | HEART RATE: 94 BPM | SYSTOLIC BLOOD PRESSURE: 119 MMHG | BODY MASS INDEX: 33.13 KG/M2 | DIASTOLIC BLOOD PRESSURE: 81 MMHG | HEIGHT: 62 IN

## 2024-05-07 DIAGNOSIS — M62.89 PELVIC FLOOR DYSFUNCTION IN FEMALE: ICD-10-CM

## 2024-05-07 DIAGNOSIS — N95.1 HOT FLUSHES, PERIMENOPAUSAL: ICD-10-CM

## 2024-05-07 DIAGNOSIS — Z01.419 WELL WOMAN EXAM WITH ROUTINE GYNECOLOGICAL EXAM: Primary | ICD-10-CM

## 2024-05-07 PROCEDURE — 99396 PREV VISIT EST AGE 40-64: CPT | Performed by: OBSTETRICS & GYNECOLOGY

## 2024-05-07 PROCEDURE — 99213 OFFICE O/P EST LOW 20 MIN: CPT | Performed by: OBSTETRICS & GYNECOLOGY

## 2024-05-07 RX ORDER — ACETAMINOPHEN AND CODEINE PHOSPHATE 120; 12 MG/5ML; MG/5ML
0.35 SOLUTION ORAL DAILY
Qty: 3 EACH | Refills: 5 | Status: SHIPPED | OUTPATIENT
Start: 2024-05-07 | End: 2024-05-08

## 2024-05-07 NOTE — PROGRESS NOTES
HPI:   Mirtha Silver is a 44 year old female who presents for a     1) annual/pap     well woman counseling.  Self breast exam explained. Health maintenance. Body mass index is 32.92 kg/m²., recommended low fat diet and aerobic exercise 30 minutes three times weekly.  The patient indicates understanding of these issues and agrees to the plan.  The patient is asked to return for an annual visit.      2) hot flushes/mood changes:  pt believes she is in premenopause. Pt counseled on options, pt wants to try micronor ocp for her sx now.  Will let me know if she wants to change.     3)  pelvic pain for 1-2 months.:  counseled on possible etiologies.  Will evaluate.    Wt Readings from Last 6 Encounters:   05/07/24 180 lb (81.6 kg)   04/25/24 179 lb (81.2 kg)   04/22/24 175 lb (79.4 kg)   04/18/24 175 lb (79.4 kg)   03/27/24 174 lb (78.9 kg)   03/28/24 177 lb (80.3 kg)     Body mass index is 32.92 kg/m².     Cholesterol, Total (mg/dL)   Date Value   03/24/2022 237 (H)   06/19/2017 204 (H)   03/15/2016 216 (H)     HDL Cholesterol (mg/dL)   Date Value   03/24/2022 49   06/19/2017 53     HDL CHOLESTEROL (P) (mg/dL)   Date Value   03/15/2016 60     LDL Cholesterol (mg/dL)   Date Value   03/24/2022 170 (H)   06/19/2017 139 (H)   03/15/2016 143 (H)     AST (U/L)   Date Value   04/29/2024 21   01/13/2024 23   09/07/2023 29     ALT (U/L)   Date Value   04/29/2024 24   01/13/2024 28   09/07/2023 50        Current Outpatient Medications   Medication Sig Dispense Refill    Norethindrone (ORTHO MICRONOR) 0.35 MG Oral Tab Take 1 tablet (0.35 mg total) by mouth daily. 3 each 5    ubrogepant (UBRELVY) 50 MG Oral Tab Take one tablet at onset of migraine.  May take additional tablet in 2 hours if needed.  Do not exceed two tablets per 24 hour period. 10 tablet 0    Diethylpropion HCl 25 MG Oral Tab Take 25 mg by mouth in the morning and 25 mg before bedtime. 120 tablet 3    ALPRAZolam 0.25 MG Oral Tab Take 1 tablet (0.25 mg total) by  mouth nightly as needed for Anxiety. 30 tablet 0    METFORMIN  MG Oral Tablet 24 Hr TAKE 1 TABLET BY MOUTH EVERY DAY 90 tablet 4    hydroxychloroquine 200 MG Oral Tab Take 1 tablet (200 mg total) by mouth 2 (two) times daily.      gabapentin 300 MG Oral Cap Take 1 capsule (300 mg total) by mouth. 1 tab AM, 2 tab QHS      Naproxen Sodium (ALEVE OR) Take 1 tablet by mouth daily as needed.      levothyroxine 50 MCG Oral Tab Take 1 tablet (50 mcg total) by mouth before breakfast. 90 tablet 3    fluorometholone 0.1 % Ophthalmic Suspension Place 1 drop into both eyes 4 (four) times daily.      fluticasone propionate 50 MCG/ACT Nasal Suspension 2 sprays by Each Nare route daily for 360 doses. 3 each 3    montelukast (SINGULAIR) 10 MG Oral Tab Take 1 tablet (10 mg total) by mouth nightly. 90 tablet 1    pantoprazole 40 MG Oral Tab EC Take 1 tablet (40 mg total) by mouth every morning before breakfast. To help with stomach acid and stomach irritation/inflammation 90 tablet 3      Past Medical History:    Anxiety state    Back problem    Calculus of kidney    Depression    Disorder of liver    FATTY LIVER     Disorder of thyroid    hypothyroid    Endometriosis    Esophageal reflux    High cholesterol    History of Papanicolaou smear of cervix    Hyperlipidemia    IBS (irritable bowel syndrome)    Migraines    Per [facility] - Migraine headaches    Osteoarthritis    Ovarian cyst    Postpartum depression    Prediabetes    Pregnancy (HCC)        Spondylolisthesis of lumbosacral region    Visual impairment    WEARS GLASSES    Vitamin B12 deficiency    Vitamin D deficiency      Past Surgical History:   Procedure Laterality Date    Cervical spine surgery  2023    C5-C6 Arthroplasty     Cholecystectomy  2012    Colonoscopy  2004    Cyst removal  ,     laporoscopic ovarian cytectomy    Hysterectomy      Laparoscopy,diagnostic  2016    laparoscopy, filshie clip sterilization of left fallopian tube,  ablation of endometriosis, retrieval and removal of displaced filshie clip    Lipoma removal Left 12/29/2018    Dorian biopsy stereo nodule 1 site left (cpt=19081)  05/10/2022    top hat    Dorian localization wire 1 site left (cpt=19281)  06/28/2022    Other  2019    low back surgery with metal implant    Total abdominal hysterectomy N/A 04/05/2017    Procedure: ABDOMINAL HYSTERECTOMY;  Surgeon: Juvencio Decker MD;  Location: [facility] MAIN OR      Family History   Problem Relation Age of Onset    Diabetes Father         Type 2    Hypertension Mother     Lipids Mother         Hyperlipidemia    Diabetes Mother         Type 2    Diabetes Sister     Hypertension Sister     Lipids Brother     Glaucoma Neg     Macular degeneration Neg       Social History:   Social History     Socioeconomic History    Marital status:     Number of children: 3   Tobacco Use    Smoking status: Former     Types: Cigarettes    Smokeless tobacco: Never   Vaping Use    Vaping status: Never Used   Substance and Sexual Activity    Alcohol use: Not Currently     Comment: socially    Drug use: No    Sexual activity: Yes     Birth control/protection: Hysterectomy   Other Topics Concern    Caffeine Concern No     Comment: 1 cup coffee daily    Exercise No   Social History Narrative    The patient does not use an assistive device..      The patient does live in a home with stairs.     Social Determinants of Health     Financial Resource Strain: Low Risk  (9/21/2023)    Financial Resource Strain     Difficulty of Paying Living Expenses: Not hard at all     Med Affordability: No   Transportation Needs: No Transportation Needs (9/21/2023)    Transportation Needs     Lack of Transportation: No            REVIEW OF SYSTEMS:   GENERAL: feels well otherwise  SKIN: denies any unusual skin lesions  EYES:denies blurred vision or double vision  HEENT: denies nasal congestion, sinus pain or ST  LUNGS: denies shortness of breath with exertion  CARDIOVASCULAR:  denies chest pain on exertion  GI: denies abdominal pain,denies heartburn  : denies dysuria, vaginal discharge or itching,periods regular   MUSCULOSKELETAL: denies back pain  NEURO: denies headaches  PSYCHE: denies depression or anxiety  HEMATOLOGIC: denies hx of anemia  ENDOCRINE: denies thyroid history  ALL/ASTHMA: denies hx of allergy or asthma    EXAM:   /81 (BP Location: Left arm, Patient Position: Sitting, Cuff Size: adult)   Pulse 94   Ht 5' 2\" (1.575 m)   Wt 180 lb (81.6 kg)   LMP 03/22/2017   BMI 32.92 kg/m²   Body mass index is 32.92 kg/m².   GENERAL: well developed, well nourished,in no apparent distress  SKIN: no rashes,no suspicious lesions  HEENT: atraumatic, normocephalic  EYES:normal in appearance  NECK: supple,no adenopathy  CHEST: no chest tenderness  BREAST: no dominant or suspicious mass  LUNGS: clear to auscultation  CARDIO: RRR without murmur  GI: good BS's,no masses, HSM or tenderness  :introitus is normal,scant discharge,cervix is pink,no adnexal masses or tenderness, right and left pelvic floor spasm, pt declined PT for now, wants to try self tx, this has worked for her before.     MUSCULOSKELETAL: back is not tender,FROM of the back  EXTREMITIES: no cyanosis, clubbing or edema  NEURO: Oriented times three      ASSESSMENT AND PLAN:   Mirtha Silver is a 44 year old female who presents for a   1) annual/pap     well woman counseling.  Self breast exam explained. Health maintenance. Body mass index is 32.92 kg/m²., recommended low fat diet and aerobic exercise 30 minutes three times weekly.  The patient indicates understanding of these issues and agrees to the plan.  The patient is asked to return for an annual visit.      2) hot flushes/mood changes:  pt believes she is in premenopause. Pt counseled on options, pt wants to try micronor ocp for her sx now.  Will let me know if she wants to change.     3)  pelvic pain for 1-2 months.:  counseled on possible etiologies. On  exam, pelvic pain c/w pelvic floor spasm.  Pt offered PT, pt prefers to selftx as before, and will call if pt wants PT at [facility].

## 2024-05-08 ENCOUNTER — OFFICE VISIT (OUTPATIENT)
Dept: RHEUMATOLOGY | Facility: CLINIC | Age: 44
End: 2024-05-08
Payer: COMMERCIAL

## 2024-05-08 VITALS
HEART RATE: 92 BPM | WEIGHT: 180 LBS | HEIGHT: 62 IN | BODY MASS INDEX: 33.13 KG/M2 | DIASTOLIC BLOOD PRESSURE: 71 MMHG | SYSTOLIC BLOOD PRESSURE: 103 MMHG

## 2024-05-08 DIAGNOSIS — R76.8 ANA POSITIVE: Primary | ICD-10-CM

## 2024-05-08 DIAGNOSIS — R76.8 ANTI-TPO ANTIBODIES PRESENT: ICD-10-CM

## 2024-05-08 DIAGNOSIS — T14.8XXA MUSCLE STRAIN: ICD-10-CM

## 2024-05-08 DIAGNOSIS — M25.50 POLYARTHRALGIA: ICD-10-CM

## 2024-05-08 DIAGNOSIS — M20.031 SWAN-NECK DEFORMITY OF FINGER OF RIGHT HAND: ICD-10-CM

## 2024-05-08 PROCEDURE — 99214 OFFICE O/P EST MOD 30 MIN: CPT | Performed by: INTERNAL MEDICINE

## 2024-05-08 RX ORDER — CYCLOBENZAPRINE HCL 5 MG
5 TABLET ORAL NIGHTLY PRN
Qty: 30 TABLET | Refills: 2 | Status: SHIPPED | OUTPATIENT
Start: 2024-05-08

## 2024-05-08 NOTE — PROGRESS NOTES
RHEUMATOLOGY CLINIC- FOLLOW UP    Mirtha Silver is a 44 year old female.    ASSESSMENT/PLAN:       ICD-10-CM    1. SAMMY positive  R76.8       2. Anti-TPO antibodies present  R76.8       3. Muscle strain  T14.8XXA       4. Polyarthralgia  M25.50       5. [place]-neck deformity of finger of right hand  M20.031         DISCUSSION:   Patient presents for follow-up after trial of hydroxychloroquine with prednisone taper without significant response.  She does have a history of a low positive SAMMY but this most likely reflects cross-reactivity to her known thyroid disease (positive TPO).  No synovitis on current exam and her symptoms seem to be worse with activity suggesting against an underlying inflammatory arthropathy.  Therefore, discussed with patient to discontinue hydroxychloroquine and no further need for p.o. prednisone.  I suspect there is a component of muscle tension given her description of symptoms so discussed low-dose Flexeril.      PLAN:   -Discontinue hydroxychloroquine 400 mg every day (<5 mg/kg) given ineffectiveness  -As needed Flexeril 5 mg before bedtime.  Discussed with patient to monitor for possible confusion, lethargy, fatigue and to avoid heavy machinery while taking this medication.  -Patient hoping to avoid NSAIDs given history of gastritis/GERD  - Consult/evaluation communicated with referring physician/provider.    Patient may follow-up PRN.     Anna Peng DO  5/8/2024   5:08 PM    Discussed with patient that they are on chronic treatment with a high-risk medication that requires close lab monitoring for possible drug toxicity.  Additionally, discussed with patient give the possible immunosuppressive effects of their medication as well as their underlying hyper-inflammatory state, the importance of keeping vaccinations and age-appropriate screenings up-to-date, given increased risk of complications and malignancies seen in these patient populations.  Patient to f/u with repeat labs drawn  prior (standing labs ordered).  Last QuantiFERON TB testin24  Last Hepatitis testin24      SUBJECTIVE:     24 Follow-Up:  [id number].  Patient reporting tightness along her upper back, posterior neck, and shoulders.  Activity makes her symptoms worse.      Current Medications:  Hydroxychloroquine 400 mg every day- started 24 but taking 1-2 tablets given concerns of side effects    Medication History:  PO Prednisone taper x12d- ineffective    Interval History:     1/10/24 Initial Consult:     :[id number]     I had the pleasure of seeing Mirtha Silver on 1/10/2024 as a new outpatient consultation for arthralgias. The patient was originally referred by her PCP, Dr. Manoj Weaver.     43 year old female w/ PMH HLD, vitamin D deficiency, vitamin B-12 distress today, hypothyroidism (+TPO), lumbar DDD, cervical DDD, anxiety/depression, GERD, elevated LFTs, endometriosis who presents to clinic today.  Patient reporting \" bones hurting\" from her right hand especially her fifth digit and is concerned given asymmetry compared to her other fingers.  States symptoms started about 2 months prior and associated with difficulty gripping things and stiffness.  She also reports fatigue, headaches, and paresthesias to her bilateral upper extremities.  On review of EMR, it appears patient has been seen by ophthalmology with concerns of post-procedural eye dryness, however, noting that it would be unusual for the last months after the procedure.  Patient also states she has had worsening dry mouth since starting a new medication for weight loss.  No prior history of dental caries.  Family history notable for a mom with \"arthritis \"but no known autoimmune disorders such as gout, lupus, RA, psoriasis.    24 Follow-Up:     : [id number]    Continues to have hand pain  \"Stabbing pain\"   Moving aggravates symptoms   No AM Stiffness       HISTORY:  Past Medical History:     Anxiety state    Back problem    Calculus of kidney    Depression    Disorder of liver    FATTY LIVER     Disorder of thyroid    hypothyroid    Endometriosis    Esophageal reflux    High cholesterol    History of Papanicolaou smear of cervix    Hyperlipidemia    IBS (irritable bowel syndrome)    Migraines    Per [facility] - Migraine headaches    Osteoarthritis    Ovarian cyst    Postpartum depression    Prediabetes    Pregnancy (HCC)        Spondylolisthesis of lumbosacral region    Visual impairment    WEARS GLASSES    Vitamin B12 deficiency    Vitamin D deficiency      Social Hx Reviewed   Family Hx Reviewed     Medications (Active prior to today's visit):  Current Outpatient Medications   Medication Sig Dispense Refill    ubrogepant (UBRELVY) 50 MG Oral Tab Take one tablet at onset of migraine.  May take additional tablet in 2 hours if needed.  Do not exceed two tablets per 24 hour period. 10 tablet 0    Diethylpropion HCl 25 MG Oral Tab Take 25 mg by mouth in the morning and 25 mg before bedtime. 120 tablet 3    ALPRAZolam 0.25 MG Oral Tab Take 1 tablet (0.25 mg total) by mouth nightly as needed for Anxiety. 30 tablet 0    METFORMIN  MG Oral Tablet 24 Hr TAKE 1 TABLET BY MOUTH EVERY DAY 90 tablet 4    hydroxychloroquine 200 MG Oral Tab Take 1 tablet (200 mg total) by mouth 2 (two) times daily.      gabapentin 300 MG Oral Cap Take 1 capsule (300 mg total) by mouth. 1 tab AM, 2 tab QHS      Naproxen Sodium (ALEVE OR) Take 1 tablet by mouth daily as needed.      levothyroxine 50 MCG Oral Tab Take 1 tablet (50 mcg total) by mouth before breakfast. 90 tablet 3    fluorometholone 0.1 % Ophthalmic Suspension Place 1 drop into both eyes 4 (four) times daily.      fluticasone propionate 50 MCG/ACT Nasal Suspension 2 sprays by Each Nare route daily for 360 doses. 3 each 3    montelukast (SINGULAIR) 10 MG Oral Tab Take 1 tablet (10 mg total) by mouth nightly. 90 tablet 1    pantoprazole 40 MG Oral Tab EC Take 1  tablet (40 mg total) by mouth every morning before breakfast. To help with stomach acid and stomach irritation/inflammation 90 tablet 3    Norethindrone (ORTHO MICRONOR) 0.35 MG Oral Tab Take 1 tablet (0.35 mg total) by mouth daily. (Patient not taking: Reported on 5/8/2024) 3 each 5       Allergies:  Allergies   Allergen Reactions    Cymbalta [Duloxetine Hcl] SHORTNESS OF BREATH and ANXIETY    Other SHORTNESS OF BREATH     SOME TOPICAL NUMBING SPRAY USED WHEN STARTING AN I.V. - cough and shortness of breath      Sulfa Antibiotics HIVES and RASH    Tramadol ITCHING     THROAT ITCHING AND FEELS LIKE IT WAS CLOSING     Ciprofloxacin RASH    Acetaminophen OTHER (SEE COMMENTS)     Fatty Liver    Lyrica [Pregabalin] RASH         ROS:   Review of Systems   Constitutional:  Negative for chills and fever.   HENT:  Negative for congestion, hearing loss, mouth sores, nosebleeds and trouble swallowing.    Eyes:  Negative for photophobia, pain, redness and visual disturbance.   Respiratory:  Negative for cough and shortness of breath.    Cardiovascular:  Negative for chest pain, palpitations and leg swelling.   Gastrointestinal:  Negative for abdominal pain, blood in stool, diarrhea and nausea.   Endocrine: Negative for cold intolerance and heat intolerance.   Genitourinary:  Negative for dysuria, frequency and hematuria.   Musculoskeletal:  Positive for arthralgias and neck pain. Negative for back pain, gait problem, joint swelling, myalgias and neck stiffness.   Skin:  Negative for color change and rash.   Neurological:  Negative for dizziness, weakness, numbness and headaches.   Psychiatric/Behavioral:  Negative for confusion and dysphoric mood.         PHYSICAL EXAM:     Constitutional:  Well developed, Well nourished, No acute distress  HENT:  Normocephalic, Atraumatic, Bilateral external ears normal, Oropharynx moist, No oral exudates.  Neck: Normal range of motion, No tenderness, Supple, No stridor.  Eyes:  PERRL, EOMI,  Conjunctiva normal, No discharge.  Respiratory:  Normal breath sounds, No respiratory distress, No wheezing.  Cardiovascular:  Normal heart rate, Normal rhythm, No murmurs, No rubs, No gallops.  GI:  Bowel sounds normal, Soft, No tenderness, No masses, No pulsatile masses.  : No CVA tenderness.  Musculoskeletal: [place]-neck deformities of the bilateral hands. Negative Tinel's sign.  Negative prayer sign. A comprehensive 28 count joint exam was done and was negative for swelling or tenderness except as noted. Inspections for misalignment, asymmetry, crepitation, defects, tenderness, masses, nodules, effusions, range of motion, and stability in the upper and lower extremities bilaterally are all normal unless noted.           Integument:  Warm, Dry, No erythema, No rash.  Lymphatic:  No lymphadenopathy noted.  Neurologic:  Alert & oriented x 3, Normal motor function, Normal sensory function, No focal deficits noted.  Psychiatric:  Affect normal, Judgment normal, Mood normal.    LABS:     Prior lab work reviewed and notable for:    4/29/24     CBC without cytopenias or leukocytosis  CMP with normal renal and hepatic function, no gamma gap noted  CRP less than 0.4, ESR 4 WNL    1/13/2024:    SAMMY +1: 160 homogenous with reflex antibodies negative  dsDNA 1.1 WNL, by IFA less than 10 WNL  C3 140.4 WNL, C4 31 WNL  HLA-B27 negative  CCP 0.6 WNL, RF less than 10 WNL    CRP less than 0.4 WNL, ESR 4 WNL    Acute hepatitis panel nonreactive, TB testing negative    CBC without cytopenias nor leukocytosis  CMP with normal renal and hepatic function, no gamma gap noted  TSH 0.93 WNL, free T41.3 WNL    10/4/2023 CBC without cytopenias nor leukocytosis    9/18/2023 UA without proteinuria nor hematuria    9/13/2023:  Hemoglobin A1c 5.6    9/7/2023 TSH 1.050 WNL    3/15/2018  TPO 42.30 (high)  dsDNA less than 10  SCL 70 negative  Smith antibody negative    2/12/2018:  SAMMY screen by IFA positive, 1: 160 (homogenous)    RF less than  5  HLA-B27 negative  SSA and SSB negative  CK 94 WNL  Imagin24 L Hand XR:   FINDINGS:  BONES: A corticated osseous density along the radial capsular margin base of the long finger at the MCP joint.  No acute fracture or significant arthropathy.  No suspicious bone lesion.  SOFT TISSUES: Negative. No visible soft tissue swelling.  EFFUSION: None visible.  OTHER: Negative.               Impression   CONCLUSION:  1. Corticated osseous density along radial capsular margin base of long finger at the 3rd MCP joint may be related to remote trauma.  Otherwise negative.     24 R Hand XR:     FINDINGS:  BONES: Normal. No significant arthropathy, fracture or acute abnormality.  SOFT TISSUES: Negative. No visible soft tissue swelling.  EFFUSION: None visible.  OTHER: Negative.               Impression   CONCLUSION: Normal examination.         2023 lumbar spine CT:  Impression   CONCLUSION:       1. Soft tissue opacity within the right neural foramen measuring approximately 11 x 10 mm with bony osteophytes that may cause mass effect on the exiting L5 nerve root.  This may represent area of granulation tissue recurrent disc herniation.  Further  evaluation using MRI with gadolinium would be  recommended.     2. Mild degenerative disc disease at L3-4.          2023 right fifth finger XR:    Impression   CONCLUSION: Normal          10/27/2023 cervical spine XR:    Impression   CONCLUSION: Straightening of normal cervical lordosis which could be due to patient position or muscle spasm.  Interval placement of disc prosthesis at C5-6.  Normal range of motion with flexion and extension without subluxation.  No acute osseous abnormality of the cervical spine.

## 2024-06-06 ENCOUNTER — OFFICE VISIT (OUTPATIENT)
Dept: FAMILY MEDICINE CLINIC | Facility: CLINIC | Age: 44
End: 2024-06-06

## 2024-06-06 VITALS
DIASTOLIC BLOOD PRESSURE: 79 MMHG | SYSTOLIC BLOOD PRESSURE: 126 MMHG | HEIGHT: 62 IN | HEART RATE: 73 BPM | WEIGHT: 179.63 LBS | TEMPERATURE: 97 F | BODY MASS INDEX: 33.06 KG/M2

## 2024-06-06 DIAGNOSIS — R41.89 COGNITIVE CHANGES: ICD-10-CM

## 2024-06-06 DIAGNOSIS — F41.9 ANXIETY: ICD-10-CM

## 2024-06-06 DIAGNOSIS — G89.29 CHRONIC HAND PAIN, RIGHT: Primary | ICD-10-CM

## 2024-06-06 DIAGNOSIS — R23.3 SPONTANEOUS ECCHYMOSIS: ICD-10-CM

## 2024-06-06 DIAGNOSIS — M79.641 CHRONIC HAND PAIN, RIGHT: Primary | ICD-10-CM

## 2024-06-06 DIAGNOSIS — R20.2 PARESTHESIA OF RIGHT ARM: ICD-10-CM

## 2024-06-06 DIAGNOSIS — G56.01 CARPAL TUNNEL SYNDROME OF RIGHT WRIST: ICD-10-CM

## 2024-06-06 PROCEDURE — 99214 OFFICE O/P EST MOD 30 MIN: CPT | Performed by: FAMILY MEDICINE

## 2024-06-06 PROCEDURE — G2211 COMPLEX E/M VISIT ADD ON: HCPCS | Performed by: FAMILY MEDICINE

## 2024-06-06 RX ORDER — LORAZEPAM 1 MG/1
1 TABLET ORAL
Qty: 30 TABLET | Refills: 1 | Status: SHIPPED | OUTPATIENT
Start: 2024-06-06

## 2024-06-06 NOTE — PROGRESS NOTES
Patient ID: Mirtha Silver is a 44 year old female.    HPI  Chief Complaint   Patient presents with    Follow - Up     From Rheumatology     Finger Pain     Right hand hx of carpal tunnel      Last seen by me on 4/25/2024.    Pt c/o 3rd, 4th, and 5th finger of the right hand that began 2 weeks ago; has trouble bending them due to pain. She states the pain begin in the 5th finger and now has spread to the other fingers; she saw a rheumatologist about this. Pt completed XR Hands in November 2023. Pt states the fingers feels stiff to her at times.  Rheumatology did not feel any of her symptoms were rheumatologic in nature and I reviewed that note along with the labs.  She feels pain radiating up to her neck upon bending the fingers. Pt has been using a wrist brace for 1 year. She received a steroid injection into her right wrist with Dr. Das about a year ago but pain remained and worsened since then. Pt completed an EMG in July 2021 and had mild carpal tunnel syndrome on the right. I provided a referral to Dr. Evans as she would like to discuss possible surgical intervention.     She takes Xanax for her anxiety but feels it's not helping her anymore. Pt would like to try a different medication. I prescribed a new medication per her request.  She states she usually takes it daily only if she is having a bad anxiety attack.    Pt would like to consult about random bruises she gets in her lower leg. I reviewed her CBC that was done per her rheumatologist in April 2024 which was normal. I discussed this with her.  She states even a small bump can cause a bruise at times.    Wt Readings from Last 6 Encounters:   06/06/24 179 lb 9.6 oz   05/08/24 180 lb   05/07/24 180 lb   04/25/24 179 lb   04/22/24 175 lb   04/18/24 175 lb       BMI Readings from Last 6 Encounters:   06/06/24 32.85 kg/m²   05/08/24 32.92 kg/m²   05/07/24 32.92 kg/m²   04/25/24 32.74 kg/m²   04/22/24 32.01 kg/m²   04/18/24 32.01 kg/m²       BP  Readings from Last 6 Encounters:   24 126/79   24 103/71   24 119/81   24 120/78   24 100/80   24 146/89         Review of Systems   Respiratory:  Negative for shortness of breath.    Cardiovascular:  Negative for chest pain.   Psychiatric/Behavioral:  The patient is nervous/anxious.            Medical History:      Past Medical History:    Anxiety state    Back problem    Calculus of kidney    Depression    Disorder of liver    FATTY LIVER     Disorder of thyroid    hypothyroid    Endometriosis    Esophageal reflux    High cholesterol    History of Papanicolaou smear of cervix    Hyperlipidemia    IBS (irritable bowel syndrome)    Migraines    Per [facility] - Migraine headaches    Osteoarthritis    Ovarian cyst    Postpartum depression    Prediabetes    Pregnancy (HCC)        Spondylolisthesis of lumbosacral region    Visual impairment    WEARS GLASSES    Vitamin B12 deficiency    Vitamin D deficiency       Past Surgical History:   Procedure Laterality Date    Cervical spine surgery  2023    C5-C6 Arthroplasty     Cholecystectomy      Colonoscopy  2004    Cyst removal  ,     laporoscopic ovarian cytectomy    Hysterectomy      Laparoscopy,diagnostic  2016    laparoscopy, filshie clip sterilization of left fallopian tube, ablation of endometriosis, retrieval and removal of displaced filshie clip    Lipoma removal Left 2018    Dorian biopsy stereo nodule 1 site left (cpt=19081)  05/10/2022    top hat    [facility] localization wire 1 site left (cpt=19281)  2022    Other  2019    low back surgery with metal implant    Total abdominal hysterectomy N/A 2017    Procedure: ABDOMINAL HYSTERECTOMY;  Surgeon: Juvencio Decker MD;  [facility] MA[facility] OR          Current Outpatient Medications   Medication Sig Dispense Refill    cyclobenzaprine 5 MG Oral Tab Take 1 tablet (5 mg total) by mouth nightly as needed for Muscle spasms. 30 tablet 2     ubrogepant (UBRELVY) 50 MG Oral Tab Take one tablet at onset of migraine.  May take additional tablet in 2 hours if needed.  Do not exceed two tablets per 24 hour period. 10 tablet 0    Diethylpropion HCl 25 MG Oral Tab Take 25 mg by mouth in the morning and 25 mg before bedtime. 120 tablet 3    ALPRAZolam 0.25 MG Oral Tab Take 1 tablet (0.25 mg total) by mouth nightly as needed for Anxiety. 30 tablet 0    METFORMIN  MG Oral Tablet 24 Hr TAKE 1 TABLET BY MOUTH EVERY DAY 90 tablet 4    gabapentin 300 MG Oral Cap Take 1 capsule (300 mg total) by mouth. 1 tab AM, 2 tab QHS      Naproxen Sodium (ALEVE OR) Take 1 tablet by mouth daily as needed.      levothyroxine 50 MCG Oral Tab Take 1 tablet (50 mcg total) by mouth before breakfast. 90 tablet 3    fluorometholone 0.1 % Ophthalmic Suspension Place 1 drop into both eyes 4 (four) times daily.      fluticasone propionate 50 MCG/ACT Nasal Suspension 2 sprays by Each Nare route daily for 360 doses. 3 each 3    pantoprazole 40 MG Oral Tab EC Take 1 tablet (40 mg total) by mouth every morning before breakfast. To help with stomach acid and stomach irritation/inflammation 90 tablet 3     Allergies:  Allergies   Allergen Reactions    Cymbalta [Duloxetine Hcl] SHORTNESS OF BREATH and ANXIETY    Other SHORTNESS OF BREATH     SOME TOPICAL NUMBING SPRAY USED WHEN STARTING AN I.V. - cough and shortness of breath      Sulfa Antibiotics HIVES and RASH    Tramadol ITCHING     THROAT ITCHING AND FEELS LIKE IT WAS CLOSING     Ciprofloxacin RASH    Acetaminophen OTHER (SEE COMMENTS)     Fatty Liver    Lyrica [Pregabalin] RASH        Physical Exam:       Physical Exam  Blood pressure 126/79, pulse 73, temperature 97.2 °F (36.2 °C), height 5' 2\" (1.575 m), weight 179 lb 9.6 oz, last menstrual period 03/22/2017, not currently breastfeeding.      Physical Exam   Constitutional: Patient is oriented to person, place, and time. Patient appears well-developed and well-nourished. No distress.    Head: Normocephalic.   Eyes: Conjunctivae and EOM are normal.   Neck: Normal range of motion. No thyromegaly present.   Lymphadenopathy: Patient has no cervical adenopathy.  Neurological: Patient is alert and oriented to person, place, and time. Positive Tinel's that goes up to the neck from her hand. Negative Tinel's on the left.  Otherwise no atrophy of the hands.  Skin: Skin is warm. No obvious bruises seen.   Psychiatry: Positive anxious affect.  Right hand: She has full range of motion of her fingers.  There is no locking.  There is no Dupuytren's contracture.  There is no swelling.  She does complain of some tenderness over the PIP and DIP joints of the third, fourth and fifth finger but no ligament laxity.  Full range of motion of her elbow.  Vitals reviewed.           Assessment/Plan:      Diagnoses and all orders for this visit:    Chronic hand pain, right  -     PLASTIC SURGERY - INTERNAL  Was able to find the EMG in the system and reviewed this with her.  She has failed conservative treatment and would like to talk to someone about possible surgery for carpal tunnel syndrome.  Paresthesia of right arm  -     PLASTIC SURGERY - INTERNAL    Carpal tunnel syndrome of right wrist  -     PLASTIC SURGERY - INTERNAL    Cognitive changes  She did see a neurologist and there is an MRI of the brain order with sedation.  She wanted to know to see if it was still valid and I told her it was  Anxiety  -     LORazepam 1 MG Oral Tab; Take 1 tablet (1 mg total) by mouth daily as needed for Anxiety.  We will try Ativan as needed  Spontaneous ecchymosis  I already let her know that her CBC and platelets are normal.  I do not see any apparent bruises at this time.  I told her some people bruise easily but many times this is not something that is dangerous.      Referrals (if applicable)  Orders Placed This Encounter   Procedures    PLASTIC SURGERY - INTERNAL     EMG in July 2021.  It did show mild median neuropathy on the  right but she has failed wearing a brace for a year and even having a steroid shot put into the right wrist by Dr. Das.     Referral Priority:   Routine     Referral Type:   OFFICE VISIT     Referred to Provider:   Terrance Costa MD     Requested Specialty:   SURGERY, PLASTIC     Number of Visits Requested:   3       Follow up if symptoms persist.  Take medicine (if given) as prescribed.  Approach to treatment discussed and patient/family member understands and agrees to plan.     Return in about 5 weeks (around 7/11/2024) for Physical Exam.    There are no Patient Instructions on file for this visit.    Ramiro Navarro    6/6/2024    By signing my name below, IRamiro,  attest that this documentation has been prepared under the direction and in the presence of Manoj Weaver DO.   Electronically Signed: Ramiro Navarro, 6/6/2024, 3:41 PM.    I, Manoj Weaver DO,  personally performed the services described in this documentation. All medical record entries made by the scribe were at my direction and in my presence.  I have reviewed the chart and discharge instructions (if applicable) and agree that the record reflects my personal performance and is accurate and complete.  Manoj Weaver DO, 6/6/2024, 4:35 PM

## 2024-06-27 ENCOUNTER — TELEPHONE (OUTPATIENT)
Dept: FAMILY MEDICINE CLINIC | Facility: CLINIC | Age: 44
End: 2024-06-27

## 2024-06-27 ENCOUNTER — TELEPHONE (OUTPATIENT)
Dept: SURGERY | Facility: CLINIC | Age: 44
End: 2024-06-27

## 2024-06-27 DIAGNOSIS — G56.01 CARPAL TUNNEL SYNDROME OF RIGHT WRIST: ICD-10-CM

## 2024-06-27 DIAGNOSIS — R20.2 PARESTHESIA OF RIGHT ARM: Primary | ICD-10-CM

## 2024-06-27 NOTE — TELEPHONE ENCOUNTER
Joel Ramos     AS    6/27/24  2:34 PM  Note     Pt called requesting appt to see Dr. Evans for carpal tunnel. Pt had EMG done in 2021 informed pt she would need a new EMG done and once she gets it scheduled to call our office back so we can get her scheduled. Pt understood and is going back to pcp to get EMG order. Used  services ID #[id number]

## 2024-06-27 NOTE — TELEPHONE ENCOUNTER
Patient contacts clinic reporting she is still having symptoms in her right hand and arm.  Has had difficulty getting through to Dr. Evans office. Nurse contacted specialist and transferred patient for appointment scheduling.

## 2024-06-27 NOTE — TELEPHONE ENCOUNTER
Pt called requesting appt to see Dr. Evans for carpal tunnel. Pt had EMG done in 2021 informed pt she would need a new EMG done and once she gets it scheduled to call our office back so we can get her scheduled. Pt understood and is going back to pcp to get EMG order. Used  services ID #[id number]

## 2024-06-27 NOTE — TELEPHONE ENCOUNTER
Patient is requesting an order - patient mentions the last one completed was in 2021 and in order to get scheduled for an appointment with Dr. Terrance Evans, the provider requires the results from this test.   Patient is requesting a call when order is complete for scheduling.    Please advise.     EMG  In regards to the carpel tunnel

## 2024-08-02 NOTE — TELEPHONE ENCOUNTER
7049 1 Magnetic resonance imaging, breast, without and with contrast material(s), including computer-aided detection (CAD real-time lesion detection, characterization and pharmacokinetic analysis), when performed; bilateral Denied Based on [employer] Breast Imaging Guidelines, we are unable to approve the requested procedure. Guidelines support a biopsy of a suspicious or indeterminate breast lesion prior to considering advanced imaging. The clinical information provided does not describe these results and, therefore, the requested imaging is not indicated at this time.    Peer to Peer available by calling [phone] option 4 case # [id number]
Dr. Higinio Ulloa patient is requesting a referral to return to see Dr. Omi Hammonds.
Please see message below, advise
The MRI of your breast and axilla are denied by her insurance. We can send you back to Dr. Mishra Going the general surgeon to see if there is any other thing he would do although in my opinion the mammogram that you had did not show anything suspicious.
What insurance wants you to do is follow the radiologist advice to redo the mammogram and ultrasound in June 2019 to see if everything is stable. If so, then the chance of cancer is much less.
,

## 2024-08-14 DIAGNOSIS — E66.9 OBESITY (BMI 30-39.9): ICD-10-CM

## 2024-08-14 RX ORDER — DIETHYLPROPION HYDROCHLORIDE 25 MG/1
1 TABLET ORAL 2 TIMES DAILY
Qty: 120 TABLET | Refills: 4 | Status: SHIPPED | OUTPATIENT
Start: 2024-08-14

## 2024-08-20 ENCOUNTER — OFFICE VISIT (OUTPATIENT)
Dept: FAMILY MEDICINE CLINIC | Facility: CLINIC | Age: 44
End: 2024-08-20

## 2024-08-20 ENCOUNTER — LAB ENCOUNTER (OUTPATIENT)
Dept: LAB | Age: 44
End: 2024-08-20
Attending: NURSE PRACTITIONER
Payer: COMMERCIAL

## 2024-08-20 VITALS
BODY MASS INDEX: 31.65 KG/M2 | HEIGHT: 62 IN | HEART RATE: 89 BPM | WEIGHT: 172 LBS | DIASTOLIC BLOOD PRESSURE: 59 MMHG | SYSTOLIC BLOOD PRESSURE: 129 MMHG

## 2024-08-20 DIAGNOSIS — K76.0 HEPATIC STEATOSIS: ICD-10-CM

## 2024-08-20 DIAGNOSIS — Z00.00 WELL ADULT EXAM: ICD-10-CM

## 2024-08-20 DIAGNOSIS — M51.36 DEGENERATIVE DISC DISEASE, LUMBAR: ICD-10-CM

## 2024-08-20 DIAGNOSIS — E78.5 DYSLIPIDEMIA: ICD-10-CM

## 2024-08-20 DIAGNOSIS — F32.A DEPRESSIVE DISORDER: ICD-10-CM

## 2024-08-20 DIAGNOSIS — E88.819 INSULIN RESISTANCE: ICD-10-CM

## 2024-08-20 DIAGNOSIS — E03.9 ACQUIRED HYPOTHYROIDISM: ICD-10-CM

## 2024-08-20 DIAGNOSIS — R74.8 ELEVATED LIVER ENZYMES: ICD-10-CM

## 2024-08-20 DIAGNOSIS — R73.09 ELEVATED GLUCOSE: ICD-10-CM

## 2024-08-20 DIAGNOSIS — M50.122 CERVICAL DISC DISORDER AT C5-C6 LEVEL WITH RADICULOPATHY: ICD-10-CM

## 2024-08-20 DIAGNOSIS — K21.9 GASTROESOPHAGEAL REFLUX DISEASE, UNSPECIFIED WHETHER ESOPHAGITIS PRESENT: ICD-10-CM

## 2024-08-20 DIAGNOSIS — L30.9 DERMATITIS: ICD-10-CM

## 2024-08-20 DIAGNOSIS — E55.9 VITAMIN D DEFICIENCY: ICD-10-CM

## 2024-08-20 DIAGNOSIS — B35.3 TINEA PEDIS OF LEFT FOOT: ICD-10-CM

## 2024-08-20 DIAGNOSIS — E66.9 OBESITY (BMI 30-39.9): ICD-10-CM

## 2024-08-20 DIAGNOSIS — E53.8 VITAMIN B12 DEFICIENCY: ICD-10-CM

## 2024-08-20 DIAGNOSIS — F41.9 ANXIETY: ICD-10-CM

## 2024-08-20 DIAGNOSIS — J30.1 SEASONAL ALLERGIC RHINITIS DUE TO POLLEN: ICD-10-CM

## 2024-08-20 DIAGNOSIS — Z00.00 WELL ADULT EXAM: Primary | ICD-10-CM

## 2024-08-20 PROBLEM — H60.501 ACUTE OTITIS EXTERNA OF RIGHT EAR: Status: RESOLVED | Noted: 2023-06-06 | Resolved: 2024-08-20

## 2024-08-20 PROBLEM — R43.2 ABNORMAL TASTE IN MOUTH: Status: RESOLVED | Noted: 2019-08-07 | Resolved: 2024-08-20

## 2024-08-20 PROBLEM — R39.15 URGENCY OF URINATION: Status: RESOLVED | Noted: 2022-02-14 | Resolved: 2024-08-20

## 2024-08-20 PROBLEM — G89.29 CHRONIC LEFT-SIDED LOW BACK PAIN WITHOUT SCIATICA: Status: RESOLVED | Noted: 2022-02-14 | Resolved: 2024-08-20

## 2024-08-20 PROBLEM — S16.1XXA CERVICAL STRAIN: Status: RESOLVED | Noted: 2022-09-14 | Resolved: 2024-08-20

## 2024-08-20 PROBLEM — N94.19 DYSPAREUNIA DUE TO MEDICAL CONDITION IN FEMALE: Status: RESOLVED | Noted: 2020-09-17 | Resolved: 2024-08-20

## 2024-08-20 PROBLEM — N64.4 BREAST PAIN, LEFT: Status: RESOLVED | Noted: 2022-10-09 | Resolved: 2024-08-20

## 2024-08-20 PROBLEM — M54.50 CHRONIC LEFT-SIDED LOW BACK PAIN WITHOUT SCIATICA: Status: RESOLVED | Noted: 2022-02-14 | Resolved: 2024-08-20

## 2024-08-20 PROBLEM — M50.20 CERVICAL HERNIATED DISC: Status: RESOLVED | Noted: 2022-06-21 | Resolved: 2024-08-20

## 2024-08-20 PROBLEM — L85.3 DRY SKIN: Status: RESOLVED | Noted: 2020-11-19 | Resolved: 2024-08-20

## 2024-08-20 PROBLEM — E87.6 HYPOKALEMIA: Status: RESOLVED | Noted: 2019-08-07 | Resolved: 2024-08-20

## 2024-08-20 LAB
ALBUMIN SERPL-MCNC: 4.4 G/DL (ref 3.2–4.8)
ALBUMIN/GLOB SERPL: 1.5 {RATIO} (ref 1–2)
ALP LIVER SERPL-CCNC: 86 U/L
ALT SERPL-CCNC: 35 U/L
ANION GAP SERPL CALC-SCNC: 7 MMOL/L (ref 0–18)
AST SERPL-CCNC: 30 U/L (ref ?–34)
BILIRUB SERPL-MCNC: 0.5 MG/DL (ref 0.3–1.2)
BUN BLD-MCNC: 8 MG/DL (ref 9–23)
BUN/CREAT SERPL: 10.5 (ref 10–20)
CALCIUM BLD-MCNC: 9.3 MG/DL (ref 8.7–10.4)
CHLORIDE SERPL-SCNC: 107 MMOL/L (ref 98–112)
CHOLEST SERPL-MCNC: 203 MG/DL (ref ?–200)
CO2 SERPL-SCNC: 25 MMOL/L (ref 21–32)
CREAT BLD-MCNC: 0.76 MG/DL
DEPRECATED RDW RBC AUTO: 40.4 FL (ref 35.1–46.3)
EGFRCR SERPLBLD CKD-EPI 2021: 100 ML/MIN/1.73M2 (ref 60–?)
ERYTHROCYTE [DISTWIDTH] IN BLOOD BY AUTOMATED COUNT: 12.3 % (ref 11–15)
EST. AVERAGE GLUCOSE BLD GHB EST-MCNC: 103 MG/DL (ref 68–126)
FASTING PATIENT LIPID ANSWER: YES
FASTING STATUS PATIENT QL REPORTED: YES
GLOBULIN PLAS-MCNC: 3 G/DL (ref 2–3.5)
GLUCOSE BLD-MCNC: 98 MG/DL (ref 70–99)
HBA1C MFR BLD: 5.2 % (ref ?–5.7)
HCT VFR BLD AUTO: 41.1 %
HDLC SERPL-MCNC: 41 MG/DL (ref 40–59)
HGB BLD-MCNC: 13.9 G/DL
INSULIN SERPL-ACNC: 44.6 MU/L (ref 3–25)
LDLC SERPL CALC-MCNC: 141 MG/DL (ref ?–100)
MCH RBC QN AUTO: 30.7 PG (ref 26–34)
MCHC RBC AUTO-ENTMCNC: 33.8 G/DL (ref 31–37)
MCV RBC AUTO: 90.7 FL
NONHDLC SERPL-MCNC: 162 MG/DL (ref ?–130)
OSMOLALITY SERPL CALC.SUM OF ELEC: 286 MOSM/KG (ref 275–295)
PLATELET # BLD AUTO: 307 10(3)UL (ref 150–450)
POTASSIUM SERPL-SCNC: 4.1 MMOL/L (ref 3.5–5.1)
PROT SERPL-MCNC: 7.4 G/DL (ref 5.7–8.2)
RBC # BLD AUTO: 4.53 X10(6)UL
SODIUM SERPL-SCNC: 139 MMOL/L (ref 136–145)
T3FREE SERPL-MCNC: 2.84 PG/ML (ref 2.4–4.2)
T4 FREE SERPL-MCNC: 1.6 NG/DL (ref 0.8–1.7)
TRIGL SERPL-MCNC: 117 MG/DL (ref 30–149)
TSI SER-ACNC: 0.29 MIU/ML (ref 0.55–4.78)
VIT B12 SERPL-MCNC: 868 PG/ML (ref 211–911)
VIT D+METAB SERPL-MCNC: 42.5 NG/ML (ref 30–100)
VLDLC SERPL CALC-MCNC: 22 MG/DL (ref 0–30)
WBC # BLD AUTO: 5.6 X10(3) UL (ref 4–11)

## 2024-08-20 PROCEDURE — 84443 ASSAY THYROID STIM HORMONE: CPT

## 2024-08-20 PROCEDURE — 36415 COLL VENOUS BLD VENIPUNCTURE: CPT

## 2024-08-20 PROCEDURE — 80061 LIPID PANEL: CPT

## 2024-08-20 PROCEDURE — 84481 FREE ASSAY (FT-3): CPT

## 2024-08-20 PROCEDURE — 83036 HEMOGLOBIN GLYCOSYLATED A1C: CPT

## 2024-08-20 PROCEDURE — 84439 ASSAY OF FREE THYROXINE: CPT

## 2024-08-20 PROCEDURE — 80053 COMPREHEN METABOLIC PANEL: CPT

## 2024-08-20 PROCEDURE — 83525 ASSAY OF INSULIN: CPT

## 2024-08-20 PROCEDURE — 82306 VITAMIN D 25 HYDROXY: CPT

## 2024-08-20 PROCEDURE — 85027 COMPLETE CBC AUTOMATED: CPT

## 2024-08-20 PROCEDURE — 82607 VITAMIN B-12: CPT

## 2024-08-20 RX ORDER — LOTEPREDNOL ETABONATE 5 MG/ML
1 SUSPENSION/ DROPS OPHTHALMIC 2 TIMES DAILY
COMMUNITY
Start: 2024-06-10

## 2024-08-20 RX ORDER — KETOCONAZOLE 20 MG/G
CREAM TOPICAL
Qty: 60 G | Refills: 1 | Status: SHIPPED | OUTPATIENT
Start: 2024-08-20

## 2024-08-20 RX ORDER — MOXIFLOXACIN 5 MG/ML
1 SOLUTION/ DROPS OPHTHALMIC 2 TIMES DAILY
COMMUNITY

## 2024-08-20 RX ORDER — CLOTRIMAZOLE AND BETAMETHASONE DIPROPIONATE 10; .64 MG/G; MG/G
1 CREAM TOPICAL 2 TIMES DAILY
Qty: 60 G | Refills: 1 | Status: SHIPPED | OUTPATIENT
Start: 2024-08-20

## 2024-08-20 NOTE — ASSESSMENT & PLAN NOTE
On ozempic  Check cmp, insulin and hga1c  I recommend that you try/continue to decrease the amount of carbohydrates that you ingest (bread products, rice, pasta, potatoes, cereals, starchy vegetables and high sugar containing foods and  beverages). Also try to increase the amount of vegetables and protein that you eat daily.  Decrease the amount of processed and fast foods. Try to exercise at least 30 minutes per day, 4-5 times per week, combination of cardio and weight training.

## 2024-08-20 NOTE — PROGRESS NOTES
HPI  Pt Her for annual physical .   Is having cold symptoms for the past week or so. Is taking advil and tylenol for symptoms.       Diet-well rounded diet  Yyhwsvak-cpqcl-hsv low back pain-had sx 4 years ago  Sleep-had to fall asleep due pain in whole body. Takes gabapentin       Sees Marcela Syed and Pippa for back and neck pains.   Mammo scheduled for 9/14/24-does sbe    Takes ozempic for weight loss-is off due to recent eye surgery-will be restarting; sees bariatrics      Review of Systems   Constitutional:  Positive for activity change and fatigue. Negative for appetite change, fever and unexpected weight change.   HENT:  Positive for congestion, ear pain and rhinorrhea. Negative for sneezing, sore throat and trouble swallowing.    Eyes:  Negative for pain, redness and visual disturbance.   Respiratory:  Positive for cough. Negative for chest tightness, shortness of breath and wheezing.    Cardiovascular:  Negative for chest pain and palpitations.   Gastrointestinal:  Negative for abdominal distention, abdominal pain, constipation, diarrhea, nausea and vomiting.   Genitourinary:  Negative for dysuria.   Musculoskeletal:  Positive for arthralgias, back pain, myalgias and neck pain. Negative for gait problem.   Skin:  Negative for color change and rash.   Neurological:  Negative for dizziness, weakness and headaches.   Psychiatric/Behavioral:  Positive for sleep disturbance (due to pain).        Vitals:    08/20/24 1551   BP: 129/59   Pulse: 89   Weight: 172 lb (78 kg)   Height: 5' 2\" (1.575 m)     Body mass index is 31.46 kg/m².  Wt Readings from Last 6 Encounters:   08/20/24 172 lb (78 kg)   06/06/24 179 lb 9.6 oz (81.5 kg)   05/08/24 180 lb (81.6 kg)   05/07/24 180 lb (81.6 kg)   04/25/24 179 lb (81.2 kg)   04/22/24 175 lb (79.4 kg)        Health Maintenance   Topic Date Due    Annual Physical  03/22/2023    COVID-19 Vaccine (3 - 2023-24 season) 09/01/2023    Mammogram  06/13/2024    Influenza Vaccine (1)  10/01/2024    DTaP,Tdap,and Td Vaccines (3 - Td or Tdap) 2032    Annual Depression Screening  Completed    Pneumococcal Vaccine: Birth to 64yrs  Aged Out       Patient's last menstrual period was 2017.    Past Medical History:    Anxiety state    Back problem    Calculus of kidney    Depression    Disorder of liver    FATTY LIVER     Disorder of thyroid    hypothyroid    Endometriosis    Esophageal reflux    Excessive or frequent menstruation    High cholesterol    History of Papanicolaou smear of cervix    Hyperlipidemia    IBS (irritable bowel syndrome)    Migraines    Per [facility] - Migraine headaches    Mittelschmerz    Osteoarthritis    Ovarian cyst    Postpartum depression    Prediabetes    Pregnancy (HCC)        Spondylolisthesis of lumbosacral region    Uterine leiomyoma    Visual impairment    WEARS GLASSES    Vitamin B12 deficiency    Vitamin D deficiency       .  Past Surgical History:   Procedure Laterality Date    Cervical spine surgery  2023    C5-C6 Arthroplasty     Cholecystectomy      Colonoscopy  2004    Cyst removal  ,     laporoscopic ovarian cytectomy    Hysterectomy      Laparoscopy,diagnostic  2016    laparoscopy, filshie clip sterilization of left fallopian tube, ablation of endometriosis, retrieval and removal of displaced filshie clip    Lipoma removal Left 2018    Dorian biopsy stereo nodule 1 site left (cpt=19081)  05/10/2022    top hat    Dorian localization wire 1 site left (cpt=19281)  2022    Other  2019    low back surgery with metal implant    Total abdominal hysterectomy N/A 2017    Procedure: ABDOMINAL HYSTERECTOMY;  Surgeon: Juvencio Decker MD;  Location: [facility] MAIN OR       Family History   Problem Relation Age of Onset    Diabetes Father         Type 2    Hypertension Mother     Lipids Mother         Hyperlipidemia    Diabetes Mother         Type 2    Diabetes Sister     Hypertension Sister     Lipids Brother      Glaucoma Neg     Macular degeneration Neg        Social History     Socioeconomic History    Marital status:      Spouse name: Not on file    Number of children: 3    Years of education: Not on file    Highest education level: Not on file   Occupational History    Not on file   Tobacco Use    Smoking status: Former     Types: Cigarettes    Smokeless tobacco: Never   Vaping Use    Vaping status: Never Used   Substance and Sexual Activity    Alcohol use: Not Currently     Comment: socially    Drug use: No    Sexual activity: Yes     Birth control/protection: Hysterectomy   Other Topics Concern     Service Not Asked    Blood Transfusions Not Asked    Caffeine Concern No     Comment: 1 cup coffee daily    Occupational Exposure Not Asked    Hobby Hazards Not Asked    Sleep Concern Not Asked    Stress Concern Not Asked    Weight Concern Not Asked    Special Diet Not Asked    Back Care Not Asked    Exercise No    Bike Helmet Not Asked    Seat Belt Not Asked    Self-Exams Not Asked   Social History Narrative    The patient does not use an assistive device..      The patient does live in a home with stairs.     Social Determinants of Health     Financial Resource Strain: Low Risk  (9/21/2023)    Financial Resource Strain     Difficulty of Paying Living Expenses: Not hard at all     Med Affordability: No   Food Insecurity: Not on file   Transportation Needs: No Transportation Needs (9/21/2023)    Transportation Needs     Lack of Transportation: No   Physical Activity: Not on file   Stress: Not on file   Social Connections: Not on file   Housing Stability: Not on file       Current Outpatient Medications   Medication Sig Dispense Refill    loteprednol 0.5 % Ophthalmic Suspension Place 1 drop into both eyes 2 (two) times daily.      semaglutide 2 MG/1.5ML Subcutaneous Solution Pen-injector Inject into the skin once a week.      moxifloxacin 0.5 % Ophthalmic Solution Place 1 drop into both eyes in the morning and  1 drop before bedtime.      clotrimazole-betamethasone 1-0.05 % External Cream Apply 1 Application topically 2 (two) times daily. To right side of nose. 60 g 1    ketoconazole 2 % External Cream Apply to affected area on left foot bid-continue to use for 5 days after all evidence of rash or redness has resolved 60 g 1    DIETHYLPROPION HCL 25 MG Oral Tab TAKE 1 TABLET BY MOUTH IN THE MORNING AND 1 TABLET BY MOUTH AT BEDTIME 120 tablet 4    METFORMIN  MG Oral Tablet 24 Hr TAKE 1 TABLET BY MOUTH EVERY DAY 90 tablet 4    gabapentin 300 MG Oral Cap Take 1 capsule (300 mg total) by mouth. 1 tab AM, 2 tab QHS      Naproxen Sodium (ALEVE OR) Take 1 tablet by mouth daily as needed.      levothyroxine 50 MCG Oral Tab Take 1 tablet (50 mcg total) by mouth before breakfast. 90 tablet 3    fluorometholone 0.1 % Ophthalmic Suspension Place 1 drop into both eyes 4 (four) times daily.      fluticasone propionate 50 MCG/ACT Nasal Suspension 2 sprays by Each Nare route daily for 360 doses. 3 each 3    pantoprazole 40 MG Oral Tab EC Take 1 tablet (40 mg total) by mouth every morning before breakfast. To help with stomach acid and stomach irritation/inflammation 90 tablet 3    ubrogepant (UBRELVY) 50 MG Oral Tab Take one tablet at onset of migraine.  May take additional tablet in 2 hours if needed.  Do not exceed two tablets per 24 hour period. 10 tablet 0       Allergies:  Allergies   Allergen Reactions    Cymbalta [Duloxetine Hcl] SHORTNESS OF BREATH and ANXIETY    Other SHORTNESS OF BREATH     SOME TOPICAL NUMBING SPRAY USED WHEN STARTING AN I.V. - cough and shortness of breath      Sulfa Antibiotics HIVES and RASH    Tramadol ITCHING     THROAT ITCHING AND FEELS LIKE IT WAS CLOSING     Ciprofloxacin RASH    Acetaminophen OTHER (SEE COMMENTS)     Fatty Liver    Lyrica [Pregabalin] RASH       Physical Exam  Vitals and nursing note reviewed.   Constitutional:       General: She is not in acute distress.     Appearance: She is  well-developed. She is obese.   HENT:      Head: Normocephalic and atraumatic.        Right Ear: Tympanic membrane, ear canal and external ear normal.      Left Ear: Tympanic membrane, ear canal and external ear normal.      Nose: Congestion and rhinorrhea present.      Comments: Pale, boggy turbinates bilaterally; clear rhinorrhea present       Mouth/Throat:      Mouth: Mucous membranes are moist.      Pharynx: Oropharynx is clear. No oropharyngeal exudate or posterior oropharyngeal erythema.   Eyes:      General:         Right eye: No discharge.         Left eye: No discharge.      Conjunctiva/sclera: Conjunctivae normal.      Pupils: Pupils are equal, round, and reactive to light.   Neck:      Thyroid: No thyromegaly.   Cardiovascular:      Rate and Rhythm: Normal rate and regular rhythm.      Heart sounds: Normal heart sounds. No murmur heard.  Pulmonary:      Effort: Pulmonary effort is normal. No respiratory distress.      Breath sounds: Normal breath sounds. No wheezing or rales.   Chest:      Chest wall: No tenderness.   Abdominal:      General: Bowel sounds are normal. There is no distension.      Palpations: Abdomen is soft.      Tenderness: There is no abdominal tenderness.   Musculoskeletal:         General: Tenderness (neck, low back) present.      Cervical back: Neck supple. Tenderness present. Decreased range of motion.      Lumbar back: Tenderness present. Decreased range of motion.        Back:         Feet:    Feet:      Left foot:      Skin integrity: Erythema and dry skin present.   Lymphadenopathy:      Cervical: No cervical adenopathy.   Skin:     General: Skin is warm and dry.      Findings: Erythema and rash present.   Neurological:      Mental Status: She is alert and oriented to person, place, and time.      Coordination: Coordination normal.   Psychiatric:         Behavior: Behavior normal.         Thought Content: Thought content normal.         Judgment: Judgment normal.          Assessment and Plan:  Problem List Items Addressed This Visit       Anxiety     Continue present management           Cervical disc disorder at C5-C6 level with radiculopathy     Sees physiatry and neuro sx  cpm         Degenerative disc disease, lumbar     Sees physiatry and neuro sx  cpm         Depressive disorder     cpm         Dermatitis     Clotrimazole-betamethasone 1-0.05% twice a day  Please call if symptoms worsen or are not resolving.           Relevant Medications    clotrimazole-betamethasone 1-0.05 % External Cream    ketoconazole 2 % External Cream    Dyslipidemia     Please aim to eat a diet high in fresh fruits and vegetables, lean protein sources, complex carbohydrates and limited processed and fast foods.  Try to get at least 150 minutes of exercise per week-a combination of weight resistance and cardio is preferred.    Check lipids         Relevant Medications    semaglutide 2 MG/1.5ML Subcutaneous Solution Pen-injector    Elevated glucose     On ozempic  Check cmp, insulin and hga1c  I recommend that you try/continue to decrease the amount of carbohydrates that you ingest (bread products, rice, pasta, potatoes, cereals, starchy vegetables and high sugar containing foods and  beverages). Also try to increase the amount of vegetables and protein that you eat daily.  Decrease the amount of processed and fast foods. Try to exercise at least 30 minutes per day, 4-5 times per week, combination of cardio and weight training.              Relevant Medications    semaglutide 2 MG/1.5ML Subcutaneous Solution Pen-injector    Other Relevant Orders    Comp Metabolic Panel (14)    Hemoglobin A1C    Elevated liver enzymes     Check cmp  Please aim to eat a diet high in fresh fruits and vegetables, lean protein sources, complex carbohydrates and limited processed and fast foods.  Try to get at least 150 minutes of exercise per week-a combination of weight resistance and cardio is preferred.     Encourage weight loss-on wegovy         Relevant Orders    Comp Metabolic Panel (14)    Gastroesophageal reflux disease     Continue pantoprazole  Limit fried, spicy and acidic foods and drinks  Please call if symptoms worsen or are not resolving.           Hepatic steatosis     Check cmp  Please aim to eat a diet high in fresh fruits and vegetables, lean protein sources, complex carbohydrates and limited processed and fast foods.  Try to get at least 150 minutes of exercise per week-a combination of weight resistance and cardio is preferred.             Relevant Orders    Comp Metabolic Panel (14)    Hypothyroidism     Heck tsh  Continue levothyroxine 50 mcg daily         Relevant Orders    TSH W Reflex To Free T4    Insulin resistance     I recommend that you try/continue to decrease the amount of carbohydrates that you ingest (bread products, rice, pasta, potatoes, cereals, starchy vegetables and high sugar containing foods and  beverages). Also try to increase the amount of vegetables and protein that you eat daily.  Decrease the amount of processed and fast foods. Try to exercise at least 30 minutes per day, 4-5 times per week, combination of cardio and weight training.     Check insulin levels  Continue wegovy         Relevant Medications    semaglutide 2 MG/1.5ML Subcutaneous Solution Pen-injector    Other Relevant Orders    Insulin    Obesity (BMI 30-39.9)     I recommend that you try/continue to decrease the amount of carbohydrates that you ingest (bread products, rice, pasta, potatoes, cereals, starchy vegetables and high sugar containing foods and  beverages). Also try to increase the amount of vegetables and protein that you eat daily.  Decrease the amount of processed and fast foods. Try to exercise at least 30 minutes per day, 4-5 times per week, combination of cardio and weight training.     Continue Wegovy         Relevant Medications    semaglutide 2 MG/1.5ML Subcutaneous Solution Pen-injector     Other Relevant Orders    Hemoglobin A1C    Insulin    Seasonal allergic rhinitis due to pollen    Tinea pedis of left foot     Ketoconazole 2% twice a day -use 5 days after all redness has resolved  Please call if symptoms worsen or are not resolving.           Relevant Medications    clotrimazole-betamethasone 1-0.05 % External Cream    ketoconazole 2 % External Cream    Vitamin B12 deficiency     Check vitamin b12         Relevant Medications    semaglutide 2 MG/1.5ML Subcutaneous Solution Pen-injector    Other Relevant Orders    Vitamin B12    Vitamin D deficiency     Check vitamin d         Relevant Medications    semaglutide 2 MG/1.5ML Subcutaneous Solution Pen-injector    Other Relevant Orders    Vitamin D     Other Visit Diagnoses       Well adult exam    -  Primary    Relevant Orders    CBC, Platelet; No Differential    Comp Metabolic Panel (14)    Hemoglobin A1C    Insulin    Lipid Panel    TSH W Reflex To Free T4    Vitamin B12    Vitamin D                        Discussed plan of care with pt and pt is in agreement.All questions answered. Pt to call with questions or concerns.    Encouraged to sign up for My Chart if not already registered.     Note to patient and family:  The 21st Century Cures Act makes medical notes available to patients in the interest of transparency.  However, please be advised that this is a medical document.  It is intended as a peer to peer communication.  It is written in medical language and may contain abbreviations or verbiage that are technical and unfamiliar.  It may appear blunt or direct.  Medical documents are intended to carry relevant information, facts as evident, and the clinical opinion of the practitioner.

## 2024-08-20 NOTE — ASSESSMENT & PLAN NOTE
Check cmp  Please aim to eat a diet high in fresh fruits and vegetables, lean protein sources, complex carbohydrates and limited processed and fast foods.  Try to get at least 150 minutes of exercise per week-a combination of weight resistance and cardio is preferred.

## 2024-08-20 NOTE — ASSESSMENT & PLAN NOTE
Please aim to eat a diet high in fresh fruits and vegetables, lean protein sources, complex carbohydrates and limited processed and fast foods.  Try to get at least 150 minutes of exercise per week-a combination of weight resistance and cardio is preferred.    Check lipids

## 2024-08-20 NOTE — ASSESSMENT & PLAN NOTE
Continue pantoprazole  Limit fried, spicy and acidic foods and drinks  Please call if symptoms worsen or are not resolving.

## 2024-08-20 NOTE — ASSESSMENT & PLAN NOTE
Screening labs  Please aim to eat a diet high in fresh fruits and vegetables, lean protein sources, complex carbohydrates and limited processed and fast foods.  Try to get at least 150 minutes of exercise per week-a combination of weight resistance and cardio is preferred.    Mammogram scheduled 9/14/2024  Encourage monthly self breast exam  H/o hysterectomy  Up to date vaccines

## 2024-08-20 NOTE — ASSESSMENT & PLAN NOTE
I recommend that you try/continue to decrease the amount of carbohydrates that you ingest (bread products, rice, pasta, potatoes, cereals, starchy vegetables and high sugar containing foods and  beverages). Also try to increase the amount of vegetables and protein that you eat daily.  Decrease the amount of processed and fast foods. Try to exercise at least 30 minutes per day, 4-5 times per week, combination of cardio and weight training.     Continue Wegovy

## 2024-08-20 NOTE — ASSESSMENT & PLAN NOTE
Ketoconazole 2% twice a day -use 5 days after all redness has resolved  Please call if symptoms worsen or are not resolving.

## 2024-08-20 NOTE — ASSESSMENT & PLAN NOTE
Clotrimazole-betamethasone 1-0.05% twice a day  Please call if symptoms worsen or are not resolving.

## 2024-08-20 NOTE — ASSESSMENT & PLAN NOTE
I recommend that you try/continue to decrease the amount of carbohydrates that you ingest (bread products, rice, pasta, potatoes, cereals, starchy vegetables and high sugar containing foods and  beverages). Also try to increase the amount of vegetables and protein that you eat daily.  Decrease the amount of processed and fast foods. Try to exercise at least 30 minutes per day, 4-5 times per week, combination of cardio and weight training.     Check insulin levels  Continue wegovy

## 2024-08-20 NOTE — ASSESSMENT & PLAN NOTE
Check cmp  Please aim to eat a diet high in fresh fruits and vegetables, lean protein sources, complex carbohydrates and limited processed and fast foods.  Try to get at least 150 minutes of exercise per week-a combination of weight resistance and cardio is preferred.    Encourage weight loss-on wegovy

## 2024-08-22 DIAGNOSIS — K21.9 GASTROESOPHAGEAL REFLUX DISEASE, UNSPECIFIED WHETHER ESOPHAGITIS PRESENT: ICD-10-CM

## 2024-08-23 RX ORDER — PANTOPRAZOLE SODIUM 40 MG/1
40 TABLET, DELAYED RELEASE ORAL
Qty: 90 TABLET | Refills: 3 | Status: SHIPPED | OUTPATIENT
Start: 2024-08-23

## 2024-08-23 NOTE — TELEPHONE ENCOUNTER
Refill passed per [facility] protocol.  Requested Prescriptions   Pending Prescriptions Disp Refills    PANTOPRAZOLE 40 MG Oral Tab EC [Pharmacy Med Name: PANTOPRAZOLE SODIUM 40MG TAB] 90 tablet 10     Sig: Take 1 tablet (40 mg total) by mouth every morning before breakfast. To help with stomach acid and stomach irritation/inflammation       Gastrointestional Medication Protocol Passed - 8/22/2024  5:23 PM        Passed - In person appointment or virtual visit in the past 12 mos or appointment in next 3 mos     Recent Outpatient Visits              3 days ago Well adult exam    [facility], Pearl Wu, BASIL    Office Visit    2 months ago Chronic hand pain, right    [facility], PershingManoj Mercedes, DO    Office Visit    3 months ago SAMMY positive    [facility] Anna Peng, DO    Office Visit    3 months ago Well woman exam with routine gynecological exam    [facility], [address], [place] - OB/GYN Juvencio Decker MD    Office Visit    4 months ago Irritable behavior    [facility], Manoj Lopez, DO    Office Visit          Future Appointments         Provider Department Appt Notes    In 2 weeks Reed Bosch MD [facility] EMG-B[facility]    In 3 weeks [facility] DEXA RM1; [facility] - [place]                        Future Appointments         Provider Department Appt Notes    [facility]    In 3 weeks [facility] DEXA RM1; [facility] RUSSELL RM1 [facility] - [place]           Recent Outpatient Visits              3 days ago Well adult exam    [facility], TundePearl Rodriguez, APRN    Office Visit    2 months ago Chronic hand pain, right     [facility], [address], Manoj Lopez,     Office Visit    3 months ago SAMMY positive    [facility], [facility], Anna Tubbs,     Office Visit    3 months ago Well woman exam with routine gynecological exam    [facility], [address], [place] - OB/GYN Juvencio Decker MD    Office Visit    4 months ago Irritable behavior    [facility], [address], Manoj Lopez,     Office Visit

## 2024-08-26 DIAGNOSIS — E03.9 ACQUIRED HYPOTHYROIDISM: Primary | ICD-10-CM

## 2024-08-26 DIAGNOSIS — E66.9 OBESITY (BMI 30-39.9): ICD-10-CM

## 2024-08-26 RX ORDER — LEVOTHYROXINE SODIUM 25 UG/1
TABLET ORAL
Qty: 50 TABLET | Refills: 1 | Status: SHIPPED | OUTPATIENT
Start: 2024-08-26

## 2024-08-26 RX ORDER — DIETHYLPROPION HYDROCHLORIDE 25 MG/1
1 TABLET ORAL 2 TIMES DAILY
Qty: 120 TABLET | Refills: 4 | Status: SHIPPED | OUTPATIENT
Start: 2024-08-26

## 2024-09-09 ENCOUNTER — PROCEDURE VISIT (OUTPATIENT)
Dept: PHYSICAL MEDICINE AND REHAB | Facility: CLINIC | Age: 44
End: 2024-09-09
Payer: COMMERCIAL

## 2024-09-09 DIAGNOSIS — G56.01 CARPAL TUNNEL SYNDROME OF RIGHT WRIST: Primary | ICD-10-CM

## 2024-09-14 ENCOUNTER — HOSPITAL ENCOUNTER (OUTPATIENT)
Dept: MAMMOGRAPHY | Age: 44
Discharge: HOME | End: 2024-09-14
Attending: FAMILY MEDICINE
Payer: COMMERCIAL

## 2024-09-14 DIAGNOSIS — Z12.31 VISIT FOR SCREENING MAMMOGRAM: ICD-10-CM

## 2024-09-14 PROCEDURE — 77067 SCR MAMMO BI INCL CAD: CPT | Performed by: FAMILY MEDICINE

## 2024-09-14 PROCEDURE — 77063 BREAST TOMOSYNTHESIS BI: CPT | Performed by: FAMILY MEDICINE

## 2024-09-16 DIAGNOSIS — J30.1 SEASONAL ALLERGIC RHINITIS DUE TO POLLEN: ICD-10-CM

## 2024-09-16 DIAGNOSIS — E03.9 ACQUIRED HYPOTHYROIDISM: ICD-10-CM

## 2024-09-19 DIAGNOSIS — E03.9 ACQUIRED HYPOTHYROIDISM: ICD-10-CM

## 2024-09-20 RX ORDER — FLUTICASONE PROPIONATE 50 MCG
SPRAY, SUSPENSION (ML) NASAL
Qty: 48 G | Refills: 3 | Status: SHIPPED | OUTPATIENT
Start: 2024-09-20

## 2024-09-20 RX ORDER — LEVOTHYROXINE SODIUM 50 UG/1
50 TABLET ORAL
Qty: 90 TABLET | Refills: 1 | Status: SHIPPED | OUTPATIENT
Start: 2024-09-20 | End: 2024-09-20

## 2024-09-20 RX ORDER — LEVOTHYROXINE SODIUM 50 UG/1
50 TABLET ORAL
Qty: 90 TABLET | Refills: 1 | Status: SHIPPED | OUTPATIENT
Start: 2024-09-20

## 2024-09-20 RX ORDER — LEVOTHYROXINE SODIUM 50 UG/1
50 TABLET ORAL
Qty: 90 TABLET | Refills: 2 | OUTPATIENT
Start: 2024-09-20

## 2024-09-20 RX ORDER — LEVOTHYROXINE SODIUM 50 UG/1
50 TABLET ORAL
Qty: 90 TABLET | Refills: 3 | OUTPATIENT
Start: 2024-09-20

## 2024-09-20 NOTE — TELEPHONE ENCOUNTER
Refill passes per [facility] protocol.    Future Appointments   Date Time Provider Department Center   2024 11:15 AM Manoj Weaver DO [facility]     Last office visit: 2024    Requested Prescriptions   Pending Prescriptions Disp Refills    fluticasone propionate 50 MCG/ACT Nasal Suspension [Pharmacy Med Name: FLUTICASONE PROPIONA 50MCG/INH AER]       Si sprays by Each Nare route daily for 360 doses.       Allergy Medication Protocol Passed - 2024  4:15 PM        Passed - In person appointment or virtual visit in the past 12 mos or appointment in next 3 mos     Recent Outpatient Visits              1 month ago Well adult exam    [facility] [place] Pearl Dasilva APRN    Office Visit    3 months ago Chronic hand pain, right    [facility] [place] [facility],     Office Visit    4 months ago SAMMY positive    [facility] [place] Anna Peng,     Office Visit    4 months ago Well woman exam with routine gynecological exam    [facility] [address], [place] - OB/GYN Juvencio Decker MD    Office Visit    4 months ago Irritable behavior    [facility] [place] Manoj Weaver,     Office Visit          Future Appointments         Provider Department Appt Notes    In 3 days Manoj Weaver DO [facility] follow up    In 4 months Bayron Ramirez MD [facility]                      Refused Prescriptions Disp Refills    levothyroxine 50 MCG Oral Tab [Pharmacy Med Name: LEVOTHYROXINE SODIUM 50mcg TAB] 90 tablet 2     Sig: Take 1 tablet (50 mcg total) by mouth before breakfast.       Thyroid Medication Protocol Failed - 2024  4:15 PM        Failed - Last TSH value is normal     Lab Results   Component Value Date    TSH 0.295 (L) 2024     THYROIDFUNC 1.56 03/15/2016                 Passed - TSH in past 12 months        Passed - In person appointment or virtual visit in the past 12 mos or appointment in next 3 mos     Recent Outpatient Visits              [facility]    Office Visit    3 months ago Chronic hand pain, right    [facility] Meg, Manoj, DO    Office Visit    4 months ago [facility]    Office Visit    4 months ago Well woman exam with routine gynecological exam    [facility], [address], [place] - OB/GYN Juvencio Decker MD    Office Visit    4 months ago Irritable behavior    [facility] [place] Meg, Manoj, DO    Office Visit          Future Appointments         Provider Department Appt Notes    In 3 days Meg, Manoj, DO [facility] follow up    In 4 months Bayron Ramirez MD [facility]                          Future Appointments         Provider Department Appt Notes    In 3 days Meg, Manoj, DO [facility] follow up    In 4 months Bayron Ramirez MD [facility]           Recent Outpatient Visits              1 month ago Well adult exam    [facility], Pearl Wu, APRN    Office Visit    3 months ago Chronic hand pain, right    [facility] Jade Weavert, DO    Office Visit    4 months ago SAMMY positive    [facility] [place]    Office Visit    4 months ago Well woman exam with routine gynecological exam    [facility], [address], [place] - OB/GYN  Juvencio Decker MD    Office Visit    4 months ago Irritable behavior    [facility] Manoj Weaver,     Office Visit

## 2024-09-20 NOTE — TELEPHONE ENCOUNTER
Pt was taking rx daily- 50 mg , changes made last month, rx sent per instructions         I would like you to decrease levothyroxine to taking 50 mcg on Sunday, Tuesday, Thursday and Saturday and then take 25 mcg on Monday, Wednesday and Friday.

## 2024-09-20 NOTE — TELEPHONE ENCOUNTER
Pt stated pharmacy said she have no refills, rx sent with 1 refill   sterile technique, indwelling urinary device inserted/Coude tip

## 2024-09-23 ENCOUNTER — OFFICE VISIT (OUTPATIENT)
Dept: FAMILY MEDICINE CLINIC | Facility: CLINIC | Age: 44
End: 2024-09-23

## 2024-09-23 VITALS
TEMPERATURE: 97 F | HEIGHT: 62 IN | SYSTOLIC BLOOD PRESSURE: 116 MMHG | WEIGHT: 172 LBS | HEART RATE: 84 BPM | DIASTOLIC BLOOD PRESSURE: 78 MMHG | BODY MASS INDEX: 31.65 KG/M2

## 2024-09-23 DIAGNOSIS — F41.9 ANXIETY: ICD-10-CM

## 2024-09-23 DIAGNOSIS — R20.2 PARESTHESIAS IN RIGHT HAND: ICD-10-CM

## 2024-09-23 DIAGNOSIS — M79.10 MYALGIA: ICD-10-CM

## 2024-09-23 DIAGNOSIS — E03.9 ACQUIRED HYPOTHYROIDISM: ICD-10-CM

## 2024-09-23 DIAGNOSIS — E66.09 NON MORBID OBESITY DUE TO EXCESS CALORIES: ICD-10-CM

## 2024-09-23 DIAGNOSIS — R53.83 LETHARGY: ICD-10-CM

## 2024-09-23 DIAGNOSIS — E16.8: ICD-10-CM

## 2024-09-23 DIAGNOSIS — R29.898 WEAKNESS OF BOTH LEGS: ICD-10-CM

## 2024-09-23 DIAGNOSIS — M62.838 NECK MUSCLE SPASM: ICD-10-CM

## 2024-09-23 DIAGNOSIS — M54.2 BILATERAL POSTERIOR NECK PAIN: ICD-10-CM

## 2024-09-23 DIAGNOSIS — R20.0 NUMBNESS IN BOTH LEGS: ICD-10-CM

## 2024-09-23 DIAGNOSIS — G56.01 CARPAL TUNNEL SYNDROME OF RIGHT WRIST: Primary | ICD-10-CM

## 2024-09-23 PROCEDURE — 99215 OFFICE O/P EST HI 40 MIN: CPT | Performed by: FAMILY MEDICINE

## 2024-09-23 NOTE — PROGRESS NOTES
Patient ID: Mirtha Silver is a 44 year old female.    HPI  Chief Complaint   Patient presents with    Follow - Up    Test Results     EMG/ MAMMO        is Brayan #[id number].    She has numerous complaints.  She states she always feels tired and weak all over.  Her whole body hurts.  She has seen rheumatology and physiatry.  She complains of tingling in her thenar eminence and her first, second and third digit of her right hand.  She states that the steroid shot for carpal tunnel syndrome did not help.  She did have an EMG which she wants me to review with her of both upper extremities.  It showed very mild abnormality in the right median nerve.  She does not feel that the tingling is any better and would like to see a hand specialist.      She is wondering if she needs labs because of the fatigue.  I explained that she just had labs in August by Pearl our nurse practitioner.  I reviewed these with her.  She is not anemic.  Her hemoglobin A1c is well-controlled as well as her sugar.  Her insulin level is a bit high but she is already on metformin once daily by the weight loss specialist along with Ozempic.  She is on 2 different doses of thyroid medication and her TSH was abnormal in August so she needs to repeat this and I told her to wait till October 7, 2024 to get that done.  Labs are already in the system.    She will see the weight loss specialist in February but continue the medications that they have filled for her.  The rheumatologist felt that her discomfort was not rheumatologic in nature and she should see a different specialist.    She also states she is having a different pain in her legs from her knees down.  She states is different from the pain that she was having before her back surgery.  She states she feels some numbness from the knees down to the feet bilaterally periodically.  She also states sometimes her legs feel so weak she feels it is hard to stand on them.    She has no  chest pain or trouble breathing at this time.  She does not think she is depressed or anxious.    Wt Readings from Last 6 Encounters:   24 172 lb (78 kg)   24 172 lb (78 kg)   24 179 lb 9.6 oz (81.5 kg)   24 180 lb (81.6 kg)   24 180 lb (81.6 kg)   24 179 lb (81.2 kg)       BMI Readings from Last 6 Encounters:   24 31.46 kg/m²   24 31.46 kg/m²   24 32.85 kg/m²   24 32.92 kg/m²   24 32.92 kg/m²   24 32.74 kg/m²       BP Readings from Last 6 Encounters:   24 116/78   24 129/59   24 126/79   24 103/71   24 119/81   24 120/78         Review of Systems  See HPI      Medical History:      Past Medical History:    Anxiety state    Back problem    Calculus of kidney    Depression    Disorder of liver    FATTY LIVER     Disorder of thyroid    hypothyroid    Endometriosis    Esophageal reflux    Excessive or frequent menstruation    High cholesterol    History of Papanicolaou smear of cervix    Hyperlipidemia    IBS (irritable bowel syndrome)    Migraines    Per [facility] - Migraine headaches    Mittelschmerz    Osteoarthritis    Ovarian cyst    Postpartum depression    Prediabetes    Pregnancy (HCC)        Spondylolisthesis of lumbosacral region    Uterine leiomyoma    Visual impairment    WEARS GLASSES    Vitamin B12 deficiency    Vitamin D deficiency       Past Surgical History:   Procedure Laterality Date    Cervical spine surgery  2023    C5-C6 Arthroplasty     Cholecystectomy  2012    Colonoscopy  2004    Cyst removal  , 2014    laporoscopic ovarian cytectomy    Hysterectomy      Laparoscopy,diagnostic  2016    laparoscopy, filshie clip sterilization of left fallopian tube, ablation of endometriosis, retrieval and removal of displaced filshie clip    Lipoma removal Left 2018    Dorian biopsy stereo nodule 1 site left (cpt=19081)  05/10/2022    top hat    Dorian localization wire 1 site  left (fpf=14432)  06/28/2022    Other  2019    low back surgery with metal implant    Total abdominal hysterectomy N/A 04/05/2017    Procedure: ABDOMINAL HYSTERECTOMY;  Surgeon: Juvencio Decker MD;  Location: [facility] MAIN OR          Current Outpatient Medications   Medication Sig Dispense Refill    fluticasone propionate 50 MCG/ACT Nasal Suspension INSTILL 2 SPRAYS IN EACH NOSTRIL DAILY. 48 g 3    levothyroxine 50 MCG Oral Tab Take 1 tablet (50 mcg total) by mouth before breakfast. On Sunday, Monday, Tuesday , Thursday 90 tablet 1    Diethylpropion HCl 25 MG Oral Tab Take 1 tablet by mouth 2 (two) times daily. 120 tablet 4    levothyroxine 25 MCG Oral Tab Take one 25 mcg tablet on Monday, Wednesday and Friday. 50 tablet 1    pantoprazole 40 MG Oral Tab EC Take 1 tablet (40 mg total) by mouth every morning before breakfast. To help with stomach acid and stomach irritation/inflammation 90 tablet 3    loteprednol 0.5 % Ophthalmic Suspension Place 1 drop into both eyes 2 (two) times daily.      semaglutide 2 MG/1.5ML Subcutaneous Solution Pen-injector Inject into the skin once a week.      moxifloxacin 0.5 % Ophthalmic Solution Place 1 drop into both eyes in the morning and 1 drop before bedtime.      clotrimazole-betamethasone 1-0.05 % External Cream Apply 1 Application topically 2 (two) times daily. To right side of nose. 60 g 1    ketoconazole 2 % External Cream Apply to affected area on left foot bid-continue to use for 5 days after all evidence of rash or redness has resolved 60 g 1    ubrogepant (UBRELVY) 50 MG Oral Tab Take one tablet at onset of migraine.  May take additional tablet in 2 hours if needed.  Do not exceed two tablets per 24 hour period. 10 tablet 0    METFORMIN  MG Oral Tablet 24 Hr TAKE 1 TABLET BY MOUTH EVERY DAY 90 tablet 4    gabapentin 300 MG Oral Cap Take 1 capsule (300 mg total) by mouth. 1 tab AM, 2 tab QHS      Naproxen Sodium (ALEVE OR) Take 1 tablet by mouth daily as needed.       fluorometholone 0.1 % Ophthalmic Suspension Place 1 drop into both eyes 4 (four) times daily.       Allergies:  Allergies   Allergen Reactions    Cymbalta [Duloxetine Hcl] SHORTNESS OF BREATH and ANXIETY    Other SHORTNESS OF BREATH     SOME TOPICAL NUMBING SPRAY USED WHEN STARTING AN I.V. - cough and shortness of breath      Sulfa Antibiotics HIVES and RASH    Tramadol ITCHING     THROAT ITCHING AND FEELS LIKE IT WAS CLOSING     Ciprofloxacin RASH    Acetaminophen OTHER (SEE COMMENTS)     Fatty Liver    Lyrica [Pregabalin] RASH        Physical Exam:       Physical Exam  Blood pressure 116/78, pulse 84, temperature 96.7 °F (35.9 °C), temperature source Temporal, height 5' 2\" (1.575 m), weight 172 lb (78 kg), last menstrual period 03/22/2017, not currently breastfeeding.  Physical Exam   Constitutional: Patient is oriented to person, place, and time. Patient appears well-developed and well-nourished. No distress.   HENT:   Head: Normocephalic.   Right Ear: Tympanic membrane, external ear and ear canal normal.   Left Ear: Tympanic membrane, external ear and ear canal normal.   Nose: No mucosal edema or rhinorrhea.   THROAT: Oropharynx is clear without exudate   Eyes: Conjunctivae and EOM are normal.   Neck: Normal range of motion. No thyromegaly present.  She does have tenderness in her trapezius muscles up into the cervical paraspinal muscles bilaterally with increased tone.  Cardiovascular: Normal rate, regular rhythm and normal heart sounds.  2+ pedal pulses.  Pulmonary/Chest: Effort normal and breath sounds normal. No respiratory distress.   Lymphadenopathy:     Patient has  no cervical adenopathy.   Neurological: Patient is alert and oriented to person, place, and time.  Deep tendon reflexes of all 4 extremities was 2 out of 4.  Sensation normal.  Gait is normal.  Skin: Skin is warm.  No pallor or diaphoresis.  Brisk capillary refill.  Psychiatry: I find her to have a sad and anxious affect.  Knees: No effusion  of the knees.  Vitals reviewed.           Assessment/Plan:      Diagnoses and all orders for this visit:    Carpal tunnel syndrome of right wrist  -     PLASTIC SURGERY - INTERNAL  See the hand specialist  Paresthesias in right hand  -     PLASTIC SURGERY - INTERNAL  As above.  She will see if she is a candidate for surgery  Neck muscle spasm  I told her I think this is really from tension and stress.  She is already on gabapentin and takes naproxen as needed.  Bilateral posterior neck pain  As above  Anxiety  Unfortunately she does not really believe that she has anything bad enough that would warrant perhaps a antidepressants or SSRI and even if we placed her in 1 she is stopped taking them and is never really compliant.  Non morbid obesity due to excess calories  -     ENDOCRINOLOGY - INTERNAL  She is very frustrated about her weight, the fatigue and the elevated insulin and I will have her see the endocrinologist.  Acquired hypothyroidism  -     ENDOCRINOLOGY - INTERNAL    Lethargy  -     ENDOCRINOLOGY - INTERNAL    Myalgia  -     ENDOCRINOLOGY - INTERNAL    Elevated fasting insulin level with normal glucose (HCC)  -     ENDOCRINOLOGY - INTERNAL  All the labs were reviewed that were ordered by Pearl  Numbness in both legs  -     Neuro Referral - In Network  She states she gets numbness and somewhat of a weakness in the legs bilaterally.  She has seen a neurologist in the past but not for this and I will go ahead and do a referral.  Weakness of both legs  -     Neuro Referral - In Network        Referrals (if applicable)  Orders Placed This Encounter   Procedures    PLASTIC SURGERY - INTERNAL     New EMG done by Dr. Reed Bosch.     Referral Priority:   Routine     Referral Type:   OFFICE VISIT     Referred to Provider:   Terrance Costa MD     Requested Specialty:   SURGERY, PLASTIC     Number of Visits Requested:   3    ENDOCRINOLOGY - INTERNAL     ALSO WORKS AT THE [facility] [employer].     Referral  Priority:   Routine     Referral Type:   OFFICE VISIT     Referred to Provider:   Rhona Aggarwal MD     Requested Specialty:   ENDOCRINOLOGY     Number of Visits Requested:   3    Neuro Referral - In Network     Referral Priority:   Routine     Referral Type:   OFFICE VISIT     Referred to Provider:   Brad Gillis MD     Requested Specialty:   NEUROLOGY     Number of Visits Requested:   3         Follow up if symptoms persist.  Take medicine (if given) as prescribed.  Approach to treatment discussed and patient/family member understands and agrees to plan.     No follow-ups on file.        Manoj Weaver DO  9/23/2024

## 2024-09-27 ENCOUNTER — NURSE TRIAGE (OUTPATIENT)
Dept: FAMILY MEDICINE CLINIC | Facility: CLINIC | Age: 44
End: 2024-09-27

## 2024-10-07 ENCOUNTER — OFFICE VISIT (OUTPATIENT)
Dept: FAMILY MEDICINE CLINIC | Facility: CLINIC | Age: 44
End: 2024-10-07

## 2024-10-07 ENCOUNTER — HOSPITAL ENCOUNTER (OUTPATIENT)
Dept: GENERAL RADIOLOGY | Age: 44
Discharge: HOME | End: 2024-10-07
Attending: FAMILY MEDICINE
Payer: COMMERCIAL

## 2024-10-07 ENCOUNTER — LAB ENCOUNTER (OUTPATIENT)
Dept: LAB | Age: 44
End: 2024-10-07
Attending: FAMILY MEDICINE
Payer: COMMERCIAL

## 2024-10-07 VITALS
WEIGHT: 171 LBS | TEMPERATURE: 97 F | HEART RATE: 77 BPM | HEIGHT: 62 IN | BODY MASS INDEX: 31.47 KG/M2 | SYSTOLIC BLOOD PRESSURE: 118 MMHG | DIASTOLIC BLOOD PRESSURE: 78 MMHG

## 2024-10-07 DIAGNOSIS — R10.12 LUQ ABDOMINAL PAIN: Primary | ICD-10-CM

## 2024-10-07 DIAGNOSIS — R10.12 LUQ ABDOMINAL PAIN: ICD-10-CM

## 2024-10-07 DIAGNOSIS — R11.0 NAUSEA: ICD-10-CM

## 2024-10-07 LAB
BASOPHILS # BLD AUTO: 0.04 X10(3) UL (ref 0–0.2)
BASOPHILS NFR BLD AUTO: 0.6 %
BILIRUB UR QL: NEGATIVE
CLARITY UR: CLEAR
DEPRECATED RDW RBC AUTO: 42.2 FL (ref 35.1–46.3)
EOSINOPHIL # BLD AUTO: 0.16 X10(3) UL (ref 0–0.7)
EOSINOPHIL NFR BLD AUTO: 2.3 %
ERYTHROCYTE [DISTWIDTH] IN BLOOD BY AUTOMATED COUNT: 12.8 % (ref 11–15)
GLUCOSE UR-MCNC: NORMAL MG/DL
HCT VFR BLD AUTO: 40.7 %
HGB BLD-MCNC: 13.9 G/DL
HGB UR QL STRIP.AUTO: NEGATIVE
IMM GRANULOCYTES # BLD AUTO: 0.02 X10(3) UL (ref 0–1)
IMM GRANULOCYTES NFR BLD: 0.3 %
KETONES UR-MCNC: NEGATIVE MG/DL
LEUKOCYTE ESTERASE UR QL STRIP.AUTO: NEGATIVE
LIPASE SERPL-CCNC: 61 U/L (ref 12–53)
LYMPHOCYTES # BLD AUTO: 2.09 X10(3) UL (ref 1–4)
LYMPHOCYTES NFR BLD AUTO: 30.5 %
MCH RBC QN AUTO: 30.8 PG (ref 26–34)
MCHC RBC AUTO-ENTMCNC: 34.2 G/DL (ref 31–37)
MCV RBC AUTO: 90 FL
MONOCYTES # BLD AUTO: 0.51 X10(3) UL (ref 0.1–1)
MONOCYTES NFR BLD AUTO: 7.4 %
NEUTROPHILS # BLD AUTO: 4.03 X10 (3) UL (ref 1.5–7.7)
NEUTROPHILS # BLD AUTO: 4.03 X10(3) UL (ref 1.5–7.7)
NEUTROPHILS NFR BLD AUTO: 58.9 %
NITRITE UR QL STRIP.AUTO: NEGATIVE
PH UR: 6 [PH] (ref 5–8)
PLATELET # BLD AUTO: 300 10(3)UL (ref 150–450)
PROT UR-MCNC: NEGATIVE MG/DL
RBC # BLD AUTO: 4.52 X10(6)UL
SP GR UR STRIP: 1.02 (ref 1–1.03)
UROBILINOGEN UR STRIP-ACNC: NORMAL
WBC # BLD AUTO: 6.9 X10(3) UL (ref 4–11)

## 2024-10-07 PROCEDURE — 81003 URINALYSIS AUTO W/O SCOPE: CPT

## 2024-10-07 PROCEDURE — 85025 COMPLETE CBC W/AUTO DIFF WBC: CPT

## 2024-10-07 PROCEDURE — 83690 ASSAY OF LIPASE: CPT

## 2024-10-07 PROCEDURE — 99214 OFFICE O/P EST MOD 30 MIN: CPT | Performed by: FAMILY MEDICINE

## 2024-10-07 PROCEDURE — 36415 COLL VENOUS BLD VENIPUNCTURE: CPT

## 2024-10-07 PROCEDURE — 74018 RADEX ABDOMEN 1 VIEW: CPT | Performed by: FAMILY MEDICINE

## 2024-10-07 RX ORDER — ONDANSETRON 8 MG/1
8 TABLET, FILM COATED ORAL EVERY 8 HOURS PRN
Qty: 20 TABLET | Refills: 0 | Status: SHIPPED | OUTPATIENT
Start: 2024-10-07 | End: 2024-10-14

## 2024-10-07 NOTE — PROGRESS NOTES
Patient ID: Mirtha Silver is a 44 year old female.    HPI  Chief Complaint   Patient presents with    Stomach Pain    Nausea     Last seen on 09/23/2024.      Translated in Bahamian by Luis Miguel on Language Line with ID# [id number].      Pt c/o left upper side stomach pain and nausea since 09/25/2024. Pt hadn't eaten anything that day and then started to feel the pain and nausea. Denies vomiting but has had two days of diarrhea about a week ago but it has since resolved. She has a loss of appetite due to her nausea. Pt denies acid in the throat. Pt has minimal lower abdominal pressure with urination but no hematuria.  No frequency or flank pain.  She also has bloating in the abdomen at times.  She is already seen gastroenterology in the past..           Wt Readings from Last 6 Encounters:   10/07/24 171 lb   09/23/24 172 lb   08/20/24 172 lb   06/06/24 179 lb 9.6 oz   05/08/24 180 lb   05/07/24 180 lb       BMI Readings from Last 6 Encounters:   10/07/24 31.28 kg/m²   09/23/24 31.46 kg/m²   08/20/24 31.46 kg/m²   06/06/24 32.85 kg/m²   05/08/24 32.92 kg/m²   05/07/24 32.92 kg/m²       BP Readings from Last 6 Encounters:   10/07/24 118/78   09/23/24 116/78   08/20/24 129/59   06/06/24 126/79   05/08/24 103/71   05/07/24 119/81         Review of Systems   Respiratory:  Negative for shortness of breath.    Cardiovascular:  Negative for chest pain.   Gastrointestinal:  Positive for abdominal pain and nausea. Negative for abdominal distention, diarrhea and vomiting.   Genitourinary:  Negative for hematuria.           Medical History:      Past Medical History:    Anxiety state    Back problem    Calculus of kidney    Depression    Disorder of liver    FATTY LIVER     Disorder of thyroid    hypothyroid    Endometriosis    Esophageal reflux    Excessive or frequent menstruation    High cholesterol    History of Papanicolaou smear of cervix    Hyperlipidemia    IBS (irritable bowel syndrome)    Migraines    Per [facility] -  Migraine headaches    Harveytelschvincent    Osteoarthritis    Ovarian cyst    Postpartum depression    Prediabetes    Pregnancy (HCC)        Spondylolisthesis of lumbosacral region    Uterine leiomyoma    Visual impairment    WEARS GLASSES    Vitamin B12 deficiency    Vitamin D deficiency       Past Surgical History:   Procedure Laterality Date    Cervical spine surgery  2023    C5-C6 Arthroplasty     Cholecystectomy  2012    Colonoscopy  2004    Cyst removal  2007, 2014    laporoscopic ovarian cytectomy    Hysterectomy      Laparoscopy,diagnostic  2016    laparoscopy, filshie clip sterilization of left fallopian tube, ablation of endometriosis, retrieval and removal of displaced filshie clip    Lipoma removal Left 2018    Dorian biopsy stereo nodule 1 site left (cpt=19081)  05/10/2022    top hat    Dorian localization wire 1 site left (cpt=19281)  2022    Other  2019    low back surgery with metal implant    Total abdominal hysterectomy N/A 2017    Procedure: ABDOMINAL HYSTERECTOMY;  Surgeon: Juvencio Decker MD;  Location: [facility] MAIN OR          Current Outpatient Medications   Medication Sig Dispense Refill    fluticasone propionate 50 MCG/ACT Nasal Suspension INSTILL 2 SPRAYS IN EACH NOSTRIL DAILY. 48 g 3    levothyroxine 50 MCG Oral Tab Take 1 tablet (50 mcg total) by mouth before breakfast. On , Monday, Tuesday , Thursday 90 tablet 1    Diethylpropion HCl 25 MG Oral Tab Take 1 tablet by mouth 2 (two) times daily. 120 tablet 4    levothyroxine 25 MCG Oral Tab Take one 25 mcg tablet on Monday, Wednesday and Friday. 50 tablet 1    pantoprazole 40 MG Oral Tab EC Take 1 tablet (40 mg total) by mouth every morning before breakfast. To help with stomach acid and stomach irritation/inflammation 90 tablet 3    loteprednol 0.5 % Ophthalmic Suspension Place 1 drop into both eyes 2 (two) times daily.      semaglutide 2 MG/1.5ML Subcutaneous Solution Pen-injector Inject into the  skin once a week.      moxifloxacin 0.5 % Ophthalmic Solution Place 1 drop into both eyes in the morning and 1 drop before bedtime.      clotrimazole-betamethasone 1-0.05 % External Cream Apply 1 Application topically 2 (two) times daily. To right side of nose. 60 g 1    ketoconazole 2 % External Cream Apply to affected area on left foot bid-continue to use for 5 days after all evidence of rash or redness has resolved 60 g 1    ubrogepant (UBRELVY) 50 MG Oral Tab Take one tablet at onset of migraine.  May take additional tablet in 2 hours if needed.  Do not exceed two tablets per 24 hour period. 10 tablet 0    METFORMIN  MG Oral Tablet 24 Hr TAKE 1 TABLET BY MOUTH EVERY DAY 90 tablet 4    gabapentin 300 MG Oral Cap Take 1 capsule (300 mg total) by mouth. 1 tab AM, 2 tab QHS      Naproxen Sodium (ALEVE OR) Take 1 tablet by mouth daily as needed.      fluorometholone 0.1 % Ophthalmic Suspension Place 1 drop into both eyes 4 (four) times daily.       Allergies:  Allergies   Allergen Reactions    Cymbalta [Duloxetine Hcl] SHORTNESS OF BREATH and ANXIETY    Other SHORTNESS OF BREATH     SOME TOPICAL NUMBING SPRAY USED WHEN STARTING AN I.V. - cough and shortness of breath      Sulfa Antibiotics HIVES and RASH    Tramadol ITCHING     THROAT ITCHING AND FEELS LIKE IT WAS CLOSING     Ciprofloxacin RASH    Acetaminophen OTHER (SEE COMMENTS)     Fatty Liver    Lyrica [Pregabalin] RASH        Physical Exam:       Physical Exam  Blood pressure 118/78, pulse 77, temperature 97 °F (36.1 °C), temperature source Temporal, height 5' 2\" (1.575 m), weight 171 lb, last menstrual period 03/22/2017, not currently breastfeeding.      Physical Exam   Constitutional: Patient is oriented to person, place, and time. Patient appears well-developed and well-nourished. No distress.   Head: Normocephalic.   Eyes: Conjunctivae and EOM are normal.   Cardiovascular: Normal rate, regular rhythm and normal heart sounds.    Pulmonary/Chest: Effort  normal and breath sounds normal. No respiratory distress.   Neurological: Patient is alert and oriented to person, place, and time.   Skin: Skin is warm.  No diaphoresis or pallor.  Psychiatry: Normal mood and affect.  Abdominal: Normal appearance and bowel sounds are normal.  There is no abdominal distention.  There is no rigidity, no rebound, no guarding.   Tender over the LUQ without mass. Also tender in the epigastric area but LUQ is worse per patient.  Negative tenderness in the right upper quadrant.  Negative Andrade sign.  Not tender over the left lower rib cage.  Not tender over the periumbilical area or lower abdomen.  No flank tenderness..     Vitals reviewed.           Assessment/Plan:      Diagnoses and all orders for this visit:    LUQ abdominal pain  -     XR ABDOMEN (1 VIEW) (CPT=74018); Future  -     CBC With Differential With Platelet; Future  -     Lipase [E]; Future  -     Urinalysis with Culture Reflex; Future  She does not have a surgical abdomen.  Will do an x-ray and some labs and then I started her on Zofran.  She is already on a PPI.  If the pain gets worse and she needs to go to the emergency room.  Patient is aware.  Nausea  -     ondansetron (ZOFRAN) 8 MG tablet; Take 1 tablet (8 mg total) by mouth every 8 (eight) hours as needed for Nausea.        Referrals (if applicable)  No orders of the defined types were placed in this encounter.      Follow up if symptoms persist.  Take medicine (if given) as prescribed.  Approach to treatment discussed and patient/family member understands and agrees to plan.     No follow-ups on file.    There are no Patient Instructions on file for this visit.    Blanka Cornejo    10/7/2024    By signing my name below, Blanka DILL,  attest that this documentation has been prepared under the direction and in the presence of Manoj Weaver DO.   Electronically Signed: Blanka Cornejo, 10/7/2024, 11:37 AM.    Manoj DILL DO,  personally performed  the services described in this documentation. All medical record entries made by the scribe were at my direction and in my presence.  I have reviewed the chart and discharge instructions (if applicable) and agree that the record reflects my personal performance and is accurate and complete.  Manoj Weaver DO, 10/7/2024, 12:18 PM

## 2024-10-10 ENCOUNTER — OFFICE VISIT (OUTPATIENT)
Dept: SURGERY | Facility: CLINIC | Age: 44
End: 2024-10-10

## 2024-10-10 DIAGNOSIS — M65.841 OTHER SYNOVITIS AND TENOSYNOVITIS, RIGHT HAND: ICD-10-CM

## 2024-10-10 DIAGNOSIS — G56.03 BILATERAL CARPAL TUNNEL SYNDROME: Primary | ICD-10-CM

## 2024-10-10 DIAGNOSIS — M79.641 PAIN IN RIGHT HAND: ICD-10-CM

## 2024-10-10 DIAGNOSIS — M65.842 OTHER SYNOVITIS AND TENOSYNOVITIS, LEFT HAND: ICD-10-CM

## 2024-10-10 DIAGNOSIS — M79.642 PAIN OF LEFT HAND: ICD-10-CM

## 2024-10-10 PROCEDURE — 99204 OFFICE O/P NEW MOD 45 MIN: CPT | Performed by: PLASTIC SURGERY

## 2024-10-10 NOTE — H&P
Mirtha Silver is a 44 year old female that presents with   Chief Complaint   Patient presents with    Carpal Tunnel Syndrome     Ezra CTS   .    REFERRED BY:  Manoj Weaver     Pacemaker: No  Latex Allergy: no  Coumadin: No  Plavix: No  Other anticoagulants: No  Diet medication: semaglutide, no longer taking per pt, last dose 2 weeks ago  Cardiac stents: No    HAND DOMINANCE:  Right  Profession: homemaker    RECONSTRUCTIVE HISTORY    SUN EXPOSURE   Current no   Past no   Sunburns yes   Tanning salons current no   Tanning salons past no    SKIN CANCER    Personal history of skin cancer: none            ++++++++++++++++++++++++++++++++++++++++++++++++++    ELECTRODIAGNOSTIC STUDIES  -   ANA    DATE: 9/9/2024    RIGHT  Carpal Tunnel Syndrome: slight  Motor Latency: 3.5  Sensory Antidromic: 3.3  APB Normal: yes  Neurogenic Changes: no  Denervation Potentials: no      LEFT  Carpal Tunnel Syndrome: slight  Motor Latency: 3.4  Sensory Antidromic: 3.1  APB Normal: yes  Neurogenic Changes: no  Denervation Potentials: no            ++++++++++++++++++++++++++++++++++++++++++++++++++    ELECTRODIAGNOSTIC STUDIES  -   DAVID    DATE: 7/7/2021    RIGHT  Carpal Tunnel Syndrome: mild  Motor Latency: 3.18  Palmar Sensory Latency:  Sensory Antidromic: 3.13  APB Normal: yes  Neurogenic Changes: no  Denervation Potentials: no      HPI:       44-year-old female right-hand-dominant with bilateral hand numbness    17 years duration    Intermittent numbness all digits, right worse than left    She has significant pain diffusely in the digits as well as the wrists which radiates proximally    Awakens every night with symptoms    Right hand is weak and she drops things she has had splints which did not help and has had an injection which has made the pain worse          Review of Systems:   Constitutional: No change in appetite, chill/rigors, or fatigue  GI: No jaundice  Endocrine: No generalized weakness  Neurological: No aphasia,  loss of consciousness, or seizures    Musculoskeletal:     Duration    17   years    NUMBNESS / TINGLING      RIGHT  Intermittent but stays longer per pt  thumb  index finger  middle finger  ring finger  small finger  Wrist  Entire R arm      LEFT  Intermittent  wrist  thumb  index finger  middle finger  ring finger  small finger    Which hand is worse?  right     Pain:    Yes, constant from head/neck/R shoulder down to hand, R thenar pain and R wrist pain    Night symptoms:  Yes, numbness and pain wake her at night    Functional problems: Yes, R hand is weak, is dropping items    Previous therapy:  Yes   wore R hand splint, did not help, had R injection , made pain worse    In her previous job, she used her R hand constantly    EMG 2024  PMH:     MEDICAL  Past Medical History:    Anxiety state    Back problem    Calculus of kidney    Depression    Disorder of liver    FATTY LIVER     Disorder of thyroid    hypothyroid    Endometriosis    Esophageal reflux    Excessive or frequent menstruation    High cholesterol    History of Papanicolaou smear of cervix    Hyperlipidemia    IBS (irritable bowel syndrome)    Migraines    Per [facility] - Migraine headaches    Mittelschmerz    Osteoarthritis    Ovarian cyst    Postpartum depression    Prediabetes    Pregnancy (HCC)        Spondylolisthesis of lumbosacral region    Uterine leiomyoma    Visual impairment    WEARS GLASSES    Vitamin B12 deficiency    Vitamin D deficiency        SURGICAL  Past Surgical History:   Procedure Laterality Date    Cervical spine surgery  2023    C5-C6 Arthroplasty     Cholecystectomy  2012    Colonoscopy  2004    Cyst removal  ,     laporoscopic ovarian cytectomy    Hysterectomy      Laparoscopy,diagnostic  2016    laparoscopy, filshie clip sterilization of left fallopian tube, ablation of endometriosis, retrieval and removal of displaced filshie clip    Lipoma removal Left 2018    [facility] biopsy stereo  nodule 1 site left (cpt=19081)  05/10/2022    top hat    Dorian localization wire 1 site left (cpt=19281)  06/28/2022    Other  2019    low back surgery with metal implant    Total abdominal hysterectomy N/A 04/05/2017    Procedure: ABDOMINAL HYSTERECTOMY;  Surgeon: Juvencio Decker MD;  Location: [facility] MAIN OR        ALLERIGIES  Allergies[1]     MEDICATIONS  Current Outpatient Medications   Medication Sig Dispense Refill    ondansetron (ZOFRAN) 8 MG tablet Take 1 tablet (8 mg total) by mouth every 8 (eight) hours as needed for Nausea. 20 tablet 0    fluticasone propionate 50 MCG/ACT Nasal Suspension INSTILL 2 SPRAYS IN EACH NOSTRIL DAILY. 48 g 3    levothyroxine 50 MCG Oral Tab Take 1 tablet (50 mcg total) by mouth before breakfast. On Sunday, Monday, Tuesday , Thursday 90 tablet 1    Diethylpropion HCl 25 MG Oral Tab Take 1 tablet by mouth 2 (two) times daily. 120 tablet 4    levothyroxine 25 MCG Oral Tab Take one 25 mcg tablet on Monday, Wednesday and Friday. 50 tablet 1    pantoprazole 40 MG Oral Tab EC Take 1 tablet (40 mg total) by mouth every morning before breakfast. To help with stomach acid and stomach irritation/inflammation 90 tablet 3    clotrimazole-betamethasone 1-0.05 % External Cream Apply 1 Application topically 2 (two) times daily. To right side of nose. 60 g 1    ketoconazole 2 % External Cream Apply to affected area on left foot bid-continue to use for 5 days after all evidence of rash or redness has resolved 60 g 1    ubrogepant (UBRELVY) 50 MG Oral Tab Take one tablet at onset of migraine.  May take additional tablet in 2 hours if needed.  Do not exceed two tablets per 24 hour period. 10 tablet 0    METFORMIN  MG Oral Tablet 24 Hr TAKE 1 TABLET BY MOUTH EVERY DAY 90 tablet 4    gabapentin 300 MG Oral Cap Take 1 capsule (300 mg total) by mouth. 1 tab AM, 2 tab QHS      Naproxen Sodium (ALEVE OR) Take 1 tablet by mouth daily as needed.      loteprednol 0.5 % Ophthalmic Suspension Place 1  drop into both eyes 2 (two) times daily. (Patient not taking: Reported on 10/10/2024)      semaglutide 2 MG/1.5ML Subcutaneous Solution Pen-injector Inject into the skin once a week. (Patient not taking: Reported on 10/10/2024)      moxifloxacin 0.5 % Ophthalmic Solution Place 1 drop into both eyes in the morning and 1 drop before bedtime. (Patient not taking: Reported on 10/10/2024)      fluorometholone 0.1 % Ophthalmic Suspension Place 1 drop into both eyes 4 (four) times daily. (Patient not taking: Reported on 10/10/2024)          SOCIAL HISTORY  Social History     Socioeconomic History    Marital status:     Number of children: 3   Tobacco Use    Smoking status: Former     Types: Cigarettes    Smokeless tobacco: Never   Vaping Use    Vaping status: Never Used   Substance and Sexual Activity    Alcohol use: Not Currently     Comment: socially    Drug use: No    Sexual activity: Yes     Birth control/protection: Hysterectomy   Other Topics Concern    Caffeine Concern No     Comment: 1 cup coffee daily    Exercise No   Social History Narrative    The patient does not use an assistive device..      The patient does live in a home with stairs.     Social Drivers of Health     Financial Resource Strain: Low Risk  (9/21/2023)    Financial Resource Strain     Difficulty of Paying Living Expenses: Not hard at all     Med Affordability: No   Transportation Needs: No Transportation Needs (9/21/2023)    Transportation Needs     Lack of Transportation: No        FAMILY HISTORY  Family History   Problem Relation Age of Onset    Diabetes Father         Type 2    Hypertension Mother     Lipids Mother         Hyperlipidemia    Diabetes Mother         Type 2    Diabetes Sister     Hypertension Sister     Lipids Brother     Glaucoma Neg     Macular degeneration Neg           PHYSICAL EXAM:     CONSTITUTIONAL: Overall appearance - Normal  HEENT: Normocephalic  EYES: Conjunctiva - Right: Normal, Left: Normal; EOMI  EARS:  Inspection - Right: Normal, Left: Normal  NECK/THYROID: Inspection - Normal, Palpation - Normal, Thyroid gland - Normal, No adenopathy  RESPIRATORY: Inspection - Normal, Effort - Normal  CARDIOVASCULAR: Regular rhythm, No murmurs  ABDOMEN: Inspection - Normal, No abdominal tenderness  NEURO: Memory intact  PSYCH: Oriented to person, place, time, and situation, Appropriate mood and affect        Hand Physical Exam:    ROM: bilateral limited range of motion secondary to pain  Wrist Hyperextension: bilateral good  Thenar Intrinsics: bilateral intact/symmetric  Ulnar Intrinsics: bilateral intact/symmetric  Wrist Tenderness: bilateral volar and dorsal  Sensation: bilateral grossly intact    Median Nerve Compression: bilateral negative    Tinel Median at Wrist: bilateral negative     Pain with flexion against resistance, and pain with range of motion      ASSESSMENT/PLAN:       CARPAL TUNNEL SYNDROME  bilateral  Bilateral synovitis and diffuse hand pain          DISPOSITION    We had a long discussion.  I explained to the patient what carpal tunnel syndrome is including treatment options.  The patient  may not have relief of symptoms with treatment.      This is a mild carpal tunnel syndrome  Non-operative therapy may help, but surgery may still be necessary.    The carpal tunnel is not the cause of her severe pain, and I would not recommend surgery for the carpal tunnel, as it would not relieve her major symptoms of pain.    I would recommend that she see rheumatology for further evaluation and treatment of her pain.          10/10/2024  Terrance Evans MD      +++++++++++++++++++++++++++++++++++++++++      MEDICAL DECISION MAKING    PROBLEMS      MODERATE    (number / complexity)          Undiagnosed new problems with exacerbation    DATA         STRAIGHTFORWARD    (amount / complexity)              MANAGEMENT RISK  MODERATE    (complications/ morbidity)       Decision regarding major surgery                  MDM  LEVEL    MODERATE                  [1]   Allergies  Allergen Reactions    Cymbalta [Duloxetine Hcl] SHORTNESS OF BREATH and ANXIETY    Other SHORTNESS OF BREATH     SOME TOPICAL NUMBING SPRAY USED WHEN STARTING AN I.V. - cough and shortness of breath      Sulfa Antibiotics HIVES and RASH    Tramadol ITCHING     THROAT ITCHING AND FEELS LIKE IT WAS CLOSING     Ciprofloxacin RASH    Acetaminophen OTHER (SEE COMMENTS)     Fatty Liver    Lyrica [Pregabalin] RASH

## 2024-10-13 DIAGNOSIS — K59.01 SLOW TRANSIT CONSTIPATION: Primary | ICD-10-CM

## 2024-10-13 DIAGNOSIS — K59.09 CHRONIC CONSTIPATION: Chronic | ICD-10-CM

## 2024-10-13 DIAGNOSIS — R74.8 ELEVATED LIPASE: Primary | ICD-10-CM

## 2024-10-13 RX ORDER — PLECANATIDE 3 MG/1
3 TABLET ORAL DAILY
Qty: 90 TABLET | Refills: 1 | Status: SHIPPED | OUTPATIENT
Start: 2024-10-13 | End: 2024-11-12

## 2024-10-16 ENCOUNTER — OFFICE VISIT (OUTPATIENT)
Dept: NEUROLOGY | Facility: CLINIC | Age: 44
End: 2024-10-16
Payer: COMMERCIAL

## 2024-10-16 VITALS
WEIGHT: 171 LBS | DIASTOLIC BLOOD PRESSURE: 78 MMHG | SYSTOLIC BLOOD PRESSURE: 106 MMHG | HEART RATE: 70 BPM | RESPIRATION RATE: 16 BRPM | BODY MASS INDEX: 31.47 KG/M2 | HEIGHT: 62 IN

## 2024-10-16 DIAGNOSIS — R20.0 NUMBNESS IN BOTH LEGS: Primary | ICD-10-CM

## 2024-10-16 DIAGNOSIS — G43.909 EPISODIC MIGRAINE: ICD-10-CM

## 2024-10-16 DIAGNOSIS — R26.81 UNSTEADY GAIT: ICD-10-CM

## 2024-10-16 PROCEDURE — 99214 OFFICE O/P EST MOD 30 MIN: CPT | Performed by: OTHER

## 2024-10-16 NOTE — PATIENT INSTRUCTIONS
Refill policies:    Allow 2-3 business days for refills; controlled substances may take longer.  Contact your pharmacy at least 5 days prior to running out of medication and have them send an electronic request or submit request through the “request refill” option in your [employer] account.  Refills are not addressed on weekends; covering physicians do not authorize routine medications on weekends.  No narcotics or controlled substances are refilled after noon on Fridays or by on call physicians.  By law, narcotics must be electronically prescribed.  A 30 day supply with no refills is the maximum allowed.  If your prescription is due for a refill, you may be due for a follow up appointment.  To best provide you care, patients receiving routine medications need to be seen at least once a year.  Patients receiving narcotic/controlled substance medications need to be seen at least once every 3 months.  In the event that your preferred pharmacy does not have the requested medication in stock (e.g. Backordered), it is your responsibility to find another pharmacy that has the requested medication available.  We will gladly send a new prescription to that pharmacy at your request.    Scheduling Tests:    If your physician has ordered radiology tests such as MRI or CT scans, please contact Central Scheduling at [phone] right away to schedule the test.  Once scheduled, the [facility] Centralized Referral Team will work with your insurance carrier to obtain pre-certification or prior authorization.  Depending on your insurance carrier, approval may take 3-10 days.  It is highly recommended patients assure they have received an authorization before having a test performed.  If test is done without insurance authorization, patient may be responsible for the entire amount billed.      Precertification and Prior Authorizations:  If your physician has recommended that you have a procedure or additional testing performed the [facility]  Centralized Referral Team will contact your insurance carrier to obtain pre-certification or prior authorization.    You are strongly encouraged to contact your insurance carrier to verify that your procedure/test has been approved and is a COVERED benefit.  Although the [facility] Centralized Referral Team does its due diligence, the insurance carrier gives the disclaimer that \"Although the procedure is authorized, this does not guarantee payment.\"    Ultimately the patient is responsible for payment.   Thank you for your understanding in this matter.  Paperwork Completion:  If you require FMLA or disability paperwork for your recovery, please make sure to either drop it off or have it faxed to our office at [phone]. Be sure the form has your name and date of birth on it.  The form will be faxed to our Forms Department and they will complete it for you.  There is a 25$ fee for all forms that need to be filled out.  Please be aware there is a 10-14 day turnaround time.  You will need to sign a release of information (DONTA) form if your paperwork does not come with one.  You may call the Forms Department with any questions at [phone].  Their fax number is [phone].

## 2024-10-16 NOTE — PROGRESS NOTES
[facility] Progress Note    HPI  Chief Complaint   Patient presents with    Neurologic Problem     C/O whole body aching/fatigue/BLE pain, notes bilat knee down numbness, no change in cognition        As per my initial H&P from 1/22/2024,   \" Mirtha Silver is a 43 year old, who presents for evaluation of memory loss.   She states she has been forgetting things for \"a while\" and states she tries to remember but cannot remember and sometimes goes into the kitchen and is not able to recall why why she was going to the kitchen but recalls this later.  She is not able to explain how long these occur or how long she had these symptoms in general; denies auras of odd tastes, smells or sounds.    She notes her  and kids have noted she asks questions several times. She denies episodes of spontaneous speech arrest; denies numbness/tingling on one side of her body aside from numbness in legs R more than L due to lumbar surgery; has cervical surgery as well and tlngling in both arms as well; sometimes misplaces objects at home; notes more anxiety in the past year;     She has history of anxiety and been on medications for ~15 yrs but been taking less frequently only taking as needed.      Headache: endorses some headaches which she notes are related to stress; no associated nausea but some light sensitivity.       Otherwise, patient denies any recent weight change, fevers, chills, nausea, double vision/ blurry vision / loss of vision, chest pain, palpitations, shortness of breath, rashes, joint pains, bowel / bladder incontinence or mood issues. \"       Prior notes as per 4/22/2024.  Patient since last visit states she has been stable but continues to have memory issues. She denies auras of odd tastes, smells or sounds or episodes of loss of awareness or speech arrest. She sometimes notes food tastes bitter. She denies issues with driving and denies getting lost in the house. She denies falls or  tripping over feet.     She notes some headaches with pain on one side of head, lasing only a matter of minutes; she sometimes has this last several hours with light / sound sensitivity.  She has history of migraines as well which seem worse when warm than cold outside.       She has been on duloxetine / Cymbalta in the past and reports she had issues with not being able to speak and could not feel below knees; she also felt short of breath and was told not to take this medication again.      She was not able to have MRI due to anxiety - awaiting MRI under sedation.         Patient last seen 2024. She states she is having numbness in legs and she is tired and feels pain throughout her body. She has tried Ubrelvy for migraines but has been taking this only on occasion. She is not sure how often she has migraines but thinks she has other headaches as well.      She has also been having pain in the low back, but feels numbness is worsening over time. She denies balance issues, falls or bowel / bladder incontinence.     She was seen by physiatry / pain management for possible steroid injection but declined this. She was recommended to see her spinal surgeon.  She feels her R leg is weaker than left one. She denies falls but feels she trips on the right foot when walking.         Past Medical History:    Anxiety state    Back problem    Calculus of kidney    Depression    Disorder of liver    FATTY LIVER     Disorder of thyroid    hypothyroid    Endometriosis    Esophageal reflux    Excessive or frequent menstruation    High cholesterol    History of Papanicolaou smear of cervix    Hyperlipidemia    IBS (irritable bowel syndrome)    Migraines    Per [facility] - Migraine headaches    Mittelschmerz    Osteoarthritis    Ovarian cyst    Postpartum depression    Prediabetes    Pregnancy (HCC)        Spondylolisthesis of lumbosacral region    Uterine leiomyoma    Visual impairment    WEARS GLASSES    Vitamin B12 deficiency     Vitamin D deficiency     Past Surgical History:   Procedure Laterality Date    Cervical spine surgery  09/18/2023    C5-C6 Arthroplasty     Cholecystectomy  2012    Colonoscopy  2004    Cyst removal  2007, 2014    laporoscopic ovarian cytectomy    Hysterectomy      Laparoscopy,diagnostic  06/08/2016    laparoscopy, filshie clip sterilization of left fallopian tube, ablation of endometriosis, retrieval and removal of displaced filshie clip    Lipoma removal Left 12/29/2018    Dorian biopsy stereo nodule 1 site left (cpt=19081)  05/10/2022    top hat    Dorian localization wire 1 site left (cpt=19281)  06/28/2022    Other  2019    low back surgery with metal implant    Total abdominal hysterectomy N/A 04/05/2017    Procedure: ABDOMINAL HYSTERECTOMY;  Surgeon: Juvencio Decker MD;  Location: [facility] MAIN OR     Family History   Problem Relation Age of Onset    Diabetes Father         Type 2    Hypertension Mother     Lipids Mother         Hyperlipidemia    Diabetes Mother         Type 2    Diabetes Sister     Hypertension Sister     Lipids Brother     Glaucoma Neg     Macular degeneration Neg      Social History     Socioeconomic History    Marital status:     Number of children: 3   Tobacco Use    Smoking status: Former     Types: Cigarettes    Smokeless tobacco: Never   Vaping Use    Vaping status: Never Used   Substance and Sexual Activity    Alcohol use: Not Currently     Comment: socially    Drug use: No    Sexual activity: Yes     Birth control/protection: Hysterectomy   Other Topics Concern    Caffeine Concern No     Comment: 1 cup coffee daily    Exercise No       Allergies   Allergen Reactions    Cymbalta [Duloxetine Hcl] SHORTNESS OF BREATH and ANXIETY    Other SHORTNESS OF BREATH     SOME TOPICAL NUMBING SPRAY USED WHEN STARTING AN I.V. - cough and shortness of breath      Sulfa Antibiotics HIVES and RASH    Tramadol ITCHING     THROAT ITCHING AND FEELS LIKE IT WAS CLOSING     Ciprofloxacin  RASH    Acetaminophen OTHER (SEE COMMENTS)     Fatty Liver    Lyrica [Pregabalin] RASH         Current Outpatient Medications:     Plecanatide (TRULANCE) 3 MG Oral Tab, Take 3 mg by mouth daily. For chronic constipation, Disp: 90 tablet, Rfl: 1    fluticasone propionate 50 MCG/ACT Nasal Suspension, INSTILL 2 SPRAYS IN EACH NOSTRIL DAILY., Disp: 48 g, Rfl: 3    levothyroxine 50 MCG Oral Tab, Take 1 tablet (50 mcg total) by mouth before breakfast. On Sunday, Monday, Tuesday , Thursday, Disp: 90 tablet, Rfl: 1    Diethylpropion HCl 25 MG Oral Tab, Take 1 tablet by mouth 2 (two) times daily., Disp: 120 tablet, Rfl: 4    levothyroxine 25 MCG Oral Tab, Take one 25 mcg tablet on Monday, Wednesday and Friday., Disp: 50 tablet, Rfl: 1    pantoprazole 40 MG Oral Tab EC, Take 1 tablet (40 mg total) by mouth every morning before breakfast. To help with stomach acid and stomach irritation/inflammation, Disp: 90 tablet, Rfl: 3    loteprednol 0.5 % Ophthalmic Suspension, Place 1 drop into both eyes 2 (two) times daily., Disp: , Rfl:     clotrimazole-betamethasone 1-0.05 % External Cream, Apply 1 Application topically 2 (two) times daily. To right side of nose., Disp: 60 g, Rfl: 1    ketoconazole 2 % External Cream, Apply to affected area on left foot bid-continue to use for 5 days after all evidence of rash or redness has resolved, Disp: 60 g, Rfl: 1    ubrogepant (UBRELVY) 50 MG Oral Tab, Take one tablet at onset of migraine.  May take additional tablet in 2 hours if needed.  Do not exceed two tablets per 24 hour period., Disp: 10 tablet, Rfl: 0    METFORMIN  MG Oral Tablet 24 Hr, TAKE 1 TABLET BY MOUTH EVERY DAY, Disp: 90 tablet, Rfl: 4    gabapentin 300 MG Oral Cap, Take 1 capsule (300 mg total) by mouth. 1 tab AM, 2 tab QHS, Disp: , Rfl:     Naproxen Sodium (ALEVE OR), Take 1 tablet by mouth daily as needed., Disp: , Rfl:     fluorometholone 0.1 % Ophthalmic Suspension, Place 1 drop into both eyes 4 (four) times daily.,  Disp: , Rfl:     semaglutide 2 MG/1.5ML Subcutaneous Solution Pen-injector, Inject into the skin once a week. (Patient not taking: Reported on 10/16/2024), Disp: , Rfl:     moxifloxacin 0.5 % Ophthalmic Solution, Place 1 drop into both eyes in the morning and 1 drop before bedtime. (Patient not taking: Reported on 10/10/2024), Disp: , Rfl:     Review of Systems:  No chest pain or palpitations; otherwise as noted in HPI.    Exam:  /78 (BP Location: Left arm, Patient Position: Sitting, Cuff Size: adult)   Pulse 70   Resp 16   Ht 62\"   Wt 171 lb (77.6 kg)   LMP 03/22/2017   BMI 31.28 kg/m²   Estimated body mass index is 31.28 kg/m² as calculated from the following:    Height as of this encounter: 62\".    Weight as of this encounter: 171 lb (77.6 kg).    Gen: well developed, well nourished, no acute distress  HEENT: normocephalic  Heart; normal S1/S2, regular rate and rhythm  Lungs: clear to auscultation bilaterally  Extremities: no edema, peripheral pulses intact    Neck: supple, full range of motion; no carotid bruits    Fundoscopic Exam: optic discs sharp bilaterally    Neuro:  Mental status:  Orientation: Alert and oriented to person, place, time  Speech Fluent and conversational    CN: PERRL, EOMI with no nystagmus, VFF, smile symmetric, sensation intact, tongue and palate midline, SCM intact, otherwise, CN 2-12 intact  Motor: very inconsistent exam - she has give way weakness and poor effort at times; otherwise, 5/5 strength throughout, tone normal  DTR: 2+ symmetric throughout, except absent R ankle jerk; toes downgoing bilaterally, no clonus  Sensory: intact to light touch throughout  Coord: FNF intact with no tremor or dysmetria; rapid alternating movements intact bilaterally  Romberg: present   Gait:     Casual gait antalgic, not able to walk on heels or toes today and very off balance     Labs:  New    8/20/2024  B12: 868 - normal   A1C 5.2    Prior as noted below    Component      Latest Ref Rng  1/31/2024   Vitamin B12      211 - 911 pg/mL 526        Imaging:  New    XR ABDOMEN (1 VIEW) (CPT=74018)    Result Date: 10/8/2024  CONCLUSION:   Nonobstructive bowel gas pattern.  Moderate stool throughout the colon, may reflect constipation in the appropriate clinical setting.  Prior cholecystectomy.    Dictated by (CST): Thalia Abraham MD on 10/08/2024 at 12:06 PM     Finalized by (CST): Thalia Abraham MD on 10/08/2024 at 12:08 PM          [facility] LISY 2D+3D SCREENING BILAT (CPT=77067/65140)    Result Date: 9/16/2024  CONCLUSION:   No mammographic evidence of malignancy.  BI-RADS CATEGORY:   DIAGNOSTIC CATEGORY 1 - NEGATIVE ASSESSMENT.   RECOMMENDATIONS:  ROUTINE MAMMOGRAM AND CLINICAL EVALUATION IN 12 MONTHS.       PLEASE NOTE: NORMAL MAMMOGRAM DOES NOT EXCLUDE THE POSSIBILITY OF BREAST CANCER.  A CLINICALLY SUSPICIOUS PALPABLE LUMP SHOULD BE BIOPSIED.   For patients over the age of 40, the target due date for the patient's next mammogram has been entered into a reminder system.   Patient received a discharge summary from the technologist after completion of exam.  Breast marker legend used on images  Triangle = Palpable lump [place] = Skin tag or mole BB = Nipple Linear mao = [place] = Pain    Dictated by (CST): Bebeto Delacruz MD on 9/16/2024 at 12:14 PM     Finalized by (CST): Bebeto Delacruz MD on 9/16/2024 at 12:18 PM      [facility] [place] [facility] [address] [place] [address] [phone]      Other procedures    EEG (2/16/2204):   IMPRESSION:   This is a normal EEG study.   No epileptiform abnormalities seen in this study.  This however does not exclude a seizure disorder. Clinical correlation is advised.           Impression/ Plan:  Mirtha Silver is a 44 year old female with PMHx including but not limited to anxiety, who originally resented 1/22/2024 for evaluation of headaches.      She notes some headaches with pain on one side of head, lasing only a matter of minutes; she  sometimes has this last several hours with light / sound sensitivity.  She has history of migraines as well which seem worse when warm than cold outside.       She has been on duloxetine / Cymbalta in the past and reports she had issues with not being able to speak and could not feel below knees; she also felt short of breath and was told not to take this medication again.      She was not able to have MRI due to anxiety - awaiting MRI under sedation; EEG was normal and B12 was normal; will await MRI brain - hold off on any new preventive medications given her prior allergic symptoms but will trial CGRP based abortive therapy - trial ubrogepantt (Ubrelvy) :  take 50 mg  within first 30 minutes of symptoms starting; if not improved after 2 hours, take additional dose, and then stop taking; discussed potential side effects, including but not limited to nausea, drowsiness and dry mouth      She now presents for concerns of not being able to walk and having numbness in both legs, worsening over time; she states this has been present since her surgery in the past which was several years ago but has not been emphasized in past visits. Thus far, she has been seen by pain management and advised no additional intervention is recommended but recommend see spinal surgery to evaluate for interval changes; otherwise, will check MRI brain and EMG to further evaluate for possible demyelinating disease and/or neuropathy / myopathy.     1. Numbness in both legs  As noted above   - EMG (ANI ROSARIO); Future  - MRI BRAIN (W+WO) (CPT=70553) [[id number]]; Future    2. Episodic migraine  As noted above   - MRI BRAIN (W+WO) (CPT=70553) [[id number]]; Future    3. Unsteady gait  As noted above   - EMG (ANI MARLENE); Future  - MRI BRAIN (W+WO) (CPT=70553) [[id number]]; Future    No follow-ups on file.    Brad Gillis MD, Neurology  [facility]  Pager [phone]  10/16/2024

## 2024-10-17 ENCOUNTER — TELEPHONE (OUTPATIENT)
Dept: FAMILY MEDICINE CLINIC | Facility: CLINIC | Age: 44
End: 2024-10-17

## 2024-10-17 NOTE — TELEPHONE ENCOUNTER
Language Line  # [phone]:  Patient calling,verified name and date of birth.    Answered patient's questions about when to recheck lipase, and TSH as per Dr Weaver's  10/13/24 lab result note and Pearl Dasilva's 8/26/24 lab result note.    She reports that Trulance prescribed by Dr Weaver  on 10/13/24 is not covered by insurance.  Called [facility], Trulance is not in patient's health plan formulary but they would accept a discount card.  Trulancc  discount card information sent to patient via [employer] as per her request. She will call back if she needs an alternative medication.  Patient has no further questions.

## 2024-10-19 ENCOUNTER — LAB ENCOUNTER (OUTPATIENT)
Dept: LAB | Age: 44
End: 2024-10-19
Attending: FAMILY MEDICINE
Payer: COMMERCIAL

## 2024-10-19 DIAGNOSIS — E03.9 ACQUIRED HYPOTHYROIDISM: ICD-10-CM

## 2024-10-19 DIAGNOSIS — R74.8 ELEVATED LIPASE: ICD-10-CM

## 2024-10-19 DIAGNOSIS — Z79.899 ENCOUNTER FOR LONG-TERM (CURRENT) USE OF HIGH-RISK MEDICATION: ICD-10-CM

## 2024-10-19 LAB
ALBUMIN SERPL-MCNC: 4.3 G/DL (ref 3.2–4.8)
ALBUMIN/GLOB SERPL: 1.8 {RATIO} (ref 1–2)
ALP LIVER SERPL-CCNC: 65 U/L
ALT SERPL-CCNC: 24 U/L
AMYLASE SERPL-CCNC: 87 U/L (ref 30–118)
ANION GAP SERPL CALC-SCNC: 7 MMOL/L (ref 0–18)
AST SERPL-CCNC: 20 U/L (ref ?–34)
BASOPHILS # BLD AUTO: 0.03 X10(3) UL (ref 0–0.2)
BASOPHILS NFR BLD AUTO: 0.5 %
BILIRUB SERPL-MCNC: 0.3 MG/DL (ref 0.3–1.2)
BUN BLD-MCNC: 9 MG/DL (ref 9–23)
BUN/CREAT SERPL: 12.2 (ref 10–20)
CALCIUM BLD-MCNC: 8.9 MG/DL (ref 8.7–10.4)
CHLORIDE SERPL-SCNC: 110 MMOL/L (ref 98–112)
CO2 SERPL-SCNC: 26 MMOL/L (ref 21–32)
CREAT BLD-MCNC: 0.74 MG/DL
CRP SERPL-MCNC: <0.4 MG/DL (ref ?–1)
DEPRECATED RDW RBC AUTO: 41.5 FL (ref 35.1–46.3)
EGFRCR SERPLBLD CKD-EPI 2021: 104 ML/MIN/1.73M2 (ref 60–?)
EOSINOPHIL # BLD AUTO: 0.08 X10(3) UL (ref 0–0.7)
EOSINOPHIL NFR BLD AUTO: 1.3 %
ERYTHROCYTE [DISTWIDTH] IN BLOOD BY AUTOMATED COUNT: 12.6 % (ref 11–15)
ERYTHROCYTE [SEDIMENTATION RATE] IN BLOOD: 11 MM/HR
FASTING STATUS PATIENT QL REPORTED: YES
GLOBULIN PLAS-MCNC: 2.4 G/DL (ref 2–3.5)
GLUCOSE BLD-MCNC: 107 MG/DL (ref 70–99)
HCT VFR BLD AUTO: 37.7 %
HGB BLD-MCNC: 13.2 G/DL
IMM GRANULOCYTES # BLD AUTO: 0.01 X10(3) UL (ref 0–1)
IMM GRANULOCYTES NFR BLD: 0.2 %
LIPASE SERPL-CCNC: 48 U/L (ref 12–53)
LYMPHOCYTES # BLD AUTO: 1.98 X10(3) UL (ref 1–4)
LYMPHOCYTES NFR BLD AUTO: 31.4 %
MCH RBC QN AUTO: 32 PG (ref 26–34)
MCHC RBC AUTO-ENTMCNC: 35 G/DL (ref 31–37)
MCV RBC AUTO: 91.3 FL
MONOCYTES # BLD AUTO: 0.54 X10(3) UL (ref 0.1–1)
MONOCYTES NFR BLD AUTO: 8.6 %
NEUTROPHILS # BLD AUTO: 3.66 X10 (3) UL (ref 1.5–7.7)
NEUTROPHILS # BLD AUTO: 3.66 X10(3) UL (ref 1.5–7.7)
NEUTROPHILS NFR BLD AUTO: 58 %
OSMOLALITY SERPL CALC.SUM OF ELEC: 295 MOSM/KG (ref 275–295)
PLATELET # BLD AUTO: 282 10(3)UL (ref 150–450)
POTASSIUM SERPL-SCNC: 4 MMOL/L (ref 3.5–5.1)
PROT SERPL-MCNC: 6.7 G/DL (ref 5.7–8.2)
RBC # BLD AUTO: 4.13 X10(6)UL
SODIUM SERPL-SCNC: 143 MMOL/L (ref 136–145)
TSI SER-ACNC: 1.54 MIU/ML (ref 0.55–4.78)
WBC # BLD AUTO: 6.3 X10(3) UL (ref 4–11)

## 2024-10-19 PROCEDURE — 83690 ASSAY OF LIPASE: CPT

## 2024-10-19 PROCEDURE — 84443 ASSAY THYROID STIM HORMONE: CPT

## 2024-10-19 PROCEDURE — 36415 COLL VENOUS BLD VENIPUNCTURE: CPT

## 2024-10-19 PROCEDURE — 82150 ASSAY OF AMYLASE: CPT

## 2024-10-19 PROCEDURE — 85025 COMPLETE CBC W/AUTO DIFF WBC: CPT

## 2024-10-19 PROCEDURE — 86140 C-REACTIVE PROTEIN: CPT

## 2024-10-19 PROCEDURE — 85652 RBC SED RATE AUTOMATED: CPT

## 2024-10-19 PROCEDURE — 80053 COMPREHEN METABOLIC PANEL: CPT

## 2024-11-06 ENCOUNTER — TELEMEDICINE (OUTPATIENT)
Dept: FAMILY MEDICINE CLINIC | Facility: CLINIC | Age: 44
End: 2024-11-06
Payer: COMMERCIAL

## 2024-11-06 DIAGNOSIS — R10.12 LUQ ABDOMINAL PAIN: ICD-10-CM

## 2024-11-06 DIAGNOSIS — L30.9 DERMATITIS: Primary | ICD-10-CM

## 2024-11-06 PROCEDURE — 99213 OFFICE O/P EST LOW 20 MIN: CPT | Performed by: NURSE PRACTITIONER

## 2024-11-06 RX ORDER — TRIAMCINOLONE ACETONIDE 1 MG/G
CREAM TOPICAL 2 TIMES DAILY PRN
Qty: 45 G | Refills: 1 | Status: SHIPPED | OUTPATIENT
Start: 2024-11-06

## 2024-11-06 NOTE — PROGRESS NOTES
[facility]    Virtual Telephone/Video Check-In    Mirtha Silver verbally consents to a Virtual/Telephone Check-In visit on 11/06/24.  Patient has been referred to the [facility] website at [email or web address] to review the yearly Consent to Treat document.    Patient understands and accepts financial responsibility for any deductible, co-insurance and/or co-pays associated with this service.    Duration of the service: 10 minutes      Summary of topics discussed:     Patient presents for follow-up of rash to nose.  Was previously prescribed clotrimazole/betamethasone cream which she states made rash worse and spread to bilateral nose.      Also with concerns of persistent left upper quadrant pain.  Was previously prescribed Trulance which she states is not covered by her insurance plan.  Has an upcoming appointment with Dr. Weaver on 11/11 to discuss alternate options.  With bouts of constipation that alternate with diarrhea.    Review of Systems   Gastrointestinal:  Positive for abdominal pain.   Skin:  Positive for rash.        There were no vitals filed for this visit.  There is no height or weight on file to calculate BMI.    Health Maintenance   Topic Date Due    COVID-19 Vaccine (3 - 2023-24 season) 09/01/2024    Influenza Vaccine (1) 10/01/2024    Annual Physical  08/20/2025    Mammogram  09/14/2025    DTaP,Tdap,and Td Vaccines (3 - Td or Tdap) 03/22/2032    Annual Depression Screening  Completed    Pneumococcal Vaccine: Birth to 64yrs  Aged Out       Patient's last menstrual period was 03/22/2017.    Past Medical History:    Anxiety state    Back problem    Calculus of kidney    Depression    Disorder of liver    FATTY LIVER     Disorder of thyroid    hypothyroid    Endometriosis    Esophageal reflux    Excessive or frequent menstruation    High cholesterol    History of Papanicolaou smear of cervix    Hyperlipidemia    IBS (irritable bowel syndrome)    Migraines    Per [facility] - Migraine headaches    Dena     Osteoarthritis    Ovarian cyst    Postpartum depression    Prediabetes    Pregnancy (HCC)        Spondylolisthesis of lumbosacral region    Uterine leiomyoma    Visual impairment    WEARS GLASSES    Vitamin B12 deficiency    Vitamin D deficiency       .  Past Surgical History:   Procedure Laterality Date    Cervical spine surgery  2023    C5-C6 Arthroplasty     Cholecystectomy  2012    Colonoscopy  2004    Cyst removal  , 2014    laporoscopic ovarian cytectomy    Hysterectomy      Laparoscopy,diagnostic  2016    laparoscopy, filshie clip sterilization of left fallopian tube, ablation of endometriosis, retrieval and removal of displaced filshie clip    Lipoma removal Left 2018    Dorian biopsy stereo nodule 1 site left (cpt=19081)  05/10/2022    top hat    Dorian localization wire 1 site left (cpt=19281)  2022    Other  2019    low back surgery with metal implant    Total abdominal hysterectomy N/A 2017    Procedure: ABDOMINAL HYSTERECTOMY;  Surgeon: Juvencio Decker MD;  Location: [facility] MAIN OR       Family History   Problem Relation Age of Onset    Diabetes Father         Type 2    Hypertension Mother     Lipids Mother         Hyperlipidemia    Diabetes Mother         Type 2    Diabetes Sister     Hypertension Sister     Lipids Brother     Glaucoma Neg     Macular degeneration Neg        Social History     Socioeconomic History    Marital status:      Spouse name: Not on file    Number of children: 3    Years of education: Not on file    Highest education level: Not on file   Occupational History    Not on file   Tobacco Use    Smoking status: Former     Types: Cigarettes    Smokeless tobacco: Never   Vaping Use    Vaping status: Never Used   Substance and Sexual Activity    Alcohol use: Not Currently     Comment: socially    Drug use: No    Sexual activity: Yes     Birth control/protection: Hysterectomy   Other Topics Concern     Service Not Asked     Blood Transfusions Not Asked    Caffeine Concern No     Comment: 1 cup coffee daily    Occupational Exposure Not Asked    Hobby Hazards Not Asked    Sleep Concern Not Asked    Stress Concern Not Asked    Weight Concern Not Asked    Special Diet Not Asked    Back Care Not Asked    Exercise No    Bike Helmet Not Asked    Seat Belt Not Asked    Self-Exams Not Asked   Social History Narrative    The patient does not use an assistive device..      The patient does live in a home with stairs.     Social Drivers of Health     Financial Resource Strain: Low Risk  (9/21/2023)    Financial Resource Strain     Difficulty of Paying Living Expenses: Not hard at all     Med Affordability: No   Food Insecurity: Not on file   Transportation Needs: No Transportation Needs (9/21/2023)    Transportation Needs     Lack of Transportation: No   Physical Activity: Not on file   Stress: Not on file   Social Connections: Not on file   Housing Stability: Not on file       Current Outpatient Medications   Medication Sig Dispense Refill    triamcinolone 0.1 % External Cream Apply topically 2 (two) times daily as needed. 45 g 1    Plecanatide (TRULANCE) 3 MG Oral Tab Take 3 mg by mouth daily. For chronic constipation 90 tablet 1    fluticasone propionate 50 MCG/ACT Nasal Suspension INSTILL 2 SPRAYS IN EACH NOSTRIL DAILY. 48 g 3    levothyroxine 50 MCG Oral Tab Take 1 tablet (50 mcg total) by mouth before breakfast. On Sunday, Monday, Tuesday , Thursday 90 tablet 1    Diethylpropion HCl 25 MG Oral Tab Take 1 tablet by mouth 2 (two) times daily. 120 tablet 4    levothyroxine 25 MCG Oral Tab Take one 25 mcg tablet on Monday, Wednesday and Friday. 50 tablet 1    pantoprazole 40 MG Oral Tab EC Take 1 tablet (40 mg total) by mouth every morning before breakfast. To help with stomach acid and stomach irritation/inflammation 90 tablet 3    loteprednol 0.5 % Ophthalmic Suspension Place 1 drop into both eyes 2 (two) times daily.      semaglutide 2  MG/1.5ML Subcutaneous Solution Pen-injector Inject into the skin once a week. (Patient not taking: Reported on 10/16/2024)      moxifloxacin 0.5 % Ophthalmic Solution Place 1 drop into both eyes in the morning and 1 drop before bedtime. (Patient not taking: Reported on 10/10/2024)      clotrimazole-betamethasone 1-0.05 % External Cream Apply 1 Application topically 2 (two) times daily. To right side of nose. 60 g 1    ketoconazole 2 % External Cream Apply to affected area on left foot bid-continue to use for 5 days after all evidence of rash or redness has resolved 60 g 1    ubrogepant (UBRELVY) 50 MG Oral Tab Take one tablet at onset of migraine.  May take additional tablet in 2 hours if needed.  Do not exceed two tablets per 24 hour period. 10 tablet 0    METFORMIN  MG Oral Tablet 24 Hr TAKE 1 TABLET BY MOUTH EVERY DAY 90 tablet 4    gabapentin 300 MG Oral Cap Take 1 capsule (300 mg total) by mouth. 1 tab AM, 2 tab QHS      Naproxen Sodium (ALEVE OR) Take 1 tablet by mouth daily as needed.      fluorometholone 0.1 % Ophthalmic Suspension Place 1 drop into both eyes 4 (four) times daily.         Allergies:  Allergies[1]    Physical Exam  Constitutional:       Appearance: Normal appearance.   Pulmonary:      Effort: No respiratory distress.   Neurological:      Mental Status: She is alert and oriented to person, place, and time.          Assessment and Plan:  Problem List Items Addressed This Visit          Skin    Dermatitis - Primary    Relevant Medications    triamcinolone 0.1 % External Cream      Trial of triamcinolone twice daily as needed.  Supportive care discussed.  If no relief of symptoms, consider follow-up with dermatology.  Patient to keep scheduled appointment with Dr. Weaver to discuss alternate options for Trulance.  May consider following up with gastroenterology in the future.     Discussed plan of care with patient and patient is in agreement.  All questions answered. Patient to call with  questions or concerns.    Encouraged to sign up for My Chart if not already registered.        [1]   Allergies  Allergen Reactions    Cymbalta [Duloxetine Hcl] SHORTNESS OF BREATH and ANXIETY    Other SHORTNESS OF BREATH     SOME TOPICAL NUMBING SPRAY USED WHEN STARTING AN I.V. - cough and shortness of breath      Sulfa Antibiotics HIVES and RASH    Tramadol ITCHING     THROAT ITCHING AND FEELS LIKE IT WAS CLOSING     Ciprofloxacin RASH    Acetaminophen OTHER (SEE COMMENTS)     Fatty Liver    Lyrica [Pregabalin] RASH

## 2024-11-22 ENCOUNTER — OFFICE VISIT (OUTPATIENT)
Dept: DERMATOLOGY CLINIC | Facility: CLINIC | Age: 44
End: 2024-11-22
Payer: COMMERCIAL

## 2024-11-22 DIAGNOSIS — L71.0 PERIORAL DERMATITIS: Primary | ICD-10-CM

## 2024-11-22 DIAGNOSIS — L65.9 HAIR LOSS: ICD-10-CM

## 2024-11-22 PROCEDURE — 99203 OFFICE O/P NEW LOW 30 MIN: CPT | Performed by: PHYSICIAN ASSISTANT

## 2024-11-22 RX ORDER — CLOBETASOL PROPIONATE 0.5 MG/ML
1 SOLUTION TOPICAL
Qty: 60 ML | Refills: 3 | Status: SHIPPED | OUTPATIENT
Start: 2024-11-22

## 2024-11-22 RX ORDER — KETOCONAZOLE 20 MG/G
CREAM TOPICAL
Qty: 30 G | Refills: 1 | Status: SHIPPED | OUTPATIENT
Start: 2024-11-22

## 2024-11-22 NOTE — PROGRESS NOTES
HPI:    Patient ID: Mirtha Silver is a 44 year old female.    Patient presents for rash under nose. Patient reports some pain at times. Patient is also concerned about hair loss. No draining or tenderness noted. No allergies to medications noted. No new products noted. Has been itchy. No pain or scaling noted. She also has hair loss on the scalp. No draining or tenderness noted. No itching noted. Mostly on the front. Was given clobetasol solution and ketoconazole shampoo and used for maybe 3 months then stopped. Did not notice a difference. Hx of allergies to medications note.d         Review of Systems   Constitutional:  Negative for chills and fever.   Musculoskeletal:  Negative for arthralgias and myalgias.   Skin:  Positive for rash. Negative for color change and wound.            Current Outpatient Medications   Medication Sig Dispense Refill    ketoconazole 2 % External Cream Apply to affected area 2 times daily. 30 g 1    metRONIDAZOLE 0.75 % External Cream Apply 1 Application topically 2 (two) times daily. 45 g 0    clobetasol 0.05 % External Solution Apply 1 mL topically daily as needed. 60 mL 3    triamcinolone 0.1 % External Cream Apply topically 2 (two) times daily as needed. 45 g 1    fluticasone propionate 50 MCG/ACT Nasal Suspension INSTILL 2 SPRAYS IN EACH NOSTRIL DAILY. 48 g 3    levothyroxine 50 MCG Oral Tab Take 1 tablet (50 mcg total) by mouth before breakfast. On Sunday, Monday, Tuesday , Thursday 90 tablet 1    Diethylpropion HCl 25 MG Oral Tab Take 1 tablet by mouth 2 (two) times daily. 120 tablet 4    levothyroxine 25 MCG Oral Tab Take one 25 mcg tablet on Monday, Wednesday and Friday. 50 tablet 1    pantoprazole 40 MG Oral Tab EC Take 1 tablet (40 mg total) by mouth every morning before breakfast. To help with stomach acid and stomach irritation/inflammation 90 tablet 3    loteprednol 0.5 % Ophthalmic Suspension Place 1 drop into both eyes 2 (two) times daily.      semaglutide 2 MG/1.5ML  Subcutaneous Solution Pen-injector Inject into the skin once a week.      moxifloxacin 0.5 % Ophthalmic Solution Place 1 drop into both eyes in the morning and 1 drop before bedtime.      clotrimazole-betamethasone 1-0.05 % External Cream Apply 1 Application topically 2 (two) times daily. To right side of nose. 60 g 1    ketoconazole 2 % External Cream Apply to affected area on left foot bid-continue to use for 5 days after all evidence of rash or redness has resolved 60 g 1    ubrogepant (UBRELVY) 50 MG Oral Tab Take one tablet at onset of migraine.  May take additional tablet in 2 hours if needed.  Do not exceed two tablets per 24 hour period. 10 tablet 0    METFORMIN  MG Oral Tablet 24 Hr TAKE 1 TABLET BY MOUTH EVERY DAY 90 tablet 4    gabapentin 300 MG Oral Cap Take 1 capsule (300 mg total) by mouth. 1 tab AM, 2 tab QHS      Naproxen Sodium (ALEVE OR) Take 1 tablet by mouth daily as needed.      fluorometholone 0.1 % Ophthalmic Suspension Place 1 drop into both eyes 4 (four) times daily.       Allergies:Allergies[1]   LMP 03/22/2017   There is no height or weight on file to calculate BMI.  PHYSICAL EXAM:   Physical Exam  Constitutional:       General: She is not in acute distress.     Appearance: Normal appearance.   Skin:     General: Skin is warm and dry.      Findings: Rash present.      Comments: Erythematous papular rash noted under the nose bilaterally. No draining or tenderness noted. No scaling noted.     Thinning noted on the front of the scalp. No draining or tenderness noted. No scaling noted. No erythema noted. No lesions noted.    Neurological:      Mental Status: She is alert and oriented to person, place, and time.                ASSESSMENT/PLAN:   1. Perioral dermatitis  -After discussion with patient, advised the following:  -Start ketoconazole   -Educated to apply 2 times per day   -Start metro cream  -Educated to apply 2 times per day   -May take several weeks to resolve.   -To call or  follow-up with worsening symptoms or concerns  -Patient was agreeable to plan and will comply with discussion above.     2. Hair loss  -After discussion with patient, advised the following:  -Start clobetasol solution  -Check iron  -Will call with results.   -Vitamin D is normal.   -To call or follow-up with worsening symptoms or concerns  -Patient was agreeable to plan and will comply with discussion above.     - Ferritin      Orders Placed This Encounter   Procedures    Ferritin       Meds This Visit:  Requested Prescriptions     Signed Prescriptions Disp Refills    ketoconazole 2 % External Cream 30 g 1     Sig: Apply to affected area 2 times daily.    metRONIDAZOLE 0.75 % External Cream 45 g 0     Sig: Apply 1 Application topically 2 (two) times daily.    clobetasol 0.05 % External Solution 60 mL 3     Sig: Apply 1 mL topically daily as needed.       Imaging & Referrals:  None         ID#[id number]       [1]   Allergies  Allergen Reactions    Cymbalta [Duloxetine Hcl] SHORTNESS OF BREATH and ANXIETY    Other SHORTNESS OF BREATH     SOME TOPICAL NUMBING SPRAY USED WHEN STARTING AN I.V. - cough and shortness of breath      Sulfa Antibiotics HIVES and RASH    Tramadol ITCHING     THROAT ITCHING AND FEELS LIKE IT WAS CLOSING     Ciprofloxacin RASH    Acetaminophen OTHER (SEE COMMENTS)     Fatty Liver    Lyrica [Pregabalin] RASH

## 2024-12-03 ENCOUNTER — PROCEDURE VISIT (OUTPATIENT)
Dept: NEUROLOGY | Facility: CLINIC | Age: 44
End: 2024-12-03
Payer: COMMERCIAL

## 2024-12-03 DIAGNOSIS — R20.0 NUMBNESS IN BOTH LEGS: ICD-10-CM

## 2024-12-03 DIAGNOSIS — R26.81 UNSTEADY GAIT: ICD-10-CM

## 2024-12-03 NOTE — PROCEDURES
[facility]  [address], [place] [address]   [phone]        Full Name: GEORGES BIRMINGHAM Gender: Female  Patient ID: [id number] YOB: 1980      Visit Date: 12/3/2024 8:08 AM  Age: 44 Years  Examining Physician: ANGEL ALBARRAN MD  Height: 5 feet 2 inch  Weight: 171 lbs  History:     Focused HPI:   This is a 44 year old female with PMHx including but not limited to anxiety, who originally presented 1/22/2024 for evaluation of headaches.       She now presents for concerns of not being able to walk and having numbness in both legs, worsening over time; she states this has been present since her surgery in the past which was several years ago but has not been emphasized in past visits. Thus far, she has been seen by pain management and advised no additional intervention is recommended but recommend see spinal surgery to evaluate for interval changes; EMG requested to further evaluate for possible neuropathy / myopathy    Focused Exam:   Motor: very inconsistent exam - she has give way weakness and poor effort at times; otherwise, 5/5 strength throughout, tone normal  DTR: 2+ symmetric throughout, except absent R ankle jerk; toes downgoing bilaterally, no clonus  Sensory: intact to light touch throughout    Sensory NCS      Nerve / Sites Rec. Site Onset Lat Peak Lat NP Amp PP Amp Segments Distance Velocity Comment     ms ms µV µV  cm m/s    R Sural - (Antidromic)      Calf Ankle 2.76 3.59 13.0 14.4 Calf - Ankle 13 47       Ref.  <=3.60 <=4.50 >=4.0 >=4.0 Ref.      L Sural - (Antidromic)      Calf Ankle 2.92 3.80 19.7 20.3 Calf - Ankle 14 48       Ref.  <=3.60 <=4.50 >=4.0 >=4.0 Ref.          Motor NCS      Nerve / Sites Muscle Latency Amplitude Segments Dist. Lat Diff Velocity Comments     ms mV  cm ms m/s    R Peroneal - EDB      Ankle EDB 11.63 0.2 Ankle - EDB 6         Ref.  <=6.50 >=1.1 Ref.          B. Fib Head EDB 17.79 0.4 B. Fib Head - Ankle  31 6.17 50.3       Ref.    Ref.   >=39.0    L Peroneal - EDB      Ankle EDB 5.58 5.4 Ankle - EDB 7         Ref.  <=6.50 >=1.1 Ref.          B. Fib Head EDB 11.96 5.1 B. Fib Head - Ankle 27.5 6.38 43.1       Ref.    Ref.   >=39.0    R Tibial - AH      Ankle AH 4.00 8.5 Ankle - AH 8         Ref.  <=6.10 >=5.3 Ref.          Knee AH 11.92 8.2 Knee - Ankle 38 7.92 48.0       Ref.    Ref.   >=44.0    L Tibial - AH      Ankle AH 4.85 13.8 Ankle - AH 8         Ref.  <=6.10 >=5.3 Ref.          Knee AH 12.81 10.5 Knee - Ankle 35 7.96 44.0       Ref.    Ref.   >=44.0    R Peroneal - Tib Ant      Fib Head Tib Ant 3.58 8.0 Fib Head - Tib Ant 11.5  32.1       Pop fossa Tib Ant 4.63 7.8 Pop fossa - Fib Head 7 1.04 67.2        F  Wave      Nerve M Latency F Latency    ms ms   R Peroneal - EDB  51.6   Ref.  <=55.0   R Tibial - AH 5.0 46.5   Ref.  <=56.0   L Tibial - AH 5.5 48.4   Ref.  <=56.0   L Peroneal - EDB 6.1 49.7   Ref.  <=55.0       EMG Summary Table     Spontaneous MUAP Recruitment   Muscle IA Fib PSW Fasc H.F. Amp Dur. PPP Pattern   R. Vastus medialis N None None None None N N N N   L. Vastus medialis N None None None None N N N N   R. Peroneus longus N None None None None N N N N   L. Peroneus longus N None None None None N N N N   R. Tibialis anterior N None None None None N N N N   L. Tibialis anterior N None None None None N N N N   R. Gastrocnemius N None None None None N N N N   L. Gastrocnemius N None None None None N N N N   R. Extensor hallucis longus N None None None None N N N N   L. Extensor hallucis longus N None None None None N N N N       Summary    Nerve conduction studies:   Right sural sensory response was normal.  Left sural sensory response was normal.  Right peroneal motor response recorded at the EDB was abnormal due to severely prolonged distal motor latency and severely reduced amplitude; F-wave response was normal; when recorded at the tibialis anterior muscle this appeared normal; this may be due  to reduced EDB bulk.  Right tibial motor response was normal; F-wave response was normal.  Left peroneal motor response recorded at the EDB was normal; F-wave response was normal.  Left tibial motor response was normal; F-wave response was normal.    EMG (needle exam):  Concentric needle EMG study was normal in all 10 tested muscles: R. Vastus medialis, L. Vastus medialis, R. Peroneus longus, L. Peroneus longus, R. Tibialis anterior, L. Tibialis anterior, R. Gastrocnemius, L. Gastrocnemius, R. Extensor hallucis longus, L. Extensor hallucis longus.        Conclusion:   This is a normal study.  There is no electrophysiologic evidence to suggest an underlying large fiber polyneuropathy, myopathy, primary demyelinating neuropathy, or motor neuronopathy, or acute lumbar radiculopathy.  Of note, this test would not rule out a small fiber neuropathy, or central etiology.    Clinical comment:  The absent CMAP response at the right common peroneal motor response with regard to EDB muscle only distally is likely due to reduced muscle bulk rather than a true peroneal nerve injury as there is no evidence for denervation changes in any of the muscles tested and supplied by this nerve; this may be technical in nature as well; clinical correlation is advised; on the basis of this study, there is no electrophysiologic evidence to explain balance issues or muscle weakness.    Brad Gillis MD, Neurology  [facility]  Pager [phone]  12/3/2024

## 2024-12-19 ENCOUNTER — OFFICE VISIT (OUTPATIENT)
Facility: LOCATION | Age: 44
End: 2024-12-19
Payer: COMMERCIAL

## 2024-12-19 ENCOUNTER — LAB ENCOUNTER (OUTPATIENT)
Dept: LAB | Age: 44
End: 2024-12-19
Attending: INTERNAL MEDICINE
Payer: COMMERCIAL

## 2024-12-19 VITALS
HEIGHT: 62 IN | BODY MASS INDEX: 32.39 KG/M2 | SYSTOLIC BLOOD PRESSURE: 118 MMHG | HEART RATE: 78 BPM | DIASTOLIC BLOOD PRESSURE: 76 MMHG | WEIGHT: 176 LBS

## 2024-12-19 DIAGNOSIS — E88.819 INSULIN RESISTANCE: ICD-10-CM

## 2024-12-19 DIAGNOSIS — E55.9 VITAMIN D DEFICIENCY: ICD-10-CM

## 2024-12-19 DIAGNOSIS — E07.9 THYROID DISEASE: ICD-10-CM

## 2024-12-19 DIAGNOSIS — R53.83 FATIGUE, UNSPECIFIED TYPE: ICD-10-CM

## 2024-12-19 DIAGNOSIS — E53.8 VITAMIN B12 DEFICIENCY: ICD-10-CM

## 2024-12-19 DIAGNOSIS — R63.5 WEIGHT GAIN: ICD-10-CM

## 2024-12-19 DIAGNOSIS — E03.9 ACQUIRED HYPOTHYROIDISM: Primary | ICD-10-CM

## 2024-12-19 DIAGNOSIS — E66.9 OBESITY (BMI 30-39.9): ICD-10-CM

## 2024-12-19 DIAGNOSIS — E03.9 ACQUIRED HYPOTHYROIDISM: ICD-10-CM

## 2024-12-19 DIAGNOSIS — R13.10 DYSPHAGIA, UNSPECIFIED TYPE: ICD-10-CM

## 2024-12-19 LAB
THYROPEROXIDASE AB SERPL-ACNC: 55 U/ML (ref ?–60)
TSI SER-ACNC: 1.53 UIU/ML (ref 0.55–4.78)

## 2024-12-19 PROCEDURE — 86376 MICROSOMAL ANTIBODY EACH: CPT | Performed by: INTERNAL MEDICINE

## 2024-12-19 PROCEDURE — 36415 COLL VENOUS BLD VENIPUNCTURE: CPT | Performed by: INTERNAL MEDICINE

## 2024-12-19 PROCEDURE — 99205 OFFICE O/P NEW HI 60 MIN: CPT | Performed by: INTERNAL MEDICINE

## 2024-12-19 PROCEDURE — 84443 ASSAY THYROID STIM HORMONE: CPT

## 2024-12-19 RX ORDER — TIRZEPATIDE 2.5 MG/.5ML
2.5 INJECTION, SOLUTION SUBCUTANEOUS
Qty: 2 ML | Refills: 0 | Status: SHIPPED | OUTPATIENT
Start: 2024-12-19 | End: 2025-01-16

## 2024-12-19 NOTE — PATIENT INSTRUCTIONS
D3 2000 unit/day   Will refer to speech therapy   Labs now   Labs and follow up in 2 mo   Levothyroxine daily as recommended. Might change doses    We discussed lifestyle change   Will consider wegovy or zepbound if covered by insurance     Thyroid medication dose: Levothyroxine 25 mc/day x3/week and 50 mcg/day x4/week  Take you medication on empty stomach, not with any other medication or food. Wait 60 minutes before eating. Wait 4 hours before taking Vitamins, Calcium or iron. On the morning of the lab test, please take the medication after the blood test not before. Do not take Biotin 1 week before blood test.           [email or web address]    Don’t take your thyroid hormone at the same time as:  Walnuts.  Soybean flour.  Cottonseed meal.  Iron supplements or multivitamins containing iron.  Calcium supplements.  Antacids that contain aluminum, magnesium or calcium.  Some ulcer medicines, such as sucralfate (Carafate).  Some cholesterol-lowering drugs, such as those containing cholestyramine (Prevalite, Locholest) and colestipol (Colestid).  To avoid possible problems, eat these foods or use these products several hours before or after you take your thyroid medicine.    Supplements containing biotin, common in hair and nail products, can make it hard to measure how much thyroid hormone is in the body. Biotin does not affect thyroid hormone levels. But supplements that have biotin should be stopped for at least a week before measuring thyroid function so that the measurement is correct.

## 2024-12-19 NOTE — PROGRESS NOTES
New Patient Evaluation - History and Physical    CONSULT - Reason for Visit:    hypothyroidism   Requesting Physician:   DO Meg Marie Vineet, DO  [address]  [address]  [place], [place] [address] [id number]    CHIEF COMPLAINT:    Chief Complaint   Patient presents with    Consult     Resistance and hypothyroidism         HISTORY OF PRESENT ILLNESS:   Mirtha Silver is a 44 year old female who presents with  hypothyroid, low vit D, insulin resistant and obese     She c/o fatigue last year. She was dx with hypothyroidism   She was started on LT4 in 2023   Now she has been on LT4 25/50 mcg/ alternating days for the last 3 mo. She was on 50 mcg/day.   She reports sx the same now      She has headache today     Compression symptoms: denied except the bolded ones SOB, dysphagia with meds , odynophagia, change in voice, hoarseness, neck pain or neck mass.     The patient endorses the bolded symptoms: intolerance to cold, constipation, decreased lacrimation, fatigue; anxiety, heat intolerance, insomnia, tremors, wt loss, wt gain, irregular menses, lid lag, palpitations, proptosis, thinning hair;        Hair and skin: hair loss. Dry skin.   Menstrual hx: Patient's last menstrual period was 03/22/2017.   Neck surgery: yes  cervical but not thyroid   Neck radiation: No   Biotin: no   Turmeric: no   Family history of thyroid cancer in 1st degree relatives:  no     US thyroid was unremarkable 4/2024   US before that was in 9/2022       Thryoid Meds: levothyroxine Tabs - 25 MCG, 50 MCG    Tried ozempic fot wt loss but insurance did not cover it. Lost job and oculd not afford the cash price from other clinic  She cannot lose wt   ASSESSMENT AND PLAN:  Mirtha Silver is a 44 year old female who presents with  hypothyroid, insulin resistance, obese, fatigue and dysphagia.   Compressive symptoms are not related to thyroid since her US is unremarkable. This can be d/t previous C spine surgery. Will     Plan  D3  2000 unit/day   Will refer to speech therapy   Labs now   Labs and follow up in 2 mo   Levothyroxine daily as recommended. Might change doses    We discussed lifestyle change   Will consider wegovy or zepbound if covered by insurance     Thyroid medication dose: Levothyroxine 25 mc/day x3/week and 50 mcg/day x4/week  Take you medication on empty stomach, not with any other medication or food. Wait 60 minutes before eating. Wait 4 hours before taking Vitamins, Calcium or iron. On the morning of the lab test, please take the medication after the blood test not before. Do not take Biotin 1 week before blood test.           [email or web address]    Don’t take your thyroid hormone at the same time as:  Walnuts.  Soybean flour.  Cottonseed meal.  Iron supplements or multivitamins containing iron.  Calcium supplements.  Antacids that contain aluminum, magnesium or calcium.  Some ulcer medicines, such as sucralfate (Carafate).  Some cholesterol-lowering drugs, such as those containing cholestyramine (Prevalite, Locholest) and colestipol (Colestid).  To avoid possible problems, eat these foods or use these products several hours before or after you take your thyroid medicine.    Supplements containing biotin, common in hair and nail products, can make it hard to measure how much thyroid hormone is in the body. Biotin does not affect thyroid hormone levels. But supplements that have biotin should be stopped for at least a week before measuring thyroid function so that the measurement is correct.      PAST MEDICAL HISTORY:   Past Medical History:    Anxiety state    Back problem    Calculus of kidney    Depression    Disorder of liver    FATTY LIVER     Disorder of thyroid    hypothyroid    Endometriosis    Esophageal reflux    Excessive or frequent menstruation    High cholesterol    History of Papanicolaou smear of cervix    Hyperlipidemia    IBS (irritable bowel syndrome)    Migraines    Per  [facility] - Migraine headaches    Mittelschmerz    Osteoarthritis    Ovarian cyst    Postpartum depression    Prediabetes    Pregnancy (HCC)        Spondylolisthesis of lumbosacral region    Uterine leiomyoma    Visual impairment    WEARS GLASSES    Vitamin B12 deficiency    Vitamin D deficiency       PAST SURGICAL HISTORY:   Past Surgical History:   Procedure Laterality Date    Cervical spine surgery  2023    C5-C6 Arthroplasty     Cholecystectomy  2012    Colonoscopy  2004    Cyst removal  , 2014    laporoscopic ovarian cytectomy    Hysterectomy      Laparoscopy,diagnostic  2016    laparoscopy, filshie clip sterilization of left fallopian tube, ablation of endometriosis, retrieval and removal of displaced filshie clip    Lipoma removal Left 2018    Dorian biopsy stereo nodule 1 site left (cpt=19081)  05/10/2022    top hat    Dorian localization wire 1 site left (cpt=19281)  2022    Other  2019    low back surgery with metal implant    Total abdominal hysterectomy N/A 2017    Procedure: ABDOMINAL HYSTERECTOMY;  Surgeon: Juvencio Decker MD;  Location[facility] MAIN OR       CURRENT MEDICATIONS:     Tirzepatide-Weight Management (ZEPBOUND) 2.5 MG/0.5ML Subcutaneous Solution Auto-injector Inject 2.5 mg into the skin every 7 days for 28 days. 2 mL 0    ketoconazole 2 % External Cream Apply to affected area 2 times daily. 30 g 1    levothyroxine 50 MCG Oral Tab Take 1 tablet (50 mcg total) by mouth before breakfast. On , Monday, Tuesday , Thursday 90 tablet 1    Diethylpropion HCl 25 MG Oral Tab Take 1 tablet by mouth 2 (two) times daily. 120 tablet 4    levothyroxine 25 MCG Oral Tab Take one 25 mcg tablet on Monday, Wednesday and Friday. 50 tablet 1    pantoprazole 40 MG Oral Tab EC Take 1 tablet (40 mg total) by mouth every morning before breakfast. To help with stomach acid and stomach irritation/inflammation 90 tablet 3    METFORMIN  MG Oral Tablet 24 Hr TAKE 1  TABLET BY MOUTH EVERY DAY 90 tablet 4    gabapentin 300 MG Oral Cap Take 1 capsule (300 mg total) by mouth. 1 tab AM, 2 tab QHS         ALLERGIES:  Allergies[1]    SOCIAL HISTORY:    Social History     Socioeconomic History    Marital status:     Number of children: 3   Tobacco Use    Smoking status: Former     Types: Cigarettes    Smokeless tobacco: Never   Vaping Use    Vaping status: Never Used   Substance and Sexual Activity    Alcohol use: Not Currently     Comment: socially    Drug use: No    Sexual activity: Yes     Birth control/protection: Hysterectomy   Other Topics Concern    Caffeine Concern No     Comment: 1 cup coffee daily    Exercise No    Breast feeding No    Reaction to local anesthetic No    Pt has a pacemaker No    Pt has a defibrillator No        FAMILY HISTORY:   Family History   Problem Relation Age of Onset    Diabetes Father         Type 2    Hypertension Mother     Lipids Mother         Hyperlipidemia    Diabetes Mother         Type 2    Diabetes Sister     Hypertension Sister     Lipids Brother     Glaucoma Neg     Macular degeneration Neg    Sister and Mother with thyroid disease        REVIEW OF SYSTEMS:  All negative other than HPI    PHYSICAL EXAM:   Height: 5' 2\" (157.5 cm) (12/19 1516)  Weight: 176 lb (79.8 kg) (12/19 1516)  BSA (Calculated - sq m): 1.81 sq meters (12/19 1516)  Pulse: 78 (12/19 1516)  BP: 118/76 (12/19 1516)  Temp: --  Do Not Use - Resp Rate: --  SpO2: --    Body mass index is 32.19 kg/m².  + horizontal scar at the base of the neck ,     CONSTITUTIONAL:  Awake and alert. Age appropriate, good hygiene not in acute distress. Well-nourished and well developed. no acute distress   PSYCH:    Normal mood and affect,   cooperative  Neuro: speech is clear. Awake, alert, no aphasia, no facial asymmetry,    EYES:  No proptosis, no ptosis, conjunctiva normal  ENT:  Normocephalic, atraumatic  Eye: EOMI, normal lids, no discharge,    No rhinorrhea, moist oral mucosa  Neck:  full range of motion  Neck/Thyroid: neck inspection: scar  LUNGS:  No acute respiratory distress, non-labored respiration. Speaking full sentences  CARDIOVASCULAR:  regular rate   ABDOMEN:  No abdominal pain.   SKIN:  Skin is dry, no obvious rashes or lesions  EXTREMITIES: no gross abnormality   MSK: Moves extremities spontaneously. full range of motion in all major joints      DATA:     Pertinent data reviewed  XR ABDOMEN (1 VIEW) (CPT=74018)    Result Date: 10/8/2024  CONCLUSION:   Nonobstructive bowel gas pattern.  Moderate stool throughout the colon, may reflect constipation in the appropriate clinical setting.  Prior cholecystectomy.    Dictated by (CST): Thalia Abraham MD on 10/08/2024 at 12:06 PM     Finalized by (CST): Thalia Abraham MD on 10/08/2024 at 12:08 PM           No results for input(s): \"TSH\", \"T4F\", \"T3F\", \"THYP\" in the last 72 hours.  TSH   Date Value Ref Range Status   10/19/2024 1.544 0.550 - 4.780 mIU/mL Final     Lab Results   Component Value Date    A1C 5.2 08/20/2024    A1C 5.6 09/13/2023    A1C 5.3 05/15/2023    A1C 5.1 02/17/2023    A1C 5.4 01/25/2021          Latest Reference Range & Units 08/20/24 16:32 10/19/24 12:05   T4,Free (Direct) 0.8 - 1.7 ng/dL 1.6    TSH 0.550 - 4.780 mIU/mL 0.295 (L) 1.544   T3 FREE 2.40 - 4.20 pg/mL 2.84    INSULIN 3.0 - 25.0 mU/L 44.6 (H)    (L): Data is abnormally low  (H): Data is abnormally high    No results for input(s): \"TSH\", \"T4F\", \"T3F\", \"THYP\" in the last 72 hours.  XR ABDOMEN (1 VIEW) (CPT=74018)    Result Date: 10/8/2024  CONCLUSION:   Nonobstructive bowel gas pattern.  Moderate stool throughout the colon, may reflect constipation in the appropriate clinical setting.  Prior cholecystectomy.    Dictated by (CST): Thalia Abraham MD on 10/08/2024 at 12:06 PM     Finalized by (CST): Thalia Abraham MD on 10/08/2024 at 12:08 PM           Orders Placed This Encounter   Procedures    TSH W Reflex To Free T4    Thyroid Peroxidase (TPO) AB    TSH W  Reflex To Free T4     Orders Placed This Encounter    TSH W Reflex To Free T4     Standing Status:   Future     Number of Occurrences:   1     Standing Expiration Date:   12/19/2025     Order Specific Question:   Release to patient     Answer:   Immediate    Thyroid Peroxidase (TPO) AB     Order Specific Question:   Release to patient     Answer:   Immediate    TSH W Reflex To Free T4     Standing Status:   Future     Number of Occurrences:   1     Standing Expiration Date:   12/19/2025     Order Specific Question:   Release to patient     Answer:   Immediate    Tirzepatide-Weight Management (ZEPBOUND) 2.5 MG/0.5ML Subcutaneous Solution Auto-injector     Sig: Inject 2.5 mg into the skin every 7 days for 28 days.     Dispense:  2 mL     Refill:  0          This is a specialized patient consultation in endocrinology and required comprehensive review of prior records, as well as current evaluation, with time required for consideration of complex endocrine issues and consultation. For this visit, I personally interviewed the patient, and family member if accompanied, performed the pertinent parts of the history and physical examination. ROS included screening for appropriate endocrine conditions.   Today's diagnosis and plan were reviewed in detail with the patient who states understanding and agrees with plan. I discussed with the patient possible diagnosis, differential diagnosis, need for work up, treatment options, alternatives and side effects.     Please see note for details about time spent which includes:   · pre-visit preparation  · reviewing records  · face to face time with the patient   · timely documentation of the encounter  · ordering medications/tests  · communication with care team  · care coordination    I appreciate the opportunity to be part of your patient's medical care and will keep you, as the referring and primary physicians, informed about the care of your patient. Please feel free to contact  me should you have any questions.    The 21st Century Cures Act makes medical notes like these available to patients in the interest of transparency. Please be advised this is a medical document. Medical documents are intended to carry relevant information, facts as evident, and the clinical opinion of the practitioner. The medical note is intended as peer to peer communication and may appear blunt or direct. It is written in medical language and may contain abbreviations or verbiage that are unfamiliar.   Roberth Turk MD             [1]   Allergies  Allergen Reactions    Cymbalta [Duloxetine Hcl] SHORTNESS OF BREATH and ANXIETY    Other SHORTNESS OF BREATH     SOME TOPICAL NUMBING SPRAY USED WHEN STARTING AN I.V. - cough and shortness of breath      Sulfa Antibiotics HIVES and RASH    Tramadol ITCHING     THROAT ITCHING AND FEELS LIKE IT WAS CLOSING     Ciprofloxacin RASH    Acetaminophen OTHER (SEE COMMENTS)     Fatty Liver    Lyrica [Pregabalin] RASH

## 2024-12-27 ENCOUNTER — NURSE TRIAGE (OUTPATIENT)
Dept: FAMILY MEDICINE CLINIC | Facility: CLINIC | Age: 44
End: 2024-12-27

## 2024-12-27 ENCOUNTER — TELEPHONE (OUTPATIENT)
Dept: ENDOCRINOLOGY CLINIC | Facility: CLINIC | Age: 44
End: 2024-12-27

## 2024-12-27 ENCOUNTER — OFFICE VISIT (OUTPATIENT)
Dept: FAMILY MEDICINE CLINIC | Facility: CLINIC | Age: 44
End: 2024-12-27

## 2024-12-27 VITALS
HEART RATE: 101 BPM | DIASTOLIC BLOOD PRESSURE: 81 MMHG | WEIGHT: 176 LBS | BODY MASS INDEX: 32.39 KG/M2 | HEIGHT: 62 IN | SYSTOLIC BLOOD PRESSURE: 121 MMHG

## 2024-12-27 DIAGNOSIS — K64.5 THROMBOSED EXTERNAL HEMORRHOID: Primary | ICD-10-CM

## 2024-12-27 PROCEDURE — 99213 OFFICE O/P EST LOW 20 MIN: CPT | Performed by: FAMILY MEDICINE

## 2024-12-27 RX ORDER — IBUPROFEN 800 MG/1
800 TABLET, FILM COATED ORAL 3 TIMES DAILY
Qty: 30 TABLET | Refills: 0 | Status: SHIPPED | OUTPATIENT
Start: 2024-12-27 | End: 2025-12-22

## 2024-12-27 RX ORDER — HYDROCODONE BITARTRATE AND ACETAMINOPHEN 10; 325 MG/1; MG/1
1 TABLET ORAL EVERY 6 HOURS PRN
Qty: 10 TABLET | Refills: 0 | Status: SHIPPED | OUTPATIENT
Start: 2024-12-27

## 2024-12-27 RX ORDER — PREDNISOLONE ACETATE 10 MG/ML
SUSPENSION/ DROPS OPHTHALMIC
COMMUNITY
Start: 2024-12-09

## 2024-12-27 NOTE — PROCEDURES
Informed consent obtained all questions answered aseptic technique employed for incision and drainage of external thrombosed hemorrhoid    Betadine applied patient denied allergies    Anesthesia with 1% lidocaine with epinephrine x 2 mL    Incision and drainage performed without complication patient tolerated well    Instructions given sitz bath's 15 to 20 minutes twice daily follow-up with PCP Dr. Block

## 2024-12-27 NOTE — TELEPHONE ENCOUNTER
Patient Slovak speaking states she is supposed to get a new prescription for tablets did not have the name please follow up

## 2024-12-27 NOTE — TELEPHONE ENCOUNTER
Action Requested: Summary for Provider     []  Critical Lab, Recommendations Needed  [] Need Additional Advice  [x]   FYI    []   Need Orders  [] Need Medications Sent to Pharmacy  []  Other     SUMMARY: Per protocol, patient should be seen in the office today or tomorrow. Requesting an appointment to be seen today. Appointment scheduled.    Future Appointments   Date Time Provider Department Center   12/27/2024  3:50 PM Antoine Mena, DO [facility]     Reason for call: Rectal Problem  Onset: 12/26    Micronesian Language Line: William ID number [id number].    Patient reports she noticed burning sensation and pain in the rectum yesterday. She checked and saw that she has a \"ball in the rectum\" and thinks it is hemorrhoid. Admits to pain and burning sensation currently. Last bowel movement was yesterday, no pain during bowel movement. She did not notice any blood in the stool. States she is constipated but has regular bowel movements, every day at times. Recalls that she had a hemorrhoid in the past and had it removed years ago. Care advice given per protocol. Patient verbalized understanding and agreed with plan of care.     Reason for Disposition   Patient wants to be seen    Protocols used: Rectal Symptoms-A-[place]

## 2024-12-28 ENCOUNTER — TELEPHONE (OUTPATIENT)
Dept: FAMILY MEDICINE CLINIC | Facility: CLINIC | Age: 44
End: 2024-12-28

## 2024-12-28 NOTE — TELEPHONE ENCOUNTER
Patient contacted.  She wants to go over every one of her medications to check interactions with hydrocodone and ibuprofen.  Nurse recommended checking with her pharmacist.  She will do so.       Name:SATURDAY TRIAGE RN                      2024 10:34:17 AM  ProfileId:    [id number]                   Jayesh  [facility]  ======================================================================  Text Messages    Message # [phone]         2024 10:32a   [MARYO]  To:  SATURDAY TRIAGE RN  From:  GEORGES AGUILERA 84  Primary MD:  Phone#:  [phone]  ----------------------------------------------------------------------    PT NEEDS TO KNOW IF SHE COULD CONTINUE CURRENT MEDIDATION WITH THE  NEW PRESCRIBED MEDICINE.

## 2024-12-31 NOTE — TELEPHONE ENCOUNTER
Spoke with pt, using , pt states that Zepbound 2.5 mg needs a PA, and she would also like to know if insurance doesn't cover the injection. What tablet medication will you be able to prescribe her as an alternative?

## 2024-12-31 NOTE — TELEPHONE ENCOUNTER
Medication PA Requested:    Zepbound 2.5 mg                                                       CoverMyMeds Used:  Key:  Quantity: 2ml  Day Supply: 30  Sig:  Inject 2.5 mg into the skin every 7 days for 28 days.   DX Code:   E66.9, E88.819, R63.5                                  CPT code (if applicable):   Case Number/Pending Ref#:

## 2024-12-31 NOTE — PROGRESS NOTES
Subjective:   Mirtha Silver is a 44 year old female who presents for Follow - Up ( Hemorrhoids: feel better but still has pain and is bleeding a little but more when goes to bathroom)   Patient is a pleasant 44-year-old female past medical history consistent for anxiety/depression, fatty liver, hypothyroidism, reflux, hyperlipidemia, IBS, and insulin resistance.  Patient presents to office today new to this provider for follow-up from recent visit with MD Mena on 2024.  Per review of chart patient had external thrombosed hemorrhoid that was incised and drained.  Patient was advised sitz bath twice daily and to follow-up with PCP.  Patient states in office today via [facility] #[id number] she is having some pain and some mild bleeding. She is not sure if this is normal. She has been doing baths but not sitz as she was unsure of what to obtain. She does have some pain when she has BM. She also has been taking the norco but does not like this as it causes her nausea, dizziness, and itching. Advised discontinuation. Atarax as needed. Start stool softener bid, increase fiber and water. Ask pharmacist for sitz bath and start BID. Reviewed s/s of infection.         Past Medical History:    Anxiety state    Back problem    Calculus of kidney    Depression    Disorder of liver    FATTY LIVER     Disorder of thyroid    hypothyroid    Endometriosis    Esophageal reflux    Excessive or frequent menstruation    High cholesterol    History of Papanicolaou smear of cervix    Hyperlipidemia    IBS (irritable bowel syndrome)    Migraines    Per [facility] - Migraine headaches    Mittelschmerz    Osteoarthritis    Ovarian cyst    Postpartum depression    Prediabetes    Pregnancy (HCC)        Spondylolisthesis of lumbosacral region    Uterine leiomyoma    Visual impairment    WEARS GLASSES    Vitamin B12 deficiency    Vitamin D deficiency      Past Surgical History:   Procedure Laterality Date    Cervical spine surgery   09/18/2023    C5-C6 Arthroplasty     Cholecystectomy  2012    Colonoscopy  2004    Cyst removal  2007, 2014    laporoscopic ovarian cytectomy    Hysterectomy      Laparoscopy,diagnostic  06/08/2016    laparoscopy, filshie clip sterilization of left fallopian tube, ablation of endometriosis, retrieval and removal of displaced filshie clip    Lipoma removal Left 12/29/2018    Dorian biopsy stereo nodule 1 site left (cpt=19081)  05/10/2022    top hat    Dorian localization wire 1 site left (cpt=19281)  06/28/2022    Other  2019    low back surgery with metal implant    Total abdominal hysterectomy N/A 04/05/2017    Procedure: ABDOMINAL HYSTERECTOMY;  Surgeon: Juvencio Decker MD;  Location: [facility] [place] MAIN OR        History/Other:    Chief Complaint Reviewed and Verified  Nursing Notes Reviewed and   Verified  Tobacco Reviewed  Allergies Reviewed  Medications Reviewed    Problem List Reviewed  Medical History Reviewed  Surgical History   Reviewed  OB Status Reviewed  Family History Reviewed  Social History   Reviewed         Tobacco:  She smoked tobacco in the past but quit greater than 12 months ago.  Social History     Tobacco Use   Smoking Status Former    Types: Cigarettes   Smokeless Tobacco Never        Current Outpatient Medications   Medication Sig Dispense Refill    docusate sodium 100 MG Oral Tab Take 1 tablet (100 mg total) by mouth 2 (two) times daily. 30 tablet 0    hydrOXYzine 25 MG Oral Tab Take 1 tablet (25 mg total) by mouth every 8 (eight) hours as needed for Itching. 10 tablet 0    prednisoLONE 1 % Ophthalmic Suspension       fluticasone propionate 50 MCG/ACT Nasal Suspension INSTILL 2 SPRAYS IN EACH NOSTRIL DAILY. 48 g 3    levothyroxine 50 MCG Oral Tab Take 1 tablet (50 mcg total) by mouth before breakfast. On Sunday, Monday, Tuesday , Thursday 90 tablet 1    Diethylpropion HCl 25 MG Oral Tab Take 1 tablet by mouth 2 (two) times daily. 120 tablet 4    levothyroxine 25 MCG Oral Tab Take  one 25 mcg tablet on Monday, Wednesday and Friday. 50 tablet 1    pantoprazole 40 MG Oral Tab EC Take 1 tablet (40 mg total) by mouth every morning before breakfast. To help with stomach acid and stomach irritation/inflammation 90 tablet 3    moxifloxacin 0.5 % Ophthalmic Solution Place 1 drop into both eyes in the morning and 1 drop before bedtime.      clotrimazole-betamethasone 1-0.05 % External Cream Apply 1 Application topically 2 (two) times daily. To right side of nose. 60 g 1    METFORMIN  MG Oral Tablet 24 Hr TAKE 1 TABLET BY MOUTH EVERY DAY 90 tablet 4    gabapentin 300 MG Oral Cap Take 1 capsule (300 mg total) by mouth. 1 tab AM, 2 tab QHS      Naproxen Sodium (ALEVE OR) Take 1 tablet by mouth daily as needed.      ibuprofen 800 MG Oral Tab Take 1 tablet (800 mg total) by mouth 3 (three) times daily. w food.(for pain/inflammation). (Patient not taking: Reported on 1/2/2025) 30 tablet 0    Tirzepatide-Weight Management (ZEPBOUND) 2.5 MG/0.5ML Subcutaneous Solution Auto-injector Inject 2.5 mg into the skin every 7 days for 28 days. (Patient not taking: Reported on 1/2/2025) 2 mL 0    ketoconazole 2 % External Cream Apply to affected area 2 times daily. 30 g 1    metRONIDAZOLE 0.75 % External Cream Apply 1 Application topically 2 (two) times daily. 45 g 0    clobetasol 0.05 % External Solution Apply 1 mL topically daily as needed. 60 mL 3    triamcinolone 0.1 % External Cream Apply topically 2 (two) times daily as needed. 45 g 1    ketoconazole 2 % External Cream Apply to affected area on left foot bid-continue to use for 5 days after all evidence of rash or redness has resolved (Patient not taking: Reported on 1/2/2025) 60 g 1    ubrogepant (UBRELVY) 50 MG Oral Tab Take one tablet at onset of migraine.  May take additional tablet in 2 hours if needed.  Do not exceed two tablets per 24 hour period. (Patient not taking: Reported on 1/2/2025) 10 tablet 0         Review of Systems:  Review of Systems    Constitutional:  Negative for chills and fever.   Respiratory:  Negative for shortness of breath.    Cardiovascular:  Negative for chest pain.   Gastrointestinal:  Positive for anal bleeding, constipation and nausea. Negative for blood in stool, diarrhea and vomiting.         Objective:   BP 98/62 (BP Location: Right arm, Patient Position: Sitting, Cuff Size: large)   Pulse 77   Ht 5' 2\" (1.575 m)   Wt 174 lb 12.8 oz (79.3 kg)   LMP 03/22/2017   SpO2 97%   BMI 31.97 kg/m²  Estimated body mass index is 31.97 kg/m² as calculated from the following:    Height as of this encounter: 5' 2\" (1.575 m).    Weight as of this encounter: 174 lb 12.8 oz (79.3 kg).  Physical Exam  Vitals and nursing note reviewed.   Constitutional:       Appearance: Normal appearance. She is obese.   Cardiovascular:      Rate and Rhythm: Normal rate and regular rhythm.      Pulses: Normal pulses.      Heart sounds: Normal heart sounds. No murmur heard.  Pulmonary:      Effort: Pulmonary effort is normal. No respiratory distress.      Breath sounds: Normal breath sounds.   Genitourinary:         Comments: Well approximated closed healing I and D.  No drainage. No fluctuance. No erythema.   Skin:     General: Skin is warm and dry.      Capillary Refill: Capillary refill takes less than 2 seconds.      Findings: Rash present.   Neurological:      Mental Status: She is alert and oriented to person, place, and time.           Assessment & Plan:   1. Thrombosed external hemorrhoid (Primary)  -     Docusate Sodium; Take 1 tablet (100 mg total) by mouth 2 (two) times daily.  Dispense: 30 tablet; Refill: 0  2. Pruritus  -     hydrOXYzine HCl; Take 1 tablet (25 mg total) by mouth every 8 (eight) hours as needed for Itching.  Dispense: 10 tablet; Refill: 0    Healing well  Advised stool softener bid  Advised increasing fiber  Advised increased water  Advised sitz baths bid  Hold steroid creams at this time.   Dc norco  Start atarax as needed for  itching  Follow up as needed  Red flags reviewed.    Patient aware of plan of care. All questions answered to satisfaction of the patient. Patient instructed to call office or reach out via [employer] if any issues arise. For urgent issues and/or reviewed red flags please proceed to the urgent care or ER.  Also, inform the nurse practitioner with any new symptoms or medication side effects.        Return if symptoms worsen or fail to improve.    Keith Donald, BASIL, 12/31/2024, 8:51 AM

## 2025-01-02 ENCOUNTER — OFFICE VISIT (OUTPATIENT)
Dept: FAMILY MEDICINE CLINIC | Facility: CLINIC | Age: 45
End: 2025-01-02

## 2025-01-02 VITALS
DIASTOLIC BLOOD PRESSURE: 62 MMHG | WEIGHT: 174.81 LBS | SYSTOLIC BLOOD PRESSURE: 98 MMHG | OXYGEN SATURATION: 97 % | BODY MASS INDEX: 32.17 KG/M2 | HEIGHT: 62 IN | HEART RATE: 77 BPM

## 2025-01-02 DIAGNOSIS — K64.5 THROMBOSED EXTERNAL HEMORRHOID: Primary | ICD-10-CM

## 2025-01-02 DIAGNOSIS — L29.9 PRURITUS: ICD-10-CM

## 2025-01-02 PROCEDURE — 99213 OFFICE O/P EST LOW 20 MIN: CPT

## 2025-01-02 RX ORDER — HYDROXYZINE HYDROCHLORIDE 25 MG/1
25 TABLET, FILM COATED ORAL EVERY 8 HOURS PRN
Qty: 10 TABLET | Refills: 0 | Status: SHIPPED | OUTPATIENT
Start: 2025-01-02

## 2025-01-02 RX ORDER — ASPIRIN 81 MG
100 TABLET, DELAYED RELEASE (ENTERIC COATED) ORAL 2 TIMES DAILY
Qty: 30 TABLET | Refills: 0 | Status: SHIPPED | OUTPATIENT
Start: 2025-01-02

## 2025-01-04 NOTE — TELEPHONE ENCOUNTER
PA has been denied. I do not see a copy of the denial letter.     Denied   1/2/2025  1:28 PM  Appeal supported: No Appeal instructions: Appeals are not supported through ePA. Please refer to the fax case notice for appeals information and instructions.   Note from payer: Request Reference Number: [id number].  ZEPBOUND     INJ 2.5MG is denied for not meeting the prior authorization requirement(s).  Details of this decision are in the notice attached below or have been faxed to you.   Payer: [employer] Commerical Case ID: [id number]    [phone]    [phone]    Called [employer]. Spoke with a representative. Weight loss medications are excluded from plan coverage. Unable to further appeal. They had an incorrect fax number on file. Updated office fax number and requested copy of the denial be sent to our office.     Dr. Turk, weight loss medications excluded from coverage. I do not see a listed history of type 2 diabetes at this time so likely other GLP1 agonist medications would not be covered. Would you like to recommend any alternative weight loss medications for cash pay?

## 2025-01-06 ENCOUNTER — OFFICE VISIT (OUTPATIENT)
Dept: FAMILY MEDICINE CLINIC | Facility: CLINIC | Age: 45
End: 2025-01-06
Payer: COMMERCIAL

## 2025-01-06 VITALS
HEART RATE: 96 BPM | DIASTOLIC BLOOD PRESSURE: 71 MMHG | BODY MASS INDEX: 32.2 KG/M2 | SYSTOLIC BLOOD PRESSURE: 131 MMHG | WEIGHT: 175 LBS | TEMPERATURE: 98 F | HEIGHT: 62 IN

## 2025-01-06 DIAGNOSIS — E03.9 ACQUIRED HYPOTHYROIDISM: ICD-10-CM

## 2025-01-06 DIAGNOSIS — R19.8 STRAINING DURING BOWEL MOVEMENTS: Chronic | ICD-10-CM

## 2025-01-06 DIAGNOSIS — K59.01 SLOW TRANSIT CONSTIPATION: Primary | ICD-10-CM

## 2025-01-06 DIAGNOSIS — R07.89 ATYPICAL CHEST PAIN: ICD-10-CM

## 2025-01-06 DIAGNOSIS — K59.09 OTHER CONSTIPATION: ICD-10-CM

## 2025-01-06 DIAGNOSIS — R10.30 LOWER ABDOMINAL PAIN: ICD-10-CM

## 2025-01-06 PROCEDURE — 99215 OFFICE O/P EST HI 40 MIN: CPT | Performed by: FAMILY MEDICINE

## 2025-01-06 RX ORDER — LINACLOTIDE 145 UG/1
145 CAPSULE, GELATIN COATED ORAL DAILY
Qty: 90 CAPSULE | Refills: 1 | Status: SHIPPED | OUTPATIENT
Start: 2025-01-06 | End: 2025-02-05

## 2025-01-06 NOTE — PROGRESS NOTES
Patient ID: Mirtha Silver is a 44 year old female.    Medication Follow-Up  Associated symptoms include chest pain.     Chief Complaint   Patient presents with    Medication Follow-Up     F/u     Last seen on 10/07/2024.      Translated in Lithuanian by Matilde on Language Line with ID# [id number].      Pt is following up on Trulance for chronic constipation. It was too expensive and not covered by insurance. She started taking fiber instead and tried Colace but it hasn't helped. Pt is also trying to drink more water. Her last bowel movement was yesterday but she had to strain and there wasn't much stool. Sometimes she will have a bowel movement every few days, and sometimes she will have one every day but she has to strain. When she is really uncomfortable she takes laxative. I discussed with her.     She also c/o left sided chest pain for a month. It is an intermittent stabbing pain.  It does not have to be with exertion.  I discussed with her.       Wt Readings from Last 6 Encounters:   01/06/25 175 lb   01/02/25 174 lb 12.8 oz   12/27/24 176 lb   12/19/24 176 lb   10/16/24 171 lb   10/07/24 171 lb       BMI Readings from Last 6 Encounters:   01/06/25 32.01 kg/m²   01/02/25 31.97 kg/m²   12/27/24 32.19 kg/m²   12/19/24 32.19 kg/m²   10/16/24 31.28 kg/m²   10/07/24 31.28 kg/m²       BP Readings from Last 6 Encounters:   01/06/25 131/71   01/02/25 98/62   12/27/24 121/81   12/19/24 118/76   10/16/24 106/78   10/07/24 118/78         Review of Systems   Respiratory:  Negative for shortness of breath.    Cardiovascular:  Positive for chest pain.   Gastrointestinal:  Positive for constipation.           Medical History:      Past Medical History:    Anxiety state    Back problem    Calculus of kidney    Depression    Disorder of liver    FATTY LIVER     Disorder of thyroid    hypothyroid    Endometriosis    Esophageal reflux    Excessive or frequent menstruation    High cholesterol    History of Papanicolaou smear of  cervix    Hyperlipidemia    IBS (irritable bowel syndrome)    Migraines    Per [facility] - Migraine headaches    Mittebelinda    Osteoarthritis    Ovarian cyst    Postpartum depression    Prediabetes    Pregnancy (HCC)        Spondylolisthesis of lumbosacral region    Uterine leiomyoma    Visual impairment    WEARS GLASSES    Vitamin B12 deficiency    Vitamin D deficiency       Past Surgical History:   Procedure Laterality Date    Cervical spine surgery  2023    C5-C6 Arthroplasty     Cholecystectomy  2012    Colonoscopy  2004    Cyst removal  ,     laporoscopic ovarian cytectomy    Hysterectomy      Laparoscopy,diagnostic  2016    laparoscopy, filshie clip sterilization of left fallopian tube, ablation of endometriosis, retrieval and removal of displaced filshie clip    Lipoma removal Left 2018    Dorian biopsy stereo nodule 1 site left (cpt=19081)  05/10/2022    top hat    Dorian localization wire 1 site left (cpt=19281)  2022    Other  2019    low back surgery with metal implant    Total abdominal hysterectomy N/A 2017    Procedure: ABDOMINAL HYSTERECTOMY;  Surgeon: Juvencio Decker MD;  Location: [facility] MAIN OR          Current Outpatient Medications   Medication Sig Dispense Refill    docusate sodium 100 MG Oral Tab Take 1 tablet (100 mg total) by mouth 2 (two) times daily. 30 tablet 0    hydrOXYzine 25 MG Oral Tab Take 1 tablet (25 mg total) by mouth every 8 (eight) hours as needed for Itching. 10 tablet 0    metRONIDAZOLE 0.75 % External Cream Apply 1 Application topically 2 (two) times daily. 45 g 0    clobetasol 0.05 % External Solution Apply 1 mL topically daily as needed. 60 mL 3    triamcinolone 0.1 % External Cream Apply topically 2 (two) times daily as needed. 45 g 1    fluticasone propionate 50 MCG/ACT Nasal Suspension INSTILL 2 SPRAYS IN EACH NOSTRIL DAILY. 48 g 3    levothyroxine 50 MCG Oral Tab Take 1 tablet (50 mcg total) by mouth before breakfast. On  Sunday, Monday, Tuesday , Thursday 90 tablet 1    Diethylpropion HCl 25 MG Oral Tab Take 1 tablet by mouth 2 (two) times daily. 120 tablet 4    levothyroxine 25 MCG Oral Tab Take one 25 mcg tablet on Monday, Wednesday and Friday. 50 tablet 1    pantoprazole 40 MG Oral Tab EC Take 1 tablet (40 mg total) by mouth every morning before breakfast. To help with stomach acid and stomach irritation/inflammation 90 tablet 3    moxifloxacin 0.5 % Ophthalmic Solution Place 1 drop into both eyes in the morning and 1 drop before bedtime.      clotrimazole-betamethasone 1-0.05 % External Cream Apply 1 Application topically 2 (two) times daily. To right side of nose. 60 g 1    ketoconazole 2 % External Cream Apply to affected area on left foot bid-continue to use for 5 days after all evidence of rash or redness has resolved 60 g 1    METFORMIN  MG Oral Tablet 24 Hr TAKE 1 TABLET BY MOUTH EVERY DAY 90 tablet 4    gabapentin 300 MG Oral Cap Take 1 capsule (300 mg total) by mouth. 1 tab AM, 2 tab QHS      Naproxen Sodium (ALEVE OR) Take 1 tablet by mouth daily as needed.      prednisoLONE 1 % Ophthalmic Suspension  (Patient not taking: Reported on 1/6/2025)       Allergies:Allergies[1]     Physical Exam:       Physical Exam  Blood pressure 131/71, pulse 96, temperature 97.7 °F (36.5 °C), temperature source Tympanic, height 5' 2\" (1.575 m), weight 175 lb, last menstrual period 03/22/2017, not currently breastfeeding.    Physical Exam   Constitutional: Patient is oriented to person, place, and time. Patient appears well-developed and well-nourished. No distress.   Neck: Normal range of motion. No thyromegaly present.   Cardiovascular: Normal rate, regular rhythm and normal heart sounds.    Pulmonary/Chest: Effort normal and breath sounds normal. No respiratory distress.   Lymphadenopathy: Patient has no cervical adenopathy.  Neurological: Patient is alert and oriented to person, place, and time.   Skin: Skin is warm.    Psychiatry: Normal mood and affect.  Abdominal: Normal appearance and bowel sounds are normal. There is    no tenderness.  There is no rigidity, no rebound, no guarding.  Positive dullness to percussion in the lower abdomen.  Not distended.    Vitals reviewed.           Assessment/Plan:      Diagnoses and all orders for this visit:    Slow transit constipation  -     linaCLOtide (LINZESS) 145 MCG Oral Cap; Take 145 mcg by mouth daily. For chronic constipation.  She has failed on Colace, fiber supplements, increased water intake.  Her insurance would not cover Trulance.  I am going to try Linzess.  Other constipation  -     linaCLOtide (LINZESS) 145 MCG Oral Cap; Take 145 mcg by mouth daily. For chronic constipation.  She has failed on Colace, fiber supplements, increased water intake.    Lower abdominal pain  Most likely due to constipation  Straining during bowel movements  As above  Acquired hypothyroidism  Already saw endocrinology and is on the correct dose of thyroid medication  Atypical chest pain  -     EKG 12 Lead; Future  She is not at all short of breath.  This is a fleeting type of stabbing pain.  This is not cardiac in nature.      Referrals (if applicable)  No orders of the defined types were placed in this encounter.      Follow up if symptoms persist.  Take medicine (if given) as prescribed.  Approach to treatment discussed and patient/family member understands and agrees to plan.     No follow-ups on file.    There are no Patient Instructions on file for this visit.    Blanka Cornejo    1/6/2025    By signing my name below, Blanka DILL,  attest that this documentation has been prepared under the direction and in the presence of Manoj Weaver DO.   Electronically Signed: Blanka Cornejo, 1/6/2025, 4:43 PM.    IManoj DO,  personally performed the services described in this documentation. All medical record entries made by the scribe were at my direction and in my presence.   I have reviewed the chart and discharge instructions (if applicable) and agree that the record reflects my personal performance and is accurate and complete.  Manoj Weaver DO, 1/6/2025, 8:50 PM                  [1]   Allergies  Allergen Reactions    Cymbalta [Duloxetine Hcl] SHORTNESS OF BREATH and ANXIETY    Other SHORTNESS OF BREATH     SOME TOPICAL NUMBING SPRAY USED WHEN STARTING AN I.V. - cough and shortness of breath      Sulfa Antibiotics HIVES and RASH    Tramadol ITCHING     THROAT ITCHING AND FEELS LIKE IT WAS CLOSING     Ciprofloxacin RASH    Acetaminophen OTHER (SEE COMMENTS)     Fatty Liver    Lyrica [Pregabalin] RASH

## 2025-01-07 ENCOUNTER — TELEPHONE (OUTPATIENT)
Dept: FAMILY MEDICINE CLINIC | Facility: CLINIC | Age: 45
End: 2025-01-07

## 2025-01-07 NOTE — TELEPHONE ENCOUNTER
Approved    Prior authorization approved  Payer: [facility] Commerical Case ID: [id number]    [phone]    [phone]  Note from payer: Request Reference Number: [id number].  [facility] is approved through 01/07/2026.  Your patient may now fill this prescription and it will be covered.  Approval Details    Authorization number: [id number]  Authorized from January 7, 2025 to January 7, 2026  Electronic appeal: Not supported  View History

## 2025-01-08 NOTE — TELEPHONE ENCOUNTER
Received fax from [email or web address] dated 1/4/25 regarding Zepbound inj 2.5mg was denied. Due to the following reason: the requested medication and/or diagnosis are not a covered benefit and are excluded from coverage in accordance with the terms and conditions of your plan benefit.    Placed in denial folder.

## 2025-01-23 ENCOUNTER — LAB ENCOUNTER (OUTPATIENT)
Dept: LAB | Age: 45
End: 2025-01-23
Attending: FAMILY MEDICINE
Payer: COMMERCIAL

## 2025-01-23 DIAGNOSIS — R07.89 ATYPICAL CHEST PAIN: ICD-10-CM

## 2025-01-23 LAB
ATRIAL RATE: 88 BPM
P AXIS: 53 DEGREES
P-R INTERVAL: 144 MS
Q-T INTERVAL: 386 MS
QRS DURATION: 70 MS
QTC CALCULATION (BEZET): 467 MS
R AXIS: 57 DEGREES
T AXIS: 52 DEGREES
VENTRICULAR RATE: 88 BPM

## 2025-01-23 PROCEDURE — 93010 ELECTROCARDIOGRAM REPORT: CPT | Performed by: INTERNAL MEDICINE

## 2025-01-23 PROCEDURE — 93005 ELECTROCARDIOGRAM TRACING: CPT

## 2025-01-31 DIAGNOSIS — J30.1 SEASONAL ALLERGIC RHINITIS DUE TO POLLEN: ICD-10-CM

## 2025-02-04 RX ORDER — FLUTICASONE PROPIONATE 50 MCG
SPRAY, SUSPENSION (ML) NASAL
Qty: 16 G | Refills: 2 | OUTPATIENT
Start: 2025-02-04

## 2025-02-13 ENCOUNTER — OFFICE VISIT (OUTPATIENT)
Dept: SURGERY | Facility: CLINIC | Age: 45
End: 2025-02-13
Payer: COMMERCIAL

## 2025-02-13 VITALS
SYSTOLIC BLOOD PRESSURE: 102 MMHG | HEART RATE: 70 BPM | WEIGHT: 180 LBS | OXYGEN SATURATION: 98 % | BODY MASS INDEX: 33.13 KG/M2 | HEIGHT: 62 IN | DIASTOLIC BLOOD PRESSURE: 80 MMHG

## 2025-02-13 DIAGNOSIS — E78.00 HYPERCHOLESTEREMIA: ICD-10-CM

## 2025-02-13 DIAGNOSIS — E78.5 DYSLIPIDEMIA: ICD-10-CM

## 2025-02-13 DIAGNOSIS — K76.0 HEPATIC STEATOSIS: ICD-10-CM

## 2025-02-13 DIAGNOSIS — F43.9 STRESS: ICD-10-CM

## 2025-02-13 DIAGNOSIS — E88.819 INSULIN RESISTANCE: Primary | ICD-10-CM

## 2025-02-13 DIAGNOSIS — E66.9 OBESITY (BMI 30-39.9): ICD-10-CM

## 2025-02-13 PROCEDURE — 99215 OFFICE O/P EST HI 40 MIN: CPT | Performed by: INTERNAL MEDICINE

## 2025-02-13 RX ORDER — DIETHYLPROPION HYDROCHLORIDE 25 MG/1
1 TABLET ORAL 2 TIMES DAILY
Qty: 120 TABLET | Refills: 4 | Status: SHIPPED | OUTPATIENT
Start: 2025-02-13

## 2025-02-13 RX ORDER — ESCITALOPRAM OXALATE 5 MG/1
5 TABLET ORAL DAILY
Qty: 90 TABLET | Refills: 1 | Status: SHIPPED | OUTPATIENT
Start: 2025-02-13 | End: 2025-05-14

## 2025-02-13 NOTE — PROGRESS NOTES
[facility], [facility], [place]  [address]  [facility] [address]  Dept: [phone]       Patient:  Mirtha Silver  :      1980  MRN:      [id number]    Chief Complaint:    Chief Complaint   Patient presents with    Follow - Up    Weight Management     Would like to increase diethylpropion        SUBJECTIVE     History of Present Illness:  Mirtha is being seen today for a follow-up for medically supported weight loss.            Initial HPI: 3/22/2021  40 yo female.   Patient first noted weight gain one year ago after low back surgery.     Recently started on gabapentin for pain, she typically takes 0-2 capsules/day.      Patient denies any history of eating disorder(s).     Patient is unemployed.  Patient lives with spouse, 3 children.     Patient's goal weight: 150 lbs  Has improved diet recently  Heaviest weight ever: 196 lbs   Previous use of medical weight loss medications: Phentermine (side effect irritability, financial concerns)      Physical activity: Limited due to back pain  Walking      Sleep: 5-6 hours/night      Past Medical History:   Past Medical History:    Anxiety state    Back problem    Calculus of kidney    Depression    Disorder of liver    FATTY LIVER     Disorder of thyroid    hypothyroid    Endometriosis    Esophageal reflux    Excessive or frequent menstruation    High cholesterol    History of Papanicolaou smear of cervix    Hyperlipidemia    IBS (irritable bowel syndrome)    Migraines    Per [facility] - Migraine headaches    Mittelschmerz    Osteoarthritis    Ovarian cyst    Postpartum depression    Prediabetes    Pregnancy (HCC)        Spondylolisthesis of lumbosacral region    Uterine leiomyoma    Visual impairment    WEARS GLASSES    Vitamin B12 deficiency    Vitamin D deficiency        Comorbidities:  Hyperlipidemia-Improvement?  no    OBJECTIVE     Vitals: /80   Pulse 70   Ht 5' 2\" (1.575 m)   Wt 180 lb  (81.6 kg)   LMP 03/22/2017   SpO2 98%   BMI 32.92 kg/m²     Initial weight loss:+02   Total weight loss:-11   Start weight: 191    Wt Readings from Last 6 Encounters:   02/13/25 180 lb (81.6 kg)   01/06/25 175 lb (79.4 kg)   01/02/25 174 lb 12.8 oz (79.3 kg)   12/27/24 176 lb (79.8 kg)   12/19/24 176 lb (79.8 kg)   10/16/24 171 lb (77.6 kg)       Patient Medications:    Current Outpatient Medications   Medication Sig Dispense Refill    docusate sodium 100 MG Oral Tab Take 1 tablet (100 mg total) by mouth 2 (two) times daily. 30 tablet 0    hydrOXYzine 25 MG Oral Tab Take 1 tablet (25 mg total) by mouth every 8 (eight) hours as needed for Itching. 10 tablet 0    prednisoLONE 1 % Ophthalmic Suspension  (Patient not taking: Reported on 1/6/2025)      metRONIDAZOLE 0.75 % External Cream Apply 1 Application topically 2 (two) times daily. 45 g 0    clobetasol 0.05 % External Solution Apply 1 mL topically daily as needed. 60 mL 3    triamcinolone 0.1 % External Cream Apply topically 2 (two) times daily as needed. 45 g 1    fluticasone propionate 50 MCG/ACT Nasal Suspension INSTILL 2 SPRAYS IN EACH NOSTRIL DAILY. 48 g 3    levothyroxine 50 MCG Oral Tab Take 1 tablet (50 mcg total) by mouth before breakfast. On Sunday, Monday, Tuesday , Thursday 90 tablet 1    Diethylpropion HCl 25 MG Oral Tab Take 1 tablet by mouth 2 (two) times daily. 120 tablet 4    levothyroxine 25 MCG Oral Tab Take one 25 mcg tablet on Monday, Wednesday and Friday. 50 tablet 1    pantoprazole 40 MG Oral Tab EC Take 1 tablet (40 mg total) by mouth every morning before breakfast. To help with stomach acid and stomach irritation/inflammation 90 tablet 3    moxifloxacin 0.5 % Ophthalmic Solution Place 1 drop into both eyes in the morning and 1 drop before bedtime.      clotrimazole-betamethasone 1-0.05 % External Cream Apply 1 Application topically 2 (two) times daily. To right side of nose. 60 g 1    ketoconazole 2 % External Cream Apply to affected  area on left foot bid-continue to use for 5 days after all evidence of rash or redness has resolved 60 g 1    METFORMIN  MG Oral Tablet 24 Hr TAKE 1 TABLET BY MOUTH EVERY DAY 90 tablet 4    gabapentin 300 MG Oral Cap Take 1 capsule (300 mg total) by mouth. 1 tab AM, 2 tab QHS      Naproxen Sodium (ALEVE OR) Take 1 tablet by mouth daily as needed.       Allergies:  Cymbalta [duloxetine hcl], Other, Sulfa antibiotics, Tramadol, Ciprofloxacin, Acetaminophen, and Lyrica [pregabalin]     Social History:    Social History     Socioeconomic History    Marital status:      Spouse name: Not on file    Number of children: 3    Years of education: Not on file    Highest education level: Not on file   Occupational History    Not on file   Tobacco Use    Smoking status: Former     Types: Cigarettes    Smokeless tobacco: Never   Vaping Use    Vaping status: Never Used   Substance and Sexual Activity    Alcohol use: Yes     Comment: socially    Drug use: No    Sexual activity: Yes     Birth control/protection: Hysterectomy   Other Topics Concern     Service Not Asked    Blood Transfusions Not Asked    Caffeine Concern No     Comment: 1 cup coffee daily    Occupational Exposure Not Asked    Hobby Hazards Not Asked    Sleep Concern Not Asked    Stress Concern Not Asked    Weight Concern Not Asked    Special Diet Not Asked    Back Care Not Asked    Exercise No    Bike Helmet Not Asked    Seat Belt Not Asked    Self-Exams Not Asked    Grew up on a farm Not Asked    History of tanning Not Asked    Outdoor occupation Not Asked    Breast feeding No    Reaction to local anesthetic No    Pt has a pacemaker No    Pt has a defibrillator No   Social History Narrative    The patient does not use an assistive device..      The patient does live in a home with stairs.     Social Drivers of Health     Food Insecurity: Not on file   Transportation Needs: No Transportation Needs (9/21/2023)    Transportation Needs     Lack of  Transportation: No   Stress: Not on file   Housing Stability: Not on file     Surgical History:    Past Surgical History:   Procedure Laterality Date    Cervical spine surgery  09/18/2023    C5-C6 Arthroplasty     Cholecystectomy  2012    Colonoscopy  2004    Cyst removal  2007, 2014    laporoscopic ovarian cytectomy    Hysterectomy      Laparoscopy,diagnostic  06/08/2016    laparoscopy, filshie clip sterilization of left fallopian tube, ablation of endometriosis, retrieval and removal of displaced filshie clip    Lipoma removal Left 12/29/2018    Dorian biopsy stereo nodule 1 site left (cpt=19081)  05/10/2022    top hat    Dorian localization wire 1 site left (cpt=19281)  06/28/2022    Other  2019    low back surgery with metal implant    Total abdominal hysterectomy N/A 04/05/2017    Procedure: ABDOMINAL HYSTERECTOMY;  Surgeon: Juvencio Decker MD;  [facility] MAIN OR     Family History:    Family History   Problem Relation Age of Onset    Diabetes Father         Type 2    Hypertension Mother     Lipids Mother         Hyperlipidemia    Diabetes Mother         Type 2    Diabetes Sister     Hypertension Sister     Lipids Brother     Glaucoma Neg     Macular degeneration Neg      Initial Intake:  After dinner behavior: -  Night eating: -  Portion sizes:   Binge:   Emotional: +  Depression: Total PHQ Score: 20  Grazing:   Sweet tooth: +  Crunchy/salty: -  Etoh: None   Soda Drinker: Yes                 If yes, how much?: Occasional    Sports Drinks:  No                  Juice:  No        Food Journal  Reviewed and Discussed:       Patient has a Food Journal?: no more mindful since meeting with RD   Patient is reading nutrition labels?  yes  Average Caloric Intake:     Average CHO Intake:   Is patient exercising? yes Minimal   Type of exercise? Other:  Good days: aims to walk 3 days/week, 30 minutes depending on pain level; Walks outdoors weather dependent    Bike pedaling  Limited due to neck and back pain      Eating Habits  Patient states the following:  Eats 2-3 meal(s) per day  Length of time it takes to consume a meal:    # of snacks per day:    Type of snacks:  Fruit   Amount of soda consumption per day:  Occasional coke zero, 2 days/week   Amount of water (in ounces) per day:  Adequate   Toughest challenge:    Sleep: poor     Fruits:    B: Green juice/shake- powder  L: Oatmeal, berrries   D: Chicken/beef/seafood, spinach, lettuce, cucumbers     Nutritional Goals  Calorie-controlled diet:  per RD , Limit carbohydrates to per RD  gms per day, Eat 100-200 calories within 1 hour of waking  and Eat 3-4 cups of fresh fruits or vegetables daily    Behavior Modifications Reviewed and Discussed  Eat breakfast, Eat 3 meals per day, Plan meals in advance, Read nutrition labels, Drink 64 oz of water per day, Maintain a daily food journal, Utlize portion control strategies to reduce calorie intake, Identify triggers for eating and manage cues and Eat slowly and take 20 to 30 minutes to complete each meal    Exercise Goals Reviewed and Discussed    Walk for  30 minute walks- 3 days/week     ROS:    Constitutional: negative  Respiratory: negative  Cardiovascular: negative  Gastrointestinal: negative  Integument/breast: negative  Hematologic/lymphatic: negative  Musculoskeletal:negative  Neurological: negative  Behavioral/Psych: negative  Endocrine: negative  All other systems were reviewed and are negative    Physical Exam:   General appearance: alert, appears stated age and cooperative mildly obese   Head: Normocephalic, without obvious abnormality, atraumatic  Neck: no adenopathy, no carotid bruit, no JVD, supple, symmetrical, trachea midline and thyroid not enlarged, symmetric, no tenderness/mass/nodules  Lungs: clear to auscultation bilaterally  Heart: S1, S2 normal, no murmur, click, rub or gallop, regular rate and rhythm  Abdomen: soft, non-tender; bowel sounds normal; no masses,  no organomegaly obese  Extremities:  extremities normal, atraumatic, no cyanosis or edema  Pulses: 2+ and symmetric  Skin: Skin color, texture, turgor normal. No rashes or lesions  Neurologic: Grossly normal    ASSESSMENT       Encounter Diagnosis(ses):   Encounter Diagnoses   Name Primary?    Insulin resistance Yes    Hepatic steatosis     Hypercholesteremia     Dyslipidemia     Obesity (BMI 30-39.9)        PLAN     Patient is not interested in bariatric surgery. Patient desires to pursue traditional weight loss at this time.      Diagnoses and all orders for this visit:    Insulin resistance    Hepatic steatosis    Hypercholesteremia    Dyslipidemia    Obesity (BMI 30-39.9)      DYSLIPIDEMIA: Recommend dietary changes and lifestyle modifications as discussed below. Monitor.     Lab Results   Component Value Date/Time    CHOLEST 203 (H) 08/20/2024 04:32 PM     (H) 08/20/2024 04:32 PM    HDL 41 08/20/2024 04:32 PM    TRIG 117 08/20/2024 04:32 PM    VLDL 22 08/20/2024 04:32 PM     OBESITY/WEIGHT GAIN:  ELEVATED LFTs:  Fatty liver noted          Reviewed lifestyle modifications: Whole Food/Plant Strong/Low Glycemic Index diet, moderate alcohol consumption, reduced sodium intake to no more than 2,400 mg/day, and at least 150 minutes of moderate physical activity per week.   Reviewed the importance of whole food, plant based, minimally to non-processed diet rich in fruits, vegetables, whole grains and healthy fats, and clean meats/seafood.      Avoid processed, poor quality carbohydrates, refined grains, flour, sugar.     Goals for next month:  1. Keep a food log, aim for 100 grams carbs/day. Meet with RD.   2. Drink 64 ounces of non-caloric beverages per day. No fruit juices or regular soda.  3. Aim for 150 minutes moderate exercise per week.    4. Increase fruit and vegetable servings to 5-6 per day.    5. Improve sleep and stress.          Discussed medication options for weight loss in detail with patient.      Insurance currently does not cover  bariatric surgery    PHENTERMINE: did not tolerate   Diethylpropion 25 mg BID    Stress: will start Lexapro    GLP not covered    Compound semaglutide is a custom-made medication that mimics the GLP-1 hormone. It is used to treat type 2 diabetes and lower the risk of heart or blood vessel disease. It works by increasing insulin release, lowering glucagon release, delaying gastric emptying and reducing appetite. Compound semaglutide is prescribed when an FDA-approved medication, dose, or dosage form is unavailable (ie. Nationwide shortage or no obesity coverage for GLP-1 meds). Patients are aware of the difference between these medications.  Cost of these medications is  dollars monthly based on dose.  Will start at 0.25 mg    Recently diagnosed with hypothyroidism  Should take thyroid med daily       Exercise as tolerated.     Bayron Ramirez MD

## 2025-02-25 ENCOUNTER — HOSPITAL ENCOUNTER (OUTPATIENT)
Dept: GENERAL RADIOLOGY | Age: 45
Discharge: HOME | End: 2025-02-25
Attending: FAMILY MEDICINE
Payer: COMMERCIAL

## 2025-02-25 ENCOUNTER — OFFICE VISIT (OUTPATIENT)
Dept: FAMILY MEDICINE CLINIC | Facility: CLINIC | Age: 45
End: 2025-02-25
Payer: COMMERCIAL

## 2025-02-25 VITALS
BODY MASS INDEX: 33.09 KG/M2 | DIASTOLIC BLOOD PRESSURE: 77 MMHG | WEIGHT: 179.81 LBS | HEIGHT: 62 IN | HEART RATE: 88 BPM | SYSTOLIC BLOOD PRESSURE: 116 MMHG | TEMPERATURE: 99 F

## 2025-02-25 DIAGNOSIS — R07.89 LEFT-SIDED CHEST WALL PAIN: ICD-10-CM

## 2025-02-25 DIAGNOSIS — Z01.818 PREOPERATIVE EXAMINATION: ICD-10-CM

## 2025-02-25 DIAGNOSIS — G89.29 CHRONIC NONINTRACTABLE HEADACHE, UNSPECIFIED HEADACHE TYPE: Primary | ICD-10-CM

## 2025-02-25 DIAGNOSIS — R51.9 CHRONIC NONINTRACTABLE HEADACHE, UNSPECIFIED HEADACHE TYPE: Primary | ICD-10-CM

## 2025-02-25 DIAGNOSIS — R41.3 EPISODIC MEMORY LOSS: ICD-10-CM

## 2025-02-25 DIAGNOSIS — M25.50 POLYARTHRALGIA: ICD-10-CM

## 2025-02-25 DIAGNOSIS — M79.604 LEG PAIN, BILATERAL: ICD-10-CM

## 2025-02-25 DIAGNOSIS — M79.605 LEG PAIN, BILATERAL: ICD-10-CM

## 2025-02-25 PROCEDURE — 71046 X-RAY EXAM CHEST 2 VIEWS: CPT | Performed by: FAMILY MEDICINE

## 2025-02-25 PROCEDURE — 99214 OFFICE O/P EST MOD 30 MIN: CPT | Performed by: FAMILY MEDICINE

## 2025-02-25 NOTE — PROGRESS NOTES
Patient ID: Mirtha Silver is a 45 year old female.    HPI  Chief Complaint   Patient presents with    Pre-Op Exam     MRI with sedation      Last seen on 01/06/2025.    Translated in [place] by Courtney on Language Line with ID# [id number].      Patient visits here today for a Pre-Op exam. She will undergo MRI brain with sedation per Dr. Gillis, neurology, on 3/11/2025. Hx migraine and episodic memory loss. Denies any anesthesia complications in the past, she has been sedated for an MRI with no issues in the past. Denies any bleeding dental gum diseases.     Pt c/o chest pain above her left breast that is localized to area just above her left breast.. I reviewed her electrocardiogram, which is similar to the EKG from last year.  She does not have any exertional chest pain or shortness of breath.  She was worried that because she had a breast biopsy in that area 2 years ago perhaps that was causing the pain but I told her I did not think that was the case., I also reviewed EKG from 1/23/2025 which was normal.     She also c/o pain all over her body and especially in her legs. Pt would like a recommendation for acupuncture. She consulted with Dr. Peng rheumatologist, in May, 2024. She has tried physical therapy and many medications without relief. I discussed with her and gave a referral to our integrative medicine team..     Wt Readings from Last 6 Encounters:   02/25/25 179 lb 12.8 oz   02/13/25 180 lb   01/06/25 175 lb   01/02/25 174 lb 12.8 oz   12/27/24 176 lb   12/19/24 176 lb       BMI Readings from Last 6 Encounters:   02/25/25 32.89 kg/m²   02/13/25 32.92 kg/m²   01/06/25 32.01 kg/m²   01/02/25 31.97 kg/m²   12/27/24 32.19 kg/m²   12/19/24 32.19 kg/m²       BP Readings from Last 6 Encounters:   02/25/25 116/77   02/13/25 102/80   01/06/25 131/71   01/02/25 98/62   12/27/24 121/81   12/19/24 118/76         Review of Systems   Respiratory:  Negative for shortness of breath.    Cardiovascular:  Negative for  chest pain.   Negative for exertional chest pain.        Medical History:      Past Medical History:    Anxiety state    Back problem    Calculus of kidney    Depression    Disorder of liver    FATTY LIVER     Disorder of thyroid    hypothyroid    Endometriosis    Esophageal reflux    Excessive or frequent menstruation    High cholesterol    History of Papanicolaou smear of cervix    Hyperlipidemia    IBS (irritable bowel syndrome)    Migraines    Per [facility] - Migraine headaches    Mittelschmer    Osteoarthritis    Ovarian cyst    Postpartum depression    Prediabetes    Pregnancy (HCC)        Spondylolisthesis of lumbosacral region    Uterine leiomyoma    Visual impairment    WEARS GLASSES    Vitamin B12 deficiency    Vitamin D deficiency       Past Surgical History:   Procedure Laterality Date    Cervical spine surgery  2023    C5-C6 Arthroplasty     Cholecystectomy      Colonoscopy  2004    Cyst removal  ,     laporoscopic ovarian cytectomy    Hysterectomy      Laparoscopy,diagnostic  2016    laparoscopy, filshie clip sterilization of left fallopian tube, ablation of endometriosis, retrieval and removal of displaced filshie clip    Lipoma removal Left 2018    Dorian biopsy stereo nodule 1 site left (cpt=19081)  05/10/2022    top hat    Dorian localization wire 1 site left (cpt=19281)  2022    Other  2019    low back surgery with metal implant    Total abdominal hysterectomy N/A 2017    Procedure: ABDOMINAL HYSTERECTOMY;  Surgeon: Juvencio Decker MD;  Location: [facility] MAIN OR          Current Outpatient Medications   Medication Sig Dispense Refill    escitalopram 5 MG Oral Tab Take 1 tablet (5 mg total) by mouth daily. 90 tablet 1    Diethylpropion HCl 25 MG Oral Tab Take 1 tablet by mouth 2 (two) times daily. 120 tablet 4    CUSTOM MEDICATION Semaglutide/ cyanocobalamin    0.25 mg-   concentration: 1 /0.5 mg/ ML  Amount:  1 Ml vial  Instructions: inject 25 units/  0.25 ML q weekly 1 each 3    docusate sodium 100 MG Oral Tab Take 1 tablet (100 mg total) by mouth 2 (two) times daily. 30 tablet 0    hydrOXYzine 25 MG Oral Tab Take 1 tablet (25 mg total) by mouth every 8 (eight) hours as needed for Itching. 10 tablet 0    prednisoLONE 1 % Ophthalmic Suspension       metRONIDAZOLE 0.75 % External Cream Apply 1 Application topically 2 (two) times daily. 45 g 0    clobetasol 0.05 % External Solution Apply 1 mL topically daily as needed. 60 mL 3    triamcinolone 0.1 % External Cream Apply topically 2 (two) times daily as needed. 45 g 1    fluticasone propionate 50 MCG/ACT Nasal Suspension INSTILL 2 SPRAYS IN EACH NOSTRIL DAILY. 48 g 3    levothyroxine 50 MCG Oral Tab Take 1 tablet (50 mcg total) by mouth before breakfast. On Sunday, Monday, Tuesday , Thursday 90 tablet 1    levothyroxine 25 MCG Oral Tab Take one 25 mcg tablet on Monday, Wednesday and Friday. 50 tablet 1    pantoprazole 40 MG Oral Tab EC Take 1 tablet (40 mg total) by mouth every morning before breakfast. To help with stomach acid and stomach irritation/inflammation 90 tablet 3    moxifloxacin 0.5 % Ophthalmic Solution Place 1 drop into both eyes in the morning and 1 drop before bedtime.      clotrimazole-betamethasone 1-0.05 % External Cream Apply 1 Application topically 2 (two) times daily. To right side of nose. 60 g 1    ketoconazole 2 % External Cream Apply to affected area on left foot bid-continue to use for 5 days after all evidence of rash or redness has resolved 60 g 1    METFORMIN  MG Oral Tablet 24 Hr TAKE 1 TABLET BY MOUTH EVERY DAY 90 tablet 4    gabapentin 300 MG Oral Cap Take 1 capsule (300 mg total) by mouth. 1 tab AM, 2 tab QHS      Naproxen Sodium (ALEVE OR) Take 1 tablet by mouth daily as needed.       Allergies:Allergies[1]     Physical Exam:       Physical Exam  Blood pressure 116/77, pulse 112, temperature 98.8 °F (37.1 °C), temperature source Tympanic, height 5' 2\" (1.575 m), weight 179  lb 12.8 oz, last menstrual period 03/22/2017, not currently breastfeeding.    Vitals:    02/25/25 1402 02/25/25 1413   BP: 116/77    Pulse: 112 88   Temp: 98.8 °F (37.1 °C)    TempSrc: Tympanic    Weight: 179 lb 12.8 oz    Height: 5' 2\" (1.575 m)         Physical Exam   Constitutional: Patient is oriented to person, place, and time. Patient appears well-developed and well-nourished. No distress.   Neck: Normal range of motion. No thyromegaly present.   Cardiovascular: Normal rate, regular rhythm and normal heart sounds.    Pulmonary/Chest: Effort normal and breath sounds normal. No respiratory distress. Tender over her left chest wall, superior to her breast but no crepitus over the ribs.  There is no swelling or redness of the pectoralis muscle..   Lymphadenopathy: Patient has no cervical adenopathy.  Neurological: Patient is alert and oriented to person, place, and time.   Skin: Skin is warm.  No rash.  Psychiatry: Normal mood and affect.  Oropharynx is clear.  Vitals reviewed.           Assessment/Plan:      Diagnoses and all orders for this visit:    Chronic nonintractable headache, unspecified headache type  Patient is already seen neurology.  MRI with sedation ordered of the brain as she is too claustrophobic without it.  She is had sedation before for the MRI and had no complications.  Episodic memory loss  As above  Preoperative examination  Cleared for MRI with sedation  Left-sided chest wall pain  -     XR CHEST PA + LAT CHEST (CPT=71046); Future  Tender over the pectoralis muscle but this seems to be very muscular in nature.  Will do a chest x-ray today.  She just had an EKG in January of this year.  Polyarthralgia  -     Acupuncture Therapy [facility] ([place] - Internal Referral    Leg pain, bilateral  -     Acupuncture Therapy [facility] - Internal Referral              Referrals (if applicable)  Orders Placed This Encounter   Procedures    Acupuncture Therapy  [facility] - Internal Referral     She has tried physical therapy and many medications without relief.  She is already seeing rheumatology and pain management.  She would like to try acupuncture .     Referral Priority:   Routine     Referral Type:   Integrative Medicine     Requested Specialty:   Acupuncture     Number of Visits Requested:   20       Follow up if symptoms persist.  Take medicine (if given) as prescribed.  Approach to treatment discussed and patient/family member understands and agrees to plan.     No follow-ups on file.    There are no Patient Instructions on file for this visit.    Blanka Cornejo    2/25/2025    By signing my name below, IBlanka,  attest that this documentation has been prepared under the direction and in the presence of Manoj Weaver DO.   Electronically Signed: Blanka Cornejo, 2/25/2025, 2:01 PM.      IManoj DO,  personally performed the services described in this documentation. All medical record entries made by the scribe were at my direction and in my presence.  I have reviewed the chart and discharge instructions (if applicable) and agree that the record reflects my personal performance and is accurate and complete.  Manoj Weaver DO, 2/25/2025, 2:56 PM              [1]   Allergies  Allergen Reactions    Cymbalta [Duloxetine Hcl] SHORTNESS OF BREATH and ANXIETY    Other SHORTNESS OF BREATH     SOME TOPICAL NUMBING SPRAY USED WHEN STARTING AN I.V. - cough and shortness of breath      Sulfa Antibiotics HIVES and RASH    Tramadol ITCHING     THROAT ITCHING AND FEELS LIKE IT WAS CLOSING     Ciprofloxacin RASH    Acetaminophen OTHER (SEE COMMENTS)     Fatty Liver    Lyrica [Pregabalin] RASH

## 2025-03-06 VITALS — HEIGHT: 62.5 IN | WEIGHT: 179 LBS | BODY MASS INDEX: 32.12 KG/M2

## 2025-03-06 RX ORDER — FAMOTIDINE 10 MG/ML
20 INJECTION, SOLUTION INTRAVENOUS ONCE
OUTPATIENT
Start: 2025-03-06

## 2025-03-06 RX ORDER — METOCLOPRAMIDE HYDROCHLORIDE 5 MG/ML
10 INJECTION INTRAMUSCULAR; INTRAVENOUS ONCE
OUTPATIENT
Start: 2025-03-06

## 2025-03-06 RX ORDER — ONDANSETRON 8 MG/1
8 TABLET, ORALLY DISINTEGRATING ORAL EVERY 8 HOURS PRN
COMMUNITY

## 2025-03-06 RX ORDER — ERYTHROMYCIN 5 MG/G
1 OINTMENT OPHTHALMIC NIGHTLY
COMMUNITY
Start: 2025-03-03 | End: 2026-03-03

## 2025-03-06 RX ORDER — FAMOTIDINE 20 MG/1
20 TABLET, FILM COATED ORAL ONCE
OUTPATIENT
Start: 2025-03-06 | End: 2025-03-06

## 2025-03-06 RX ORDER — DORZOLAMIDE HYDROCHLORIDE AND TIMOLOL MALEATE 20; 5 MG/ML; MG/ML
1 SOLUTION/ DROPS OPHTHALMIC 2 TIMES DAILY
COMMUNITY
Start: 2025-03-05 | End: 2026-10-26

## 2025-03-06 RX ORDER — METOCLOPRAMIDE 10 MG/1
10 TABLET ORAL ONCE
OUTPATIENT
Start: 2025-03-06 | End: 2025-03-06

## 2025-03-06 RX ORDER — SODIUM CHLORIDE, SODIUM LACTATE, POTASSIUM CHLORIDE, CALCIUM CHLORIDE 600; 310; 30; 20 MG/100ML; MG/100ML; MG/100ML; MG/100ML
INJECTION, SOLUTION INTRAVENOUS CONTINUOUS
OUTPATIENT
Start: 2025-03-06

## 2025-03-10 ENCOUNTER — NURSE TRIAGE (OUTPATIENT)
Dept: FAMILY MEDICINE CLINIC | Facility: CLINIC | Age: 45
End: 2025-03-10

## 2025-03-10 ENCOUNTER — OFFICE VISIT (OUTPATIENT)
Dept: ENDOCRINOLOGY CLINIC | Facility: CLINIC | Age: 45
End: 2025-03-10

## 2025-03-10 VITALS
BODY MASS INDEX: 33.31 KG/M2 | WEIGHT: 181 LBS | DIASTOLIC BLOOD PRESSURE: 77 MMHG | SYSTOLIC BLOOD PRESSURE: 122 MMHG | HEART RATE: 79 BPM | HEIGHT: 62 IN

## 2025-03-10 DIAGNOSIS — E07.9 THYROID DISEASE: ICD-10-CM

## 2025-03-10 DIAGNOSIS — R63.5 WEIGHT GAIN: ICD-10-CM

## 2025-03-10 DIAGNOSIS — R73.03 PREDIABETES: ICD-10-CM

## 2025-03-10 DIAGNOSIS — E88.819 INSULIN RESISTANCE: ICD-10-CM

## 2025-03-10 DIAGNOSIS — E03.9 ACQUIRED HYPOTHYROIDISM: ICD-10-CM

## 2025-03-10 DIAGNOSIS — E55.9 VITAMIN D DEFICIENCY: ICD-10-CM

## 2025-03-10 DIAGNOSIS — E53.8 VITAMIN B12 DEFICIENCY: ICD-10-CM

## 2025-03-10 DIAGNOSIS — R53.83 FATIGUE, UNSPECIFIED TYPE: Primary | ICD-10-CM

## 2025-03-10 DIAGNOSIS — R13.10 DYSPHAGIA, UNSPECIFIED TYPE: ICD-10-CM

## 2025-03-10 DIAGNOSIS — E66.9 OBESITY (BMI 30-39.9): ICD-10-CM

## 2025-03-10 RX ORDER — LEVOTHYROXINE SODIUM 50 UG/1
50 TABLET ORAL
Qty: 90 TABLET | Refills: 1 | Status: SHIPPED | OUTPATIENT
Start: 2025-03-10

## 2025-03-10 NOTE — PROGRESS NOTES
Reason for Visit:    hypothyroidism   Requesting Physician:   .DO Meg Gamboa Vineet,  [facility]  [address]  [place], [place] [address] [id number]    CHIEF COMPLAINT:    Chief Complaint   Patient presents with    Hypothyroidism        HISTORY OF PRESENT ILLNESS:   Mirtha Silver is a 45 year old female who presents with  hypothyroid, low vit D, insulin resistant and obese   She was started on LT4 in 2023   She has fatigue  Has  hair loss and dry skin.   Has swelling in glory and face swelling in the morning   Ring get stuck  in the morning.     She is on LT4 daily - 3 days 25 mcg and 4 days 50 mcg   Getting inj  b12 and semglutide   Taking  D3   Thryoid Meds: levothyroxine Tabs - 50 MCG    Tried ozempic fot wt loss but insurance did not cover it. Lost job and oculd not afford the cash price from other clinic      Menstrual hx: Patient's last menstrual period was Patient's last menstrual period was 03/22/2017.   Neck surgery: yes  cervical but not thyroid   Neck radiation: No   Biotin: no   Turmeric: no   Family history of thyroid cancer in 1st degree relatives:  no     US thyroid was unremarkable 4/2024   US before that was in 9/2022  ASSESSMENT AND PLAN:  Mirtha Silver is a 45 year old female who presents with  hypothyroid, insulin resistance, obese, fatigue and dysphagia.   She is on  LT4 daily - 3 days 25 mcg and 4 days 50 mcg   Will get labs soon but will change her dose   Plan  D3 2000 unit/day   Will refer to speech therapy consult   Follow up with Dr Ramirez   Labs now   Labs and follow up in 2 mo   Levothyroxine daily as recommended. Might change doses      Thyroid medication dose: Levothyroxine   50 mcg/day x6/week. Skip once a week   Take you medication on empty stomach, not with any other medication or food. Wait 60 minutes before eating. Wait 4 hours before taking Vitamins, Calcium or iron. On the morning of the lab test, please take the medication after the blood test not  before. Do not take Biotin 1 week before blood test.           [email or web address]    Don’t take your thyroid hormone at the same time as:  Walnuts.  Soybean flour.  Cottonseed meal.  Iron supplements or multivitamins containing iron.  Calcium supplements.  Antacids that contain aluminum, magnesium or calcium.  Some ulcer medicines, such as sucralfate (Carafate).  Some cholesterol-lowering drugs, such as those containing cholestyramine (Prevalite, Locholest) and colestipol (Colestid).  To avoid possible problems, eat these foods or use these products several hours before or after you take your thyroid medicine.    Supplements containing biotin, common in hair and nail products, can make it hard to measure how much thyroid hormone is in the body. Biotin does not affect thyroid hormone levels. But supplements that have biotin should be stopped for at least a week before measuring thyroid function so that the measurement is correct.      PAST MEDICAL HISTORY:   Past Medical History:    Anxiety state    Back problem    Calculus of kidney    Depression    Disorder of liver    FATTY LIVER     Disorder of thyroid    hypothyroid    Endometriosis    Esophageal reflux    Excessive or frequent menstruation    High cholesterol    History of Papanicolaou smear of cervix    Hyperlipidemia    IBS (irritable bowel syndrome)    Migraines    Per [facility] - Migraine headaches    Mittelschmerz    Osteoarthritis    Ovarian cyst    Postpartum depression    Prediabetes    Pregnancy (HCC)        Spondylolisthesis of lumbosacral region    Uterine leiomyoma    Visual impairment    glasses    Vitamin B12 deficiency    Vitamin D deficiency       PAST SURGICAL HISTORY:   Past Surgical History:   Procedure Laterality Date    Cervical spine surgery  2023    C5-C6 Arthroplasty     Cholecystectomy  2012    Colonoscopy  2004    Cyst removal  ,     laporoscopic ovarian cytectomy     Hysterectomy      Laparoscopy,diagnostic  06/08/2016    laparoscopy, filshie clip sterilization of left fallopian tube, ablation of endometriosis, retrieval and removal of displaced filshie clip    Lipoma removal Left 12/29/2018    Dorian biopsy stereo nodule 1 site left (cpt=19081)  05/10/2022    top hat    Dorian localization wire 1 site left (cpt=19281)  06/28/2022    Other  2019    low back surgery with metal implant    Other surgical history Left     eye surgery for cornea    Total abdominal hysterectomy N/A 04/05/2017    Procedure: ABDOMINAL HYSTERECTOMY;  Surgeon: Juvencio Decker MD;  Location: [facility] MAIN OR       CURRENT MEDICATIONS:     levothyroxine 50 MCG Oral Tab Take 1 tablet (50 mcg total) by mouth before breakfast. 90 tablet 1    dorzolamide-timolol 2-0.5 % Ophthalmic Solution Apply 1 drop to eye 2 (two) times daily.      erythromycin 5 MG/GM Ophthalmic Ointment Apply 1 Application to eye nightly.      ondansetron 8 MG Oral Tablet Dispersible Take 1 tablet (8 mg total) by mouth every 8 (eight) hours as needed for Nausea.      escitalopram 5 MG Oral Tab Take 1 tablet (5 mg total) by mouth daily. 90 tablet 1    Diethylpropion HCl 25 MG Oral Tab Take 1 tablet by mouth 2 (two) times daily. 120 tablet 4    CUSTOM MEDICATION Semaglutide/ cyanocobalamin    0.25 mg-   concentration: 1 /0.5 mg/ ML  Amount:  1 Ml vial  Instructions: inject 25 units/ 0.25 ML q weekly 1 each 3    clobetasol 0.05 % External Solution Apply 1 mL topically daily as needed. 60 mL 3    fluticasone propionate 50 MCG/ACT Nasal Suspension INSTILL 2 SPRAYS IN EACH NOSTRIL DAILY. 48 g 3    pantoprazole 40 MG Oral Tab EC Take 1 tablet (40 mg total) by mouth every morning before breakfast. To help with stomach acid and stomach irritation/inflammation 90 tablet 3    clotrimazole-betamethasone 1-0.05 % External Cream Apply 1 Application topically 2 (two) times daily. To right side of nose. 60 g 1    ketoconazole 2 % External Cream Apply to  affected area on left foot bid-continue to use for 5 days after all evidence of rash or redness has resolved 60 g 1    METFORMIN  MG Oral Tablet 24 Hr TAKE 1 TABLET BY MOUTH EVERY DAY 90 tablet 4    gabapentin 300 MG Oral Cap Take 1 capsule (300 mg total) by mouth. 1 tab AM, 2 tab QHS      Naproxen Sodium (ALEVE OR) Take 1 tablet by mouth daily as needed.         ALLERGIES:  Allergies[1]    SOCIAL HISTORY:    Social History     Socioeconomic History    Marital status:     Number of children: 3   Tobacco Use    Smoking status: Former     Types: Cigarettes    Smokeless tobacco: Never    Tobacco comments:     Quit around 2018   Vaping Use    Vaping status: Never Used   Substance and Sexual Activity    Alcohol use: Yes     Comment: socially    Drug use: No    Sexual activity: Yes     Birth control/protection: Hysterectomy   Other Topics Concern    Caffeine Concern No     Comment: 1 cup coffee daily    Exercise No    Breast feeding No    Reaction to local anesthetic No    Pt has a pacemaker No    Pt has a defibrillator No        FAMILY HISTORY:   Family History   Problem Relation Age of Onset    Diabetes Father         Type 2    Hypertension Mother     Lipids Mother         Hyperlipidemia    Diabetes Mother         Type 2    Diabetes Sister     Hypertension Sister     Lipids Brother     Glaucoma Neg     Macular degeneration Neg    Sister and Mother with thyroid disease      PHYSICAL EXAM:   Height: 5' 2\" (157.5 cm) (03/10 1729)  Weight: 181 lb (82.1 kg) (03/10 1729)  BSA (Calculated - sq m): 1.83 sq meters (03/10 1729)  Pulse: 79 (03/10 1729)  BP: 122/77 (03/10 1729)  Temp: --  Do Not Use - Resp Rate: --  SpO2: --    Body mass index is 33.11 kg/m².  + horizontal scar at the base of the neck ,        DATA:     Pertinent data reviewed  XR CHEST PA + LAT CHEST (CPT=71046)    Result Date: 2/26/2025  CONCLUSION:  1. No acute cardiopulmonary disease    Dictated by (CST): Antoine Fowler MD on 2/26/2025 at  10:13 AM     Finalized by (CST): Antoine Fowler MD on 2/26/2025 at 10:18 AM           No results for input(s): \"TSH\", \"T4F\", \"T3F\", \"THYP\" in the last 72 hours.  TSH   Date Value Ref Range Status   12/19/2024 1.532 0.550 - 4.780 uIU/mL Final     Lab Results   Component Value Date    A1C 5.2 08/20/2024    A1C 5.6 09/13/2023    A1C 5.3 05/15/2023    A1C 5.1 02/17/2023    A1C 5.4 01/25/2021          Latest Reference Range & Units 08/20/24 16:32 10/19/24 12:05   T4,Free (Direct) 0.8 - 1.7 ng/dL 1.6    TSH 0.550 - 4.780 mIU/mL 0.295 (L) 1.544   T3 FREE 2.40 - 4.20 pg/mL 2.84    INSULIN 3.0 - 25.0 mU/L 44.6 (H)    (L): Data is abnormally low  (H): Data is abnormally high    No results for input(s): \"TSH\", \"T4F\", \"T3F\", \"THYP\" in the last 72 hours.  XR CHEST PA + LAT CHEST (CPT=71046)    Result Date: 2/26/2025  CONCLUSION:  1. No acute cardiopulmonary disease    Dictated by (CST): Antoine Fowler MD on 2/26/2025 at 10:13 AM     Finalized by (CST): Antoine Fowler MD on 2/26/2025 at 10:18 AM           Orders Placed This Encounter   Procedures    TSH W Reflex To Free T4    TSH W Reflex To Free T4     Orders Placed This Encounter    TSH W Reflex To Free T4     Standing Status:   Future     Standing Expiration Date:   3/10/2026     Order Specific Question:   Release to patient     Answer:   Immediate    TSH W Reflex To Free T4     Standing Status:   Future     Standing Expiration Date:   3/10/2026     Order Specific Question:   Release to patient     Answer:   Immediate    levothyroxine 50 MCG Oral Tab     Sig: Take 1 tablet (50 mcg total) by mouth before breakfast.     Dispense:  90 tablet     Refill:  1     Discontinue previous rx sent- changes in August          This is a specialized patient consultation in endocrinology and required comprehensive review of prior records, as well as current evaluation, with time required for consideration of complex endocrine issues and consultation. For this visit, I  personally interviewed the patient, and family member if accompanied, performed the pertinent parts of the history and physical examination. ROS included screening for appropriate endocrine conditions.   Today's diagnosis and plan were reviewed in detail with the patient who states understanding and agrees with plan. I discussed with the patient possible diagnosis, differential diagnosis, need for work up, treatment options, alternatives and side effects.     Please see note for details about time spent which includes:   · pre-visit preparation  · reviewing records  · face to face time with the patient   · timely documentation of the encounter  · ordering medications/tests  · communication with care team  · care coordination    I appreciate the opportunity to be part of your patient's medical care and will keep you, as the referring and primary physicians, informed about the care of your patient. Please feel free to contact me should you have any questions.    The 21st Century Cures Act makes medical notes like these available to patients in the interest of transparency. Please be advised this is a medical document. Medical documents are intended to carry relevant information, facts as evident, and the clinical opinion of the practitioner. The medical note is intended as peer to peer communication and may appear blunt or direct. It is written in medical language and may contain abbreviations or verbiage that are unfamiliar.   Roberth Turk MD             [1]   Allergies  Allergen Reactions    Cymbalta [Duloxetine Hcl] SHORTNESS OF BREATH and ANXIETY    Other SHORTNESS OF BREATH     SOME TOPICAL NUMBING SPRAY USED WHEN STARTING AN I.V. - cough and shortness of breath      Sulfa Antibiotics HIVES and RASH    Tramadol ITCHING     THROAT ITCHING AND FEELS LIKE IT WAS CLOSING     Ciprofloxacin RASH    Acetaminophen OTHER (SEE COMMENTS)     Fatty Liver    Lyrica [Pregabalin] RASH

## 2025-03-10 NOTE — TELEPHONE ENCOUNTER
Using language line : Kay ID number [id number]  Action Requested: Summary for Provider     []  Critical Lab, Recommendations Needed  [] Need Additional Advice  []   FYI    []   Need Orders  [] Need Medications Sent to Pharmacy  []  Other     SUMMARY: Recommended visit today or tomorrow. Patient would like to be scheduled for visit tomorrow at 2:30 but has a MRI scheduled at this time. Patient would like to cancel. Nurse transferred to central scheduling so they can assist. Will monitor for cancellation so requested appointment can be scheduled.     Reason for call: Flank Pain  Onset: 5-6 days     Patient reports she is having right flank pain that started 5-6 days ago. Pain is intermittent and rated as moderate at worst but currently mild. Afebrile. Patient is nauseous but has not vomited.      Reason for Disposition   Patient wants to be seen    Protocols used: Flank Pain-A-OH

## 2025-03-10 NOTE — TELEPHONE ENCOUNTER
Using language line  ID number [id number]    Future Appointments   Date Time Provider Department Center   3/11/2025  2:30 PM Manoj Weaver DO [facility]       Patient notified, verbalized understanding.

## 2025-03-10 NOTE — PATIENT INSTRUCTIONS
D3 2000 unit/day   Will refer to speech therapy consult   Follow up with Dr Ramirez   Labs now   Labs and follow up in 2 mo   Levothyroxine daily as recommended. Might change doses      Thyroid medication dose: Levothyroxine   50 mcg/day x6/week. Skip once a week   Take you medication on empty stomach, not with any other medication or food. Wait 60 minutes before eating. Wait 4 hours before taking Vitamins, Calcium or iron. On the morning of the lab test, please take the medication after the blood test not before. Do not take Biotin 1 week before blood test.           [email or web address]    Don’t take your thyroid hormone at the same time as:  Walnuts.  Soybean flour.  Cottonseed meal.  Iron supplements or multivitamins containing iron.  Calcium supplements.  Antacids that contain aluminum, magnesium or calcium.  Some ulcer medicines, such as sucralfate (Carafate).  Some cholesterol-lowering drugs, such as those containing cholestyramine (Prevalite, Locholest) and colestipol (Colestid).  To avoid possible problems, eat these foods or use these products several hours before or after you take your thyroid medicine.    Supplements containing biotin, common in hair and nail products, can make it hard to measure how much thyroid hormone is in the body. Biotin does not affect thyroid hormone levels. But supplements that have biotin should be stopped for at least a week before measuring thyroid function so that the measurement is correct.

## 2025-03-11 ENCOUNTER — OFFICE VISIT (OUTPATIENT)
Dept: FAMILY MEDICINE CLINIC | Facility: CLINIC | Age: 45
End: 2025-03-11

## 2025-03-11 ENCOUNTER — LAB ENCOUNTER (OUTPATIENT)
Dept: LAB | Age: 45
End: 2025-03-11
Attending: Other
Payer: COMMERCIAL

## 2025-03-11 ENCOUNTER — HOSPITAL ENCOUNTER (OUTPATIENT)
Dept: MRI IMAGING | Facility: HOSPITAL | Age: 45
Discharge: HOME | End: 2025-03-11
Attending: Other
Payer: COMMERCIAL

## 2025-03-11 VITALS
HEART RATE: 81 BPM | BODY MASS INDEX: 33.34 KG/M2 | HEIGHT: 62 IN | DIASTOLIC BLOOD PRESSURE: 72 MMHG | SYSTOLIC BLOOD PRESSURE: 120 MMHG | WEIGHT: 181.19 LBS | TEMPERATURE: 98 F

## 2025-03-11 DIAGNOSIS — E66.9 OBESITY (BMI 30-39.9): ICD-10-CM

## 2025-03-11 DIAGNOSIS — E07.9 THYROID DISEASE: ICD-10-CM

## 2025-03-11 DIAGNOSIS — R53.83 FATIGUE, UNSPECIFIED TYPE: ICD-10-CM

## 2025-03-11 DIAGNOSIS — R20.0 RIGHT LEG NUMBNESS: ICD-10-CM

## 2025-03-11 DIAGNOSIS — R10.11 RUQ ABDOMINAL PAIN: Primary | ICD-10-CM

## 2025-03-11 DIAGNOSIS — R11.0 NAUSEA: ICD-10-CM

## 2025-03-11 DIAGNOSIS — E03.9 ACQUIRED HYPOTHYROIDISM: ICD-10-CM

## 2025-03-11 DIAGNOSIS — R73.03 PREDIABETES: ICD-10-CM

## 2025-03-11 DIAGNOSIS — R13.10 DYSPHAGIA, UNSPECIFIED TYPE: ICD-10-CM

## 2025-03-11 DIAGNOSIS — M54.31 SCIATICA, RIGHT SIDE: ICD-10-CM

## 2025-03-11 DIAGNOSIS — M25.50 POLYARTHRALGIA: Chronic | ICD-10-CM

## 2025-03-11 DIAGNOSIS — E88.819 INSULIN RESISTANCE: ICD-10-CM

## 2025-03-11 DIAGNOSIS — R14.0 POSTPRANDIAL ABDOMINAL BLOATING: ICD-10-CM

## 2025-03-11 DIAGNOSIS — R63.5 WEIGHT GAIN: ICD-10-CM

## 2025-03-11 DIAGNOSIS — E55.9 VITAMIN D DEFICIENCY: ICD-10-CM

## 2025-03-11 DIAGNOSIS — M43.06 SPONDYLOLYSIS, LUMBAR REGION: ICD-10-CM

## 2025-03-11 DIAGNOSIS — E53.8 VITAMIN B12 DEFICIENCY: ICD-10-CM

## 2025-03-11 LAB — TSI SER-ACNC: 0.76 UIU/ML (ref 0.55–4.78)

## 2025-03-11 PROCEDURE — 84443 ASSAY THYROID STIM HORMONE: CPT

## 2025-03-11 PROCEDURE — 99215 OFFICE O/P EST HI 40 MIN: CPT | Performed by: FAMILY MEDICINE

## 2025-03-11 PROCEDURE — 36415 COLL VENOUS BLD VENIPUNCTURE: CPT

## 2025-03-11 RX ORDER — ONDANSETRON 8 MG/1
8 TABLET, FILM COATED ORAL EVERY 8 HOURS PRN
Qty: 60 TABLET | Refills: 0 | Status: SHIPPED | OUTPATIENT
Start: 2025-03-11

## 2025-03-11 RX ORDER — DOXYCYCLINE HYCLATE 50 MG/1
CAPSULE ORAL
COMMUNITY
Start: 2025-03-03

## 2025-03-11 RX ORDER — TIMOLOL MALEATE 5 MG/ML
SOLUTION/ DROPS OPHTHALMIC
COMMUNITY
Start: 2025-03-07

## 2025-03-11 RX ORDER — TAFLUPROST OPTHALMIC 0 MG/.3ML
1 SOLUTION/ DROPS OPHTHALMIC DAILY
COMMUNITY
Start: 2025-03-07

## 2025-03-11 NOTE — PROGRESS NOTES
Patient ID: Mirtha Silver is a 45 year old female.         The following individual(s) verbally consented to be recorded using ambient AI listening technology and understand that they can each withdraw their consent to this listening technology at any point by asking the clinician to turn off or pause the recording:    Patient name: Mirtha Silver  Additional names: Gustavo Torres    HPI  Chief Complaint   Patient presents with    Pain     Back and knee pain and stomach pain    Nausea     Has been feeling very nauseas.     Last seen on 02/25/2025.  Most of these complaints are chronic and she has seen numerous specialists already.    Translated in [place]er by Gustavo on Language Line with ID# [id number].      Pt c/o pain in her back with sciatica in her right leg. She has numbness all the way down to her feet. Pt is still consulting with the spine specialist for her back pain but was told her only option was surgery. She then consulted with  Dr. Das, physiatrist, who also told her that she would also need surgery. She deferred cortisone shots in the back, and would not like another back surgery. I reviewed the note. Pt is also consulting with rheumatology for wide spread pain, although is going to go get a second opinion. She is currently not working and her insurance would not cover physical therapy. She takes Advil as needed. I discussed with her.     Pt c/o RUQ pain that began on 3/5/2025. It is worsened with eating but sometimes she will have pain even without eating. The pain is also present when she breaths. She has not consulted with GI in many years. Denies acid taste in the throat. Nausea began with the stomach pain. SHx cholecystectomy. She has been on Zofran in the past but ran out of it. Pt also describes feeling bloated in her abdomen. Hx IBS. I discussed with her and referred her to GI.       History of Present Illness  The patient presents with abdominal pain and widespread body  pain.    She experiences abdominal pain that began last Wednesday, located in the right upper quadrant. The pain worsens with eating but can occur without food intake. It is severe enough to prevent her from eating at times. Nausea started concurrently with the abdominal pain. She has a history of gallbladder removal and occasional acid reflux, though not consistently.  Already on an acid blocker.  She also experiences bloating, feeling as if her stomach might 'explode'.  She has a diagnosis of IBS already for many years.    She has pain in her right leg, describing it as numb and heavy from CHCF down the leg to the foot, which she associates with sciatica. She has experienced this condition in the past. Her pain specialist advised her to continue with gabapentin, taking 300 mg one tablet in the morning and two tablets at night. She also takes Advil as needed for pain. She previously underwent physical therapy but discontinued due to insurance coverage issues. She was scheduled for an MRI today but canceled due to pain, which was to include sedation.  She is seen the physiatrist along with the neurosurgeon and they both told her that the only way to make this better is to have surgery on her back but she defers as they cannot guarantee that she will actually get a better result than she has now.    She has widespread body pain and is scheduled to see a rheumatologist on April 16th for a second opinion. She has previously seen another rheumatologist for this issue.      Wt Readings from Last 6 Encounters:   03/11/25 181 lb 3.2 oz   03/06/25 179 lb   03/10/25 181 lb   02/25/25 179 lb 12.8 oz   02/13/25 180 lb   01/06/25 175 lb       BMI Readings from Last 6 Encounters:   03/11/25 33.14 kg/m²   03/06/25 32.22 kg/m²   03/10/25 33.11 kg/m²   02/25/25 32.89 kg/m²   02/13/25 32.92 kg/m²   01/06/25 32.01 kg/m²       BP Readings from Last 6 Encounters:   03/11/25 120/72   03/10/25 122/77   02/25/25 116/77   02/13/25  102/80   25 131/71   25 98/62         Review of Systems   Respiratory:  Negative for shortness of breath.    Cardiovascular:  Negative for chest pain.   Gastrointestinal:  Positive for abdominal pain and nausea.   Musculoskeletal:  Positive for back pain.           Medical History:      Past Medical History:    Anxiety state    Back problem    Calculus of kidney    Depression    Disorder of liver    FATTY LIVER     Disorder of thyroid    hypothyroid    Endometriosis    Esophageal reflux    Excessive or frequent menstruation    High cholesterol    History of Papanicolaou smear of cervix    Hyperlipidemia    IBS (irritable bowel syndrome)    Migraines    Per [facility] - Migraine headaches    Mittelschmerz    Osteoarthritis    Ovarian cyst    Postpartum depression    Prediabetes    Pregnancy (HCC)        Spondylolisthesis of lumbosacral region    Uterine leiomyoma    Visual impairment    glasses    Vitamin B12 deficiency    Vitamin D deficiency       Past Surgical History:   Procedure Laterality Date    Cervical spine surgery  2023    C5-C6 Arthroplasty     Cholecystectomy      Colonoscopy  2004    Cyst removal  ,     laporoscopic ovarian cytectomy    Hysterectomy      Laparoscopy,diagnostic  2016    laparoscopy, filshie clip sterilization of left fallopian tube, ablation of endometriosis, retrieval and removal of displaced filshie clip    Lipoma removal Left 2018    Dorian biopsy stereo nodule 1 site left (cpt=19081)  05/10/2022    top hat    Dorian localization wire 1 site left (cpt=19281)  2022    Other  2019    low back surgery with metal implant    Other surgical history Left     eye surgery for cornea    Total abdominal hysterectomy N/A 2017    Procedure: ABDOMINAL HYSTERECTOMY;  Surgeon: Juvencio Decker MD;  Location: [facility] MAIN OR          Current Outpatient Medications   Medication Sig Dispense Refill    timolol 0.5 % Ophthalmic Solution        Tafluprost, PF, 0.0015 % Ophthalmic Solution Apply 1 drop to eye daily.      doxycycline 50 MG Oral Cap       levothyroxine 50 MCG Oral Tab Take 1 tablet (50 mcg total) by mouth before breakfast. 90 tablet 1    dorzolamide-timolol 2-0.5 % Ophthalmic Solution Apply 1 drop to eye 2 (two) times daily.      erythromycin 5 MG/GM Ophthalmic Ointment Apply 1 Application to eye nightly.      ondansetron 8 MG Oral Tablet Dispersible Take 1 tablet (8 mg total) by mouth every 8 (eight) hours as needed for Nausea.      escitalopram 5 MG Oral Tab Take 1 tablet (5 mg total) by mouth daily. 90 tablet 1    Diethylpropion HCl 25 MG Oral Tab Take 1 tablet by mouth 2 (two) times daily. 120 tablet 4    CUSTOM MEDICATION Semaglutide/ cyanocobalamin    0.25 mg-   concentration: 1 /0.5 mg/ ML  Amount:  1 Ml vial  Instructions: inject 25 units/ 0.25 ML q weekly 1 each 3    clobetasol 0.05 % External Solution Apply 1 mL topically daily as needed. 60 mL 3    fluticasone propionate 50 MCG/ACT Nasal Suspension INSTILL 2 SPRAYS IN EACH NOSTRIL DAILY. 48 g 3    pantoprazole 40 MG Oral Tab EC Take 1 tablet (40 mg total) by mouth every morning before breakfast. To help with stomach acid and stomach irritation/inflammation 90 tablet 3    clotrimazole-betamethasone 1-0.05 % External Cream Apply 1 Application topically 2 (two) times daily. To right side of nose. 60 g 1    ketoconazole 2 % External Cream Apply to affected area on left foot bid-continue to use for 5 days after all evidence of rash or redness has resolved 60 g 1    METFORMIN  MG Oral Tablet 24 Hr TAKE 1 TABLET BY MOUTH EVERY DAY 90 tablet 4    gabapentin 300 MG Oral Cap Take 1 capsule (300 mg total) by mouth. 1 tab AM, 2 tab QHS      Naproxen Sodium (ALEVE OR) Take 1 tablet by mouth daily as needed.       Allergies:Allergies[1]     Physical Exam:       Physical Exam  Blood pressure 120/72, pulse 81, temperature 98 °F (36.7 °C), temperature source Tympanic, height 5' 2\" (1.575 m),  weight 181 lb 3.2 oz, last menstrual period 03/22/2017, not currently breastfeeding.    Physical Exam   Constitutional: Patient is oriented to person, place, and time. Patient appears well-developed and well-nourished. No distress.   Cardiovascular: Normal rate, regular rhythm and normal heart sounds.    Pulmonary/Chest: Effort normal and breath sounds normal. No respiratory distress.   Lymphadenopathy: Patient has no cervical adenopathy.  Neurological: Patient is alert and oriented to person, place, and time. SLR negative.   Skin: Skin is warm.   Psychiatry: Normal mood and affect.  Lower legs: No edema of the legs bilaterally. 2+ pedal pulses.  Abdominal: Normal appearance and bowel sounds are normal. There is no rigidity, no rebound, no guarding. RUQ tenderness with palpation but also palpating the epigastric area causes some pain into the RUQ.     Vitals reviewed.           Assessment/Plan:      Diagnoses and all orders for this visit:    RUQ abdominal pain  -     ondansetron (ZOFRAN) 8 MG tablet; Take 1 tablet (8 mg total) by mouth every 8 (eight) hours as needed for Nausea.  -     GASTRO - INTERNAL  See gastroenterology.  She does not have a gallbladder.  I reviewed some the diagnostic test that she is already had.  Polyarthralgia  Most likely fibromyalgia and she is getting a second opinion from a different rheumatologist  Nausea  -     ondansetron (ZOFRAN) 8 MG tablet; Take 1 tablet (8 mg total) by mouth every 8 (eight) hours as needed for Nausea.  -     GASTRO - INTERNAL  Can do Zofran 3 times daily as needed and continue the PPI  Right leg numbness  Her gait is normal.  She has no weakness of her lower extremities.  Postprandial abdominal bloating  -     GASTRO - INTERNAL    Spondylolysis, lumbar region  Chronic and is already seen the physiatrist and spine specialist but defers surgery and will continue her gabapentin and home exercises  Sciatica, right side  As above    Assessment & Plan  Right upper  quadrant abdominal pain  She reports right upper quadrant abdominal pain since last Wednesday, exacerbated by eating and accompanied by nausea. Despite a cholecystectomy, the pain persists. Tenderness is present in the right upper quadrant and epigastric palpation elicits pain in the same area. Differential diagnosis includes peptic ulcer disease or gastritis. She requires gastroenterology referral for further evaluation.  - Refer to gastroenterology for further evaluation  - Prescribe ondansetron for nausea, to be taken up to three times daily    Irritable Bowel Syndrome (IBS)  She experiences bloating and abdominal swelling, consistent with IBS.  - Manage with dietary modifications as previously advised    Sciatica  She experiences numbness and heaviness in the right leg, consistent with sciatica. She has been advised that surgery is an option but is hesitant due to previous surgery and potential risks, including a lower success rate for a second surgery. She is on gabapentin and uses ibuprofen as needed for pain. She is not pursuing physical therapy due to insurance coverage issues but performs home exercises as advised.  - Continue gabapentin 300 mg, 1 tablet in the morning and 2 tablets at night  - Use ibuprofen as needed for pain  - Discuss physical therapy options, noting insurance coverage issues        Referrals (if applicable)  Orders Placed This Encounter   Procedures    GASTRO - INTERNAL     Gastroenterology Department phone number is [phone].  ++++ Ask for first available provider that can see you ++++.     Referral Priority:   Routine     Referral Type:   OFFICE VISIT     Requested Specialty:   GASTROENTEROLOGY     Number of Visits Requested:   3       Follow up if symptoms persist.  Take medicine (if given) as prescribed.  Approach to treatment discussed and patient/family member understands and agrees to plan.     No follow-ups on file.    There are no Patient Instructions on file for this  visit.    Blanka Cornejo    3/11/2025    By signing my name below, IBlanka,  attest that this documentation has been prepared under the direction and in the presence of Manoj Weaver DO.   Electronically Signed: Blanka Cornejo, 3/11/2025, 2:16 PM.    I, Manoj Weaver DO,  personally performed the services described in this documentation. All medical record entries made by the scribe were at my direction and in my presence.  I have reviewed the chart and discharge instructions (if applicable) and agree that the record reflects my personal performance and is accurate and complete.  Manoj Weaver DO, 3/11/2025, 2:51 PM                  [1]   Allergies  Allergen Reactions    Cymbalta [Duloxetine Hcl] SHORTNESS OF BREATH and ANXIETY    Other SHORTNESS OF BREATH     SOME TOPICAL NUMBING SPRAY USED WHEN STARTING AN I.V. - cough and shortness of breath      Sulfa Antibiotics HIVES and RASH    Tramadol ITCHING     THROAT ITCHING AND FEELS LIKE IT WAS CLOSING     Ciprofloxacin RASH    Acetaminophen OTHER (SEE COMMENTS)     Fatty Liver    Lyrica [Pregabalin] RASH

## 2025-04-14 DIAGNOSIS — J30.1 SEASONAL ALLERGIC RHINITIS DUE TO POLLEN: ICD-10-CM

## 2025-04-16 ENCOUNTER — TELEPHONE (OUTPATIENT)
Dept: ENDOCRINOLOGY CLINIC | Facility: CLINIC | Age: 45
End: 2025-04-16

## 2025-04-16 DIAGNOSIS — R13.10 DYSPHAGIA, UNSPECIFIED TYPE: Primary | ICD-10-CM

## 2025-04-16 NOTE — TELEPHONE ENCOUNTER
Patient states she tried scheduling for speech therapy but Speech Therapy office is requesting for additional information as to why she needs speech therapy.  Please call - speaks Arabic.  Thank you.

## 2025-04-17 RX ORDER — FLUTICASONE PROPIONATE 50 MCG
SPRAY, SUSPENSION (ML) NASAL
Qty: 48 G | Refills: 3 | Status: SHIPPED | OUTPATIENT
Start: 2025-04-17

## 2025-04-17 RX ORDER — FLUTICASONE PROPIONATE 50 MCG
2 SPRAY, SUSPENSION (ML) NASAL DAILY
Qty: 16 G | Refills: 2 | OUTPATIENT
Start: 2025-04-17

## 2025-04-17 NOTE — TELEPHONE ENCOUNTER
Refill Per Protocol     Requested Prescriptions   Pending Prescriptions Disp Refills    fluticasone propionate 50 MCG/ACT Nasal Suspension 48 g 3     Sig: INSTILL 2 SPRAYS IN EACH NOSTRIL DAILY.       Allergy Medication Protocol Passed - 4/17/2025  2:16 PM        Passed - In person appointment or virtual visit in the past 12 mos or appointment in next 3 mos     Recent Outpatient Visits              [facility]     [facility]    1 month ago Fatigue, unspecified type    [facility] Roberth Turk MD    Office Visit    1 month ago Chronic nonintractable headache, unspecified headache type    [facility] Manoj Lopez DO    Office Visit    2 months ago Insulin resistance    [facility] Bayron Ramirez MD    Office Visit    3 months ago Slow transit constipation    [facility] Manoj Weaver,     Office Visit          Future Appointments         Provider Department Appt Notes    In 2 weeks Katlin Castelan APRN [facility] abdominal pain and nausea    In 3 weeks Roberth Turk MD [facility] 2 months    In 4 months Bayron Ramirez MD [facility]                     Passed - Medication is active on med list

## 2025-04-18 NOTE — TELEPHONE ENCOUNTER
Dr. Turk,     Please see message below. Speech therapy office requires additional information on why pt requires speech therapy.

## 2025-04-21 ENCOUNTER — LAB ENCOUNTER (OUTPATIENT)
Dept: LAB | Facility: HOSPITAL | Age: 45
End: 2025-04-21
Attending: FAMILY MEDICINE
Payer: COMMERCIAL

## 2025-04-21 DIAGNOSIS — H04.123 DRY EYE SYNDROME OF BILATERAL LACRIMAL GLANDS: Primary | ICD-10-CM

## 2025-04-21 PROCEDURE — 36415 COLL VENOUS BLD VENIPUNCTURE: CPT

## 2025-04-21 PROCEDURE — 87389 HIV-1 AG W/HIV-1&-2 AB AG IA: CPT

## 2025-04-22 DIAGNOSIS — H04.123 DRY EYES: Primary | ICD-10-CM

## 2025-04-24 ENCOUNTER — LAB ENCOUNTER (OUTPATIENT)
Dept: LAB | Facility: HOSPITAL | Age: 45
End: 2025-04-24
Attending: OPHTHALMOLOGY
Payer: COMMERCIAL

## 2025-04-24 ENCOUNTER — TELEPHONE (OUTPATIENT)
Dept: PHYSICAL THERAPY | Facility: HOSPITAL | Age: 45
End: 2025-04-24

## 2025-04-24 DIAGNOSIS — H04.123 DRY EYES: ICD-10-CM

## 2025-04-24 PROCEDURE — 36415 COLL VENOUS BLD VENIPUNCTURE: CPT

## 2025-04-24 NOTE — TELEPHONE ENCOUNTER
Spoke with patient with  ID #[id number]. Went over diagnosis and reasons for speech therapy, called speech therapy and states nothing more is required from us at this time.

## 2025-04-24 NOTE — TELEPHONE ENCOUNTER
She has dysphagia   Hx of     Cervical spine surgery     On exam   + horizontal scar at the base of the neck ,      Thanks

## 2025-05-05 ENCOUNTER — OFFICE VISIT (OUTPATIENT)
Facility: CLINIC | Age: 45
End: 2025-05-05
Payer: COMMERCIAL

## 2025-05-05 ENCOUNTER — LAB ENCOUNTER (OUTPATIENT)
Dept: LAB | Facility: HOSPITAL | Age: 45
End: 2025-05-05
Payer: COMMERCIAL

## 2025-05-05 VITALS
DIASTOLIC BLOOD PRESSURE: 71 MMHG | SYSTOLIC BLOOD PRESSURE: 105 MMHG | HEIGHT: 62 IN | HEART RATE: 82 BPM | WEIGHT: 182 LBS | BODY MASS INDEX: 33.49 KG/M2

## 2025-05-05 DIAGNOSIS — K58.2 IRRITABLE BOWEL SYNDROME WITH BOTH CONSTIPATION AND DIARRHEA: ICD-10-CM

## 2025-05-05 DIAGNOSIS — R11.0 NAUSEA: ICD-10-CM

## 2025-05-05 DIAGNOSIS — R10.84 GENERALIZED ABDOMINAL PAIN: ICD-10-CM

## 2025-05-05 DIAGNOSIS — K76.0 HEPATIC STEATOSIS: ICD-10-CM

## 2025-05-05 DIAGNOSIS — K92.89 GAS BLOAT SYNDROME: ICD-10-CM

## 2025-05-05 DIAGNOSIS — K76.0 HEPATIC STEATOSIS: Primary | ICD-10-CM

## 2025-05-05 DIAGNOSIS — R10.9 RIGHT SIDED ABDOMINAL PAIN: ICD-10-CM

## 2025-05-05 LAB
ALBUMIN SERPL-MCNC: 4.3 G/DL (ref 3.2–4.8)
ALP LIVER SERPL-CCNC: 67 U/L (ref 37–98)
ALT SERPL-CCNC: 34 U/L (ref 10–49)
AST SERPL-CCNC: 27 U/L (ref ?–34)
BILIRUB DIRECT SERPL-MCNC: 0.1 MG/DL (ref ?–0.3)
BILIRUB SERPL-MCNC: 0.5 MG/DL (ref 0.3–1.2)
DEPRECATED HBV CORE AB SER IA-ACNC: 44 NG/ML (ref 50–306)
PROT SERPL-MCNC: 7 G/DL (ref 5.7–8.2)
T3FREE SERPL-MCNC: 3.25 PG/ML (ref 2.4–4.2)
T4 FREE SERPL-MCNC: 1.4 NG/DL (ref 0.8–1.7)
TSI SER-ACNC: 0.13 UIU/ML (ref 0.55–4.78)

## 2025-05-05 PROCEDURE — 36415 COLL VENOUS BLD VENIPUNCTURE: CPT | Performed by: PHYSICIAN ASSISTANT

## 2025-05-05 PROCEDURE — 80076 HEPATIC FUNCTION PANEL: CPT

## 2025-05-05 PROCEDURE — 82728 ASSAY OF FERRITIN: CPT | Performed by: PHYSICIAN ASSISTANT

## 2025-05-05 PROCEDURE — 99214 OFFICE O/P EST MOD 30 MIN: CPT

## 2025-05-05 NOTE — PATIENT INSTRUCTIONS
# hepatic steatosis  - ct abdomen  - repeat hepatic panel    # right sided abdominal pain/gas bloating/generalized abdominal pain  - simethicone 1 tab tid  - abdominal CT    # nausea  - continue ondansetron and pantoprazole    # IBS with constipation and diarrhea  - trial miralax for 2 weeks  - increase fiber and fluid intake  - continue linzess daily  - IB Guard over the counter

## 2025-05-05 NOTE — H&P
[facility] - Gastroenterology                                                                                                               Requesting physician or provider: Manoj Weaver DO    Chief Complaint   Patient presents with    Consult     For Right upper abdominal pain ,bloating ,nausea,constipation, diarrhea     [id number] Nena assisted with interpretation of visit  HPI:   Mirtha Silver is a 45 year old year-old female with history of  IBS, GERD, hepatic steatosis, anxiety, depression, endometriosis, migraine headaches, OA, postpartum depression, ovarian cyst, back issues   Presents for evaluation of upper abdominal pain, bloating, constipation, diarrhea. Pt has had symptoms in the past however, symptoms have worsened recently. Pt describes having spontaneous episodes of nausea and abdominal pain with and without food.   Pt states that abdominal pain may be related to fatty liver disease. Pt is nervous about having liver ca as her mom was recently diagnosed with liver ca after having fatty liver disease.   Pt reports having bm twice daily, pt has been taking linzess to help. However recently for the past week she had not had a bm for a week even though she has been taking linzess.   Denies include: melena, hematochezia, hematemesis, abd surgery, loss of appetite, changes in stool or bowel habits, weight gain/loss    Prior endoscopies:  Egd 2010  Colonoscopy 2011- ibs    Soc:  -denies smoking  -denies Etoh    Wt Readings from Last 6 Encounters:   05/05/25 182 lb (82.6 kg)   03/06/25 179 lb (81.2 kg)   03/11/25 181 lb 3.2 oz (82.2 kg)   03/10/25 181 lb (82.1 kg)   02/25/25 179 lb 12.8 oz (81.6 kg)   02/13/25 180 lb (81.6 kg)        History, Medications, Allergies, ROS:      Past Medical History[1]   Past Surgical History[2]   Family Hx: Family History[3]   Social History: Short Social Hx on File[4]     Medications (Active prior to today's visit):  Current  Medications[5]    Allergies:  Allergies[6]    ROS:   CONSTITUTIONAL:  negative for fevers, rigors  EYES:  negative for diplopia   RESPIRATORY:  negative for severe shortness of breath  CARDIOVASCULAR:  negative for crushing sub-sternal chest pain  GASTROINTESTINAL:  see HPI  GENITOURINARY:  negative for dysuria or gross hematuria  INTEGUMENT/BREAST:  SKIN:  negative for jaundice   ALLERGIC/IMMUNOLOGIC:  negative for hay fever  ENDOCRINE:  negative for cold intolerance and heat intolerance  MUSCULOSKELETAL:  negative for joint effusion/severe erythema  BEHAVIOR/PSYCH:  negative for psychotic behavior      PHYSICAL EXAM:   Blood pressure 105/71, pulse 82, height 5' 2\" (1.575 m), weight 182 lb (82.6 kg), last menstrual period 03/22/2017, not currently breastfeeding.    Gen- Patient appears comfortable and in no acute discomfort  HEENT: the sclera appears anicteric, oropharynx clear, mucus membranes appear moist  CV- regular rate and rhythm, the extremities are warm and well perfused   Lung- Moves air well; No labored breathing  Abdomen- soft, non-tender exam in all quadrants without rigidity or guarding, non-distended, no abnormal bowel sounds noted, no masses are palpated  Skin- No jaundice  Ext: no cyanosis, clubbing or edema is evident.   Neuro- Alert and interactive, and gross movements of extremities normal  Psych - appropriate, non-agitated    Labs/Imaging:     Patient's pertinent labs and imaging were reviewed and discussed with patient today.      Lab Results   Component Value Date     (H) 10/19/2024    GLU 98 08/20/2024    GLU 97 04/29/2024     10/19/2024     08/20/2024     04/29/2024    K 4.0 10/19/2024    K 4.1 08/20/2024    K 4.0 04/29/2024     10/19/2024     08/20/2024     04/29/2024    CO2 26.0 10/19/2024    CO2 25.0 08/20/2024    CO2 26.0 04/29/2024    ANIONGAP 7 10/19/2024    ANIONGAP 7 08/20/2024    ANIONGAP 6 04/29/2024    BUN 9 10/19/2024    BUN 8 (L)  08/20/2024    BUN 8 (L) 04/29/2024    CREATSERUM 0.74 10/19/2024    CREATSERUM 0.76 08/20/2024    CREATSERUM 0.75 04/29/2024    BUNCREA 12.2 10/19/2024    BUNCREA 10.5 08/20/2024    BUNCREA 10.7 04/29/2024    CA 8.9 10/19/2024    CA 9.3 08/20/2024    CA 9.3 04/29/2024    OSMOCALC 295 10/19/2024    OSMOCALC 286 08/20/2024    OSMOCALC 282 04/29/2024    EGFRCR 104 10/19/2024    EGFRCR 100 08/20/2024    EGFRCR 102 04/29/2024    ALT 24 10/19/2024    ALT 35 08/20/2024    ALT 24 04/29/2024    AST 20 10/19/2024    AST 30 08/20/2024    AST 21 04/29/2024    ALKPHO 65 10/19/2024    ALKPHO 86 08/20/2024    ALKPHO 68 04/29/2024    BILT 0.3 10/19/2024    BILT 0.5 08/20/2024    BILT 0.4 04/29/2024    TP 6.7 10/19/2024    TP 7.4 08/20/2024    TP 7.0 04/29/2024    ALB 4.3 10/19/2024    ALB 4.4 08/20/2024    ALB 4.3 04/29/2024    GLOBULIN 2.4 10/19/2024    GLOBULIN 3.0 08/20/2024    GLOBULIN 2.7 (L) 04/29/2024    ELECTAG 1.8 10/19/2024    ELECTAG 1.5 08/20/2024    ELECTAG 1.6 04/29/2024    FASTING Yes 10/19/2024    FASTING Yes 08/20/2024    FASTING Yes 08/20/2024       Lab Results   Component Value Date    RBC 4.13 10/19/2024    RBC 4.52 10/07/2024    RBC 4.53 08/20/2024    HGB 13.2 10/19/2024    HGB 13.9 10/07/2024    HGB 13.9 08/20/2024    HCT 37.7 10/19/2024    HCT 40.7 10/07/2024    HCT 41.1 08/20/2024    MCV 91.3 10/19/2024    MCV 90.0 10/07/2024    MCV 90.7 08/20/2024    MCH 32.0 10/19/2024    MCH 30.8 10/07/2024    MCH 30.7 08/20/2024    MCHC 35.0 10/19/2024    MCHC 34.2 10/07/2024    MCHC 33.8 08/20/2024    RDW 12.6 10/19/2024    RDW 12.8 10/07/2024    RDW 12.3 08/20/2024    NEPRELIM 3.66 10/19/2024    NEPRELIM 4.03 10/07/2024    NEPRELIM 4.99 04/29/2024    WBC 6.3 10/19/2024    WBC 6.9 10/07/2024    WBC 5.6 08/20/2024    .0 10/19/2024    .0 10/07/2024    .0 08/20/2024          ASSESSMENT/PLAN:   Mirtha Silver is a 45 year old year-old female with history of  IBS, GERD, hepatic steatosis, anxiety,  depression, endometriosis, migraine headaches, OA, postpartum depression, ovarian cyst, back issues   Presents for evaluation of upper abdominal pain, bloating, constipation, diarrhea. Pt has had symptoms in the past however, symptoms have worsened recently. Pt describes having spontaneous episodes of nausea and abdominal pain with and without food.   Pt states that abdominal pain may be related to fatty liver disease. Pt is nervous about having liver ca as her mom was recently diagnosed with liver ca after having fatty liver disease.   Pt reports having bm twice daily, pt has been taking linzess to help. However recently for the past week she had not had a bm for a week even though she has been taking linzess.     1. Hepatic steatosis    2. Right sided abdominal pain    3. Nausea    4. Gas bloat syndrome    5. Generalized abdominal pain    6. Irritable bowel syndrome with both constipation and diarrhea    Possible consideration of colonoscopy if pain remains unresolved      Recommendations:    # hepatic steatosis  - ct abdomen  - repeat hepatic panel    # right sided abdominal pain/gas bloating/generalized abdominal pain  - simethicone 1 tab tid  - abdominal CT    # nausea  - continue ondansetron and pantoprazole    # IBS with constipation and diarrhea  - trial miralax for 2 weeks  - increase fiber and fluid intake  - continue linzess daily  - IB Guard over the counter        Orders This Visit:  No orders of the defined types were placed in this encounter.      Meds This Visit:  Requested Prescriptions     Pending Prescriptions Disp Refills    Simethicone 80 MG Oral Tab 120 tablet 2     Sig: Take 1 tablet by mouth 3 (three) times daily as needed.       Imaging & Referrals:  None         BASIL Sullivan   5/5/2025          [1]   Past Medical History:   Anxiety    Anxiety state    Back problem    Calculus of kidney    Depression    Disorder of liver    FATTY LIVER     Disorder of thyroid    hypothyroid     Endometriosis    Esophageal reflux    Excessive or frequent menstruation    High cholesterol    History of Papanicolaou smear of cervix    Hyperlipidemia    IBS (irritable bowel syndrome)    Migraines    Per [facility] - Migraine headaches    Mittelschmerz    Osteoarthritis    Ovarian cyst    Postpartum depression    Prediabetes    Pregnancy (HCC)        Spondylolisthesis of lumbosacral region    Uterine leiomyoma    Visual impairment    glasses    Vitamin B12 deficiency    Vitamin D deficiency   [2]   Past Surgical History:  Procedure Laterality Date    Cervical spine surgery  2023    C5-C6 Arthroplasty     Cholecystectomy  2012    Colonoscopy  2004    Cyst removal  ,     laporoscopic ovarian cytectomy    Hysterectomy      Laparoscopy,diagnostic  2016    laparoscopy, filshie clip sterilization of left fallopian tube, ablation of endometriosis, retrieval and removal of displaced filshie clip    Lipoma removal Left 2018    Dorian biopsy stereo nodule 1 site left (cpt=19081)  05/10/2022    top hat    Dorian localization wire 1 site left (cpt=19281)  2022    Other      low back surgery with metal implant    Other surgical history Left     eye surgery for cornea    Total abdominal hysterectomy N/A 2017    Procedure: ABDOMINAL HYSTERECTOMY;  Surgeon: Juvencio Decker MD;  Location: [facility] MAIN OR   [3]   Family History  Problem Relation Age of Onset    Diabetes Father         Type 2    Hypertension Mother     Lipids Mother         Hyperlipidemia    Diabetes Mother         Type 2    Diabetes Sister     Hypertension Sister     Lipids Brother     Glaucoma Neg     Macular degeneration Neg    [4]   Social History  Socioeconomic History    Marital status:     Number of children: 3   Tobacco Use    Smoking status: Former     Types: Cigarettes    Smokeless tobacco: Never    Tobacco comments:     Quit around    Vaping Use    Vaping status: Never Used   Substance and Sexual  Activity    Alcohol use: Yes     Comment: socially    Drug use: No    Sexual activity: Yes     Birth control/protection: Hysterectomy   Other Topics Concern    Caffeine Concern No     Comment: 1 cup coffee daily    Exercise No    Breast feeding No    Reaction to local anesthetic No    Pt has a pacemaker No    Pt has a defibrillator No   Social History Narrative    The patient does not use an assistive device..      The patient does live in a home with stairs.     Social Drivers of Health     Transportation Needs: No Transportation Needs (9/21/2023)    Transportation Needs     Lack of Transportation: No   [5]   Current Outpatient Medications   Medication Sig Dispense Refill    fluticasone propionate 50 MCG/ACT Nasal Suspension INSTILL 2 SPRAYS IN EACH NOSTRIL DAILY. 48 g 3    timolol 0.5 % Ophthalmic Solution       Tafluprost, PF, 0.0015 % Ophthalmic Solution Apply 1 drop to eye daily.      doxycycline 50 MG Oral Cap       ondansetron (ZOFRAN) 8 MG tablet Take 1 tablet (8 mg total) by mouth every 8 (eight) hours as needed for Nausea. 60 tablet 0    levothyroxine 50 MCG Oral Tab Take 1 tablet (50 mcg total) by mouth before breakfast. 90 tablet 1    dorzolamide-timolol 2-0.5 % Ophthalmic Solution Apply 1 drop to eye 2 (two) times daily.      erythromycin 5 MG/GM Ophthalmic Ointment Apply 1 Application to eye nightly.      ondansetron 8 MG Oral Tablet Dispersible Take 1 tablet (8 mg total) by mouth every 8 (eight) hours as needed for Nausea.      escitalopram 5 MG Oral Tab Take 1 tablet (5 mg total) by mouth daily. 90 tablet 1    CUSTOM MEDICATION Semaglutide/ cyanocobalamin    0.25 mg-   concentration: 1 /0.5 mg/ ML  Amount:  1 Ml vial  Instructions: inject 25 units/ 0.25 ML q weekly 1 each 3    clobetasol 0.05 % External Solution Apply 1 mL topically daily as needed. 60 mL 3    pantoprazole 40 MG Oral Tab EC Take 1 tablet (40 mg total) by mouth every morning before breakfast. To help with stomach acid and stomach  irritation/inflammation 90 tablet 3    clotrimazole-betamethasone 1-0.05 % External Cream Apply 1 Application topically 2 (two) times daily. To right side of nose. 60 g 1    ketoconazole 2 % External Cream Apply to affected area on left foot bid-continue to use for 5 days after all evidence of rash or redness has resolved 60 g 1    METFORMIN  MG Oral Tablet 24 Hr TAKE 1 TABLET BY MOUTH EVERY DAY 90 tablet 4    gabapentin 300 MG Oral Cap Take 1 capsule (300 mg total) by mouth. 1 tab AM, 2 tab QHS      Naproxen Sodium (ALEVE OR) Take 1 tablet by mouth daily as needed.      Diethylpropion HCl 25 MG Oral Tab Take 1 tablet by mouth 2 (two) times daily. 120 tablet 4   [6]   Allergies  Allergen Reactions    Cymbalta [Duloxetine Hcl] SHORTNESS OF BREATH and ANXIETY    Other SHORTNESS OF BREATH     SOME TOPICAL NUMBING SPRAY USED WHEN STARTING AN I.V. - cough and shortness of breath      Sulfa Antibiotics HIVES and RASH    Tramadol ITCHING     THROAT ITCHING AND FEELS LIKE IT WAS CLOSING     Ciprofloxacin RASH    Acetaminophen OTHER (SEE COMMENTS)     Fatty Liver    Lyrica [Pregabalin] RASH

## 2025-05-12 ENCOUNTER — HOSPITAL ENCOUNTER (OUTPATIENT)
Dept: CT IMAGING | Facility: HOSPITAL | Age: 45
Discharge: HOME | End: 2025-05-12
Payer: COMMERCIAL

## 2025-05-12 DIAGNOSIS — K76.0 HEPATIC STEATOSIS: ICD-10-CM

## 2025-05-12 LAB
CREAT BLD-MCNC: 1 MG/DL (ref 0.55–1.02)
EGFRCR SERPLBLD CKD-EPI 2021: 72 ML/MIN/1.73M2 (ref 60–?)

## 2025-05-12 PROCEDURE — 82565 ASSAY OF CREATININE: CPT

## 2025-05-12 PROCEDURE — 74177 CT ABD & PELVIS W/CONTRAST: CPT

## 2025-05-13 ENCOUNTER — OFFICE VISIT (OUTPATIENT)
Facility: LOCATION | Age: 45
End: 2025-05-13

## 2025-05-13 VITALS
BODY MASS INDEX: 32.3 KG/M2 | DIASTOLIC BLOOD PRESSURE: 74 MMHG | SYSTOLIC BLOOD PRESSURE: 108 MMHG | WEIGHT: 180 LBS | HEART RATE: 75 BPM | HEIGHT: 62.5 IN

## 2025-05-13 DIAGNOSIS — E55.9 VITAMIN D DEFICIENCY: ICD-10-CM

## 2025-05-13 DIAGNOSIS — E07.9 THYROID DISEASE: ICD-10-CM

## 2025-05-13 DIAGNOSIS — M54.2 NECK PAIN ON LEFT SIDE: ICD-10-CM

## 2025-05-13 DIAGNOSIS — E53.8 VITAMIN B12 DEFICIENCY: Primary | ICD-10-CM

## 2025-05-13 DIAGNOSIS — E03.9 ACQUIRED HYPOTHYROIDISM: ICD-10-CM

## 2025-05-13 PROCEDURE — 99214 OFFICE O/P EST MOD 30 MIN: CPT | Performed by: INTERNAL MEDICINE

## 2025-05-13 RX ORDER — LEVOTHYROXINE SODIUM 50 UG/1
50 TABLET ORAL
Qty: 90 TABLET | Refills: 1 | Status: SHIPPED | OUTPATIENT
Start: 2025-05-13

## 2025-05-13 NOTE — PROGRESS NOTES
Reason for Visit:    hypothyroidism   Requesting Physician:   .DO Meg Gamboa Vineet,  [facility]  [address]  [place] [address]      CHIEF COMPLAINT:    Chief Complaint   Patient presents with    Hypothyroidism     F/u        HISTORY OF PRESENT ILLNESS:   Mirtha Silver is a 45 year old female who presents with  hypothyroid, low vit D, insulin resistant and obese   She was seen with daughter in law   She has left side neck discomfort and pain   She has palpitations sometimes   She gets fatigued easily from even walking  She has anxiety but it is chronic   She was started on LT4 in 2023        Taking  D3 , inj  b12 and semglutide   Thryoid Meds: levothyroxine Tabs - 50 MCG x6/week    Menstrual hx: Patient's last menstrual period was Patient's last menstrual period was 03/22/2017.   Neck surgery: yes  cervical but not thyroid   Neck radiation: No   Biotin: no   Turmeric: no   Family history of thyroid cancer in 1st degree relatives:  no     US thyroid was unremarkable 4/2024   US before that was in 9/2022    ASSESSMENT AND PLAN:  Mirtha Silver is a 45 year old female who presents with  hypothyroid, insulin resistance, obese, fatigue and dysphagia.   Clinically has nonspecific symptoms   Labs showed TSH low   She is on LT4 daily  50 mcg/day x6/week.  We discussed her Lt side neck pain is vague and she had an US last year. She saw ENT. Will order another US.   Fatigue is multifactorial most likely. Will follow up with PCP       D3 2000 unit/day     Labs and follow up in 2 mo   Levothyroxine daily as recommended.    US neck for left side pain     Thyroid medication dose: Levothyroxine   50 mcg/day x5/week. Skip 2 days a week   Take you medication on empty stomach, not with any other medication or food. Wait 60 minutes before eating. Wait 4 hours before taking Vitamins, Calcium or iron. On the morning of the lab test, please take the medication after the blood test not before. Do not take  Biotin 1 week before blood test.       [email or web address]    Don’t take your thyroid hormone at the same time as:  Walnuts.  Soybean flour.  Cottonseed meal.  Iron supplements or multivitamins containing iron.  Calcium supplements.  Antacids that contain aluminum, magnesium or calcium.  Some ulcer medicines, such as sucralfate (Carafate).  Some cholesterol-lowering drugs, such as those containing cholestyramine (Prevalite, Locholest) and colestipol (Colestid).  To avoid possible problems, eat these foods or use these products several hours before or after you take your thyroid medicine.    Supplements containing biotin, common in hair and nail products, can make it hard to measure how much thyroid hormone is in the body. Biotin does not affect thyroid hormone levels. But supplements that have biotin should be stopped for at least a week before measuring thyroid function so that the measurement is correct.      PAST MEDICAL HISTORY:   Past Medical History:    Anxiety    Anxiety state    Back problem    Calculus of kidney    Depression    Disorder of liver    FATTY LIVER     Disorder of thyroid    hypothyroid    Endometriosis    Esophageal reflux    Excessive or frequent menstruation    High cholesterol    History of Papanicolaou smear of cervix    Hyperlipidemia    IBS (irritable bowel syndrome)    Migraines    Per [facility] - Migraine headaches    Mittelschmerz    Osteoarthritis    Ovarian cyst    Postpartum depression    Prediabetes    Pregnancy (HCC)        Spondylolisthesis of lumbosacral region    Uterine leiomyoma    Visual impairment    glasses    Vitamin B12 deficiency    Vitamin D deficiency       PAST SURGICAL HISTORY:   Past Surgical History:   Procedure Laterality Date    Cervical spine surgery  2023    C5-C6 Arthroplasty     Cholecystectomy  2012    Colonoscopy  2004    Cyst removal  ,     laporoscopic ovarian cytectomy    Hysterectomy       Laparoscopy,diagnostic  06/08/2016    laparoscopy, filshie clip sterilization of left fallopian tube, ablation of endometriosis, retrieval and removal of displaced filshie clip    Lipoma removal Left 12/29/2018    Dorian biopsy stereo nodule 1 site left (cpt=19081)  05/10/2022    top hat    Dorian localization wire 1 site left (cpt=19281)  06/28/2022    Other  2019    low back surgery with metal implant    Other surgical history Left     eye surgery for cornea    Total abdominal hysterectomy N/A 04/05/2017    Procedure: ABDOMINAL HYSTERECTOMY;  Surgeon: Juvencio Decker MD;  Location: [facility] MAIN OR       CURRENT MEDICATIONS:     levothyroxine 50 MCG Oral Tab Take 1 tablet (50 mcg total) by mouth before breakfast. 90 tablet 1       ALLERGIES:  Allergies[1]    SOCIAL HISTORY:    Social History     Socioeconomic History    Marital status:     Number of children: 3   Tobacco Use    Smoking status: Former     Types: Cigarettes    Smokeless tobacco: Never    Tobacco comments:     Quit around 2018   Vaping Use    Vaping status: Never Used   Substance and Sexual Activity    Alcohol use: Yes     Comment: socially    Drug use: No    Sexual activity: Yes     Birth control/protection: Hysterectomy   Other Topics Concern    Caffeine Concern No     Comment: 1 cup coffee daily    Exercise No    Breast feeding No    Reaction to local anesthetic No    Pt has a pacemaker No    Pt has a defibrillator No        FAMILY HISTORY:   Family History   Problem Relation Age of Onset    Diabetes Father         Type 2    Hypertension Mother     Lipids Mother         Hyperlipidemia    Diabetes Mother         Type 2    Diabetes Sister     Hypertension Sister     Lipids Brother     Glaucoma Neg     Macular degeneration Neg    Sister and Mother with thyroid disease      PHYSICAL EXAM:   Height: 5' 2.5\" (158.8 cm) (05/13 1444)  Weight: 180 lb (81.6 kg) (05/13 1444)  BSA (Calculated - sq m): 1.84 sq meters (05/13 1444)  Pulse: 75 (05/13 1444)  BP:  108/74 (05/13 1444)  Temp: --  Do Not Use - Resp Rate: --  SpO2: --    Body mass index is 32.4 kg/m².  + horizontal scar at the base of the neck ,        DATA:     Pertinent data reviewed  XR CHEST PA + LAT CHEST (HOS=[id number])  Result Date: 2/26/2025  CONCLUSION:  1. No acute cardiopulmonary disease    Dictated by (CST): Antoine Fowler MD on 2/26/2025 at 10:13 AM     Finalized by (CST): Antoine Fowler MD on 2/26/2025 at 10:18 AM            No results for input(s): \"TSH\", \"T4F\", \"T3F\", \"THYP\" in the last 72 hours.  TSH   Date Value Ref Range Status   05/05/2025 0.131 (L) 0.550 - 4.780 uIU/mL Final     Lab Results   Component Value Date    A1C 5.2 08/20/2024    A1C 5.6 09/13/2023    A1C 5.3 05/15/2023    A1C 5.1 02/17/2023    A1C 5.4 01/25/2021          Latest Reference Range & Units 08/20/24 16:32 10/19/24 12:05   T4,Free (Direct) 0.8 - 1.7 ng/dL 1.6    TSH 0.550 - 4.780 mIU/mL 0.295 (L) 1.544   T3 FREE 2.40 - 4.20 pg/mL 2.84    INSULIN 3.0 - 25.0 mU/L 44.6 (H)    (L): Data is abnormally low  (H): Data is abnormally high    No results for input(s): \"TSH\", \"T4F\", \"T3F\", \"THYP\" in the last 72 hours.  XR CHEST PA + LAT CHEST (NVO=[id number])  Result Date: 2/26/2025  CONCLUSION:  1. No acute cardiopulmonary disease    Dictated by (CST): Antoine Fowler MD on 2/26/2025 at 10:13 AM     Finalized by (CST): Antoine Fowler MD on 2/26/2025 at 10:18 AM            Orders Placed This Encounter   Procedures    TSH W Reflex To Free T4     Orders Placed This Encounter    TSH W Reflex To Free T4     Standing Status:   Future     Expected Date:   6/13/2025     Expiration Date:   5/13/2026     Release to patient:   Immediate    levothyroxine 50 MCG Oral Tab     Sig: Take 1 tablet (50 mcg total) by mouth before breakfast.     Dispense:  90 tablet     Refill:  1     Discontinue previous rx sent- changes in August          This is a specialized patient consultation in endocrinology and required comprehensive review of prior  records, as well as current evaluation, with time required for consideration of complex endocrine issues and consultation. For this visit, I personally interviewed the patient, and family member if accompanied, performed the pertinent parts of the history and physical examination. ROS included screening for appropriate endocrine conditions.   Today's diagnosis and plan were reviewed in detail with the patient who states understanding and agrees with plan. I discussed with the patient possible diagnosis, differential diagnosis, need for work up, treatment options, alternatives and side effects.     Please see note for details about time spent which includes:   · pre-visit preparation  · reviewing records  · face to face time with the patient   · timely documentation of the encounter  · ordering medications/tests  · communication with care team  · care coordination    I appreciate the opportunity to be part of your patient's medical care and will keep you, as the referring and primary physicians, informed about the care of your patient. Please feel free to contact me should you have any questions.    The 21st Century Cures Act makes medical notes like these available to patients in the interest of transparency. Please be advised this is a medical document. Medical documents are intended to carry relevant information, facts as evident, and the clinical opinion of the practitioner. The medical note is intended as peer to peer communication and may appear blunt or direct. It is written in medical language and may contain abbreviations or verbiage that are unfamiliar.   Roberth Turk MD             [1]   Allergies  Allergen Reactions    Cymbalta [Duloxetine Hcl] SHORTNESS OF BREATH and ANXIETY    Other SHORTNESS OF BREATH     SOME TOPICAL NUMBING SPRAY USED WHEN STARTING AN I.V. - cough and shortness of breath      Sulfa Antibiotics HIVES and RASH    Tramadol ITCHING     THROAT ITCHING AND FEELS LIKE IT WAS CLOSING      Ciprofloxacin RASH    Acetaminophen OTHER (SEE COMMENTS)     Fatty Liver    Lyrica [Pregabalin] RASH

## 2025-05-13 NOTE — PATIENT INSTRUCTIONS
D3 2000 unit/day     Labs and follow up in 2-3 mo   Levothyroxine daily as recommended.      Thyroid medication dose: Levothyroxine   50 mcg/day x5/week. Skip 2 days a week   Take you medication on empty stomach, not with any other medication or food. Wait 60 minutes before eating. Wait 4 hours before taking Vitamins, Calcium or iron. On the morning of the lab test, please take the medication after the blood test not before. Do not take Biotin 1 week before blood test.           [email or web address]    Don’t take your thyroid hormone at the same time as:  Walnuts.  Soybean flour.  Cottonseed meal.  Iron supplements or multivitamins containing iron.  Calcium supplements.  Antacids that contain aluminum, magnesium or calcium.  Some ulcer medicines, such as sucralfate (Carafate).  Some cholesterol-lowering drugs, such as those containing cholestyramine (Prevalite, Locholest) and colestipol (Colestid).  To avoid possible problems, eat these foods or use these products several hours before or after you take your thyroid medicine.    Supplements containing biotin, common in hair and nail products, can make it hard to measure how much thyroid hormone is in the body. Biotin does not affect thyroid hormone levels. But supplements that have biotin should be stopped for at least a week before measuring thyroid function so that the measurement is correct.

## 2025-05-19 ENCOUNTER — TELEPHONE (OUTPATIENT)
Facility: CLINIC | Age: 45
End: 2025-05-19

## 2025-05-19 NOTE — TELEPHONE ENCOUNTER
[id number] afsaneh used for interpretation of visit.   Pt contacted to review CT results.   No answer left voicemail, pt encouraged to make a follow up appointment.

## 2025-05-29 ENCOUNTER — TELEPHONE (OUTPATIENT)
Dept: FAMILY MEDICINE CLINIC | Facility: CLINIC | Age: 45
End: 2025-05-29

## 2025-05-29 NOTE — TELEPHONE ENCOUNTER
Patient called forms dept. Patient states she has long term disability forms she needs completed. Patient wanted to know if she can drop off her forms now or on her next appointment with provider. Informed patient she can drop off her forms at anytime with provider, they will forward the forms to us. Patient verbalized understanding. Patient on long term disability due to cervical radiculitis and lumbar pain.

## 2025-06-02 NOTE — TELEPHONE ENCOUNTER
Received long term disability forms in the forms department via e-mail with Valid Authorization. Scanned authorization. Logged for processing.

## 2025-06-05 ENCOUNTER — OFFICE VISIT (OUTPATIENT)
Dept: FAMILY MEDICINE CLINIC | Facility: CLINIC | Age: 45
End: 2025-06-05

## 2025-06-05 VITALS
WEIGHT: 181 LBS | SYSTOLIC BLOOD PRESSURE: 121 MMHG | BODY MASS INDEX: 32.47 KG/M2 | HEIGHT: 62.5 IN | TEMPERATURE: 97 F | DIASTOLIC BLOOD PRESSURE: 81 MMHG | HEART RATE: 72 BPM

## 2025-06-05 DIAGNOSIS — M79.601 PAIN IN BOTH UPPER EXTREMITIES: ICD-10-CM

## 2025-06-05 DIAGNOSIS — K21.9 GASTROESOPHAGEAL REFLUX DISEASE, UNSPECIFIED WHETHER ESOPHAGITIS PRESENT: ICD-10-CM

## 2025-06-05 DIAGNOSIS — R20.2 NUMBNESS AND TINGLING IN RIGHT HAND: ICD-10-CM

## 2025-06-05 DIAGNOSIS — M54.42 CHRONIC BILATERAL LOW BACK PAIN WITH BILATERAL SCIATICA: Primary | ICD-10-CM

## 2025-06-05 DIAGNOSIS — M79.602 PAIN IN BOTH UPPER EXTREMITIES: ICD-10-CM

## 2025-06-05 DIAGNOSIS — R53.83 LETHARGY: ICD-10-CM

## 2025-06-05 DIAGNOSIS — G56.01 CARPAL TUNNEL SYNDROME OF RIGHT WRIST: ICD-10-CM

## 2025-06-05 DIAGNOSIS — G89.29 CHRONIC BILATERAL LOW BACK PAIN WITH BILATERAL SCIATICA: Primary | ICD-10-CM

## 2025-06-05 DIAGNOSIS — M54.41 CHRONIC BILATERAL LOW BACK PAIN WITH BILATERAL SCIATICA: Primary | ICD-10-CM

## 2025-06-05 DIAGNOSIS — R29.898 BILATERAL LEG WEAKNESS: ICD-10-CM

## 2025-06-05 DIAGNOSIS — R20.0 NUMBNESS AND TINGLING IN RIGHT HAND: ICD-10-CM

## 2025-06-05 PROCEDURE — 99215 OFFICE O/P EST HI 40 MIN: CPT | Performed by: FAMILY MEDICINE

## 2025-06-05 RX ORDER — PANTOPRAZOLE SODIUM 40 MG/1
40 TABLET, DELAYED RELEASE ORAL
Qty: 90 TABLET | Refills: 3 | Status: SHIPPED | OUTPATIENT
Start: 2025-06-05

## 2025-06-05 RX ORDER — GABAPENTIN 600 MG/1
600 TABLET ORAL 3 TIMES DAILY
Qty: 270 TABLET | Refills: 1 | Status: SHIPPED | OUTPATIENT
Start: 2025-06-05

## 2025-06-05 NOTE — PROGRESS NOTES
Patient ID: Mirtha Silver is a 45 year old female.         The following individual(s) verbally consented to be recorded using ambient AI listening technology and understand that they can each withdraw their consent to this listening technology at any point by asking the clinician to turn off or pause the recording:    Patient name: Mirtha Silver  Additional names:  is Bridger #[id number].         HPI  Chief Complaint   Patient presents with    Back Pain    Leg Pain     Ezra. Leg pain        History of Present Illness  Mirtha Silver is a 45 year old female who presents with back and leg pain.    She experiences significant back and leg pain, with increasing numbness in her legs. Her arms feel fatigued, and her right hand, particularly one finger, has been experiencing numbness. She has been diagnosed with carpal tunnel syndrome and has previously received a steroid injection for it. She takes gabapentin, one in the morning and two at night, and uses Advil for pain management.    She feels very tired and suspects it might be related to her thyroid or low iron levels. She takes an iron supplement of 65 mg daily. Her ferritin level was checked on May 5th and was slightly low at 44. She reports weakness, especially when climbing or descending stairs, feeling as though she might faint due to leg weakness.  I explained to her that she is not anemic and her thyroid test were just checked by her endocrinologist and he already has a follow-up lab order for her.    A previous nerve conduction study (EMG) on December 3rd, 2024, showed normal results with no nerve injury. However, she recalls being told about a pinched nerve in her lumbar region.    No edema.    Wt Readings from Last 6 Encounters:   06/05/25 181 lb (82.1 kg)   05/13/25 180 lb (81.6 kg)   05/05/25 182 lb (82.6 kg)   03/06/25 179 lb (81.2 kg)   03/11/25 181 lb 3.2 oz (82.2 kg)   03/10/25 181 lb (82.1 kg)       BMI Readings from Last 6  Encounters:   25 32.58 kg/m²   25 32.40 kg/m²   25 33.29 kg/m²   25 32.22 kg/m²   25 33.14 kg/m²   03/10/25 33.11 kg/m²       BP Readings from Last 6 Encounters:   25 121/81   25 108/74   25 105/71   25 120/72   03/10/25 122/77   25 116/77       Results  LABS  Ferritin: 44 (2025)    DIAGNOSTIC  EMG: Normal nerve conduction in legs (2024)    Review of Systems  No exertional cardiac chest pain or shortness of breath unless stated in HPI which would take precedence.  See HPI for further review of systems.        Medical History:      Past Medical History:    Anxiety    Anxiety state    Back problem    Calculus of kidney    Depression    Disorder of liver    FATTY LIVER     Disorder of thyroid    hypothyroid    Endometriosis    Esophageal reflux    Excessive or frequent menstruation    High cholesterol    History of Papanicolaou smear of cervix    Hyperlipidemia    IBS (irritable bowel syndrome)    Migraines    Per [facility] - Migraine headaches    Mittelschmerz    Osteoarthritis    Ovarian cyst    Postpartum depression    Prediabetes    Pregnancy (HCC)        Spondylolisthesis of lumbosacral region    Uterine leiomyoma    Visual impairment    glasses    Vitamin B12 deficiency    Vitamin D deficiency       Past Surgical History:   Procedure Laterality Date    Cervical spine surgery  2023    C5-C6 Arthroplasty     Cholecystectomy  2012    Colonoscopy  2004    Cyst removal  , 2014    laporoscopic ovarian cytectomy    Hysterectomy      Laparoscopy,diagnostic  2016    laparoscopy, filshie clip sterilization of left fallopian tube, ablation of endometriosis, retrieval and removal of displaced filshie clip    Lipoma removal Left 2018    Dorian biopsy stereo nodule 1 site left (cpt=19081)  05/10/2022    top hat    Dorian localization wire 1 site left (cpt=19281)  2022    Other  2019    low back surgery with metal implant     Other surgical history Left     eye surgery for cornea    Total abdominal hysterectomy N/A 04/05/2017    Procedure: ABDOMINAL HYSTERECTOMY;  Surgeon: Juvencio Decker MD;  Location: [facility] MAIN OR          Current Outpatient Medications   Medication Sig Dispense Refill    levothyroxine 50 MCG Oral Tab Take 1 tablet (50 mcg total) by mouth before breakfast. 90 tablet 1    fluticasone propionate 50 MCG/ACT Nasal Suspension INSTILL 2 SPRAYS IN EACH NOSTRIL DAILY. 48 g 3    timolol 0.5 % Ophthalmic Solution       Tafluprost, PF, 0.0015 % Ophthalmic Solution Apply 1 drop to eye daily.      doxycycline 50 MG Oral Cap       ondansetron (ZOFRAN) 8 MG tablet Take 1 tablet (8 mg total) by mouth every 8 (eight) hours as needed for Nausea. 60 tablet 0    dorzolamide-timolol 2-0.5 % Ophthalmic Solution Apply 1 drop to eye 2 (two) times daily.      erythromycin 5 MG/GM Ophthalmic Ointment Apply 1 Application to eye nightly.      ondansetron 8 MG Oral Tablet Dispersible Take 1 tablet (8 mg total) by mouth every 8 (eight) hours as needed for Nausea.      Diethylpropion HCl 25 MG Oral Tab Take 1 tablet by mouth 2 (two) times daily. 120 tablet 4    CUSTOM MEDICATION Semaglutide/ cyanocobalamin    0.25 mg-   concentration: 1 /0.5 mg/ ML  Amount:  1 Ml vial  Instructions: inject 25 units/ 0.25 ML q weekly 1 each 3    clobetasol 0.05 % External Solution Apply 1 mL topically daily as needed. 60 mL 3    pantoprazole 40 MG Oral Tab EC Take 1 tablet (40 mg total) by mouth every morning before breakfast. To help with stomach acid and stomach irritation/inflammation 90 tablet 3    clotrimazole-betamethasone 1-0.05 % External Cream Apply 1 Application topically 2 (two) times daily. To right side of nose. 60 g 1    ketoconazole 2 % External Cream Apply to affected area on left foot bid-continue to use for 5 days after all evidence of rash or redness has resolved 60 g 1    METFORMIN  MG Oral Tablet 24 Hr TAKE 1 TABLET BY MOUTH EVERY DAY  90 tablet 4    gabapentin 300 MG Oral Cap Take 1 capsule (300 mg total) by mouth. 1 tab AM, 2 tab QHS      Naproxen Sodium (ALEVE OR) Take 1 tablet by mouth daily as needed.       Allergies:  Allergies   Allergen Reactions    Cymbalta [Duloxetine Hcl] SHORTNESS OF BREATH and ANXIETY    Other SHORTNESS OF BREATH     SOME TOPICAL NUMBING SPRAY USED WHEN STARTING AN I.V. - cough and shortness of breath      Sulfa Antibiotics HIVES and RASH    Tramadol ITCHING     THROAT ITCHING AND FEELS LIKE IT WAS CLOSING     Ciprofloxacin RASH    Acetaminophen OTHER (SEE COMMENTS)     Fatty Liver    Lyrica [Pregabalin] RASH        Physical Exam:       Physical Exam  Blood pressure 121/81, pulse 72, temperature 97.3 °F (36.3 °C), temperature source Tympanic, height 5' 2.5\" (1.588 m), weight 181 lb (82.1 kg), last menstrual period 03/22/2017, not currently breastfeeding.  Vitals:    06/05/25 0915   BP: 121/81   Pulse: 72   Temp: 97.3 °F (36.3 °C)   TempSrc: Tympanic   Weight: 181 lb (82.1 kg)   Height: 5' 2.5\" (1.588 m)     Physical Exam   Constitutional: Patient is oriented to person, place, and time. Patient appears well-developed and well-nourished. No distress.   HENT:   Head: Normocephalic and atraumatic.   Neck: Normal range of motion. No thyromegaly present.   Lymphadenopathy:     Has no cervical adenopathy.   Cardiovascular: Normal rate, regular rhythm and no murmur heard.  Pulmonary/Chest: Effort normal and breath sounds normal. No respiratory distress.   Neurological: Patient is alert and oriented to person, place, and time.   Skin: Skin is warm and dry.  No pallor or diaphoresis.  Psychiatric: Patient has a anxious affect.  Legs: No pitting edema.    Nursing note and vitals reviewed.           Physical Exam  NECK: Neck supple.  CHEST: Clear to auscultation bilaterally.  EXTREMITIES: No edema.  NEUROLOGICAL: Reflexes normal in knees and arms. Weakness in plantar flexion and dorsiflexion of both ankles but it does not seem  that she is trying very hard as her gait is completely normal when I watched her walk down the hallway and she has no foot drop and is able to lift her toes up fully and then plantarflex fully.      Assessment/Plan:       Diagnoses and all orders for this visit:    Chronic bilateral low back pain with bilateral sciatica  -     gabapentin 600 MG Oral Tab; Take 1 tablet (600 mg total) by mouth 3 (three) times daily.  -     PHYSIATRY - INTERNAL  I did bump up her gabapentin dose but I really think she needs to see the specialist as I am not sure what else to do for her.  She seems to have numerous complaints at each visit and does not seem to get better with many of the interventions that are done.  Pain in both upper extremities  -     gabapentin 600 MG Oral Tab; Take 1 tablet (600 mg total) by mouth 3 (three) times daily.  -     PHYSIATRY - INTERNAL    Numbness and tingling in right hand  -     gabapentin 600 MG Oral Tab; Take 1 tablet (600 mg total) by mouth 3 (three) times daily.  -     PHYSIATRY - INTERNAL    Gastroesophageal reflux disease, unspecified whether esophagitis present  -     pantoprazole 40 MG Oral Tab EC; Take 1 tablet (40 mg total) by mouth every morning before breakfast. To help with stomach acid and stomach irritation/inflammation    Lethargy  -     CBC With Differential With Platelet; Future  I wonder if a lot of this is mood related.  We have offered her therapy and medications but she does not really stay with it.  Bilateral leg weakness  -     PHYSIATRY - INTERNAL    Carpal tunnel syndrome of right wrist  She may need a steroid injection into the right wrist by physiatry as they states she is not a surgical candidate.      Referrals (if applicable)  Orders Placed This Encounter   Procedures    PHYSIATRY - INTERNAL     Physical medicine and rehabilitation doctor.    Can also see his partners:   Dr. Jerod Mims, Dr Alex Behar, Dr Leah Campo, Dr Reed Bosch, and Asha THORPE.      Referral Priority:   Routine     Referral Type:   OFFICE VISIT     Referred to Provider:   Hema Das DO     Requested Specialty:   PHYSIATRY     Number of Visits Requested:   3         Follow up if symptoms persist.  Take medicine (if given) as prescribed.  Approach to treatment discussed and patient/family member understands and agrees to plan.     No follow-ups on file.      Assessment & Plan  Chronic back and leg pain  Chronic back and leg pain with associated numbness and weakness in the legs. Previous EMG showed normal nerve conduction in the legs. Suspected pinched nerve in the lumbar region. Reports increased frequency of numbness in the right hand, possibly related to carpal tunnel syndrome. Previous steroid injection for carpal tunnel was administered. Considering increasing gabapentin dosage to manage pain more effectively. If symptoms persist, further evaluation by a neurologist and possible steroid injection in the lumbar region are recommended. If these interventions are ineffective, surgical consultation may be necessary.  - Increase gabapentin to 600 mg three times daily.  - Refer to neurologist for further evaluation of leg weakness and numbness in the hand.  - Consider repeat nerve conduction studies for the hand.  - Consult with Dr. Kilgore for possible steroid injection in the lumbar region.  - If steroid injection is ineffective, consider surgical consultation.    Fatigue  Reports feeling very tired, possibly related to thyroid function or low iron levels. Recent thyroid function tests were normal. Ferritin level was slightly low at 44 on May 5th. Current iron supplementation is 65 mg daily. Will check CBC to evaluate for anemia.  - Check CBC to evaluate for anemia.  - Continue current iron supplementation of 65 mg daily.  - Continue follow-up with endocrinologist for thyroid management.    Manoj Weaver DO  6/5/2025

## 2025-06-09 NOTE — TELEPHONE ENCOUNTER
Patient called stating she called provider office and was informed Dr. Weaver schedule isn't opened for 2026. Informed patient to contact office in Jan.2026 and schedule appointment. Patient verbalized understanding.

## 2025-06-09 NOTE — TELEPHONE ENCOUNTER
Patient called and requested a [place] Interrupter Agent: Zhane ID: [id number] - Unable to conference call and had the Interrupter to call right back- call went to voicemail (3 attempts) made to  each patient and Interrupter left a message in Hebrew informing patient to call forms department  No Details were obtained

## 2025-06-09 NOTE — TELEPHONE ENCOUNTER
Left message to call back to obtain complete details on long term disability, please task provider for approval.

## 2025-06-09 NOTE — TELEPHONE ENCOUNTER
Called patient to confirm details. Patient has been disabled since 2/13/2020. Patient will schedule a future appointment with provider to add pending re eval long term disability per forms dept lead if provider approves we can add pending re eval to be scheduled in 2026.

## 2025-06-09 NOTE — TELEPHONE ENCOUNTER
Dr Weaver,    Patient is requesting re-certification of long term disability due to her lumbar/ cervical ongoing diagnostic starting on 2/13/20 through 5/13/26.    Do you support?    Thank you.    Jade SANTOS

## 2025-06-09 NOTE — TELEPHONE ENCOUNTER
Patient Called and the office is telling her that they cannot schedule her until after December. I believe there is a mis understanding she thinks they cannot access the schedule until after December. I advise her to just schedule any date even if its after December.

## 2025-06-09 NOTE — TELEPHONE ENCOUNTER
Patient Called back with details.        Type of Leave: long term disability   Reason for Leave: lumbar surgery since 2020  Start date of leave: 5/13/25  End date of leave: 5/13/26 continuous   How many flare ups per month/length?:  How many appts per month/length?:   Was Fee and Turnaround info Given?: Y

## 2025-06-10 NOTE — TELEPHONE ENCOUNTER
Dr. Weaver,    Please sign off on form if you agree to: long term disability due to back pain    -Signature page will be the first page scanned  -From your Inbasket, Highlight the patient and click Chart   -Double click the 5/29/25 Forms Completion telephone encounter  -Scroll down to the Media section   -Click the blue Hyperlink: long term disability  6/10/25  -Click Acknowledge located in the top right ribbon/menu   -Drag the mouse into the blank space of the document and a + sign will appear. Left click to   electronically sign the document.  -Once signed, simply exit out of the screen and you signature will be saved.     Thank you,     Chana

## 2025-06-12 NOTE — TELEPHONE ENCOUNTER
Forms completed and faxed to [facility] at [phone],efax confirmation received. AssertIDt message sent to patient.

## 2025-06-23 DIAGNOSIS — K59.01 SLOW TRANSIT CONSTIPATION: ICD-10-CM

## 2025-06-23 DIAGNOSIS — K59.09 OTHER CONSTIPATION: ICD-10-CM

## 2025-06-25 ENCOUNTER — TELEPHONE (OUTPATIENT)
Dept: FAMILY MEDICINE CLINIC | Facility: CLINIC | Age: 45
End: 2025-06-25

## 2025-06-25 RX ORDER — LINACLOTIDE 145 UG/1
145 CAPSULE, GELATIN COATED ORAL DAILY
Qty: 90 CAPSULE | Refills: 0 | Status: SHIPPED | OUTPATIENT
Start: 2025-06-25 | End: 2025-07-25

## 2025-06-25 NOTE — TELEPHONE ENCOUNTER
Approved    Prior authorization approved  Payer: [facility] [place] Case ID: [id number]    [phone]    [phone]  Note from payer: Your PA request has been approved.  Additional information will be provided in the approval communication. (Message 1149)  Approval Details    Authorized from June 25, 2025 to June 25, 2026  Electronic appeal: Not supported  View History

## 2025-07-02 RX ORDER — METFORMIN HYDROCHLORIDE 500 MG/1
500 TABLET, EXTENDED RELEASE ORAL DAILY
Qty: 90 TABLET | Refills: 13 | Status: SHIPPED | OUTPATIENT
Start: 2025-07-02

## 2025-07-08 RX ORDER — METFORMIN HYDROCHLORIDE 500 MG/1
500 TABLET, EXTENDED RELEASE ORAL DAILY
Qty: 90 TABLET | Refills: 13 | OUTPATIENT
Start: 2025-07-08

## 2025-07-21 ENCOUNTER — OFFICE VISIT (OUTPATIENT)
Facility: CLINIC | Age: 45
End: 2025-07-21
Payer: COMMERCIAL

## 2025-07-21 VITALS
WEIGHT: 177.13 LBS | BODY MASS INDEX: 31.78 KG/M2 | SYSTOLIC BLOOD PRESSURE: 118 MMHG | DIASTOLIC BLOOD PRESSURE: 72 MMHG | HEIGHT: 62.5 IN | HEART RATE: 83 BPM

## 2025-07-21 DIAGNOSIS — K76.0 HEPATIC STEATOSIS: ICD-10-CM

## 2025-07-21 DIAGNOSIS — K58.2 IRRITABLE BOWEL SYNDROME WITH BOTH CONSTIPATION AND DIARRHEA: Primary | ICD-10-CM

## 2025-07-21 DIAGNOSIS — R10.84 GENERALIZED ABDOMINAL PAIN: ICD-10-CM

## 2025-07-21 DIAGNOSIS — K92.89 GAS BLOAT SYNDROME: ICD-10-CM

## 2025-07-21 RX ORDER — LOTEPREDNOL ETABONATE 5 MG/ML
1 SUSPENSION/ DROPS OPHTHALMIC 2 TIMES DAILY
COMMUNITY
Start: 2025-07-18

## 2025-07-21 RX ORDER — FLUOROMETHOLONE 1 MG/ML
1 SUSPENSION/ DROPS OPHTHALMIC DAILY
COMMUNITY
Start: 2025-07-09 | End: 2026-07-09

## 2025-07-21 NOTE — PATIENT INSTRUCTIONS
Recommendations:     # hepatic steatosis  - follow up in 1 yr for US of liver   - check hepatic panel   - low fat and low grease diet  - exercise      # nausea  - continue ondansetron and pantoprazole     # IBS with constipation and diarrhea  - miralax  - increase fiber and fluid intake  - continue linzess daily  - IB Guard over the counter    Follow up in 1 yr for hepatic steatosis; as needed for changes in abdominal pain

## 2025-07-21 NOTE — PROGRESS NOTES
[facility] - Gastroenterology                                                                                                                 Requesting physician or provider: Manoj Weaver DO    Chief Complaint   Patient presents with    Follow - Up     From 5/5/25  visit      Uvaldo [id number] assisted with interpretation of vist  HPI:   Mirtha Silver is a 45 year old year-old female with history of  IBS, GERD, hepatic steatosis, anxiety, depression, endometriosis, migraine headaches, OA, postpartum depression, ovarian cyst, back issues   Pt states she presents today for results of CT and continued bloating, abdominal pain. Pt states that she's taking miralax and linzess; did not try simethicone or ib guard yet. Reports rare episodes of nausea, takes ondansetron as needed.   Describes bloating to occur intermittently after eating. Has noticed that certain foods may cause it more than others. However sharp pain in the lower abdomen occurs 2-3 a week might last minutes to hours; does not relate the pain to food or bms. Overall has noticed a decrease in abdominal pain and bloating as she is not constipated anymore.   Pt is concerned about mother having fatty liver disease which turned into cirrhosis and liver ca.   Denies: melena, hematochezia, hematemesis, abd surgery, loss of appetite, changes in stool or bowel habits, N/V/D, weight gain/loss        Last seen by me   Mirtha Silver is a 45 year old year-old female with history of  IBS, GERD, hepatic steatosis, anxiety, depression, endometriosis, migraine headaches, OA, postpartum depression, ovarian cyst, back issues   Presents for evaluation of upper abdominal pain, bloating, constipation, diarrhea. Pt has had symptoms in the past however, symptoms have worsened recently. Pt describes having spontaneous episodes of nausea and abdominal pain with and without food.   Pt states that abdominal pain may be related to fatty liver disease.  Pt is nervous about having liver ca as her mom was recently diagnosed with liver ca after having fatty liver disease.   Pt reports having bm twice daily, pt has been taking linzess to help. However recently for the past week she had not had a bm for a week even though she has been taking linzess.   Denies include: melena, hematochezia, hematemesis, abd surgery, loss of appetite, changes in stool or bowel habits, weight gain/loss     Prior endoscopies:  Egd   Colonoscopy - ibs     Soc:  -denies smoking  -denies Etoh  Wt Readings from Last 6 Encounters:   25 177 lb 1.6 oz (80.3 kg)   25 181 lb (82.1 kg)   25 180 lb (81.6 kg)   25 182 lb (82.6 kg)   25 179 lb (81.2 kg)   25 181 lb 3.2 oz (82.2 kg)        History, Medications, Allergies, ROS:      Past Medical History:    Anxiety    Anxiety state    Back problem    Calculus of kidney    Depression    Disorder of liver    FATTY LIVER     Disorder of thyroid    hypothyroid    Endometriosis    Esophageal reflux    Excessive or frequent menstruation    High cholesterol    History of Papanicolaou smear of cervix    Hyperlipidemia    IBS (irritable bowel syndrome)    Migraines    Per [facility] - Migraine headaches    Mittelschmerz    Osteoarthritis    Ovarian cyst    Postpartum depression    Prediabetes    Pregnancy (HCC)        Spondylolisthesis of lumbosacral region    Uterine leiomyoma    Visual impairment    glasses    Vitamin B12 deficiency    Vitamin D deficiency      Past Surgical History:   Procedure Laterality Date    Cervical spine surgery  2023    C5-C6 Arthroplasty     Cholecystectomy  2012    Colonoscopy  2004    Cyst removal  ,     laporoscopic ovarian cytectomy    Hysterectomy      Laparoscopy,diagnostic  2016    laparoscopy, filshie clip sterilization of left fallopian tube, ablation of endometriosis, retrieval and removal of displaced filshie clip    Lipoma removal Left 2018     Dorian biopsy stereo nodule 1 site left (cpt=19081)  05/10/2022    top hat    Dorian localization wire 1 site left (cpt=19281)  06/28/2022    Other  2019    low back surgery with metal implant    Other surgical history Left     eye surgery for cornea    Total abdominal hysterectomy N/A 04/05/2017    Procedure: ABDOMINAL HYSTERECTOMY;  Surgeon: Juvencio Decker MD;  Location: [facility] MAIN OR      Family Hx:   Family History   Problem Relation Age of Onset    Diabetes Father         Type 2    Hypertension Mother     Lipids Mother         Hyperlipidemia    Diabetes Mother         Type 2    Diabetes Sister     Hypertension Sister     Lipids Brother     Glaucoma Neg     Macular degeneration Neg       Social History:   Social History     Socioeconomic History    Marital status:     Number of children: 3   Tobacco Use    Smoking status: Former     Types: Cigarettes    Smokeless tobacco: Never    Tobacco comments:     Quit around 2018   Vaping Use    Vaping status: Never Used   Substance and Sexual Activity    Alcohol use: Yes     Comment: socially    Drug use: No    Sexual activity: Yes     Birth control/protection: Hysterectomy   Other Topics Concern    Caffeine Concern No     Comment: 1 cup coffee daily    Exercise No    Breast feeding No    Reaction to local anesthetic No    Pt has a pacemaker No    Pt has a defibrillator No   Social History Narrative    The patient does not use an assistive device..      The patient does live in a home with stairs.     Social Drivers of Health     Transportation Needs: No Transportation Needs (9/21/2023)    Transportation Needs     Lack of Transportation: No        Medications (Active prior to today's visit):  Current Outpatient Medications   Medication Sig Dispense Refill    loteprednol 0.5 % Ophthalmic Suspension Apply 1 drop to eye 2 (two) times daily.      fluorometholone 0.1 % Ophthalmic Suspension Apply 1 drop to eye daily.      METFORMIN  MG Oral Tablet 24 Hr TAKE 1  TABLET BY MOUTH EVERY DAY 90 tablet 13    linaCLOtide (LINZESS) 145 MCG Oral Cap Take 145 mcg by mouth daily. For chronic constipation.  She has failed on Colace, fiber supplements, increased water intake. 90 capsule 0    gabapentin 600 MG Oral Tab Take 1 tablet (600 mg total) by mouth 3 (three) times daily. 270 tablet 1    pantoprazole 40 MG Oral Tab EC Take 1 tablet (40 mg total) by mouth every morning before breakfast. To help with stomach acid and stomach irritation/inflammation 90 tablet 3    levothyroxine 50 MCG Oral Tab Take 1 tablet (50 mcg total) by mouth before breakfast. 90 tablet 1    fluticasone propionate 50 MCG/ACT Nasal Suspension INSTILL 2 SPRAYS IN EACH NOSTRIL DAILY. 48 g 3    timolol 0.5 % Ophthalmic Solution       Tafluprost, PF, 0.0015 % Ophthalmic Solution Apply 1 drop to eye daily.      doxycycline 50 MG Oral Cap       ondansetron (ZOFRAN) 8 MG tablet Take 1 tablet (8 mg total) by mouth every 8 (eight) hours as needed for Nausea. 60 tablet 0    dorzolamide-timolol 2-0.5 % Ophthalmic Solution Apply 1 drop to eye 2 (two) times daily.      erythromycin 5 MG/GM Ophthalmic Ointment Apply 1 Application to eye nightly.      ondansetron 8 MG Oral Tablet Dispersible Take 1 tablet (8 mg total) by mouth every 8 (eight) hours as needed for Nausea.      Diethylpropion HCl 25 MG Oral Tab Take 1 tablet by mouth 2 (two) times daily. 120 tablet 4    CUSTOM MEDICATION Semaglutide/ cyanocobalamin    0.25 mg-   concentration: 1 /0.5 mg/ ML  Amount:  1 Ml vial  Instructions: inject 25 units/ 0.25 ML q weekly 1 each 3    clobetasol 0.05 % External Solution Apply 1 mL topically daily as needed. 60 mL 3    clotrimazole-betamethasone 1-0.05 % External Cream Apply 1 Application topically 2 (two) times daily. To right side of nose. 60 g 1    ketoconazole 2 % External Cream Apply to affected area on left foot bid-continue to use for 5 days after all evidence of rash or redness has resolved 60 g 1    Naproxen Sodium  (ALEVE OR) Take 1 tablet by mouth daily as needed.         Allergies:  Allergies   Allergen Reactions    Cymbalta [Duloxetine Hcl] SHORTNESS OF BREATH and ANXIETY    Other SHORTNESS OF BREATH     SOME TOPICAL NUMBING SPRAY USED WHEN STARTING AN I.V. - cough and shortness of breath      Sulfa Antibiotics HIVES and RASH    Tramadol ITCHING     THROAT ITCHING AND FEELS LIKE IT WAS CLOSING     Ciprofloxacin RASH    Acetaminophen OTHER (SEE COMMENTS)     Fatty Liver    Lyrica [Pregabalin] RASH       ROS:   CONSTITUTIONAL:  negative for fevers, rigors  EYES:  negative for diplopia   RESPIRATORY:  negative for severe shortness of breath  CARDIOVASCULAR:  negative for crushing sub-sternal chest pain  GASTROINTESTINAL:  see HPI  GENITOURINARY:  negative for dysuria or gross hematuria  INTEGUMENT/BREAST:  SKIN:  negative for jaundice   ALLERGIC/IMMUNOLOGIC:  negative for hay fever  ENDOCRINE:  negative for cold intolerance and heat intolerance  MUSCULOSKELETAL:  negative for joint effusion/severe erythema  BEHAVIOR/PSYCH:  negative for psychotic behavior      PHYSICAL EXAM:   Blood pressure 118/72, pulse 83, height 5' 2.5\" (1.588 m), weight 177 lb 1.6 oz (80.3 kg), last menstrual period 03/22/2017, not currently breastfeeding.    Gen- Patient appears comfortable and in no acute discomfort  HEENT: the sclera appears anicteric, oropharynx clear, mucus membranes appear moist  CV- regular rate and rhythm, the extremities are warm and well perfused   Lung- Moves air well; No labored breathing  Abdomen- soft, non-tender exam in all quadrants without rigidity or guarding, non-distended, no abnormal bowel sounds noted, no masses are palpated  Skin- No jaundice  Ext: no cyanosis, clubbing or edema is evident.   Neuro- Alert and interactive, and gross movements of extremities normal  Psych - appropriate, non-agitated    Labs/Imaging:     Patient's pertinent labs and imaging were reviewed and discussed with patient today.      Lab  Results   Component Value Date     (H) 10/19/2024    GLU 98 08/20/2024    GLU 97 04/29/2024     10/19/2024     08/20/2024     04/29/2024    K 4.0 10/19/2024    K 4.1 08/20/2024    K 4.0 04/29/2024     10/19/2024     08/20/2024     04/29/2024    CO2 26.0 10/19/2024    CO2 25.0 08/20/2024    CO2 26.0 04/29/2024    ANIONGAP 7 10/19/2024    ANIONGAP 7 08/20/2024    ANIONGAP 6 04/29/2024    BUN 9 10/19/2024    BUN 8 (L) 08/20/2024    BUN 8 (L) 04/29/2024    CREATSERUM 0.74 10/19/2024    CREATSERUM 0.76 08/20/2024    CREATSERUM 0.75 04/29/2024    BUNCREA 12.2 10/19/2024    BUNCREA 10.5 08/20/2024    BUNCREA 10.7 04/29/2024    CA 8.9 10/19/2024    CA 9.3 08/20/2024    CA 9.3 04/29/2024    OSMOCALC 295 10/19/2024    OSMOCALC 286 08/20/2024    OSMOCALC 282 04/29/2024    EGFRCR 72 05/12/2025    EGFRCR 104 10/19/2024    EGFRCR 100 08/20/2024    ALT 34 05/05/2025    ALT 24 10/19/2024    ALT 35 08/20/2024    AST 27 05/05/2025    AST 20 10/19/2024    AST 30 08/20/2024    ALKPHO 67 05/05/2025    ALKPHO 65 10/19/2024    ALKPHO 86 08/20/2024    BILT 0.5 05/05/2025    BILT 0.3 10/19/2024    BILT 0.5 08/20/2024    TP 7.0 05/05/2025    TP 6.7 10/19/2024    TP 7.4 08/20/2024    ALB 4.3 05/05/2025    ALB 4.3 10/19/2024    ALB 4.4 08/20/2024    GLOBULIN 2.4 10/19/2024    GLOBULIN 3.0 08/20/2024    GLOBULIN 2.7 (L) 04/29/2024    ELECTAG 1.8 10/19/2024    ELECTAG 1.5 08/20/2024    ELECTAG 1.6 04/29/2024    FASTING Yes 10/19/2024    FASTING Yes 08/20/2024    FASTING Yes 08/20/2024       Lab Results   Component Value Date    RBC 4.13 10/19/2024    RBC 4.52 10/07/2024    RBC 4.53 08/20/2024    HGB 13.2 10/19/2024    HGB 13.9 10/07/2024    HGB 13.9 08/20/2024    HCT 37.7 10/19/2024    HCT 40.7 10/07/2024    HCT 41.1 08/20/2024    MCV 91.3 10/19/2024    MCV 90.0 10/07/2024    MCV 90.7 08/20/2024    MCH 32.0 10/19/2024    MCH 30.8 10/07/2024    MCH 30.7 08/20/2024    MCHC 35.0 10/19/2024    MCHC 34.2  10/07/2024    MCHC 33.8 08/20/2024    RDW 12.6 10/19/2024    RDW 12.8 10/07/2024    RDW 12.3 08/20/2024    NEPRELIM 3.66 10/19/2024    NEPRELIM 4.03 10/07/2024    NEPRELIM 4.99 04/29/2024    WBC 6.3 10/19/2024    WBC 6.9 10/07/2024    WBC 5.6 08/20/2024    .0 10/19/2024    .0 10/07/2024    .0 08/20/2024          ASSESSMENT/PLAN:   Mirtha Silver is a 45 year old year-old female with history of  IBS, GERD, hepatic steatosis, anxiety, depression, endometriosis, migraine headaches, OA, postpartum depression, ovarian cyst, back issues   Pt states she presents today for results of CT and continued bloating, abdominal pain. Pt states that she's taking miralax and linzess; did not try simethicone or ib guard yet. Reports rare episodes of nausea, takes ondansetron as needed.   Describes bloating to occur intermittently after eating. Has noticed that certain foods may cause it more than others. However sharp pain in the lower abdomen occurs 2-3 a week might last minutes to hours; does not relate the pain to food or bms. Overall has noticed a decrease in abdominal pain and bloating as she is not constipated anymore.   Pt is concerned about mother having fatty liver disease which turned into cirrhosis and liver ca.     1. Irritable bowel syndrome with both constipation and diarrhea    2. Gas bloat syndrome    3. Generalized abdominal pain    4. Hepatic steatosis      Recommendations:    # hepatic steatosis  - follow up in 1 yr for US of liver   - check hepatic panel   - low fat and low grease diet  - exercise      # nausea  - continue ondansetron and pantoprazole     # IBS with constipation and diarrhea  - miralax  - increase fiber and fluid intake  - continue linzess daily  - IB Guard over the counter    Follow up in 1 yr for hepatic steatosis; as needed for changes in abdominal pain        Orders This Visit:  No orders of the defined types were placed in this encounter.      Meds This  Visit:  Requested Prescriptions      No prescriptions requested or ordered in this encounter       Imaging & Referrals:  None         Katlin Castelan, BASIL   7/21/2025

## 2025-07-31 ENCOUNTER — HOSPITAL ENCOUNTER (OUTPATIENT)
Dept: ULTRASOUND IMAGING | Age: 45
Discharge: HOME | End: 2025-07-31
Attending: INTERNAL MEDICINE

## 2025-07-31 ENCOUNTER — APPOINTMENT (OUTPATIENT)
Dept: LAB | Age: 45
End: 2025-07-31
Attending: INTERNAL MEDICINE

## 2025-07-31 ENCOUNTER — OFFICE VISIT (OUTPATIENT)
Dept: FAMILY MEDICINE CLINIC | Facility: CLINIC | Age: 45
End: 2025-07-31
Payer: COMMERCIAL

## 2025-07-31 ENCOUNTER — LAB ENCOUNTER (OUTPATIENT)
Dept: LAB | Age: 45
End: 2025-07-31
Attending: INTERNAL MEDICINE

## 2025-07-31 ENCOUNTER — EKG ENCOUNTER (OUTPATIENT)
Dept: LAB | Age: 45
End: 2025-07-31
Attending: INTERNAL MEDICINE

## 2025-07-31 VITALS
DIASTOLIC BLOOD PRESSURE: 80 MMHG | WEIGHT: 176.63 LBS | SYSTOLIC BLOOD PRESSURE: 113 MMHG | HEART RATE: 80 BPM | HEIGHT: 62.5 IN | TEMPERATURE: 98 F | BODY MASS INDEX: 31.69 KG/M2

## 2025-07-31 DIAGNOSIS — R07.89 ATYPICAL CHEST PAIN: ICD-10-CM

## 2025-07-31 DIAGNOSIS — R53.83 LETHARGY: Primary | ICD-10-CM

## 2025-07-31 DIAGNOSIS — F41.9 ANXIETY: ICD-10-CM

## 2025-07-31 DIAGNOSIS — E03.9 ACQUIRED HYPOTHYROIDISM: ICD-10-CM

## 2025-07-31 DIAGNOSIS — K21.9 GASTROESOPHAGEAL REFLUX DISEASE, UNSPECIFIED WHETHER ESOPHAGITIS PRESENT: ICD-10-CM

## 2025-07-31 DIAGNOSIS — H53.9 TRANSIENT VISION DISTURBANCE OF LEFT EYE: ICD-10-CM

## 2025-07-31 DIAGNOSIS — E07.9 THYROID DISEASE: ICD-10-CM

## 2025-07-31 DIAGNOSIS — R14.0 ABDOMINAL BLOATING: ICD-10-CM

## 2025-07-31 DIAGNOSIS — R53.83 LETHARGY: ICD-10-CM

## 2025-07-31 DIAGNOSIS — M54.2 NECK PAIN ON LEFT SIDE: ICD-10-CM

## 2025-07-31 DIAGNOSIS — R10.13 EPIGASTRIC ABDOMINAL PAIN: ICD-10-CM

## 2025-07-31 DIAGNOSIS — K58.2 IRRITABLE BOWEL SYNDROME WITH BOTH CONSTIPATION AND DIARRHEA: ICD-10-CM

## 2025-07-31 LAB
BASOPHILS # BLD AUTO: 0.04 X10(3) UL (ref 0–0.2)
BASOPHILS NFR BLD AUTO: 0.6 %
DEPRECATED RDW RBC AUTO: 41.6 FL (ref 35.1–46.3)
EOSINOPHIL # BLD AUTO: 0.09 X10(3) UL (ref 0–0.7)
EOSINOPHIL NFR BLD AUTO: 1.4 %
ERYTHROCYTE [DISTWIDTH] IN BLOOD BY AUTOMATED COUNT: 12.6 % (ref 11–15)
HCT VFR BLD AUTO: 39.3 % (ref 35–48)
HGB BLD-MCNC: 13.2 G/DL (ref 12–16)
IMM GRANULOCYTES # BLD AUTO: 0.01 X10(3) UL (ref 0–1)
IMM GRANULOCYTES NFR BLD: 0.2 %
LYMPHOCYTES # BLD AUTO: 1.77 X10(3) UL (ref 1–4)
LYMPHOCYTES NFR BLD AUTO: 27.1 %
MCH RBC QN AUTO: 30.1 PG (ref 26–34)
MCHC RBC AUTO-ENTMCNC: 33.6 G/DL (ref 31–37)
MCV RBC AUTO: 89.5 FL (ref 80–100)
MONOCYTES # BLD AUTO: 0.45 X10(3) UL (ref 0.1–1)
MONOCYTES NFR BLD AUTO: 6.9 %
NEUTROPHILS # BLD AUTO: 4.18 X10 (3) UL (ref 1.5–7.7)
NEUTROPHILS # BLD AUTO: 4.18 X10(3) UL (ref 1.5–7.7)
NEUTROPHILS NFR BLD AUTO: 63.8 %
PLATELET # BLD AUTO: 309 10(3)UL (ref 150–450)
RBC # BLD AUTO: 4.39 X10(6)UL (ref 3.8–5.3)
TSI SER-ACNC: 1.64 UIU/ML (ref 0.55–4.78)
WBC # BLD AUTO: 6.5 X10(3) UL (ref 4–11)

## 2025-07-31 PROCEDURE — 76536 US EXAM OF HEAD AND NECK: CPT | Performed by: INTERNAL MEDICINE

## 2025-07-31 PROCEDURE — 85025 COMPLETE CBC W/AUTO DIFF WBC: CPT

## 2025-07-31 PROCEDURE — 93010 ELECTROCARDIOGRAM REPORT: CPT | Performed by: INTERNAL MEDICINE

## 2025-07-31 PROCEDURE — 99215 OFFICE O/P EST HI 40 MIN: CPT | Performed by: FAMILY MEDICINE

## 2025-07-31 PROCEDURE — 84443 ASSAY THYROID STIM HORMONE: CPT

## 2025-07-31 PROCEDURE — 93005 ELECTROCARDIOGRAM TRACING: CPT

## 2025-07-31 PROCEDURE — 36415 COLL VENOUS BLD VENIPUNCTURE: CPT

## 2025-07-31 RX ORDER — DICYCLOMINE HCL 20 MG
20 TABLET ORAL
Qty: 100 TABLET | Refills: 1 | Status: SHIPPED | OUTPATIENT
Start: 2025-07-31

## 2025-08-01 LAB
ATRIAL RATE: 74 BPM
P AXIS: 46 DEGREES
P-R INTERVAL: 154 MS
Q-T INTERVAL: 392 MS
QRS DURATION: 74 MS
QTC CALCULATION (BEZET): 435 MS
R AXIS: 59 DEGREES
T AXIS: 55 DEGREES
VENTRICULAR RATE: 74 BPM

## 2025-08-07 ENCOUNTER — TELEPHONE (OUTPATIENT)
Dept: FAMILY MEDICINE CLINIC | Facility: CLINIC | Age: 45
End: 2025-08-07

## 2025-08-07 DIAGNOSIS — H04.129 DRY EYE: Primary | ICD-10-CM

## 2025-08-07 DIAGNOSIS — H53.9 VISUAL DISTURBANCE: ICD-10-CM

## 2025-08-08 ENCOUNTER — NURSE TRIAGE (OUTPATIENT)
Dept: FAMILY MEDICINE CLINIC | Facility: CLINIC | Age: 45
End: 2025-08-08

## 2025-08-16 DIAGNOSIS — E66.9 OBESITY (BMI 30-39.9): ICD-10-CM

## 2025-08-18 DIAGNOSIS — K59.01 SLOW TRANSIT CONSTIPATION: ICD-10-CM

## 2025-08-18 DIAGNOSIS — K59.09 OTHER CONSTIPATION: ICD-10-CM

## 2025-08-18 RX ORDER — DIETHYLPROPION HYDROCHLORIDE 25 MG/1
1 TABLET ORAL 2 TIMES DAILY
Qty: 120 TABLET | Refills: 3 | Status: SHIPPED | OUTPATIENT
Start: 2025-08-18

## 2025-08-21 ENCOUNTER — OFFICE VISIT (OUTPATIENT)
Dept: FAMILY MEDICINE CLINIC | Facility: CLINIC | Age: 45
End: 2025-08-21

## 2025-08-21 VITALS
HEART RATE: 80 BPM | SYSTOLIC BLOOD PRESSURE: 118 MMHG | HEIGHT: 62 IN | WEIGHT: 175 LBS | DIASTOLIC BLOOD PRESSURE: 79 MMHG | BODY MASS INDEX: 32.2 KG/M2

## 2025-08-21 DIAGNOSIS — R10.31 RLQ ABDOMINAL PAIN: Primary | ICD-10-CM

## 2025-08-21 PROCEDURE — 99213 OFFICE O/P EST LOW 20 MIN: CPT | Performed by: FAMILY MEDICINE

## 2025-08-21 RX ORDER — LINACLOTIDE 145 UG/1
CAPSULE, GELATIN COATED ORAL
COMMUNITY
Start: 2025-07-26

## 2025-08-21 RX ORDER — LINACLOTIDE 145 UG/1
145 CAPSULE, GELATIN COATED ORAL DAILY
Qty: 90 CAPSULE | Refills: 0 | Status: SHIPPED | OUTPATIENT
Start: 2025-08-21 | End: 2025-09-20

## 2025-08-25 ENCOUNTER — OFFICE VISIT (OUTPATIENT)
Dept: SURGERY | Facility: CLINIC | Age: 45
End: 2025-08-25

## 2025-08-25 VITALS
SYSTOLIC BLOOD PRESSURE: 112 MMHG | WEIGHT: 176 LBS | HEART RATE: 86 BPM | RESPIRATION RATE: 16 BRPM | BODY MASS INDEX: 32.39 KG/M2 | OXYGEN SATURATION: 99 % | DIASTOLIC BLOOD PRESSURE: 70 MMHG | HEIGHT: 62 IN

## 2025-08-25 DIAGNOSIS — E78.5 DYSLIPIDEMIA: ICD-10-CM

## 2025-08-25 DIAGNOSIS — F43.9 STRESS: ICD-10-CM

## 2025-08-25 DIAGNOSIS — K76.0 HEPATIC STEATOSIS: ICD-10-CM

## 2025-08-25 DIAGNOSIS — E88.819 INSULIN RESISTANCE: Primary | ICD-10-CM

## 2025-08-25 DIAGNOSIS — E66.9 OBESITY (BMI 30-39.9): ICD-10-CM

## 2025-08-25 PROCEDURE — 99214 OFFICE O/P EST MOD 30 MIN: CPT | Performed by: INTERNAL MEDICINE

## 2025-08-25 RX ORDER — ESCITALOPRAM OXALATE 5 MG/1
5 TABLET ORAL DAILY
Qty: 90 TABLET | Refills: 1 | Status: SHIPPED | OUTPATIENT
Start: 2025-08-25 | End: 2025-11-23

## (undated) DIAGNOSIS — Z11.59 ENCOUNTER FOR SCREENING FOR OTHER VIRAL DISEASES: ICD-10-CM

## (undated) DIAGNOSIS — M54.6 CHRONIC LEFT-SIDED THORACIC BACK PAIN: ICD-10-CM

## (undated) DIAGNOSIS — Z72.821 POOR SLEEP HYGIENE: ICD-10-CM

## (undated) DIAGNOSIS — R07.89 LEFT-SIDED CHEST WALL PAIN: ICD-10-CM

## (undated) DIAGNOSIS — G89.29 CHRONIC LEFT-SIDED LOW BACK PAIN WITHOUT SCIATICA: ICD-10-CM

## (undated) DIAGNOSIS — M54.50 CHRONIC LEFT-SIDED LOW BACK PAIN WITHOUT SCIATICA: ICD-10-CM

## (undated) DIAGNOSIS — G89.29 CHRONIC LEFT-SIDED THORACIC BACK PAIN: ICD-10-CM

## (undated) DIAGNOSIS — Z01.818 PRE-OP TESTING: Primary | ICD-10-CM

## (undated) DIAGNOSIS — M72.2 PLANTAR FASCIITIS, BILATERAL: ICD-10-CM

## (undated) DEVICE — SOL  .9 1000ML BTL

## (undated) DEVICE — 4.0MM COARSE DIAMOND

## (undated) DEVICE — SUT VICRYL 2-0 SH J417H

## (undated) DEVICE — STERILE POLYISOPRENE POWDER-FREE SURGICAL GLOVES: Brand: PROTEXIS

## (undated) DEVICE — C-ARMOR C-ARM EQUIPMENT COVERS CLEAR STERILE UNIVERSAL FIT 12 PER CASE: Brand: C-ARMOR

## (undated) DEVICE — SUTURE VICRYL 1 CT-1

## (undated) DEVICE — GRAFT DELIVERY SYS MODULE

## (undated) DEVICE — PEN: MARKING STD PT 100/CS: Brand: MEDICAL ACTION INDUSTRIES

## (undated) DEVICE — SUTURE MONOCRYL 4-0 Y845G

## (undated) DEVICE — ALARIS SMARTSITE EXTENSION SET: Brand: ALARIS, SMARTSITE

## (undated) DEVICE — TOWEL OR BLU 16X26 STRL

## (undated) DEVICE — MINOR GENERAL: Brand: MEDLINE INDUSTRIES, INC.

## (undated) DEVICE — LAMINECTOMY: Brand: MEDLINE INDUSTRIES, INC.

## (undated) DEVICE — DRAPE SRG 150X54IN LEICA

## (undated) DEVICE — NV CLIP DSC&IN-LINE ACTIVATOR

## (undated) DEVICE — CHLORAPREP ORANGE TINT 10.5ML

## (undated) DEVICE — BATTERY

## (undated) DEVICE — GAUZE SPONGES,12 PLY: Brand: CURITY

## (undated) DEVICE — TRAY FOLEY BDX 16F STATLOCK

## (undated) DEVICE — X-RAY DETECTABLE SPONGES,16 PLY: Brand: VISTEC

## (undated) DEVICE — ENCORE® LATEX MICRO SIZE 7, STERILE LATEX POWDER-FREE SURGICAL GLOVE: Brand: ENCORE

## (undated) DEVICE — UNDYED BRAIDED (POLYGLACTIN 910), SYNTHETIC ABSORBABLE SUTURE: Brand: COATED VICRYL

## (undated) DEVICE — CURAD NONADHERENT PAD 3X4

## (undated) DEVICE — SUTURE VICRYL 3-0 SH

## (undated) DEVICE — GAMMEX® PI HYBRID SIZE 9, STERILE POWDER-FREE SURGICAL GLOVE, POLYISOPRENE AND NEOPRENE BLEND: Brand: GAMMEX

## (undated) DEVICE — CAUTERY BLADE 2IN INS E1455

## (undated) DEVICE — CHLORAPREP 26ML APPLICATOR

## (undated) DEVICE — NON-ADHERENT PAD PREPACK: Brand: TELFA

## (undated) DEVICE — SOL NACL IRRIG 0.9% 1000ML BTL

## (undated) DEVICE — SOLUTION  .9 1000ML BTL

## (undated) DEVICE — FRAZIER SUCTION INSTRUMENT 10 FR W/CONTROL VENT & OBTURATOR: Brand: FRAZIER

## (undated) DEVICE — VIOLET BRAIDED (POLYGLACTIN 910), SYNTHETIC ABSORBABLE SUTURE: Brand: COATED VICRYL

## (undated) DEVICE — AGENT HEMO THROMBIN 8ML KIT

## (undated) DEVICE — CLOSURE EXOFIN 1.0ML

## (undated) DEVICE — BANDAID COVERLET 1X3

## (undated) DEVICE — NV I-PAS III BEVELED TIP STER

## (undated) DEVICE — OMNIPAQUE 240ML VIAL

## (undated) DEVICE — KIT PATIENT CARE SPINAL TABLE

## (undated) DEVICE — BAG SRG CLR 36X30IN

## (undated) DEVICE — MICRO KOVER: Brand: UNBRANDED

## (undated) DEVICE — SUTURE CHROMIC GUT 2-0 SH

## (undated) DEVICE — SUCTION CANISTER, 3000CC,SAFELINER: Brand: DEROYAL

## (undated) DEVICE — NEEDLE SPINAL 22X3-1/2 BLK

## (undated) DEVICE — 3M™ TEGADERM™ TRANSPARENT FILM DRESSING, 1626W, 4 IN X 4-3/4 IN (10 CM X 12 CM), 50 EACH/CARTON, 4 CARTON/CASE: Brand: 3M™ TEGADERM™

## (undated) DEVICE — 6 ML SYRINGE LUER-LOCK TIP: Brand: MONOJECT

## (undated) DEVICE — ENCORE® PERRY STYLE 42 PF SIZE 7.5, STERILE LATEX POWDER-FREE SURGICAL GLOVE: Brand: ENCORE

## (undated) DEVICE — 12 ML SYRINGE LUER-LOCK TIP: Brand: MONOJECT

## (undated) DEVICE — DRAPE SHEET LARGE 76X55

## (undated) DEVICE — STERILE LATEX POWDER-FREE SURGICAL GLOVESWITH NITRILE COATING: Brand: PROTEXIS

## (undated) DEVICE — SUCTION TIP FRAZIER 10FR #3

## (undated) DEVICE — CERVICAL CDS: Brand: MEDLINE INDUSTRIES, INC.

## (undated) DEVICE — SUTURE VICRYL 0 J340H

## (undated) DEVICE — DRAPE SHEET LAPAROTOMY

## (undated) DEVICE — GAMMEX® PI HYBRID SIZE 6.5, STERILE POWDER-FREE SURGICAL GLOVE, POLYISOPRENE AND NEOPRENE BLEND: Brand: GAMMEX

## (undated) DEVICE — FORCEP CUSH BAY BP DISP 7.5IN

## (undated) DEVICE — GAMMEX® PI HYBRID SIZE 6, STERILE POWDER-FREE SURGICAL GLOVE, POLYISOPRENE AND NEOPRENE BLEND: Brand: GAMMEX

## (undated) DEVICE — INTROCAN SAFETY® IV CATHETER 14 GA. X 2 IN., FEP, WINGED: Brand: INTROCAN SAFETY®

## (undated) DEVICE — CONTAINER SPEC STR 4OZ GRY LID

## (undated) DEVICE — DRESSING BIOPATCH 1X4 BLUE

## (undated) DEVICE — TAPE POROUS CLOTH 3X10 YD

## (undated) DEVICE — 3.0MM NEURO (MATCH HEAD) SOFT TOUCH

## (undated) DEVICE — 3M™ MEDITPORE™ SOFT CLOTH TAPE 6 IN X 10 YD 12 ROLLS/CASE 2966: Brand: 3M™ MEDIPORE™

## (undated) DEVICE — SNAP KOVER: Brand: UNBRANDED

## (undated) DEVICE — NEEDLE DNTL 1.25IN 25GA LNG

## (undated) DEVICE — SUTURE VICRYL 2-0 CT-1

## (undated) DEVICE — DRAPE SHEET TRANSVERSE LAP

## (undated) DEVICE — NEEDLE 18G 1-1/2 BLUNT FILL

## (undated) DEVICE — KIT ACCESS MAXCESS 4

## (undated) DEVICE — SUT VICRYL 3-0 SH J416H

## (undated) DEVICE — NEEDLE SPINAL 22X5 405148

## (undated) DEVICE — 3.0MM COARSE DIAMOND, EXTENDED

## (undated) DEVICE — FLEXIBLE YANKAUER,MEDIUM TIP, NO VACUUM CONTROL: Brand: ARGYLE

## (undated) DEVICE — LIGASURE IMPACT OPEN DEVICE

## (undated) DEVICE — SUCTION TIP FRAZIER 8 FR #2

## (undated) DEVICE — DRAPE SHEET LG

## (undated) DEVICE — SWABSTICK PVP IOD 4IN PDI PRP

## (undated) DEVICE — INSULATED BLADE ELECTRODE 6.5

## (undated) DEVICE — MEDI-VAC NON-CONDUCTIVE SUCTION TUBING: Brand: CARDINAL HEALTH

## (undated) DEVICE — WIRE FX PRCPT KRSH BVL BLNT

## (undated) DEVICE — NEEDLE HPO 18GA 1.5IN ECLPS

## (undated) DEVICE — SUT MONOCRYL 4-0 PS-2 Y496G

## (undated) DEVICE — GAUZE TRAY STERILE 4X4 12PLY

## (undated) DEVICE — SUTURE VICRYL 2-0 CT-2

## (undated) DEVICE — DRAPE SRG 90X60IN BCK TBL CVR

## (undated) DEVICE — 3M™ STERI-STRIP™ COMPOUND BENZOIN TINCTURE 40 BAGS/CARTON 4 CARTONS/CASE C1544: Brand: 3M™ STERI-STRIP™

## (undated) DEVICE — SOLUTION SURG DURAPREP 26ML

## (undated) DEVICE — TZ MEDICAL TITANIUM DISTRACTION PINS 12MM: Brand: TZ MEDICAL TITANIUM DISTRACTION PINS

## (undated) DEVICE — 3M™ STERI-STRIP™ REINFORCED ADHESIVE SKIN CLOSURES, R1547, 1/2 IN X 4 IN (12 MM X 100 MM), 6 STRIPS/ENVELOPE: Brand: 3M™ STERI-STRIP™

## (undated) DEVICE — INTENDED FOR TISSUE SEPARATION, AND OTHER PROCEDURES THAT REQUIRE A SHARP SURGICAL BLADE TO PUNCTURE OR CUT.: Brand: BARD-PARKER ® STAINLESS STEEL BLADES

## (undated) DEVICE — SUT MONOCRYL 4-0 PC-3 Y845G

## (undated) DEVICE — SET XTN .1IN 2.7ML 20IN IV

## (undated) DEVICE — PERIFIX® 18 GA. X 3-1/2 IN. (90 MM) TUOHY, 5 ML GLASS LUER LOCK LOR TRAY (KIT): Brand: PERIFIX®

## (undated) DEVICE — SUTURE VICRYL 0 CT-1

## (undated) DEVICE — SOL H2O 1000ML BTL

## (undated) DEVICE — STANDARD HYPODERMIC NEEDLE,POLYPROPYLENE HUB: Brand: MONOJECT

## (undated) DEVICE — IV SET EXT MACROBORE 7

## (undated) DEVICE — LAPAROTOMY: Brand: MEDLINE INDUSTRIES, INC.

## (undated) DEVICE — SUT SILK 2-0 SA65H

## (undated) DEVICE — ACCUVAC SMOKE ATTACHMENT

## (undated) DEVICE — KIT DRN 1/8IN PVC 3 SPRG EVAC

## (undated) DEVICE — SUT SILK 2-0 SH K833H

## (undated) DEVICE — COLLAR CRV ADLT REG CHIN REST

## (undated) DEVICE — FRAZIER SUCTION INSTRUMENT 8 FR W/CONTROL VENT & OBTURATOR: Brand: FRAZIER

## (undated) DEVICE — NVM5 MULTIMODALITY SURFACE KIT

## (undated) DEVICE — TZ MEDICAL TITANIUM DISTRACTION PINS 14MM: Brand: TZ MEDICAL TITANIUM DISTRACTION PINS

## (undated) NOTE — Clinical Note
6/21/2017              [address]         Dear Melissa Mcclelland,    It was a pleasure to see you. Your EKG / Rexann Has was normal.  There is no need for further testing at this time.   I look fo

## (undated) NOTE — MR AVS SNAPSHOT
[address], [address]  [address] [facility]  [phone]               Thank you for choosing us for your health care visit with Carie Escobedo MD.  We are glad to serve you and happy to provide you with this summ Instructions and Information about Your Health     None      Allergies as of Jun 19, 2017     Sulfa Antibiotics Hives, Rash    Other Shortness of Breath    SOME TOPICAL NUMBING SPRAY USED WHEN STARTING AN I.V.                 Today's Vital Signs     BP Puls

## (undated) NOTE — ED AVS SNAPSHOT
[facility] Emergency Department    [address]     [place] [address]    Phone:  [phone]    Fax:  [phone]           Merlinda Compton   MRN: [id number]    Department:  [facility] Emergency Department   Date of Visit:  4/27 and Class Registration line at [phone] or find a doctor online by visiting [email or web address].    IF THERE IS ANY CHANGE OR WORSENING OF YOUR CONDITION, CALL YOUR PRIMARY CARE PHYSICIAN AT ONCE OR RETURN IMMEDIATELY TO [address] [place] [address].     If

## (undated) NOTE — LETTER
3/18/2024              Mirtha Silver        [address]        [facility] [address]         To whom it may concern,    Mirtha Silver is currently a patient under my medical care.  I have recently seen normal.  She has had an MRI in the past with sedation and tolerated it just fine.  She is cleared for another MRI under sedation.  If you require additional information please do not hesitate to contact my office.        Sincerely,    Manoj Weaver DO  [address] [id number] [phone]        Document electronically generated by:  Manoj Weaver DO

## (undated) NOTE — MR AVS SNAPSHOT
[place]  [address]  [phone]               Thank you for choosing us for your health care visit with Arianna Epps MD.  We are glad to serve you and happy to provide you with this summary Commonly known as:  PEPCID           * famotidine 40 MG Tabs   Take 40 mg by mouth daily. Commonly known as:  PEPCID           hydrocodone-acetaminophen 7.5-325 MG Tabs   Take 1 tablet by mouth every 6 (six) hours as needed for Pain.    Commonly known as: [email or web address]

## (undated) NOTE — Clinical Note
TCM call completed. A TCM-HFU appointment is scheduled for 10/2/2023. The patient reported symptoms have continued to improve daily. Thank you.

## (undated) NOTE — LETTER
1/14/2019              [address]         To whom it may concern,    Dorian Lira is currently a patient under my medical care.   Her sitting for 8 hours a day is causing quite a bit of pain at her

## (undated) NOTE — LETTER
August 23, 2017     [address]      Dear Zoey Confer:    Below are the results from your recent visit: CBC shows no anemia, the kidney function is normal, and the sugar is normal. EKG is normal. X-ray of the Eosinophil % 1 %    Basophil % 1 %    Neutrophil Absolute 4.1 1.8 - 7.7 K/UL    Lymphocyte Absolute 1.5 1.0 - 4.0 K/UL    Monocyte Absolute 0.5 0.0 - 1.0 K/UL    Eosinophil Absolute 0.1 0.0 - 0.7 K/UL    Basophil Absolute 0.0 0.0 - 0.2 K/UL   EKG 12-LEAD

## (undated) NOTE — MR AVS SNAPSHOT
[facility]  [address] [place] [place] [address] [phone]               Thank you for choosing us for your health care visit with Bobby Horn.  Irving Frankel, MD.  We are glad to serve you and happy to provide you with this summary of your vis

## (undated) NOTE — LETTER
8/7/2017              [address] [facility] [address]         To whom it may concern,    Kristen Shore is currently a patient under my medical care. Patient has been having back pain.   She is getting an MRI o

## (undated) NOTE — LETTER
[address], [facility]   Date:   10/26/2021     Name:   Aravind Churchill    YOB: 1980   MRN:   [id number]       WHERE IS YOUR PAIN NOW?   Jonathon the areas on your body where you feel the desc

## (undated) NOTE — LETTER
WHERE IS YOUR PAIN NOW?  Mao the areas on your body where you feel the described sensations.  Use the appropriate symbol.  Mao the areas of radiation.  Include all affected areas.  Just to complete the picture, please draw in the face.     ACHE:  ^ ^ ^   NUMBNESS:  0000   PINS & NEEDLES:  = = = =                              ^ ^ ^                       0000              = = = =                                    ^ ^ ^                       0000            = = = =      BURNING:  XXXX   STABBING: ////                  XXXX                ////                         XXXX          ////     Please mao the line below indicating your degree of pain right now  with 0 being no pain 10 being the worst pain possible.                                         0             1             2              3             4              5              6              7             8             9             10         Patient Signature:

## (undated) NOTE — LETTER
12/27/2018          To Whom It May Concern:    Jennifer Smith is currently under my medical care and may return to work on January 7, 2018. Activity is restricted as follows: No Restrictions.   If you require additional information please contact ou

## (undated) NOTE — LETTER
Surgery Scheduling Request Form/Physician Orders  Fax to:  [phone]            New Case   Modified   Rescheduled To:   / /   Magy Salt order supersedes any conflicting corresponding orders on the pre-op order set.   Surgery Date: 9/8/17 Ammy Lindsay Pacemaker / AICD:    Yes            No     Day of Surgery Orders:                               Date:  _      8/29/2017 __  Electronically Signed by: Franco Shaw MD Time:  ___4:09 PM __    For Internal Use Only   Case #: Initials:   Date Booked: Time Ranjit

## (undated) NOTE — LETTER
Surgery Scheduling Request Form/Physician Orders  Fax to:  [phone]          [x]  New Case []  Modified []  Rescheduled To:   / /   Magy Salt order supersedes any conflicting corresponding orders on the pre-op order set.   Surgery Date: 05/18/201 Additional Orders:    Latex Allergy:  []  Yes          [x]  No    Pacemaker / AICD:  []  Yes          []  No     Day of Surgery Orders:                               Date:  _      5/10/2018 __  Electronically Signed by:Hema Escoto MD  Time:  ___11:22 AM

## (undated) NOTE — LETTER
Surgery Scheduling Request Form/Physician Orders  Fax to:  [phone]            New Case   Modified   Rescheduled To:   / /   Danielle Srivastava order supersedes any conflicting corresponding orders on the pre-op order set.   Surgery Date: 9/22/17 Start Ti Pacemaker / AICD:    Yes            No     Day of Surgery Orders:                               Date:  _      9/22/2017 __  Electronically Signed by: Dr. Altagracia Talavera    Time:  ___12:09 PM __    For Internal Use Only   Case #: Initials:   Date Booked: Lazaro Murray

## (undated) NOTE — MR AVS SNAPSHOT
[facility]  [address] [place] [id number] [phone]               Thank you for choosing us for your health care visit with Madina Bahena.  Belinda Bender MD.  We are glad to serve you and happy to provide you with this summary of your vis Take 1 tablet by mouth daily. Vitamin D3 13318 units Caps   Take 1 tablet by mouth once a week.                    Sumit                  Visit [facility] online at  [email or web address]

## (undated) NOTE — ED AVS SNAPSHOT
Bethany Hartman   MRN: [id number]    Department:  [facility] Emergency Department   Date of Visit:  4/11/2019           Disclosure     Insurance plans vary and the physician(s) referred by the ER may not be covered by your plan.  Please contac CARE PHYSICIAN AT ONCE OR RETURN IMMEDIATELY TO THE EMERGENCY DEPARTMENT. If you have been prescribed any medication(s), please fill your prescription right away and begin taking the medication(s) as directed.   If you believe that any of the medications

## (undated) NOTE — MR AVS SNAPSHOT
[address]  [address] [facility]  [phone]               Thank you for choosing us for your health care visit with Lencho Emery DO.   We are glad to serve you and happy to provide you with this summary Commonly known as:  cyclobenzaprine           ergocalciferol 38496 units Caps   Commonly known as:  DRISDOL/VITAMIN D2           famotidine 40 MG Tabs   Take 1 tablet (40 mg total) by mouth daily.    Commonly known as:  PEPCID           Ketorolac Tromethami

## (undated) NOTE — LETTER
Date: 4/8/2019    Patient Name: Nikolai oG          To Whom it may concern: This letter has been written at the patient's request. The above patient was seen at the [facility] for treatment of a medical condition.   She will need to

## (undated) NOTE — LETTER
8/7/2019          To Whom It May Concern:    Mita Aguilar is currently under my medical care and may not return to work at this time. Please excuse Evart Ryan for 2 days. She may return to work on 8/12/19. Activity is restricted as follows: none.     I

## (undated) NOTE — LETTER
5/12/2022              Sadia         Dear Mrati Montenegro,    This letter is to inform you that our office has made several attempts to reach you by phone without success. We were attempting to contact you by phone regarding {EM NO RESPONCE:[id number]}    Please contact our office at the number listed below as soon as you receive this letter to discuss this issue and to make the necessary changes in our system to your contact information. Thank you for your cooperation. Sincerely,    Tri Wilson DO  [address] [place] [address]  [phone]        Document electronically generated by:   Philip Montalvo

## (undated) NOTE — LETTER
6/14/2018              [address] [facility] [address]         To whom it may concern,    Kristen Shore is currently a patient under my medical care. Patient was seen today.   She was in the emergency room yes

## (undated) NOTE — LETTER
12/19/2018          To Whom It May Concern:    Markos Soto is currently under my medical care and may not return to work at this time. Please excuse Ada Fresh from 12/19/2018 through 12/27/2018. She may return to work on 12/28/2018.   Activity is restr

## (undated) NOTE — LETTER
Date & Time: 6/1/2018, 12:58 PM  Patient: Mita Aguilar  Encounter Provider(s):    Aurelio Ortiz MD       To Whom It May Concern:    Selma Mcknight was seen and treated in our department on 6/1/2018. She can return to work 6/2/2018.     If you ha

## (undated) NOTE — LETTER
Surgery Scheduling Request Form/Physician Orders  Fax to:  [phone]          []  New Case []  Modified [x]  Rescheduled To:  06 /08/2018  Handwritten order supersedes any conflicting corresponding orders on the pre-op order set.   Surgery Date:06/0 []  Obtain [facility] Test from:    Additional Orders:    Latex Allergy:  []  Yes          [x]  No    Pacemaker / AICD:  []  Yes          []  No     Day of Surgery Orders:                               Date:  _      5/24/2018 __  Electronically Sig

## (undated) NOTE — MR AVS SNAPSHOT
[address], [address]  [address] [facility]  [phone]               Thank you for choosing us for your health care visit with Vane Gutierrez MD.  We are glad to serve you and happy to provide you with this summ Duration: 4    Number of visits: 12      Referral Orders      Normal Orders This Visit    PHYSICAL THERAPY - INTERNAL [[id number] CUSTOM]  Order #:  [id number]         Note to Managed Care Patients:  This is the physician order form only and not an authorizat Other Shortness of Breath    SOME TOPICAL NUMBING SPRAY USED WHEN STARTING AN I.V. Today's Vital Signs     BP Pulse Temp Weight Breastfeeding?        111/76 mmHg 66 98.5 °F (36.9 °C) (Oral) 170 lb (77.111 kg) No          Current Medications

## (undated) NOTE — LETTER
AUTHORIZATION FOR SURGICAL OPERATION OR OTHER PROCEDURE    1. I hereby authorize Dr. Antoine Mena., and [facility] staff assigned to my case to perform the following operation and/or procedure at the [facility] site:     incision and drainage of hemorrhoid  _______________________________________________________________________________________________      _______________________________________________________________________________________________    2.  My physician has explained the nature and purpose of the operation or other procedure, possible alternative methods of treatment, the risks involved, and the possibility of complication to me.  I acknowledge that no guarantee has been made as to the result that may be obtained.  3.  I recognize that, during the course of this operation, or other procedure, unforseen conditions may necessitate additional or different procedure than those listed above.  I, therefore, further authorize and request that the above named physician, his/her physician assistants or designees perform such procedures as are, in his/her professional opinion, necessary and desirable.  4.  Any tissue or organs removed in the operation or other procedure may be disposed of by and at the discretion of the [facility] and [facility].  5.  I understand that in the event of a medical emergency, I will be transported by local paramedics to [facility] or other hospital emergency department.  6.  I certify that I have read and fully understand the above consent to operation and/or other procedure.    7.  I acknowledge that my physician has explained sedation/analgesia administration to me including the risks and benefits.  I consent to the administration of sedation/analgesia as may be necessary or desirable in the judgement of my physician.    Witness signature: ___________________________________________________ Date:   ______/______/_____                    Time:  ________ A.M.  P.M.       Patient Name:  ______________________________________________________  (please print)      Patient signature:  ___________________________________________________             Relationship to Patient:           []  Parent    Responsible person                          []  Spouse  In case of minor or                    [] Other  _____________   Incompetent name:  __________________________________________________                               (please print)      _____________      Responsible person  In case of minor or  Incompetent signature:  _______________________________________________    Statement of Physician  My signature below affirms that prior to the time of the procedure, I have explained to the patient and/or his/her guardian, the risks and benefits involved in the proposed treatment and any reasonable alternative to the proposed treatment.  I have also explained the risks and benefits involved in the refusal of the proposed treatment and have answered the patient's questions.                        Date:  ______/______/_______  Provider                      Signature:  __________________________________________________________       Time:  ___________ A.M    P.M.

## (undated) NOTE — LETTER
Surgery Scheduling Request Form/Physician Orders  Fax to:  [phone]          []  New Case []  Modified [x]  Rescheduled To:   06/08/2018   Handwritten order supersedes any conflicting corresponding orders on the pre-op order set.   Surgery Date: 06 any, listed on Evidence-Based Order Sets or Personal Order Sets.        []  Obtain [facility] Test from:    Additional Orders:    Latex Allergy:  []  Yes          [x]  No    Pacemaker / AICD:  []  Yes          []  No     Day of Surgery Orders:

## (undated) NOTE — LETTER
2023      No Recipients     Patient: Luba Galloway   YOB: 1980   Date of Visit: 2023       Dear Dr. Andrzej Colón Recipients: Thank you for referring Demetris Morley to me for evaluation. Here is my assessment and plan of care:    Luba Galloway is a 41year old female. HPI:     HPI    Pt in today for a swelling and blurry vision evaluation in the left eye. Pt states she had cervical neck surgery on 23 and after being /discharged, she developed a \"rash\" on majority of the left side of her body and is now experiencing swelling and blurry vision in the left eye. Pt was seen by Dr. Ap Holly on 23 and was prescribed   Triamcinolone 0.5 % cream to be used BID x10 days on her body and was given Soothe eye ointment for her left eye. Pt has been using the eye ointment and also doing warm compresses BID and using artificial tears BID but is not seeing any improvement. Consult: per Dr. Ap Holly     Last edited by Ava Runner, OT on 2023  9:32 AM.        Patient History:  Past Medical History:   Diagnosis Date    Anxiety state     Back problem     Calculus of kidney     Depression     Disorder of liver     FATTY LIVER     Disorder of thyroid     hypothyroid    Endometriosis     Esophageal reflux     High cholesterol     History of Papanicolaou smear of cervix 2015    Hyperlipidemia     IBS (irritable bowel syndrome)     Migraines     Per [facility] - Migraine headaches    Osteoarthritis     Ovarian cyst     Postpartum depression     Prediabetes     Pregnancy 2002, 2005, 2008        Spondylolisthesis of lumbosacral region 2017    Visual impairment     WEARS GLASSES    Vitamin B12 deficiency     Vitamin D deficiency        Surgical History: Luba Galloway has a past surgical history that includes colonoscopy ();  Total abdominal hysterectomy (N/A, 2017) (Procedure: ABDOMINAL HYSTERECTOMY;  Surgeon: Devika Adam MD;  Location: [facility] OR); cholecystectomy (2012); cyst removal (2007, 2014) (laporoscopic ovarian cytectomy); lipoma removal (Left, 12/29/2018); laparoscopy,diagnostic (06/08/2016) (laparoscopy, filshie clip sterilization of left fallopian tube, ablation of endometriosis, retrieval and removal of displaced filshie clip); hysterectomy; other (2019) (low back surgery with metal implant); zachary biopsy stereo nodule 1 site left (cpt=19081) (05/10/2022) (top hat); zachary localization wire 1 site left (cpt=19281) (06/28/2022); and cervical spine surgery (09/18/2023) (C5-C6 Arthroplasty ). Family History   Problem Relation Age of Onset    Diabetes Father         Type 2    Hypertension Mother     Lipids Mother         Hyperlipidemia    Diabetes Mother         Type 2    Diabetes Sister     Hypertension Sister     Lipids Brother     Glaucoma Neg     Macular degeneration Neg        Social History:   Social History     Socioeconomic History    Marital status:     Number of children: 3   Tobacco Use    Smoking status: Former     Types: Cigarettes    Smokeless tobacco: Never   Vaping Use    Vaping Use: Never used   Substance and Sexual Activity    Alcohol use: Not Currently    Drug use: No    Sexual activity: Yes     Birth control/protection: Hysterectomy   Other Topics Concern    Caffeine Concern No     Comment: 1 cup coffee daily    Exercise No   Social History Narrative    The patient does not use an assistive device. .      The patient does live in a home with stairs.    Social Determinants of Health  Financial Resource Strain: Low Risk  (9/21/2023)      Financial Resource Strain          Difficulty of Paying Living Expenses: Not hard at all          Med Affordability: No  Transportation Needs: No Transportation Needs (9/21/2023)      Transportation Needs          Lack of Transportation: No    Medications:  Current Outpatient Medications   Medication Sig Dispense Refill    levocetirizine 5 MG Oral Tab Take 1 tablet (5 mg total) by mouth in the morning and 1 tablet (5 mg total) before bedtime. 60 tablet 0    triamcinolone 0.5 % External Cream Apply 1 Application topically 2 (two) times daily for 10 days. Apply twice daily for 7-10 days. Do NOT use on face or in folds of skin. 1 each 0    cyclobenzaprine 5 MG Oral Tab Take 1 tablet (5 mg total) by mouth 3 (three) times daily as needed for Muscle spasms. 20 tablet 0    phenazopyridine HCl 95 MG Oral Tab Take 1 tablet (95 mg total) by mouth 3 (three) times daily as needed for Pain. HYDROcodone-acetaminophen 5-325 MG Oral Tab Take 1 tablet by mouth every 6 (six) hours as needed for Pain. 30 tablet 0    triamcinolone 0.1 % External Cream Apply topically 2 (two) times daily. (Patient not taking: Reported on 9/21/2023) 45 g 0    metFORMIN  MG Oral Tablet 24 Hr Take 1 tablet (500 mg total) by mouth daily. 90 tablet 1    pantoprazole 40 MG Oral Tab EC Take 1 tablet (40 mg total) by mouth every morning before breakfast. To help with stomach acid and stomach irritation/inflammation 90 tablet 3    levothyroxine 50 MCG Oral Tab Take 1 tablet (50 mcg total) by mouth before breakfast. 90 tablet 3    gabapentin 300 MG Oral Cap Take 1 capsule (300 mg total) by mouth 3 (three) times daily. 2 tabs at night 1 in the morning 270 capsule 1    fluticasone propionate 50 MCG/ACT Nasal Suspension 2 sprays by Each Nare route daily for 360 doses. 3 each 3    ALPRAZolam 0.25 MG Oral Tab Take 1 tablet (0.25 mg total) by mouth nightly as needed for Anxiety.  30 tablet 1       Allergies:    Cymbalta [Duloxetin*    SHORTNESS OF BREATH, ANXIETY  Other                   SHORTNESS OF BREATH    Comment:SOME TOPICAL NUMBING SPRAY USED WHEN STARTING AN             I.V. - cough and shortness of breath  Sulfa Antibiotics       HIVES, RASH  Tramadol                ITCHING    Comment:THROAT ITCHING AND FEELS LIKE IT WAS CLOSING  Ciprofloxacin           RASH  Acetaminophen           OTHER (SEE COMMENTS) Comment:Fatty Liver    ROS:     ROS    Positive for: Eyes  Negative for: Constitutional, Gastrointestinal, Neurological, Skin, Genitourinary, Musculoskeletal, HENT, Endocrine, Cardiovascular, Respiratory, Psychiatric, Allergic/Imm, Heme/Lymph  Last edited by Pastor Janie OT on 9/28/2023  9:16 AM.          PHYSICAL EXAM:     Base Eye Exam       Visual Acuity (Snellen - Linear)         Right Left    Dist cc 20/20 -1 20/20 -2      Correction: Glasses              Pupils         Pupils    Right PERRL    Left PERRL                  Additional Tests       Amsler         Right Left     Normal Normal                  Slit Lamp and Fundus Exam       Slit Lamp Exam         Right Left    Lids/Lashes Dermatochalasis, Meibomian gland dysfunction, Permanent eyeliner upper lid Dermatochalasis, Meibomian gland dysfunction, Permanent eyeliner, Permanent eyeliner upper lid    Conjunctiva/Sclera Nasal pinguecula, non-injected Nasal pinguecula, non-injected    Cornea Clear, no fluorescein stain Clear, no fluorescein stain    Anterior Chamber Deep and quiet Deep and quiet    Iris Normal Normal              Fundus Exam         Right Left    Disc Good rim, Temporal crescent Good rim, Temporal crescent    C/D Ratio 0.4 0.4                  Refraction       Wearing Rx         Sphere Cylinder Axis    Right +0.50 +1.00 100    Left +0.50 +0.75 080      Type: Single vision                     ASSESSMENT/PLAN:     Diagnoses and Plan:     History of corneal abrasion  Patient had surgery of her neck, complained of pain and burning upon waking up from surgery. Probable corneal abrasion OS which has resolved. Recommend artificial tears in the left eye 4-5 times a day    Will see patient as needed. No orders of the defined types were placed in this encounter.       Meds This Visit:  Requested Prescriptions      No prescriptions requested or ordered in this encounter        Follow up instructions:  Return if symptoms worsen or fail to improve. 9/28/2023  Scribed by: Jorge Luis Telles MD        If you have questions, please do not hesitate to call me. I look forward to following Dorys Thomas along with you.     Sincerely,        Jorge Luis Telles MD        CC:   No Recipients    Document electronically generated by: Jorge Luis Telles MD

## (undated) NOTE — MR AVS SNAPSHOT
[facility]  [address] [place] [address]  [phone]               Thank you for choosing us for your health care visit with Esperanza Hayward.  [facility], MD.  We are glad to serve you and happy to provide you with this summary of your vis Take 1 tablet (200 mg total) by mouth 3 (three) times daily as needed for Pain.    Commonly known as:  PYRIDIUM                Where to Get Your Medications      These medications were sent to [address] [place] [address]. [phone], [email or web address]

## (undated) NOTE — MR AVS SNAPSHOT
[facility]  [address] [place] [address] [phone]               Thank you for choosing us for your health care visit with Adele Rutherford MD.  We are glad to serve you and happy to provide you with this summary of your vis Take 500 mg by mouth every 6 (six) hours as needed for Pain. Commonly known as:  TYLENOL EXTRA STRENGTH           ALPRAZolam 0.25 MG Tabs   Take 1 tablet (0.25 mg total) by mouth nightly as needed for Sleep.    Commonly known as:  Vickie Bill

## (undated) NOTE — LETTER
01/24/24    Dear Dr. Manoj Weaver DO        Thank you for referring your patient, Mirtha Silver to me for an evaluation.  Please see my initial consult note enclosed below.  Let me know if you have any questions.    Thank you  Brad Gillis MD, Neurology  [facility]  Pager [phone]  Office:    [address] [place] [id number]  [phone]      1/24/2024    [facility] New Patient / Consult Visit    Mirtha Silver is a 43 year old female.                         Referring MD: Manoj Weaver DO      Chief Complaint   Patient presents with    Neurologic Problem     Referred by PCP, C/O memory changes, notes forgetfulness/recall challenges       HPI:    Mirtha Silver is a 43 year old, who presents for evaluation of memory loss.   She states she has been forgetting things for \"a while\" and states she tries to remember but cannot remember and sometimes goes into the kitchen and is not able to recall why why she was going to the kitchen but recalls this later.  She is not able to explain how long these occur or how long she had these symptoms in general; denies auras of odd tastes, smells or sounds.    She notes her  and kids have noted she asks questions several times. She denies episodes of spontaneous speech arrest; denies numbness/tingling on one side of her body aside from numbness in legs R more than L due to lumbar surgery; has cervical surgery as well and tlngling in both arms as well; sometimes misplaces objects at home; notes more anxiety in the past year;     She has history of anxiety and been on medications for ~15 yrs but been taking less frequently only taking as needed.        Headache: endorses some headaches which she notes are related to stress; no associated nausea but some light sensitivity.       Otherwise, patient denies any recent weight change, fevers, chills, nausea, double vision/ blurry vision / loss of vision,  chest pain, palpitations, shortness of breath, rashes, joint pains, bowel / bladder incontinence or mood issues.     Past Medical History:   Diagnosis Date    Anxiety state     Back problem     Calculus of kidney     Depression     Disorder of liver     FATTY LIVER     Disorder of thyroid     hypothyroid    Endometriosis     Esophageal reflux     High cholesterol     History of Papanicolaou smear of cervix 2015    Hyperlipidemia 2013    IBS (irritable bowel syndrome)     Migraines     Per [facility] - Migraine headaches    Osteoarthritis     Ovarian cyst     Postpartum depression     Prediabetes     Pregnancy 2002, , 2008        Spondylolisthesis of lumbosacral region 2017    Visual impairment     WEARS GLASSES    Vitamin B12 deficiency     Vitamin D deficiency      Past Surgical History:   Procedure Laterality Date    CERVICAL SPINE SURGERY  2023    C5-C6 Arthroplasty     CHOLECYSTECTOMY      COLONOSCOPY  2004    CYST REMOVAL  ,     laporoscopic ovarian cytectomy    HYSTERECTOMY      LAPAROSCOPY,DIAGNOSTIC  2016    laparoscopy, filshie clip sterilization of left fallopian tube, ablation of endometriosis, retrieval and removal of displaced filshie clip    LIPOMA REMOVAL Left 2018    RUSSELL BIOPSY STEREO NODULE 1 SITE LEFT (CPT=19081)  05/10/2022    top hat    RUSSELL LOCALIZATION WIRE 1 SITE LEFT (CPT=19281)  2022    OTHER  2019    low back surgery with metal implant    TOTAL ABDOMINAL HYSTERECTOMY N/A 2017    Procedure: ABDOMINAL HYSTERECTOMY;  Surgeon: Juvencio Decker MD;  Location: [facility] MAIN OR     Social History     Socioeconomic History    Marital status:     Number of children: 3   Tobacco Use    Smoking status: Former     Types: Cigarettes    Smokeless tobacco: Never   Vaping Use    Vaping Use: Never used   Substance and Sexual Activity    Alcohol use: Not Currently    Drug use: No    Sexual activity: Yes     Birth control/protection:  Hysterectomy   Other Topics Concern    Caffeine Concern No     Comment: 1 cup coffee daily    Exercise No   Social History Narrative    The patient does not use an assistive device..      The patient does live in a home with stairs.     Social Determinants of Health     Financial Resource Strain: Low Risk  (9/21/2023)    Financial Resource Strain     Difficulty of Paying Living Expenses: Not hard at all     Med Affordability: No   Transportation Needs: No Transportation Needs (9/21/2023)    Transportation Needs     Lack of Transportation: No     Family History   Problem Relation Age of Onset    Diabetes Father         Type 2    Hypertension Mother     Lipids Mother         Hyperlipidemia    Diabetes Mother         Type 2    Diabetes Sister     Hypertension Sister     Lipids Brother     Glaucoma Neg     Macular degeneration Neg        Allergies:  Allergies   Allergen Reactions    Cymbalta [Duloxetine Hcl] SHORTNESS OF BREATH and ANXIETY    Other SHORTNESS OF BREATH     SOME TOPICAL NUMBING SPRAY USED WHEN STARTING AN I.V. - cough and shortness of breath      Sulfa Antibiotics HIVES and RASH    Tramadol ITCHING     THROAT ITCHING AND FEELS LIKE IT WAS CLOSING     Ciprofloxacin RASH    Acetaminophen OTHER (SEE COMMENTS)     Fatty Liver      Current Meds:  Current Outpatient Medications   Medication Sig Dispense Refill    metFORMIN  MG Oral Tablet 24 Hr Take 1 tablet (500 mg total) by mouth daily with breakfast.      fluticasone propionate 50 MCG/ACT Nasal Suspension 2 sprays by Each Nare route daily for 360 doses. 3 each 3    montelukast (SINGULAIR) 10 MG Oral Tab Take 1 tablet (10 mg total) by mouth nightly. 90 tablet 1    levocetirizine 5 MG Oral Tab Take 1 tablet (5 mg total) by mouth every morning. 90 tablet 0    tiZANidine 4 MG Oral Tab Take 1 tablet (4 mg total) by mouth 3 (three) times daily. For pain and muscle relaxation. 90 tablet 1    gabapentin 300 MG Oral Cap Take 1 capsule (300 mg total) by mouth 3  (three) times daily. 2 tabs at night 1 in the morning 270 capsule 3    pantoprazole 40 MG Oral Tab EC Take 1 tablet (40 mg total) by mouth every morning before breakfast. To help with stomach acid and stomach irritation/inflammation 90 tablet 3    levothyroxine 50 MCG Oral Tab Take 1 tablet (50 mcg total) by mouth before breakfast. 90 tablet 3    ALPRAZolam 0.25 MG Oral Tab Take 1 tablet (0.25 mg total) by mouth nightly as needed for Anxiety. 30 tablet 1    Tirzepatide-Weight Management (ZEPBOUND) 2.5 MG/0.5ML Subcutaneous Solution Auto-injector Inject 2.5 mg into the skin once a week. 3 mL 2    Diethylpropion HCl 25 MG Oral Tab Take 25 mg by mouth in the morning and 25 mg before bedtime. 120 tablet 3          ROS:   A comprehensive 10 point review of systems was completed.  Pertinent positives and negatives noted in the the HPI.      PHYSICAL EXAM:   /80 (BP Location: Left arm, Patient Position: Sitting, Cuff Size: adult)   Pulse 85   Resp 16   Ht 62\"   Wt 171 lb (77.6 kg)   LMP 03/22/2017   BMI 31.28 kg/m²   Estimated body mass index is 31.28 kg/m² as calculated from the following:    Height as of this encounter: 62\".    Weight as of this encounter: 171 lb (77.6 kg).    GENERAL: well developed, well nourished, in no apparent distress  SKIN: no rashes  EYES: sclera anicteric, conjunctiva normal  HEENT: normocephalic  CARDIOVASCULAR: S1, S2 normal, RRR  LUNGS: clear to auscultation bilaterally  EXTREMITIES: no cyanosis, peripheral pulses intact    Neck: Supple; full range of motion; no carotid bruits    Mental status:  Alert and oriented to time, place, person, and situation  Speech: fluent  Language: see detailed testing as below  Memory: see detailed testing as below  Attention/concentration: see detailed testing as below    MOCA: 26/30  Visuospatial/executive: 4/5 (did not understand trails test and unable to do even with cues - may be limited by language)   Naming: 3/3  Atttention: 4/6 ( serial 7s 93  [phone] 61)  Language: 3/3  Abstraction: 2/2  Delayed recall: 4/5   Orientaton: 6/6    Fundoscopic Exam: optic discs sharp bilaterally    Cranial Nerves: II through XII  Optic:    Pupils: equally round and reactive to light with direct and consensual responses, normal accomodation   Visual acuity: Normal              Visual fields: Normal  Oculomotor/Trochlear/Abducens:    Eye Movements: EOMI without nystagmus  Trigeminal:   Facial sensation:intact to light touch bilaterally  Facial:   Smile symmetric, eyebrow raise symmetric  Vestibulocochlear:   Hearing: normal bilaterally  Glossopharyngeal/Vagus:   Palate elevates symmetrically with midline uvula  Spinal accessory:   Shoulder Shrug: normal bilaterally   Lateral head turn: normal bilaterally  Hypoglossal:   Tongue movement: protrusion is midline with normal lateral movements    Motor System:  Strength: 5/5 throughout  Tone: normal    Sensory:  Pin is normal  Vibration is normal  Proprioception is normal  Romberg is absent    Coordination:  Finger to nose normal bilaterally  Rapid alternating movements normal bilaterally  Heel to shin is normal bilaterally    DTRs:   2+, symmetric, throughout, toes downgoing bilaterally; no clonus            Gait:  Normal casual, heel, toe and tandem gait    TEST RESULTS/DATA REVIEWED:       IMPRESSION AND PLAN:   Mirtha Silver is a 43 year old female with who presents for evaluation of memory loss.   She states she has been forgetting things for \"a while\" and states she tries to remember but cannot remember and sometimes goes into the kitchen and is not able to recall why why she was going to the kitchen but recalls this later.  She is not able to explain how long these occur or how long she had these symptoms in general; denies auras of odd tastes, smells or sounds.    She notes her  and kids have noted she asks questions several times. She denies episodes of spontaneous speech arrest; denies numbness/tingling on one  side of her body aside from numbness in legs R more than L due to lumbar surgery; has cervical surgery as well and tlngling in both arms as well; sometimes misplaces objects at home; notes more anxiety in the past year;     She has history of anxiety and been on medications for ~15 yrs but been taking less frequently only taking as needed.          In terms of exam, she has a normal non-focal neurologic examination; cognitive evaluation within normal range with MOCA screening score 26/30 (visuospatial/executive function 4/5, naming 3/3, attention 4/6, language 3/3, abstraction 2/2, delayed recall 4/5, orientation 6/6), with most notable deficits in attention / concentration.  Overall, symptoms seem more related to anxiety than dementia but could have mild cognitive impairment; in addition, will check for reversible causes of memory loss with B12 level.     For workup of her cognitive changes, headache and episodes of decreased attentiveness, will check MRI brain to evaluate for secondary pathology and/or subclinical strokes and will check EEG to rule out subclinical seizures.      Given the mild findings on exam at this time, it was discussed with patient that she did not need any medication at this time. She was advised that we would wait until further workup is obtained and was advised of the benefits of cardiovascular health as well as cognitive exercise and the importance of maintaining social engagements in reducing the risk of dementia. We will continue to monitor with serial exams, and may consider additional detailed neuropsychological testing as indicated, pending results of these investigations.      1. Spells of decreased attentiveness  As noted above   - Vitamin B12; Future  - MRI BRAIN (W+WO) (CPT=70553) [[id number]]; Future  - EEG (At [facility]); Future    2. Cognitive changes  As noted above   - Vitamin B12; Future  - MRI BRAIN (W+WO) (CPT=70553) [[id number]]; Future  - EEG (At [facility]);  Future    Return in about 3 months (around 4/22/2024).    Copy of note was sent to referring physician.      Brad Gillis MD, Neurology  [facility]  Pager [phone]  1/22/2024

## (undated) NOTE — LETTER
24          Mirtha Silver  :  1980      To Whom It May Concern:    This patient was seen in our office on 24 .  Work status:  May return to work full-time, no restrictions    May return to work status per above effective ***.    If this office may be of further assistance, please do not hesitate to contact us.      Sincerely,        Uli MARTINEZ MA

## (undated) NOTE — LETTER
18          Mita Aguilar  :  1980      To Whom It May Concern: This patient was seen in our office on 18. She should be excused from work for this day.  She may resume work on Monday, 6/10/2018    If this office may be of further

## (undated) NOTE — LETTER
12/4/2018          To Whom It May Concern:    Ilana Sutton is currently under my medical care and is scheduled for a surgical procedure on 12/19/2018. If you require additional information please contact our office.         Sincerely,    JACINTA Gandara

## (undated) NOTE — LETTER
2/12/2018              [address] [facility] [address] [facility] [address]         To whom it may concern,    Adrian Cason is currently a patient under my medical care. Patient was seen today for illness.   She needs to take alan

## (undated) NOTE — IP AVS SNAPSHOT
[address] [facility] [phone]                Discharge Summary   4/5/2017    Dean Medico           Admission Information        Provider Department    4/5/2017 Chey Frazier MD [facility] 4 Take 40 mg by mouth daily. NEXT DOSE TOMORROW MORNING                   Melatonin 1 MG Caps        Take 2 mg by mouth daily.          NEXT DOSE TOMORROW MORNING AS PREVIOUSLY TAKING BEFORE                     STOP taking these medications     aceta All belongings returned to patient at discharge Pt's bedside belongings    Medications Sent Home None to return    Medications Returned:           Additional Information       We are concerned for your overall well being:    - If you are a smoker or have s GI Medications     famoTIDine 20 MG Oral Tab       Use:  Nausea/vomiting, acid reflux, low bowel motility, stomach pain   Most common side effects:  Depends on the specific medication but generally include: diarrhea, constipation, headache, allergic reacti

## (undated) NOTE — LETTER
5/10/2019          To Whom It May Concern:    Nikolai Go is currently under my medical care and may not return to work at this time. Please excuse Jose Fee for 2 days 05/10/19 & 05/13/19  She may return to work on 05/14/19.   Activity is restricted a

## (undated) NOTE — LETTER
Date & Time: 4/4/2018, 6:54 PM  Patient: Pallavi Vu  Attending Provider:    Sincerely,    Erick Quevedo MD         To Whom It May Concern:    Ericka Bennett was seen and treated in our department on 4/4/2018.  She should not return to work

## (undated) NOTE — MR AVS SNAPSHOT
[address]  [address] [facility]  [phone]               Thank you for choosing us for your health care visit with Leanna Ahumada MD.  We are glad to serve you and happy to provide you with this summ Take 1 tablet (10 mg total) by mouth every 12 (twelve) hours as needed for Pain. Commonly known as:  TORADOL           Melatonin 1 MG Caps   Take 2 mg by mouth daily.            tramadol-acetaminophen 37.5-325 MG Tabs   Take 1 tablet by mouth every 6 (six

## (undated) NOTE — MR AVS SNAPSHOT
[address]  [address] [facility]  [phone]               Thank you for choosing us for your health care visit with Conchis Da Silva MD.  We are glad to serve you and happy to provide you with this summ Vitamin B 12 250 MCG Lozg   Take 1 tablet by mouth daily. Vitamin D3 21516 units Caps   Take 1 tablet by mouth once a week.                    Sumit                  Visit [facility] online at  [email or web address]

## (undated) NOTE — LETTER
10/10/24      Patient: Mirtha Silver  : 1980 Visit date: 10/10/2024    Dear Manoj,      I examined your patient in consultation today.    Although she has mild carpal tunnel syndrome on electrodiagnostic studies, she has soft findings on physical exam regarding her carpal tunnel.  Her main issue is significant bilateral hand pain and stiffness.  I think she has synovitis and may be some underlying arthritis.  I have recommended that she see rheumatology for further evaluation and treatment.    Thank you for your kind referral. If I may answer any questions, please feel free to contact me.     Sincerely,   Terrance Evans MD     CC: No Recipients

## (undated) NOTE — LETTER
AUTHORIZATION FOR SURGICAL OPERATION OR OTHER PROCEDURE    1.  I hereby authorize Dr. Raj Cueto and the [facility] staff assigned to my case to perform the following operation and/or procedure at the [facility]:     Right carpal tunnel steroid injection under of Physician  My signature below affirms that prior to the time of the procedure, I have explained to the patient and/or his/her guardian, the risks and benefits involved in the proposed treatment and any reasonable alternative to the proposed treatment.

## (undated) NOTE — LETTER
8/13/2019          To Whom It May Concern:    Esther Beckham is currently under my medical care and may not return to work at this time. She may return to work on 8/15/19. Activity is restricted as follows: none.     If you require additional informa

## (undated) NOTE — Clinical Note
Dear Shayne,  I had the opportunity to see your patient Mirtha Silver for an EMG recently. I appreciate your confidence in me to care for your patients. Please feel free call me with any questions at [phone] or contact me through Epic.  Sincerely, Cyril Bosch MD Board Certified, Physical Medicine and Rehabilitation Specializing in Sports Medicine, Spine Medicine and Electrodiagnostic Medicine [facility]

## (undated) NOTE — LETTER
17          Mita Aguilar  :  1980      To Whom It May Concern: This patient was seen in our office on 17 . Work status: May return to work full-time, no restrictions    May return to work status per above effective 10/2/17.

## (undated) NOTE — MR AVS SNAPSHOT
[facility]  [address] [place] [address]  [phone]               Thank you for choosing us for your health care visit with Elmo Fuentes.  Margie De La Torre MD.  We are glad to serve you and happy to provide you with this summary of your vis Nitrofurantoin Monohyd Macro 100 MG Caps   Take 1 capsule (100 mg total) by mouth 2 (two) times daily.    Commonly known as:  MACROBID           Phenazopyridine HCl 200 MG Tabs   Take 1 tablet (200 mg total) by mouth 3 (three) times daily as needed for [employer]

## (undated) NOTE — LETTER
24          Mirtha Silver  :  1980      To Whom It May Concern:    This patient was seen in our office on 24 .  Work status:  May return to work full-time, no restrictions    May return to work status per above effective 24.    If this office may be of further assistance, please do not hesitate to contact us.      Sincerely,        Hema Das, DO

## (undated) NOTE — Clinical Note
Candelario,  I met with Ana Davis in clinic today for weight loss/management. I have recommended intensive lifestyle/behavioral modifications for weight loss.  In addition, I have recommended duloxetine with very careful LFT and CNS/respiratory monitoring to hopeful

## (undated) NOTE — LETTER
1/22/2018          To Whom It May Concern:    Maxime Lambert is currently under my medical care and may return to work at this time. She may return to work on 01/22/18.   Activity is restricted as follows: please allow for alternating jobs standing, s

## (undated) NOTE — LETTER
6/1/2020              [address] [facility] [address]         To whom it may concern,    Bria Balderas is currently a patient under my medical care.   Patient at times has an intermittent cough but this is when she gets

## (undated) NOTE — LETTER
12/14/19    [address] [address]      Dear Karen Canela records indicate that you have outstanding lab work and or testing that was ordered for you and has not yet been completed:  Orders Placed This Encounter      CBC With

## (undated) NOTE — LETTER
11/13/2019          To Whom It May Concern:    Rohan Norris is currently under my medical care and may not return to work at this time. Please excuse Stephanie Stewart for 2 days. She may return to work on 11/15/19. Activity is restricted as follows: none.

## (undated) NOTE — LETTER
[address] [place] [address]      Authorization for Surgical Operation and Procedure     Date:___________                                                                                                         Time:__________  1. I hereby authorize                                      , my physician and his/her assistants (if applicable), which may include medical students, residents, and/or fellows, to perform the following surgical operation/ procedure and administer such anesthesia as may be determined necessary by my physician:  Operation/Procedure name (s)  Left breast  needle localization   on Dontrell Fears   2. I recognize that during the surgical operation/procedure, unforeseen conditions may necessitate additional or different procedures than those listed above. I, therefore, further authorize and request that the above-named surgeon, assistants, or designees perform such procedures as are, in their judgment, necessary and desirable. 3.   My surgeon/physician has discussed prior to my surgery the potential benefits, risks and side effects of this procedure; the likelihood of achieving goals; and potential problems that might occur during recuperation. They also discussed reasonable alternatives to the procedure, including risks, benefits, and side effects related to the alternatives and risks related to not receiving this procedure. I have had all my questions answered and I acknowledge that no guarantee has been made as to the result that may be obtained. 4.   Should the need arise during my operation or immediate post-operative period, I also consent to the administration of blood and/or blood products. Further, I understand that despite careful testing and screening of blood or blood products by collecting agencies, I may still be subject to ill effects as a result of receiving a blood transfusion and/or blood products.   The following are some, but not all, of the potential risks that can occur: fever and allergic reactions, hemolytic reactions, transmission of diseases such as Hepatitis, AIDS and Cytomegalovirus (CMV) and fluid overload. In the event that I wish to have an autologous transfusion of my own blood, or a directed donor transfusion. I will discuss this with my physician. 5.   I authorize the use of any specimen, organs, tissues, body parts or foreign objects that may be removed from my body during the operation/procedure for diagnosis, research or teaching purposes and their subsequent disposal by hospital authorities. I also authorize the release of specimen test results and/or written reports to my treating physician on the hospital medical staff or other referring or consulting physicians involved in my care, at the discretion of the Pathologist or my treating physician. 6.   I consent to the photographing or videotaping of the operations or procedures to be performed, including appropriate portions of my body for medical, scientific, or educational purposes, provided my identity is not revealed by the pictures or by descriptive texts accompanying them. If the procedure has been photographed/videotaped, the surgeon will obtain the original picture, image, videotape or CD. The hospital will not be responsible for storage, release or maintenance of the picture, image, tape or CD.    7.   I consent to the presence of a  or observers in the operating room as deemed necessary by my physician or their designees. 8.   I recognize that in the event my procedure results in extended X-Ray/fluoroscopy time, I may develop a skin reaction. 9. If I have a Do Not Attempt Resuscitation (DNAR) order in place, that status will be suspended while in the operating room, procedural suite, and during the recovery period unless otherwise explicitly stated by me (or a person authorized to consent on my behalf).  The surgeon or my attending physician will determine when the applicable recovery period ends for purposes of reinstating the DNAR order. 10. Patients having a sterilization procedure: I understand that if the procedure is successful the results will be permanent and it will therefore be impossible for me to inseminate, conceive, or bear children. I also understand that the procedure is intended to result in sterility, although the result has not been guaranteed. 11. I acknowledge that my physician has explained sedation/analgesia administration to me including the risk and benefits I consent to the administration of sedation/analgesia as may be necessary or desirable in the judgment of my physician. I CERTIFY THAT I HAVE READ AND FULLY UNDERSTAND THE ABOVE CONSENT TO OPERATION and/or OTHER PROCEDURE.    _________________________________________  __________________________________  Signature of Patient     Signature of Responsible Person         ___________________________________         Printed Name of Responsible Person           _________________________________                  Relationship to Patient  _________________________________________  ______________________________  Signature of Witness          Date  Time    STATEMENT OF PHYSICIAN My signature below affirms that prior to the time of the procedure; I have explained to the patient and/or his/her legal representative, the risks and benefits involved in the proposed treatment and any reasonable alternative to the proposed treatment. I have also explained the risks and benefits involved in refusal of the proposed treatment and alternatives to the proposed treatment and have answered the patient's questions. If I have a significant financial interest in a co-management agreement or a significant financial interest in any product or implant, or other significant relationship used in this procedure/surgery, I have disclosed this and had a discussion with my patient. _______________________________________________________________ _____________________________  Aldon Kawasaki  Physician)                                                                                         (Date)                                   (Time)        Patient Name: Lakshmi Fernando    : 1980   Printed: 2022      Medical Record #: [id number]                                              Page 1 of 1

## (undated) NOTE — LETTER
8/13/2019          To Whom It May Concern:    Harjeet Corral is currently under my medical care and may not return to work at this time. Please excuse Jillian Music for 1 days. She may return to work on 8/13/19. Activity is restricted as follows: none.

## (undated) NOTE — LETTER
2/21/2019              Mirtha CUEVAS [address] [place] [address]         To whom it may concern,    Jose Anderson is currently a patient under my medical care. Patient was seen today and is in discomfort.   She will have to be o

## (undated) NOTE — ED AVS SNAPSHOT
[facility] Emergency Department    [address]     [place] [address]    Phone:  [phone]    Fax:  [phone]           Yandy Madhavi   MRN: [id number]    Department:  [facility] Emergency Department   Date of Visit:  4/27 covered by your plan. Please contact your insurance company to determine coverage and benefits available for follow-up care and referrals.       If you have difficulty scheduling your follow-up appointment as directed, please call our  at ([phone]) If you believe that any of the medications or instructions on this list is different from what your Primary Care doctor has instructed you - please continue to take your medications as instructed by your Primary Care doctor until you can check with your do coverage. Patient [address] is a Federal [employer]igator program that can help with your Affordable Care Act coverage, as well as all types of Medicaid plans.   To get signed up and covered, please call [phone] and ask to get set up for an insuran

## (undated) NOTE — LETTER
AUTHORIZATION FOR SURGICAL OPERATION OR OTHER PROCEDURE    1.  I hereby authorize Dr. Altagracia Talavera and the [facility] staff assigned to my case to perform the following operation and/or procedure at the [facility]:    Right carpal tunnel steroid injection ___________________________________________________             Relationship to Patient:           []  Parent    Responsible person                          []  Spouse  In case of minor or                    [] Other  _____________   Incompetent name:  ___

## (undated) NOTE — LETTER
Date & Time: 4/11/2019, 10:26 PM  Patient: Ilana Sutton  Encounter Provider(s):    Dorian Narayanan MD       To Whom It May Concern:    Lorne Mcdermott was seen and treated in our department on 4/11/2019. She should not return to work until 4/15/19.     If